# Patient Record
Sex: FEMALE | Race: OTHER | Employment: OTHER | URBAN - METROPOLITAN AREA
[De-identification: names, ages, dates, MRNs, and addresses within clinical notes are randomized per-mention and may not be internally consistent; named-entity substitution may affect disease eponyms.]

---

## 2017-01-05 PROCEDURE — 82728 ASSAY OF FERRITIN: CPT | Performed by: INTERNAL MEDICINE

## 2017-01-05 PROCEDURE — 82746 ASSAY OF FOLIC ACID SERUM: CPT | Performed by: INTERNAL MEDICINE

## 2017-01-05 PROCEDURE — 83540 ASSAY OF IRON: CPT | Performed by: INTERNAL MEDICINE

## 2017-01-05 PROCEDURE — 83550 IRON BINDING TEST: CPT | Performed by: INTERNAL MEDICINE

## 2017-01-05 PROCEDURE — 82607 VITAMIN B-12: CPT | Performed by: INTERNAL MEDICINE

## 2017-01-05 PROCEDURE — 85045 AUTOMATED RETICULOCYTE COUNT: CPT | Performed by: INTERNAL MEDICINE

## 2017-01-13 ENCOUNTER — APPOINTMENT (OUTPATIENT)
Dept: CT IMAGING | Age: 57
DRG: 389 | End: 2017-01-13
Attending: STUDENT IN AN ORGANIZED HEALTH CARE EDUCATION/TRAINING PROGRAM
Payer: COMMERCIAL

## 2017-01-13 ENCOUNTER — HOSPITAL ENCOUNTER (INPATIENT)
Facility: HOSPITAL | Age: 57
LOS: 3 days | Discharge: HOME | DRG: 389 | End: 2017-01-17
Attending: STUDENT IN AN ORGANIZED HEALTH CARE EDUCATION/TRAINING PROGRAM | Admitting: HOSPITALIST
Payer: COMMERCIAL

## 2017-01-13 DIAGNOSIS — K56.609 SBO (SMALL BOWEL OBSTRUCTION) (HCC): Primary | ICD-10-CM

## 2017-01-13 DIAGNOSIS — R10.9 ABDOMINAL PAIN, ACUTE: ICD-10-CM

## 2017-01-13 LAB
ALBUMIN SERPL-MCNC: 3.8 G/DL (ref 3.5–4.8)
ALP LIVER SERPL-CCNC: 119 U/L (ref 46–118)
ALT SERPL-CCNC: 27 U/L (ref 14–54)
AST SERPL-CCNC: 31 U/L (ref 15–41)
BASOPHILS # BLD AUTO: 0.06 X10(3) UL (ref 0–0.1)
BASOPHILS NFR BLD AUTO: 0.6 %
BILIRUB SERPL-MCNC: 0.5 MG/DL (ref 0.1–2)
BILIRUB UR QL STRIP.AUTO: NEGATIVE
BUN BLD-MCNC: 14 MG/DL (ref 8–20)
CALCIUM BLD-MCNC: 9.1 MG/DL (ref 8.3–10.3)
CHLORIDE: 106 MMOL/L (ref 101–111)
CO2: 29 MMOL/L (ref 22–32)
COLOR UR AUTO: YELLOW
CREAT BLD-MCNC: 0.86 MG/DL (ref 0.55–1.02)
EOSINOPHIL # BLD AUTO: 0.51 X10(3) UL (ref 0–0.3)
EOSINOPHIL NFR BLD AUTO: 5.4 %
ERYTHROCYTE [DISTWIDTH] IN BLOOD BY AUTOMATED COUNT: 13.4 % (ref 11.5–16)
GLUCOSE BLD-MCNC: 105 MG/DL (ref 70–99)
GLUCOSE UR STRIP.AUTO-MCNC: NEGATIVE MG/DL
HCT VFR BLD AUTO: 45.5 % (ref 34–50)
HGB BLD-MCNC: 14.8 G/DL (ref 12–16)
IMMATURE GRANULOCYTE COUNT: 0.02 X10(3) UL (ref 0–1)
IMMATURE GRANULOCYTE RATIO %: 0.2 %
KETONES UR STRIP.AUTO-MCNC: NEGATIVE MG/DL
LEUKOCYTE ESTERASE UR QL STRIP.AUTO: NEGATIVE
LYMPHOCYTES # BLD AUTO: 3.07 X10(3) UL (ref 0.9–4)
LYMPHOCYTES NFR BLD AUTO: 32.6 %
M PROTEIN MFR SERPL ELPH: 7.4 G/DL (ref 6.1–8.3)
MCH RBC QN AUTO: 30.3 PG (ref 27–33.2)
MCHC RBC AUTO-ENTMCNC: 32.5 G/DL (ref 31–37)
MCV RBC AUTO: 93 FL (ref 81–100)
MONOCYTES # BLD AUTO: 0.57 X10(3) UL (ref 0.1–0.6)
MONOCYTES NFR BLD AUTO: 6.1 %
NEUTROPHIL ABS PRELIM: 5.18 X10 (3) UL (ref 1.3–6.7)
NEUTROPHILS # BLD AUTO: 5.18 X10(3) UL (ref 1.3–6.7)
NEUTROPHILS NFR BLD AUTO: 55.1 %
NITRITE UR QL STRIP.AUTO: NEGATIVE
PH UR STRIP.AUTO: 5 [PH] (ref 4.5–8)
PLATELET # BLD AUTO: 212 10(3)UL (ref 150–450)
POTASSIUM SERPL-SCNC: 4.9 MMOL/L (ref 3.6–5.1)
PROT UR STRIP.AUTO-MCNC: NEGATIVE MG/DL
RBC # BLD AUTO: 4.89 X10(6)UL (ref 3.8–5.1)
RBC UR QL AUTO: NEGATIVE
RED CELL DISTRIBUTION WIDTH-SD: 46.1 FL (ref 35.1–46.3)
SODIUM SERPL-SCNC: 142 MMOL/L (ref 136–144)
SP GR UR STRIP.AUTO: 1.02 (ref 1–1.03)
UROBILINOGEN UR STRIP.AUTO-MCNC: 0.2 MG/DL
WBC # BLD AUTO: 9.4 X10(3) UL (ref 4–13)

## 2017-01-13 PROCEDURE — 83690 ASSAY OF LIPASE: CPT | Performed by: STUDENT IN AN ORGANIZED HEALTH CARE EDUCATION/TRAINING PROGRAM

## 2017-01-13 PROCEDURE — 96376 TX/PRO/DX INJ SAME DRUG ADON: CPT

## 2017-01-13 PROCEDURE — 99285 EMERGENCY DEPT VISIT HI MDM: CPT

## 2017-01-13 PROCEDURE — 96361 HYDRATE IV INFUSION ADD-ON: CPT

## 2017-01-13 PROCEDURE — 96374 THER/PROPH/DIAG INJ IV PUSH: CPT

## 2017-01-13 PROCEDURE — 96375 TX/PRO/DX INJ NEW DRUG ADDON: CPT

## 2017-01-13 PROCEDURE — 80053 COMPREHEN METABOLIC PANEL: CPT

## 2017-01-13 PROCEDURE — 85025 COMPLETE CBC W/AUTO DIFF WBC: CPT

## 2017-01-13 PROCEDURE — 81003 URINALYSIS AUTO W/O SCOPE: CPT | Performed by: STUDENT IN AN ORGANIZED HEALTH CARE EDUCATION/TRAINING PROGRAM

## 2017-01-13 RX ORDER — HYDROMORPHONE HYDROCHLORIDE 1 MG/ML
0.5 INJECTION, SOLUTION INTRAMUSCULAR; INTRAVENOUS; SUBCUTANEOUS EVERY 30 MIN PRN
Status: DISCONTINUED | OUTPATIENT
Start: 2017-01-13 | End: 2017-01-14

## 2017-01-13 RX ORDER — ONDANSETRON 2 MG/ML
4 INJECTION INTRAMUSCULAR; INTRAVENOUS ONCE
Status: COMPLETED | OUTPATIENT
Start: 2017-01-13 | End: 2017-01-13

## 2017-01-13 RX ORDER — ONDANSETRON 2 MG/ML
4 INJECTION INTRAMUSCULAR; INTRAVENOUS ONCE
Status: DISCONTINUED | OUTPATIENT
Start: 2017-01-13 | End: 2017-01-17

## 2017-01-13 RX ORDER — SODIUM CHLORIDE 9 MG/ML
INJECTION, SOLUTION INTRAVENOUS ONCE
Status: COMPLETED | OUTPATIENT
Start: 2017-01-13 | End: 2017-01-14

## 2017-01-14 ENCOUNTER — APPOINTMENT (OUTPATIENT)
Dept: GENERAL RADIOLOGY | Facility: HOSPITAL | Age: 57
DRG: 389 | End: 2017-01-14
Attending: SURGERY
Payer: COMMERCIAL

## 2017-01-14 ENCOUNTER — APPOINTMENT (OUTPATIENT)
Dept: CT IMAGING | Age: 57
DRG: 389 | End: 2017-01-14
Attending: STUDENT IN AN ORGANIZED HEALTH CARE EDUCATION/TRAINING PROGRAM
Payer: COMMERCIAL

## 2017-01-14 PROBLEM — K56.609 SBO (SMALL BOWEL OBSTRUCTION) (HCC): Status: ACTIVE | Noted: 2017-01-14

## 2017-01-14 PROBLEM — R10.9 ABDOMINAL PAIN, ACUTE: Status: ACTIVE | Noted: 2017-01-14

## 2017-01-14 LAB
ALBUMIN SERPL-MCNC: 3.4 G/DL (ref 3.5–4.8)
ALP LIVER SERPL-CCNC: 144 U/L (ref 46–118)
ALT SERPL-CCNC: 64 U/L (ref 14–54)
AST SERPL-CCNC: 115 U/L (ref 15–41)
BASOPHILS # BLD AUTO: 0.05 X10(3) UL (ref 0–0.1)
BASOPHILS NFR BLD AUTO: 0.6 %
BILIRUB SERPL-MCNC: 0.4 MG/DL (ref 0.1–2)
BUN BLD-MCNC: 11 MG/DL (ref 8–20)
CALCIUM BLD-MCNC: 8.2 MG/DL (ref 8.3–10.3)
CHLORIDE: 109 MMOL/L (ref 101–111)
CO2: 29 MMOL/L (ref 22–32)
CREAT BLD-MCNC: 0.66 MG/DL (ref 0.55–1.02)
EOSINOPHIL # BLD AUTO: 0.26 X10(3) UL (ref 0–0.3)
EOSINOPHIL NFR BLD AUTO: 3.2 %
ERYTHROCYTE [DISTWIDTH] IN BLOOD BY AUTOMATED COUNT: 13.5 % (ref 11.5–16)
GLUCOSE BLD-MCNC: 103 MG/DL (ref 70–99)
HCT VFR BLD AUTO: 40 % (ref 34–50)
HGB BLD-MCNC: 13.2 G/DL (ref 12–16)
IMMATURE GRANULOCYTE COUNT: 0.03 X10(3) UL (ref 0–1)
IMMATURE GRANULOCYTE RATIO %: 0.4 %
LIPASE: 139 U/L (ref 73–393)
LYMPHOCYTES # BLD AUTO: 1.88 X10(3) UL (ref 0.9–4)
LYMPHOCYTES NFR BLD AUTO: 23.5 %
M PROTEIN MFR SERPL ELPH: 6.2 G/DL (ref 6.1–8.3)
MCH RBC QN AUTO: 30.8 PG (ref 27–33.2)
MCHC RBC AUTO-ENTMCNC: 33 G/DL (ref 31–37)
MCV RBC AUTO: 93.5 FL (ref 81–100)
MONOCYTES # BLD AUTO: 0.53 X10(3) UL (ref 0.1–0.6)
MONOCYTES NFR BLD AUTO: 6.6 %
NEUTROPHIL ABS PRELIM: 5.26 X10 (3) UL (ref 1.3–6.7)
NEUTROPHILS # BLD AUTO: 5.26 X10(3) UL (ref 1.3–6.7)
NEUTROPHILS NFR BLD AUTO: 65.7 %
PLATELET # BLD AUTO: 178 10(3)UL (ref 150–450)
POTASSIUM SERPL-SCNC: 4.1 MMOL/L (ref 3.6–5.1)
RBC # BLD AUTO: 4.28 X10(6)UL (ref 3.8–5.1)
RED CELL DISTRIBUTION WIDTH-SD: 45.9 FL (ref 35.1–46.3)
SODIUM SERPL-SCNC: 143 MMOL/L (ref 136–144)
WBC # BLD AUTO: 8 X10(3) UL (ref 4–13)

## 2017-01-14 PROCEDURE — 71010 XR CHEST AP PORTABLE  (CPT=71010): CPT

## 2017-01-14 PROCEDURE — 94640 AIRWAY INHALATION TREATMENT: CPT

## 2017-01-14 PROCEDURE — 80053 COMPREHEN METABOLIC PANEL: CPT | Performed by: INTERNAL MEDICINE

## 2017-01-14 PROCEDURE — 85025 COMPLETE CBC W/AUTO DIFF WBC: CPT | Performed by: INTERNAL MEDICINE

## 2017-01-14 PROCEDURE — 74177 CT ABD & PELVIS W/CONTRAST: CPT

## 2017-01-14 RX ORDER — OXYBUTYNIN CHLORIDE 10 MG/1
10 TABLET, EXTENDED RELEASE ORAL DAILY
Status: DISCONTINUED | OUTPATIENT
Start: 2017-01-14 | End: 2017-01-17

## 2017-01-14 RX ORDER — HYDROCODONE BITARTRATE AND ACETAMINOPHEN 5; 325 MG/1; MG/1
2 TABLET ORAL EVERY 4 HOURS PRN
Status: DISCONTINUED | OUTPATIENT
Start: 2017-01-14 | End: 2017-01-17

## 2017-01-14 RX ORDER — HYDROCODONE BITARTRATE AND ACETAMINOPHEN 5; 325 MG/1; MG/1
1 TABLET ORAL EVERY 4 HOURS PRN
Status: DISCONTINUED | OUTPATIENT
Start: 2017-01-14 | End: 2017-01-17

## 2017-01-14 RX ORDER — ONDANSETRON 2 MG/ML
4 INJECTION INTRAMUSCULAR; INTRAVENOUS EVERY 6 HOURS PRN
Status: DISCONTINUED | OUTPATIENT
Start: 2017-01-14 | End: 2017-01-17

## 2017-01-14 RX ORDER — MONTELUKAST SODIUM 10 MG/1
10 TABLET ORAL
Status: DISCONTINUED | OUTPATIENT
Start: 2017-01-14 | End: 2017-01-17

## 2017-01-14 RX ORDER — LOSARTAN POTASSIUM 100 MG/1
100 TABLET ORAL DAILY
Status: DISCONTINUED | OUTPATIENT
Start: 2017-01-14 | End: 2017-01-17

## 2017-01-14 RX ORDER — MORPHINE SULFATE 2 MG/ML
2 INJECTION, SOLUTION INTRAMUSCULAR; INTRAVENOUS EVERY 2 HOUR PRN
Status: DISCONTINUED | OUTPATIENT
Start: 2017-01-14 | End: 2017-01-17

## 2017-01-14 RX ORDER — BUPROPION HYDROCHLORIDE 100 MG/1
100 TABLET ORAL 2 TIMES DAILY
Status: DISCONTINUED | OUTPATIENT
Start: 2017-01-14 | End: 2017-01-17

## 2017-01-14 RX ORDER — FLUOXETINE HYDROCHLORIDE 20 MG/1
40 CAPSULE ORAL DAILY
Status: DISCONTINUED | OUTPATIENT
Start: 2017-01-14 | End: 2017-01-17

## 2017-01-14 RX ORDER — HEPARIN SODIUM 5000 [USP'U]/ML
7500 INJECTION, SOLUTION INTRAVENOUS; SUBCUTANEOUS EVERY 8 HOURS
Status: DISCONTINUED | OUTPATIENT
Start: 2017-01-14 | End: 2017-01-17

## 2017-01-14 RX ORDER — SODIUM CHLORIDE 9 MG/ML
INJECTION, SOLUTION INTRAVENOUS CONTINUOUS
Status: DISCONTINUED | OUTPATIENT
Start: 2017-01-14 | End: 2017-01-17

## 2017-01-14 RX ORDER — MORPHINE SULFATE 4 MG/ML
4 INJECTION, SOLUTION INTRAMUSCULAR; INTRAVENOUS EVERY 2 HOUR PRN
Status: DISCONTINUED | OUTPATIENT
Start: 2017-01-14 | End: 2017-01-17

## 2017-01-14 RX ORDER — ONDANSETRON 2 MG/ML
4 INJECTION INTRAMUSCULAR; INTRAVENOUS EVERY 4 HOURS PRN
Status: DISCONTINUED | OUTPATIENT
Start: 2017-01-14 | End: 2017-01-14

## 2017-01-14 RX ORDER — HYDROMORPHONE HYDROCHLORIDE 1 MG/ML
0.5 INJECTION, SOLUTION INTRAMUSCULAR; INTRAVENOUS; SUBCUTANEOUS EVERY 30 MIN PRN
Status: ACTIVE | OUTPATIENT
Start: 2017-01-14 | End: 2017-01-14

## 2017-01-14 RX ORDER — HEPARIN SODIUM 5000 [USP'U]/ML
5000 INJECTION, SOLUTION INTRAVENOUS; SUBCUTANEOUS EVERY 8 HOURS
Status: DISCONTINUED | OUTPATIENT
Start: 2017-01-14 | End: 2017-01-14

## 2017-01-14 RX ORDER — ALBUTEROL SULFATE 90 UG/1
2 AEROSOL, METERED RESPIRATORY (INHALATION) EVERY 4 HOURS PRN
Status: DISCONTINUED | OUTPATIENT
Start: 2017-01-14 | End: 2017-01-17

## 2017-01-14 RX ORDER — SODIUM CHLORIDE 9 MG/ML
INJECTION, SOLUTION INTRAVENOUS CONTINUOUS
Status: ACTIVE | OUTPATIENT
Start: 2017-01-14 | End: 2017-01-14

## 2017-01-14 RX ORDER — AMLODIPINE BESYLATE 5 MG/1
5 TABLET ORAL 2 TIMES DAILY
Status: DISCONTINUED | OUTPATIENT
Start: 2017-01-14 | End: 2017-01-16

## 2017-01-14 RX ORDER — MORPHINE SULFATE 2 MG/ML
1 INJECTION, SOLUTION INTRAMUSCULAR; INTRAVENOUS EVERY 2 HOUR PRN
Status: DISCONTINUED | OUTPATIENT
Start: 2017-01-14 | End: 2017-01-17

## 2017-01-14 RX ORDER — ACETAMINOPHEN 325 MG/1
650 TABLET ORAL EVERY 4 HOURS PRN
Status: DISCONTINUED | OUTPATIENT
Start: 2017-01-14 | End: 2017-01-17

## 2017-01-14 NOTE — PROGRESS NOTES
Brief Internal Medicine Note    Full Note to Follow    Pt is a 59 yo with HTN/HL, morbid obesity s/p gastric bypass, pseudoseizures, asthma, GERD, depression, who is admitted for abdominal pain.   Yesterday, started having n/bilious vomiting with intermit

## 2017-01-14 NOTE — PROGRESS NOTES
1/14/17 14 FR NG TUBE INSERTED IN LEFT NARE AND PLACED TO LIS SUCTION PER ORDER. SMALLER (14 FR) NG TUBE USED PER REQUEST OF DR TALAVERA RELATED TO PT HISTORY. PT TOLERATED WELL. NO SHORTNESS OF BREATH NOTED. CPOX REMAINS ON AND 02 SAT 97% ON RA.  SMALL AMOUNT

## 2017-01-14 NOTE — PROGRESS NOTES
NURSING ADMISSION NOTE      Patient admitted via cart via ambulance from [facility] er    Oriented to room. Safety precautions initiated. Bed in low position. Call light in reach.  at bedside. stephan protocol started.  placed on o2 at 2 l. decline

## 2017-01-14 NOTE — CONSULTS
[facility]  Report of Consultation    Samra Diallo Patient Status:  Inpatient    1960 MRN [id number]   [facility] 3NW-A Attending Louise Katz, *   Hosp Day # 1 PCP Michael Farr MD     Reason for [facility] UNDER STRESS   • Hearing impairment    • Muscle weakness    • Blood disorder      LOW IRON       Past Surgical History:        Past Surgical History    UPPER GI ENDOSCOPY,DIAGNOSIS  5/18/2005    Comment hiatal hernia, mild gastritis (H pylori (--))    SHADIA acetaminophen (TYLENOL) tab 650 mg, 650 mg, Oral, Q4H PRN **OR** HYDROcodone-acetaminophen (NORCO) 5-325 MG per tab 1 tablet, 1 tablet, Oral, Q4H PRN **OR** HYDROcodone-acetaminophen (NORCO) 5-325 MG per tab 2 tablet, 2 tablet, Oral, Q4H PRN  •  ondansetro Montelukast Sodium 10 MG Oral Tab Take 1 tablet (10 mg total) by mouth once daily.  Disp: 90 tablet Rfl: 2   FLUoxetine HCl 40 MG Oral Cap TAKE 2 CAPSULES DAILY Disp: 180 capsule Rfl: 2   Pantoprazole Sodium 40 MG Oral Tab EC Take 1 tablet (40 mg total) b performed from the dome of the diaphragm to the pubic symphysis with non-ionic intravenous contrast material. Post contrast coronal MIP imaging was performed. Dose reduction techniques were used.  Dose information is transmitted to the [employer] ([employer] Problem List:    Patient Active Problem List:     Type II or unspecified type diabetes mellitus without mention of complication, not stated as uncontrolled     Morbid obesity ([facility] [address]     Essential hypertension     Anxiety state, unspecified     Allergic

## 2017-01-14 NOTE — H&P
[facility] Hospitalist H&P       CC: abdominal pain    PCP: Gregg Arellano MD    History of Present Illness:     Pt is a 59 yo with HTN/HL, morbid obesity s/p gastric bypass, pseudoseizures, asthma, GERD, depression, who is admitted for abdominal pain. BYPASS,OBESE<100CM LIZBETH-EN-Y      UPPER GI ENDOSCOPY,DIAGNOSIS  3/24/2009    CHOLECYSTECTOMY      COLONOSCOPY  5/18/2005    Comment diverticulosis - result in scan - Clabe Sor    TUBAL LIGATION      OTHER SURGICAL HISTORY  2007    Comment Lizbeth-en-y nose within normal limits, hearing intact       Neck: Supple, symmetrical, trachea midline, no thyromegaly   Lungs:   Clear diminished. Normal effort   Chest wall:  No tenderness or deformity.    Heart:  Regular rate and rhythm, S1, S2 normal, no murmur, ru FINDINGS:  LIVER:  Normal.  No enlargement, atrophy, abnormal density, or significant focal lesion. BILIARY:  Cholecystectomy. Common duct measures 1.5 cm. PANCREAS:  Normal.  No lesion, fluid collection, ductal dilatation, or atrophy.   SPLEEN:  Normal. continue home meds    **PPx-heparin subq    Outpatient records or previous hospital records reviewed. Further recommendations pending patient's clinical course.   [facility] hospitalist to continue to follow patient while in house    Patient and/or patient's f

## 2017-01-14 NOTE — CONSULTS
[facility] Progress Note:  Anticoagulation Weight Dose Adjustment for heparin    Suellen Henderson is a 60year old female who has been prescribed heparin 5000 units subq , q8hr for VTE prophylaxis. CrCl cannot be calculated (Unknown ideal weight. ).

## 2017-01-14 NOTE — ED PROVIDER NOTES
Patient Seen in: THE [facility] Department In [place]    History   Patient presents with:  Abdomen/Flank Pain (GI/)    Stated Complaint: Abd pain    HPI    Patient is a 57-year-old female who presents emergency department complaining of generalize scan - Meir Sida    TUBAL LIGATION      OTHER SURGICAL HISTORY  2007    Comment Lizbeth-en-y    OTHER SURGICAL HISTORY  1990    Comment Lap laser removal of endometrisis    TONSILLECTOMY      GASTRIC BYPASS,OBESITY,SB RECONSTRUC      SLING OPER STRES I Former Smoker                   Packs/Day: 0.30  Years: 15        Types: Cigarettes      Quit date: 03/04/2007    Smokeless Status: Never Used                        Comment: 1/2 ppd x 10 yrs     Alcohol Use: Yes           2.4 oz/week       4 Cans of beer, Abnormal; Notable for the following:     Glucose 105 (*)     Alkaline Phosphatase 119 (*)     All other components within normal limits   URINALYSIS WITH CULTURE REFLEX - Abnormal; Notable for the following:     Clarity Urine Slightly Cloudy (*)     All ot obtained, who recommended bowel rest and no NGT given lack of active vomiting and risk of trauma to prior gastric bypass.     Disposition and Plan     Clinical Impression:  SBO (small bowel obstruction) ([address]  (primary encounter diagnosis)  Abdominal pain,

## 2017-01-14 NOTE — PLAN OF CARE
Minimal or absence of nausea and vomiting Not Progressing      Maintains or returns to baseline bowel function Not Progressing      Maintains adequate nutritional intake (undernourished) Not Progressing      Discharge to home or other facility with appropr

## 2017-01-15 ENCOUNTER — APPOINTMENT (OUTPATIENT)
Dept: GENERAL RADIOLOGY | Facility: HOSPITAL | Age: 57
DRG: 389 | End: 2017-01-15
Attending: SURGERY
Payer: COMMERCIAL

## 2017-01-15 LAB
BUN BLD-MCNC: 6 MG/DL (ref 8–20)
CALCIUM BLD-MCNC: 8.6 MG/DL (ref 8.3–10.3)
CHLORIDE: 109 MMOL/L (ref 101–111)
CO2: 28 MMOL/L (ref 22–32)
CREAT BLD-MCNC: 0.54 MG/DL (ref 0.55–1.02)
ERYTHROCYTE [DISTWIDTH] IN BLOOD BY AUTOMATED COUNT: 13.6 % (ref 11.5–16)
GLUCOSE BLD-MCNC: 81 MG/DL (ref 70–99)
HCT VFR BLD AUTO: 40.8 % (ref 34–50)
HGB BLD-MCNC: 12.7 G/DL (ref 12–16)
MCH RBC QN AUTO: 30.2 PG (ref 27–33.2)
MCHC RBC AUTO-ENTMCNC: 31.1 G/DL (ref 31–37)
MCV RBC AUTO: 97.1 FL (ref 81–100)
PLATELET # BLD AUTO: 165 10(3)UL (ref 150–450)
POTASSIUM SERPL-SCNC: 3.9 MMOL/L (ref 3.6–5.1)
RBC # BLD AUTO: 4.2 X10(6)UL (ref 3.8–5.1)
RED CELL DISTRIBUTION WIDTH-SD: 48.9 FL (ref 35.1–46.3)
SODIUM SERPL-SCNC: 143 MMOL/L (ref 136–144)
WBC # BLD AUTO: 6.1 X10(3) UL (ref 4–13)

## 2017-01-15 PROCEDURE — 80048 BASIC METABOLIC PNL TOTAL CA: CPT | Performed by: SURGERY

## 2017-01-15 PROCEDURE — 85027 COMPLETE CBC AUTOMATED: CPT | Performed by: SURGERY

## 2017-01-15 PROCEDURE — 74020 XR ABDOMEN, OBSTRUCTIVE SERIES (CPT=74020): CPT

## 2017-01-15 RX ORDER — HYDRALAZINE HYDROCHLORIDE 20 MG/ML
10 INJECTION INTRAMUSCULAR; INTRAVENOUS EVERY 6 HOURS PRN
Status: DISCONTINUED | OUTPATIENT
Start: 2017-01-15 | End: 2017-01-17

## 2017-01-15 RX ORDER — KETOROLAC TROMETHAMINE 30 MG/ML
30 INJECTION, SOLUTION INTRAMUSCULAR; INTRAVENOUS EVERY 6 HOURS PRN
Status: DISPENSED | OUTPATIENT
Start: 2017-01-15 | End: 2017-01-17

## 2017-01-15 NOTE — PLAN OF CARE
Maintains or returns to baseline bowel function Not Progressing      Maintains adequate nutritional intake (undernourished) Not Progressing      Discharge to home or other facility with appropriate resources Progressing      Glucose maintained within presc

## 2017-01-15 NOTE — PLAN OF CARE
DISCHARGE PLANNING    • Discharge to home or other facility with appropriate resources Progressing        Diabetes/Glucose Control    • Glucose maintained within prescribed range Progressing        GASTROINTESTINAL - ADULT    • Minimal or absence of nausea

## 2017-01-15 NOTE — PROGRESS NOTES
[facility]  Progress Note    Reid Loss Patient Status:  Inpatient    1960 MRN [id number]   [facility] 3NW-A Attending Ron Bailey, *   Hosp Day # 2 PCP Татьяна Singer MD     Subjective:    Feels better.   A malabsorption     Other iron deficiency anemias     Ganglion cyst of wrist, right     Epilepsy seizure, generalized, convulsive ([facility] [address] pt. says was told it was \"nerves\"     Alteration consciousness     SBO (small bowel obstruction) (HCC)     Abdominal pa

## 2017-01-16 ENCOUNTER — APPOINTMENT (OUTPATIENT)
Dept: CT IMAGING | Facility: HOSPITAL | Age: 57
DRG: 389 | End: 2017-01-16
Attending: HOSPITALIST
Payer: COMMERCIAL

## 2017-01-16 LAB
GLUCOSE BLD-MCNC: 117 MG/DL (ref 65–99)
GLUCOSE BLD-MCNC: 146 MG/DL (ref 65–99)
GLUCOSE BLD-MCNC: 149 MG/DL (ref 65–99)
GLUCOSE BLD-MCNC: 63 MG/DL (ref 65–99)
GLUCOSE BLD-MCNC: 72 MG/DL (ref 65–99)
GLUCOSE BLD-MCNC: 89 MG/DL (ref 65–99)

## 2017-01-16 PROCEDURE — 70450 CT HEAD/BRAIN W/O DYE: CPT

## 2017-01-16 PROCEDURE — 82962 GLUCOSE BLOOD TEST: CPT

## 2017-01-16 RX ORDER — DEXTROSE MONOHYDRATE 25 G/50ML
50 INJECTION, SOLUTION INTRAVENOUS AS NEEDED
Status: DISCONTINUED | OUTPATIENT
Start: 2017-01-16 | End: 2017-01-17

## 2017-01-16 RX ORDER — HYDRALAZINE HYDROCHLORIDE 20 MG/ML
10 INJECTION INTRAMUSCULAR; INTRAVENOUS ONCE
Status: COMPLETED | OUTPATIENT
Start: 2017-01-16 | End: 2017-01-16

## 2017-01-16 RX ORDER — AMLODIPINE BESYLATE 5 MG/1
10 TABLET ORAL 2 TIMES DAILY
Status: DISCONTINUED | OUTPATIENT
Start: 2017-01-16 | End: 2017-01-16

## 2017-01-16 RX ORDER — AMLODIPINE BESYLATE 5 MG/1
5 TABLET ORAL 2 TIMES DAILY
Status: DISCONTINUED | OUTPATIENT
Start: 2017-01-16 | End: 2017-01-17

## 2017-01-16 RX ORDER — DEXTROSE MONOHYDRATE 25 G/50ML
INJECTION, SOLUTION INTRAVENOUS
Status: COMPLETED
Start: 2017-01-16 | End: 2017-01-16

## 2017-01-16 NOTE — PROGRESS NOTES
[facility] Hospitalist Progress Note                                                                   [address]  8/7/1960    CC: FU Abd pain    Interval History:  - +  Nausea, passing gas 165.0     Recent Labs   Lab  01/13/17   2233  01/14/17   0602  01/15/17   0631   GLU  105*  103*  81   BUN  14  11  6*   CREATSERUM  0.86  0.66  0.54*   CA  9.1  8.2*  8.6   NA  142  143  143   K  4.9  4.1  3.9   CL  106  109  109   CO2  29.0  29.0  28.0

## 2017-01-16 NOTE — PLAN OF CARE
NG tube clamped per MD's orders, patient instructed to notify writing RN if she develops any nausea or vomiting and plan of care to check residuals after 6 hours, patient verbalizes understanding. NG d/c. Pt tolerated clamping.

## 2017-01-16 NOTE — PAYOR COMM NOTE
Attending Physician: Deandre Pineda,*    Review Type: ADMISSION   Reviewer: Zee Tate       Date: January 16, 2017 - 8:24 AM  Payor: [facility] [address] Number:  [id number]  Admit date: 1/13/2017 10:11 PM   Admit yesterday.  No melena, hematochezia, or hemetemesis.  No dysuria, urgency.  Has had multiple abdominal surgeries in the past  PMH  Past Medical History    Diagnosis  Date    •  Anemia, unspecified          EGD/colon in 2005 negative    •  Obesity, unspecifi BYPASS,OBESITY,SB RECONSTRUC          SLING OPER STRES INCONTINENCE          COLONOSCOPY  N/A  5/25/2016      Comment  adenoma/hp, divertic. repeat 5 yrs, mac/2 day prep         ALL:    No Known Allergies           Home Medications:    Active Medications Neurologic:  Psychiatric:  No focal deficits, cranial nerves grossly intact, sensation intact  AxO, appropriate affect, recent/remote memory intact        Diagnostic Data:     CBC/Chem  Recent Labs    Lab  01/13/17   2233   01/14/17   0602    WBC   9.4  RETROPERITONEUM:  Normal.  No mass or adenopathy.  BOWEL/MESENTERY:  Postsurgical changes of a gastric bypass. Distended proximal small bowel loop measures up to 4.7 cm with transition point at the site of the small bowel anastomosis.  Normal caliber colon Service: (none) Author Type: Physician     Filed: 1/15/2017 10:59 AM Note Time: 1/15/2017 10:58 AM Status: Signed     : Nicole Campos MD (Physician)     [facility]  Progress Note  [address] [place] [address] [address] Patient Status:  Inpatient    [facility] 8/7/ w/minimal debridement-lt knee-global thru 8/27/14     S/P left knee arthroscopy     Hypertonicity of bladder     Transient cerebral ischemia     At risk for falling     Chest pain     Iron malabsorption     Other iron deficiency anemias     Ganglion cyst o Intravenous Bairon Chaudhary, RN      0.9%  NaCl infusion 125 ml/hr  Freq:  Once Route: IV  Last Dose: 1,000 mL (01/14/17 0006)  Start: 01/13/17 2257 End: 01/14/17 0006    RESULTS LAST 24HRS:  Labs Reviewed   COMP METABOLIC PANEL (14) - Abnorm -----------         ------                     CBC W/ DIFFERENTIAL[[id number]]                              Final result                 Please view results for these tests on the individual orders.    CBC W/ DIFFERENTIAL

## 2017-01-16 NOTE — PROGRESS NOTES
[facility] Hospitalist Progress Note                                                                   [address]  8/7/1960    CC: FU Abd pain    Interval History:  - Nausea improved, wants MCH  30.3  30.8  30.2   MCHC  32.5  33.0  31.1   RDW  13.4  13.5  13.6   NEPRELIM  5.18  5.26   --    WBC  9.4  8.0  6.1   PLT  212.0  178.0  165.0     Recent Labs   Lab  01/13/17   2233  01/14/17   0602  01/15/17   0631   GLU  105*  103*  81   BUN  14

## 2017-01-16 NOTE — PROGRESS NOTES
[facility]  Progress Note    Ocasio Manifold Patient Status:  Inpatient    1960 MRN [id number]   [facility] 3NW-A Attending Cora Archer Day # 3 PCP Jv Arias MD     Subjective:    Patient reports SBO (small bowel obstruction) (HCC)     Abdominal pain, acute          Impression:     61 y/o with small bowel obstruction, improved    Plan:    -will clamp NG today x 6 hrs, check residual if less than 150cc can remove and start clears.   If increase in ab

## 2017-01-17 VITALS
OXYGEN SATURATION: 94 % | RESPIRATION RATE: 18 BRPM | WEIGHT: 277.38 LBS | TEMPERATURE: 98 F | HEART RATE: 73 BPM | SYSTOLIC BLOOD PRESSURE: 135 MMHG | DIASTOLIC BLOOD PRESSURE: 64 MMHG | BODY MASS INDEX: 49 KG/M2

## 2017-01-17 LAB
GLUCOSE BLD-MCNC: 132 MG/DL (ref 65–99)
GLUCOSE BLD-MCNC: 85 MG/DL (ref 65–99)

## 2017-01-17 PROCEDURE — 82962 GLUCOSE BLOOD TEST: CPT

## 2017-01-17 RX ORDER — FLUTICASONE PROPIONATE 50 MCG
2 SPRAY, SUSPENSION (ML) NASAL 2 TIMES DAILY
Qty: 1 BOTTLE | Refills: 0 | Status: SHIPPED | OUTPATIENT
Start: 2017-01-17 | End: 2017-01-29

## 2017-01-17 RX ORDER — ACETAMINOPHEN 325 MG/1
650 TABLET ORAL EVERY 4 HOURS PRN
Qty: 20 TABLET | Refills: 0 | Status: SHIPPED | OUTPATIENT
Start: 2017-01-17 | End: 2017-05-04 | Stop reason: ALTCHOICE

## 2017-01-17 RX ORDER — FLUTICASONE PROPIONATE 50 MCG
2 SPRAY, SUSPENSION (ML) NASAL 2 TIMES DAILY
Status: DISCONTINUED | OUTPATIENT
Start: 2017-01-17 | End: 2017-01-17

## 2017-01-17 NOTE — DISCHARGE SUMMARY
General Medicine Discharge Summary     Patient ID:  Minda Stone  60year old  8/7/1960    Admit date: 1/13/2017    Discharge date and time: 1/17/17    Attending Physician: Mayo Cabrera diabetic but had no PO, on IVF only, on admit given SBO. She is now eating and BS up as expected. Discussed in detail with patient. Last HA1C 5.9.      Consults: IP CONSULT TO HOSPITALIST  IP CONSULT TO GENERAL SURGERY  IP CONSULT TO RESPIRATORY CARE  IP CO residue  Wound Care: as directed  Code Status: Full Code      Exam on day of discharge:      01/17/17  1141   BP: 135/64   Pulse: 73   Temp: 98 °F (36.7 °C)   Resp: 18       General: no acute distress, alert and oriented x 3  Heart: RRR  Lungs: clear bilat

## 2017-01-17 NOTE — PROGRESS NOTES
[facility]  Progress Note    Christine Ege Patient Status:  Inpatient    1960 MRN [id number]   [facility] 3NW-A Attending Matthew Jeong   [facility] Day # 4 PCP iVvienne Broderick MD     Subjective:  NG was removed yes diet  -encourage ambulation    Patient seen and examined with Neville Maharaj [facility]  1/17/2017

## 2017-01-17 NOTE — PROGRESS NOTES
NURSING DISCHARGE NOTE    Discharged pt via wheelchair to [facility]. Accompanied by spouse. Belongings at hand. IV catheter d/c'd; catheter intact. Discharge instruction and education given to pt and verbalizes understanding.  Scripts filled via [employer]

## 2017-01-17 NOTE — PLAN OF CARE
DISCHARGE PLANNING    • Discharge to home or other facility with appropriate resources Completed        Diabetes/Glucose Control    • Glucose maintained within prescribed range Completed        GASTROINTESTINAL - ADULT    • Minimal or absence of nausea and

## 2017-01-19 NOTE — PAYOR COMM NOTE
Review Type: CONTINUED STAY  Reviewer: Belem Tomas     Date: January 19, 2017 - 1:12 PM  Payor: [address] Number:  [id number]  Admit date: 1/13/2017 10:11 PM        Discharge Summaries by Ryne Goodrich meds- not getting orals as NPO- prn hydralazine.  Restart home meds on D/C    **morbid obesity s/p gastric bypass-encourage weight loss, f/u with PCP    **pseudoseizures, asthma, GERD, depression-stable, no acute issues- continue home meds    **HA- toradol Oral Tab  Take 1 tablet (10 mg total) by mouth once daily. FLUoxetine HCl 40 MG Oral Cap  TAKE 2 CAPSULES DAILY    Pantoprazole Sodium 40 MG Oral Tab EC  Take 1 tablet (40 mg total) by mouth daily.     Albuterol Sulfate HFA (PROAIR HFA) 108 (90 BASE) MCG

## 2017-01-29 ENCOUNTER — APPOINTMENT (OUTPATIENT)
Dept: GENERAL RADIOLOGY | Facility: HOSPITAL | Age: 57
End: 2017-01-29
Attending: EMERGENCY MEDICINE
Payer: COMMERCIAL

## 2017-01-29 ENCOUNTER — HOSPITAL ENCOUNTER (OUTPATIENT)
Facility: HOSPITAL | Age: 57
Setting detail: OBSERVATION
Discharge: HOME | End: 2017-01-31
Attending: EMERGENCY MEDICINE | Admitting: FAMILY MEDICINE
Payer: COMMERCIAL

## 2017-01-29 ENCOUNTER — APPOINTMENT (OUTPATIENT)
Dept: CT IMAGING | Facility: HOSPITAL | Age: 57
End: 2017-01-29
Attending: EMERGENCY MEDICINE
Payer: COMMERCIAL

## 2017-01-29 DIAGNOSIS — G45.9 TIA DUE TO EMBOLISM (HCC): Primary | ICD-10-CM

## 2017-01-29 DIAGNOSIS — I74.9 TIA DUE TO EMBOLISM (HCC): Primary | ICD-10-CM

## 2017-01-29 DIAGNOSIS — G40.309 EPILEPSY SEIZURE, GENERALIZED, CONVULSIVE (HCC): ICD-10-CM

## 2017-01-29 LAB
ALBUMIN SERPL-MCNC: 3.7 G/DL (ref 3.5–4.8)
ALP LIVER SERPL-CCNC: 127 U/L (ref 46–118)
ALT SERPL-CCNC: 29 U/L (ref 14–54)
AST SERPL-CCNC: 18 U/L (ref 15–41)
BASOPHILS # BLD AUTO: 0.07 X10(3) UL (ref 0–0.1)
BASOPHILS NFR BLD AUTO: 0.8 %
BILIRUB SERPL-MCNC: 0.4 MG/DL (ref 0.1–2)
BUN BLD-MCNC: 14 MG/DL (ref 8–20)
CALCIUM BLD-MCNC: 8.6 MG/DL (ref 8.3–10.3)
CHLORIDE: 109 MMOL/L (ref 101–111)
CO2: 25 MMOL/L (ref 22–32)
CREAT BLD-MCNC: 0.76 MG/DL (ref 0.55–1.02)
EOSINOPHIL # BLD AUTO: 0.42 X10(3) UL (ref 0–0.3)
EOSINOPHIL NFR BLD AUTO: 5 %
ERYTHROCYTE [DISTWIDTH] IN BLOOD BY AUTOMATED COUNT: 13.6 % (ref 11.5–16)
GLUCOSE BLD-MCNC: 98 MG/DL (ref 70–99)
HCT VFR BLD AUTO: 44.1 % (ref 34–50)
HGB BLD-MCNC: 14.5 G/DL (ref 12–16)
IMMATURE GRANULOCYTE COUNT: 0.02 X10(3) UL (ref 0–1)
IMMATURE GRANULOCYTE RATIO %: 0.2 %
LYMPHOCYTES # BLD AUTO: 3.06 X10(3) UL (ref 0.9–4)
LYMPHOCYTES NFR BLD AUTO: 36.4 %
M PROTEIN MFR SERPL ELPH: 7.2 G/DL (ref 6.1–8.3)
MCH RBC QN AUTO: 30.7 PG (ref 27–33.2)
MCHC RBC AUTO-ENTMCNC: 32.9 G/DL (ref 31–37)
MCV RBC AUTO: 93.2 FL (ref 81–100)
MONOCYTES # BLD AUTO: 0.49 X10(3) UL (ref 0.1–0.6)
MONOCYTES NFR BLD AUTO: 5.8 %
NEUTROPHIL ABS PRELIM: 4.34 X10 (3) UL (ref 1.3–6.7)
NEUTROPHILS # BLD AUTO: 4.34 X10(3) UL (ref 1.3–6.7)
NEUTROPHILS NFR BLD AUTO: 51.8 %
PLATELET # BLD AUTO: 248 10(3)UL (ref 150–450)
POTASSIUM SERPL-SCNC: 4.1 MMOL/L (ref 3.6–5.1)
RBC # BLD AUTO: 4.73 X10(6)UL (ref 3.8–5.1)
RED CELL DISTRIBUTION WIDTH-SD: 46.5 FL (ref 35.1–46.3)
SODIUM SERPL-SCNC: 142 MMOL/L (ref 136–144)
TROPONIN: <0.046 NG/ML (ref ?–0.05)
WBC # BLD AUTO: 8.4 X10(3) UL (ref 4–13)

## 2017-01-29 PROCEDURE — 85025 COMPLETE CBC W/AUTO DIFF WBC: CPT | Performed by: EMERGENCY MEDICINE

## 2017-01-29 PROCEDURE — 93005 ELECTROCARDIOGRAM TRACING: CPT

## 2017-01-29 PROCEDURE — 36415 COLL VENOUS BLD VENIPUNCTURE: CPT

## 2017-01-29 PROCEDURE — 99285 EMERGENCY DEPT VISIT HI MDM: CPT

## 2017-01-29 PROCEDURE — 70450 CT HEAD/BRAIN W/O DYE: CPT

## 2017-01-29 PROCEDURE — 71010 XR CHEST AP PORTABLE  (CPT=71010): CPT

## 2017-01-29 PROCEDURE — 80053 COMPREHEN METABOLIC PANEL: CPT | Performed by: EMERGENCY MEDICINE

## 2017-01-29 PROCEDURE — 84484 ASSAY OF TROPONIN QUANT: CPT | Performed by: EMERGENCY MEDICINE

## 2017-01-29 PROCEDURE — 93010 ELECTROCARDIOGRAM REPORT: CPT

## 2017-01-29 RX ORDER — ASPIRIN 325 MG
325 TABLET ORAL DAILY
Status: DISCONTINUED | OUTPATIENT
Start: 2017-01-30 | End: 2017-01-31

## 2017-01-29 RX ORDER — ASPIRIN 325 MG
325 TABLET, DELAYED RELEASE (ENTERIC COATED) ORAL ONCE
Status: COMPLETED | OUTPATIENT
Start: 2017-01-29 | End: 2017-01-29

## 2017-01-29 RX ORDER — LOSARTAN POTASSIUM 100 MG/1
100 TABLET ORAL DAILY
Status: DISCONTINUED | OUTPATIENT
Start: 2017-01-30 | End: 2017-01-31

## 2017-01-29 RX ORDER — UBIDECARENONE 75 MG
100 CAPSULE ORAL DAILY
COMMUNITY
End: 2017-12-18

## 2017-01-29 RX ORDER — BUPROPION HYDROCHLORIDE 100 MG/1
200 TABLET, EXTENDED RELEASE ORAL DAILY
Status: DISCONTINUED | OUTPATIENT
Start: 2017-01-30 | End: 2017-01-31

## 2017-01-29 RX ORDER — AMLODIPINE BESYLATE 5 MG/1
5 TABLET ORAL 2 TIMES DAILY
Status: DISCONTINUED | OUTPATIENT
Start: 2017-01-29 | End: 2017-01-31

## 2017-01-29 RX ORDER — ALBUTEROL SULFATE 90 UG/1
2 AEROSOL, METERED RESPIRATORY (INHALATION) EVERY 4 HOURS PRN
Status: DISCONTINUED | OUTPATIENT
Start: 2017-01-29 | End: 2017-01-30

## 2017-01-29 RX ORDER — OXYBUTYNIN CHLORIDE 5 MG/1
10 TABLET, EXTENDED RELEASE ORAL DAILY
Status: DISCONTINUED | OUTPATIENT
Start: 2017-01-30 | End: 2017-01-31

## 2017-01-29 RX ORDER — ACETAMINOPHEN 325 MG/1
650 TABLET ORAL EVERY 4 HOURS PRN
Status: DISCONTINUED | OUTPATIENT
Start: 2017-01-29 | End: 2017-01-29

## 2017-01-29 RX ORDER — ACETAMINOPHEN 325 MG/1
650 TABLET ORAL EVERY 6 HOURS PRN
Status: DISCONTINUED | OUTPATIENT
Start: 2017-01-29 | End: 2017-01-31

## 2017-01-29 RX ORDER — PANTOPRAZOLE SODIUM 40 MG/1
40 TABLET, DELAYED RELEASE ORAL DAILY
Status: DISCONTINUED | OUTPATIENT
Start: 2017-01-30 | End: 2017-01-31

## 2017-01-29 RX ORDER — BUPROPION HYDROCHLORIDE 200 MG/1
200 TABLET, EXTENDED RELEASE ORAL DAILY
COMMUNITY
End: 2019-04-02

## 2017-01-29 RX ORDER — MONTELUKAST SODIUM 10 MG/1
10 TABLET ORAL DAILY
Status: DISCONTINUED | OUTPATIENT
Start: 2017-01-30 | End: 2017-01-31

## 2017-01-29 NOTE — ED PROVIDER NOTES
Patient Seen in: [facility] Emergency Department    History   Patient presents with:  Stroke (neurologic)    Stated Complaint: ha/facial droop/numbness started yesterday 5 pm    HPI    49-year-old female comes in the hospital the chief complaint of Past Surgical History    UPPER GI ENDOSCOPY,DIAGNOSIS  5/18/2005    Comment hiatal hernia, mild gastritis (H pylori (--))    GASTRIC BYPASS,OBESE<100CM NIKKI-EN-Y      UPPER GI ENDOSCOPY,DIAGNOSIS  3/24/2009    CHOLECYSTECTOMY      COLONOSCOPY  5/18/2005 • Cancer Other      ?ovarian   • Diabetes Sister    • Diabetes Sister    • Diabetes Other      MAUNT         Smoking Status: Former Smoker                   Packs/Day: 0.30  Years: 15        Types: Cigarettes      Quit date: 03/04/2007    Smokeless Status: CBC W/ DIFFERENTIAL - Abnormal; Notable for the following:     RDW-SD 46.5 (*)     Eosinophil Absolute 0.42 (*)     All other components within normal limits   TROPONIN I - Normal   CBC WITH DIFFERENTIAL WITH PLATELET    Narrative:      The following orders FINDINGS:  Borderline heart size is normal pulmonary vascularity. No pleural effusion or pneumothorax. No lobar consolidation.  Degenerative changes in the thoracic spine.                  CT BRAIN OR HEAD (21708) (Final result) Result time: 01/29/17 17:20:   RAINBOW DRAW LAVENDER (Final result) Component (Lab Inquiry)       Collection Time Result Time HL     01/29/17 16:22:00 01/29/17 18:00:00 Auto Resulted                      RAINBOW DRAW LIGHT GREEN (Final result) Component (Lab Inquiry)       Collection   CBC W/ DIFFERENTIAL (Final result)    Abnormal Component (Lab Inquiry)       Collection Time Result Time WBC RBC HGB HCT PLT     01/29/17 16:22:00 01/29/17 16:30:00 8.4 4.73 14.5 44.1 248. 0          Collection Time Result Time MCV MCH MCHC RDW RDW-SD     * (Principal)TIA due to embolism (HCC) G45.9, I74.9 1/29/2017 Unknown    TIA (transient ischemic attack) G45.9 1/29/2017 Unknown

## 2017-01-29 NOTE — ED INITIAL ASSESSMENT (HPI)
Yesterday at 5 pm pt family noticed that right side of face drooping. Pt states right side of face feels heavy, slurred speech noted.

## 2017-01-29 NOTE — ED NOTES
Patient is resting comfortably. Reports no changes in headache following aspirin and no changes in neuro status.

## 2017-01-30 ENCOUNTER — APPOINTMENT (OUTPATIENT)
Dept: MRI IMAGING | Facility: HOSPITAL | Age: 57
End: 2017-01-30
Attending: Other
Payer: COMMERCIAL

## 2017-01-30 ENCOUNTER — APPOINTMENT (OUTPATIENT)
Dept: CV DIAGNOSTICS | Facility: HOSPITAL | Age: 57
End: 2017-01-30
Attending: Other
Payer: COMMERCIAL

## 2017-01-30 LAB
ATRIAL RATE: 66 BPM
BASOPHILS # BLD AUTO: 0.07 X10(3) UL (ref 0–0.1)
BASOPHILS NFR BLD AUTO: 1 %
BUN BLD-MCNC: 14 MG/DL (ref 8–20)
CALCIUM BLD-MCNC: 8.6 MG/DL (ref 8.3–10.3)
CHLORIDE: 109 MMOL/L (ref 101–111)
CHOLEST SMN-MCNC: 172 MG/DL (ref ?–200)
CO2: 27 MMOL/L (ref 22–32)
CREAT BLD-MCNC: 0.69 MG/DL (ref 0.55–1.02)
EOSINOPHIL # BLD AUTO: 0.51 X10(3) UL (ref 0–0.3)
EOSINOPHIL NFR BLD AUTO: 6.9 %
ERYTHROCYTE [DISTWIDTH] IN BLOOD BY AUTOMATED COUNT: 13.9 % (ref 11.5–16)
EST. AVERAGE GLUCOSE BLD GHB EST-MCNC: 123 MG/DL (ref 68–126)
GLUCOSE BLD-MCNC: 102 MG/DL (ref 65–99)
GLUCOSE BLD-MCNC: 125 MG/DL (ref 65–99)
GLUCOSE BLD-MCNC: 75 MG/DL (ref 65–99)
GLUCOSE BLD-MCNC: 77 MG/DL (ref 65–99)
GLUCOSE BLD-MCNC: 90 MG/DL (ref 70–99)
HBA1C MFR BLD HPLC: 5.9 % (ref ?–5.7)
HCT VFR BLD AUTO: 43.2 % (ref 34–50)
HDLC SERPL-MCNC: 68 MG/DL (ref 45–?)
HDLC SERPL: 2.53 {RATIO} (ref ?–4.44)
HGB BLD-MCNC: 13.9 G/DL (ref 12–16)
IMMATURE GRANULOCYTE COUNT: 0.02 X10(3) UL (ref 0–1)
IMMATURE GRANULOCYTE RATIO %: 0.3 %
LDLC SERPL CALC-MCNC: 88 MG/DL (ref ?–130)
LYMPHOCYTES # BLD AUTO: 2.49 X10(3) UL (ref 0.9–4)
LYMPHOCYTES NFR BLD AUTO: 33.9 %
MCH RBC QN AUTO: 30.2 PG (ref 27–33.2)
MCHC RBC AUTO-ENTMCNC: 32.2 G/DL (ref 31–37)
MCV RBC AUTO: 93.9 FL (ref 81–100)
MONOCYTES # BLD AUTO: 0.51 X10(3) UL (ref 0.1–0.6)
MONOCYTES NFR BLD AUTO: 6.9 %
NEUTROPHIL ABS PRELIM: 3.75 X10 (3) UL (ref 1.3–6.7)
NEUTROPHILS # BLD AUTO: 3.75 X10(3) UL (ref 1.3–6.7)
NEUTROPHILS NFR BLD AUTO: 51 %
NONHDLC SERPL-MCNC: 104 MG/DL (ref ?–130)
P AXIS: 44 DEGREES
P-R INTERVAL: 140 MS
PLATELET # BLD AUTO: 225 10(3)UL (ref 150–450)
POTASSIUM SERPL-SCNC: 3.9 MMOL/L (ref 3.6–5.1)
Q-T INTERVAL: 420 MS
QRS DURATION: 96 MS
QTC CALCULATION (BEZET): 440 MS
R AXIS: -11 DEGREES
RBC # BLD AUTO: 4.6 X10(6)UL (ref 3.8–5.1)
RED CELL DISTRIBUTION WIDTH-SD: 47.3 FL (ref 35.1–46.3)
SODIUM SERPL-SCNC: 144 MMOL/L (ref 136–144)
T AXIS: 37 DEGREES
TRIGLYCERIDES: 78 MG/DL (ref ?–150)
VENTRICULAR RATE: 66 BPM
VLDL: 16 MG/DL (ref 5–40)
WBC # BLD AUTO: 7.4 X10(3) UL (ref 4–13)

## 2017-01-30 PROCEDURE — 82962 GLUCOSE BLOOD TEST: CPT

## 2017-01-30 PROCEDURE — 70549 MR ANGIOGRAPH NECK W/O&W/DYE: CPT

## 2017-01-30 PROCEDURE — 93306 TTE W/DOPPLER COMPLETE: CPT

## 2017-01-30 PROCEDURE — 94640 AIRWAY INHALATION TREATMENT: CPT

## 2017-01-30 PROCEDURE — 94660 CPAP INITIATION&MGMT: CPT

## 2017-01-30 PROCEDURE — 80048 BASIC METABOLIC PNL TOTAL CA: CPT | Performed by: INTERNAL MEDICINE

## 2017-01-30 PROCEDURE — 70546 MR ANGIOGRAPH HEAD W/O&W/DYE: CPT

## 2017-01-30 PROCEDURE — 80061 LIPID PANEL: CPT | Performed by: OTHER

## 2017-01-30 PROCEDURE — A9575 INJ GADOTERATE MEGLUMI 0.1ML: HCPCS | Performed by: FAMILY MEDICINE

## 2017-01-30 PROCEDURE — 93306 TTE W/DOPPLER COMPLETE: CPT | Performed by: INTERNAL MEDICINE

## 2017-01-30 PROCEDURE — 83036 HEMOGLOBIN GLYCOSYLATED A1C: CPT | Performed by: OTHER

## 2017-01-30 PROCEDURE — 85025 COMPLETE CBC W/AUTO DIFF WBC: CPT | Performed by: INTERNAL MEDICINE

## 2017-01-30 PROCEDURE — 92610 EVALUATE SWALLOWING FUNCTION: CPT

## 2017-01-30 PROCEDURE — 70553 MRI BRAIN STEM W/O & W/DYE: CPT

## 2017-01-30 RX ORDER — ALBUTEROL SULFATE 90 UG/1
2 AEROSOL, METERED RESPIRATORY (INHALATION) EVERY 4 HOURS PRN
Status: DISCONTINUED | OUTPATIENT
Start: 2017-01-30 | End: 2017-01-31

## 2017-01-30 RX ORDER — CYCLOBENZAPRINE HCL 5 MG
5 TABLET ORAL 3 TIMES DAILY PRN
Status: DISCONTINUED | OUTPATIENT
Start: 2017-01-30 | End: 2017-01-31

## 2017-01-30 NOTE — CM/SW NOTE
01/30/17 1200   CM/SW Referral Data   Referral Source    Reason for Referral Readmission   Informant Patient   Readmission Assessment   Factors that patient feels contributed to this readmission Other (only choose if nothing else applies)  ( steps to get into her home. Patient indicates she has never used home health care.

## 2017-01-30 NOTE — H&P
[facility] Patient Status:  Inpatient    1960 MRN [id number]   [facility] 7NE-A Attending Shady Newberry MD   Hosp Day # 1 PCP Umu Patel MD     History of Present Illness: (postoperative nausea and vomiting)    • Depression    • Esophageal reflux    • Visual impairment      wears bifocals   • Hyperlipidemia    • Essential hypertension    • Asthma    • Problems with swallowing      narrow esophagus, hiatal hernia   • Back pro mouth daily. Disp:  Rfl:  1/28/2017 at 0900   Budesonide-Formoterol Fumarate (SYMBICORT) 160-4.5 MCG/ACT Inhalation Aerosol Inhale 2 puffs into the lungs 2 (two) times daily.  Disp: 1 Inhaler Rfl: 11 1/29/2017 at 0900   AmLODIPine Besylate 5 MG Oral Tab [facility] 66  Temp(Src) 98 °F (36.7 °C) (Oral)  Resp 19  Ht 5' 3\" (1.6 m)  Wt 271 lb (122.925 kg)  BMI 48.02 kg/m2  SpO2 92%    General Appearance:    Alert.    Head:    Normocephalic, without obvious abnormality, atraumatic   Eyes:    PER, sclerae non-icteric, no e as per out-pt. Anemia= managed well          Avel Snow  1/30/2017  7:47 AM      Disposition: full code    VTE / GI prophylaxis: TEDs / PPI    D/C Plans : home when Neuro.  w/u completed and pt. stable      Patient understands the above and, except as

## 2017-01-30 NOTE — ED NOTES
Diet tray ordered with MD approval. Pt to be admitted for \"TIA vs pseudo seizure activity\".  Pt states her symptoms today are similar to her pseudo seizure symptoms, but have lasted longer than normal, otherwise pt reports being back to her normal baselin

## 2017-01-30 NOTE — CONSULTS
[facility]    PATIENT'S NAME: Citlalli Campos   ATTENDING PHYSICIAN: Ruperto Verdin M.D.   CONSULTING PHYSICIAN: Tristan Trotter M.D.    PATIENT ACCOUNT#:   [de-identified]    LOCATION:  [address] [place] [address]  MEDICAL RECORD #:   [id number] the past; prediabetes; muscle weakness, generalized. PAST SURGICAL HISTORY:  Upper endoscopy, gastric bypass, cholecystectomy, colonoscopy, tubal ligation, endometriosis surgery, sling surgery for incontinence.     MEDICATIONS:  At home:  Bupropion, B1 on the workup.     Dictated By Soraya Roberts M.D.  d: 01/30/2017 10:38:20  t: 01/30/2017 11:28:54  Job [id number]  /

## 2017-01-30 NOTE — ED NOTES
Pt reports her concern is that symptoms are > 2 hr and normally she has similar symptoms with her pseudo seizure activity that resolves in 30 min to an hour of onset. Pt able to swallow water with no difficulty and aspirin ordered for pt.  Pt's main complai

## 2017-01-30 NOTE — ED NOTES
Although spouse reports slight deviation to pt's smile on right side, but no change has been observed by this RN since initial assessment on arrival. Will continue to be monitored.

## 2017-01-30 NOTE — PAYOR COMM NOTE
Attending Physician: Cami Benitez MD    Review Type: ADMISSION   Reviewer: Maris Lee       Date: January 30, 2017 - 11:32 AM  Payor: [address] [place] [address] Road Number: [id number]  Admit date: 1/29/2017  4:14 PM Incontinence      •  Ganglion cyst          right wrist    •  Abdominal pain          left side    •  Prediabetes      •  Seizure disorder (HCC)      •  Hearing impairment      •  Muscle weakness      •  Blood disorder          LOW IRON      Past Surgical Problem  Relation  Age of Onset    •  Hypertension  Father      •  Heart Disease  Father          pacer    •  Other[other] [OTHER]  Mother          copd    •  Pulmonary Disease  Mother          COPD    •  Other[other] [OTHER]  Sister          copd    •    Eosinophil Absolute  0.42 (*)        All other components within normal limits    TROPONIN I - Normal    CBC WITH DIFFERENTIAL WITH PLATELET        EKG  Rate, intervals and axes as noted on EKG Report.   Rate: 66  Rhythm: Sinus Rhythm  Reading: Agreed injury     TECHNIQUE:  Noncontrast CT scanning is performed through the brain. Dose reduction techniques were used.   PATIENT STATED HISTORY:  Patient with right side facial droop and heavy feeling.  Patient states had slurred speech and symptoms yesterday           8.6         (Comment)  -         Total Calciums are not corrected for effects of low albumin.  If needed, use the following correction formula.       Corrected Calcium Formula:       ((4.0 - Albumin) x 0.8 + Calcium     Note: Calculation is only MOPERCENT       01/29/17 16:22:00  01/29/17 16:30:00  0.07  0.02  51.8  36.4  5.8              Collection Time  Result Time  EOPERCENT  BASOPERCEN  IG%       01/29/17 16:22:00  01/29/17 16:30:00  5.0  0.8  0.2                             Medications    ace of C) Neurologist as having \"pseudo-sz d/o. \"  These sx consist, per pt., of her mouth \"bunching up to the right\" for a brief period of time. On 1/28 she had sx like these but, in addition, had what sounds like dysarthria and some R sided facial pains. 325 mg Oral Mona Edwards RN      aspirin EC EC tab 325 mg     Date Action Dose Route User    1/29/2017 1642 Given 325 mg Oral Lima Mcdaniels RN      Fluticasone Furoate-Vilanterol (BREO ELLIPTA) 200-25 MCG/INH inhaler 1 puff     Date Action Dose Rou

## 2017-01-30 NOTE — PLAN OF CARE
NURSING ADMISSION NOTE      Patient admitted via cart from ER to [address] [place] [address]  Admitted for right facial droop, slurred speech,right face numbness and headache.   No right facial droop noted, mild slurred speech, right facial numbness and tolerable headache

## 2017-01-30 NOTE — ED NOTES
Food tray has arrived, report pending till after change of shift, spouse is at bedside, no neuro status changes observed.

## 2017-01-30 NOTE — SLP NOTE
ADULT SWALLOWING EVALUATION    ASSESSMENT & PLAN   ASSESSMENT  Patient seen for swallowing evaluation per protocol. Patient alert, up in bedside chair. Coughing and clearing her throat after recently receiving inhaler for asthma.   She reports this is her w/u   • Unspecified sleep apnea PSG 5-15-13     AHI 21 RDI 21 REM AHI 48 SaO2 marisol 78%    • Borderline diabetes mellitus    • PMB (postmenopausal bleeding)    • Chest pain 4/2/2013     cath at [facility] [place] - nl - ef = 60% report sent to scan   • High b Upright;Midline;Standard chair    Oral Phase of Swallow: Within Functional Limits                      Pharyngeal Phase of Swallow: Within Functional Limits           (Please note: Silent aspiration cannot be evaluated clinically.  Videofluoroscopic Swallow

## 2017-01-31 VITALS
BODY MASS INDEX: 48.02 KG/M2 | TEMPERATURE: 98 F | HEIGHT: 63 IN | RESPIRATION RATE: 20 BRPM | HEART RATE: 85 BPM | SYSTOLIC BLOOD PRESSURE: 120 MMHG | DIASTOLIC BLOOD PRESSURE: 59 MMHG | OXYGEN SATURATION: 98 % | WEIGHT: 271 LBS

## 2017-01-31 LAB
GLUCOSE BLD-MCNC: 73 MG/DL (ref 65–99)
GLUCOSE BLD-MCNC: 92 MG/DL (ref 65–99)

## 2017-01-31 PROCEDURE — 92526 ORAL FUNCTION THERAPY: CPT

## 2017-01-31 PROCEDURE — 82962 GLUCOSE BLOOD TEST: CPT

## 2017-01-31 RX ORDER — CYCLOBENZAPRINE HCL 5 MG
5 TABLET ORAL 3 TIMES DAILY PRN
Qty: 30 TABLET | Refills: 3 | Status: SHIPPED | OUTPATIENT
Start: 2017-01-31 | End: 2017-05-04 | Stop reason: ALTCHOICE

## 2017-01-31 RX ORDER — ASPIRIN 81 MG/1
81 TABLET ORAL DAILY
Qty: 30 TABLET | Refills: 11 | Status: SHIPPED | OUTPATIENT
Start: 2017-01-31 | End: 2020-12-03

## 2017-01-31 NOTE — RESPIRATORY THERAPY NOTE
APRIL - Equipment Use Daily Summary:                  . Set Mode: AUTO CPAP WITH C-FLEX                . Usage in hours: 0.1                . 90% Pressure (EPAP) level: 6.0                . 90% Insp. Pressure (IPAP): Slater August AHI: 0                .  Kennyth Holden

## 2017-01-31 NOTE — CM/SW NOTE
01/31/17 1200   Discharge disposition   Discharged to: Home or Self   Name of [address] Provider Home   Outpatient services (Neuro PT)   Discharge transportation Private car

## 2017-01-31 NOTE — PLAN OF CARE
DISCHARGE PLANNING    • Discharge to home or other facility with appropriate resources Progressing        Impaired Swallowing    • Minimize aspiration risk Progressing        NEUROLOGICAL - ADULT    • Achieves stable or improved neurological status Progres

## 2017-01-31 NOTE — PAYOR COMM NOTE
Mary Kohler     (MR # [id number])         Order    Place in observation Once [ADT12] (Order [id number])         Order Questions      Question Answer Comment     Diagnosis TIA (transient ischemic attack)        Reason For Exam  Priority: Routine

## 2017-01-31 NOTE — PROGRESS NOTES
Thu Tapia is a 60year old female.    Patient presents with:  Stroke (neurologic)    HPI:    Neuro f/u    Pt stable, no events overnight  W/u negative  She still has a bit of a ha and numbness right f/a/l    Notes back and neck muscle spasm  The reformats were done.      Fetal origin of the right posterior cerebral artery is noted. Infundibulum involving the left posterior communicating artery is noted.   The anterior, middle, posterior cerebral arteries and their major branches are of normal cours study was performed. Additional evaluation included M-mode, complete spectral Doppler, and color  Doppler. Inpatient. Bedside. Comparison was made to the study of 03/27/2016.     This was a routine echocardiographic study.  Transthoracic  echocardiography (D):  4mm Hg. Aortic valve:   Structurally normal valve. Trileaflet. Cusp separation was  normal.  Doppler:  Transvalvular velocity was within the normal range. There  was no stenosis.  Trivial regurgitation. Tricuspid valve:   Structurally normal valve.  6            ---------   LV e&apos;, medial                                   0.085 m/sec  ---------   LV E/e&apos;, medial                                 6            ---------   LV e&apos;, average                                  0.084 m/sec  --------  ---------      Pulmonary arteries                              Value        Reference   PA pressure, S, DP                              31    mm Hg  ---------      Tricuspid valve                                 Value        Reference   Tricuspid regurg p 95%    CONSTITUTIONAL/OTHER     Appearance: well nourished, well developed, no acute distress. Head-Spine: No tenderness.      BP: Normal     Pulse/Respiration: Normal     Carotid: No carotid bruits     Heart: RRR     Extremities: No edema     Lungs CTA

## 2017-01-31 NOTE — PLAN OF CARE
Neuro assessments consistent. No neuro deficits noted at 1900. At 2100, pt states facial droop and slight slurred speech returned like it was in the daytime. Alteration not present at 0100 assessment. Maintained on room air.      NEUROLOGICAL - ADULT

## 2017-01-31 NOTE — SLP NOTE
SPEECH DAILY NOTE - INPATIENT    Evaluation Date: 01/31/2017    ASSESSMENT & PLAN   ASSESSMENT  Patient seen for dysphagia and communication follow up. Patient seems clearer today, reports her speech is near baseline.   She is 100% intelligible though her

## 2017-01-31 NOTE — PAYOR COMM NOTE
Elif Calix     (MR # [id number])         Order    Admit to inpatient Once [ADT1] (Order [id number])         Order Questions      Question Answer Comment     Diagnosis TIA due to embolism [facility])      Level of Care Acute        Reason For Exam  Prior

## 2017-01-31 NOTE — PLAN OF CARE
DISCHARGE PLANNING    • Discharge to home or other facility with appropriate resources Adequate for Discharge        Impaired Swallowing    • Minimize aspiration risk Adequate for Discharge        NEUROLOGICAL - ADULT    • Achieves stable or improved neuro

## 2017-01-31 NOTE — PROGRESS NOTES
Bhumika [address] [place] [address] Family Medicine Progress Note    [facility] Patient Status:  Inpatient    1960 MRN [id number]   [facility] 7NE-A Attending Martín Velazquez MD   Hosp Day # 2 PCP Virl Dubin, MD ALT  29   AST  18   ALB  3.7       No results for input(s): PT, INR in the last 72 hours. Recent Labs   Lab  01/29/17   1622   TROP  <0.046         Assessment & Plan:    #) Neuro  · S/p evaluation  · Imaging noted.   · Echo wnl  · Neuro evaluation - un

## 2017-02-01 ENCOUNTER — HOSPITAL ENCOUNTER (OUTPATIENT)
Dept: PHYSICAL THERAPY | Facility: HOSPITAL | Age: 57
Setting detail: THERAPIES SERIES
Discharge: HOME | End: 2017-02-01
Attending: Other
Payer: COMMERCIAL

## 2017-02-01 DIAGNOSIS — G40.309 EPILEPSY SEIZURE, GENERALIZED, CONVULSIVE (HCC): Primary | ICD-10-CM

## 2017-02-01 PROCEDURE — 97112 NEUROMUSCULAR REEDUCATION: CPT

## 2017-02-01 PROCEDURE — 97162 PT EVAL MOD COMPLEX 30 MIN: CPT

## 2017-02-02 NOTE — PROGRESS NOTES
SPINE EVALUATION:   Referring Physician: Dr. Ryan Ventura  Diagnosis: Seizures/ Cervical/upper back pain     Date of Service: 2/1/2017     PATIENT SUMMARY   Thiago Rolon is a 60year old y/o female who presents to therapy today with complaints of n day and intermittent seziures. Her  helps assist with reaching overhead or under the bed or reach low objects due to pain. Pt goals include \"relieving neck pain and to find out if her back and neck pain is related her her seizures. \"  Past medical L 79 degs with mid back pain at end range  Accessory motion: Significant reproduction of pain down entire spine into low back, per report, with Gr II central mobs to C2-T10.  Hypomobile cervical and thoracic spine with central PAs, lumbar spine WNL  Palpati mobilizations without radiating pain to low back, indicating improved pain and posterior blood circulation with all daily activities. NEW GOAL      4.  Patient will improve R cervical rotation AROM to at least 75degs with no more than 1-2/10 pain, indicati

## 2017-02-09 ENCOUNTER — HOSPITAL ENCOUNTER (OUTPATIENT)
Dept: PHYSICAL THERAPY | Facility: HOSPITAL | Age: 57
Setting detail: THERAPIES SERIES
Discharge: HOME | End: 2017-02-09
Attending: Other
Payer: COMMERCIAL

## 2017-02-09 PROCEDURE — 97140 MANUAL THERAPY 1/> REGIONS: CPT

## 2017-02-09 PROCEDURE — 97110 THERAPEUTIC EXERCISES: CPT

## 2017-02-09 NOTE — PROGRESS NOTES
Dx: Cervical pain/seizure disorder         Authorized # of Visits:  [facility]         Next MD visit: none scheduled  Fall Risk: standard         Precautions: n/a             Subjective: Patient reports taht she feels more tense today, it is one of her bad days. progressive postural strengthening and manual therapy for decreased tension and pain    Date: 2/9/2017  Tx#: 2/8 Date: Tx#: 3/ Date: Tx#: 4/ Date: Tx#: 5/ Date: Tx#: 6/ Date: Tx#: 7/ Date:    Tx#: 8/   Jai Level 3  Fwd x 2'  Retro x 3'

## 2017-02-16 ENCOUNTER — HOSPITAL ENCOUNTER (OUTPATIENT)
Dept: PHYSICAL THERAPY | Facility: HOSPITAL | Age: 57
Setting detail: THERAPIES SERIES
Discharge: HOME | End: 2017-02-16
Attending: Other
Payer: COMMERCIAL

## 2017-02-16 PROCEDURE — 97140 MANUAL THERAPY 1/> REGIONS: CPT

## 2017-02-16 PROCEDURE — 97110 THERAPEUTIC EXERCISES: CPT

## 2017-02-17 NOTE — PROGRESS NOTES
Dx: Cervical pain/seizure disorder         Authorized # of Visits:  [facility]         Next MD visit: none scheduled  Fall Risk: standard         Precautions: n/a             Subjective: Patient reports feeling really good after last visit.  She was sore after las Progressing   3. Patient will tolerate Gr II central cervical and thoracic mobilizations without radiating pain to low back, indicating improved pain and posterior blood circulation with all daily activities.   Progressing   4.  Patient will improve R cervi Reviewed HEP today    Charges: Manual x 3 (40mins), There-ex x 1 (15mins)       Total Timed Treatment: 55 min  Total Treatment Time: 60 min

## 2017-02-23 ENCOUNTER — HOSPITAL ENCOUNTER (OUTPATIENT)
Dept: PHYSICAL THERAPY | Facility: HOSPITAL | Age: 57
Setting detail: THERAPIES SERIES
Discharge: HOME | End: 2017-02-23
Attending: Other
Payer: COMMERCIAL

## 2017-02-23 PROCEDURE — 97140 MANUAL THERAPY 1/> REGIONS: CPT

## 2017-02-23 PROCEDURE — 97110 THERAPEUTIC EXERCISES: CPT

## 2017-02-24 NOTE — PROGRESS NOTES
Dx: Cervical pain/seizure disorder         Authorized # of Visits:  [facility]         Next MD visit: none scheduled  Fall Risk: standard         Precautions: n/a             Subjective: Patient reports not feeling so good today.  Her R side is hurting her again t 1-2/10 pain, indicating improved tissue tightness for decreased pain with driving.   Progressing   5.  Patient will report resting cervical pain at no more than 5/10 with all daily activities suggesting improved tissue balances.   Progressing     Plan: Cont [id number]    Supine SCM x 4mins ea L/R    Manual foam rolling to entire thoracic spine x 5mins for improved mobility and blood flow Manual foam rolling to entire thoracic spine x 4 mins for improved mobility and blood flow       Skilled Services: Manual ther

## 2017-02-27 ENCOUNTER — HOSPITAL ENCOUNTER (OUTPATIENT)
Dept: PHYSICAL THERAPY | Facility: HOSPITAL | Age: 57
Setting detail: THERAPIES SERIES
Discharge: HOME | End: 2017-02-27
Attending: Other
Payer: COMMERCIAL

## 2017-02-27 PROCEDURE — 97110 THERAPEUTIC EXERCISES: CPT

## 2017-02-27 PROCEDURE — 97140 MANUAL THERAPY 1/> REGIONS: CPT

## 2017-02-27 NOTE — PROGRESS NOTES
Dx: Cervical pain/seizure disorder         Authorized # of Visits:  [facility]         Next MD visit: none scheduled  Fall Risk: standard         Precautions: n/a             Subjective: Patient reports she still has the pain on the R side of her body, her neck f report resting cervical pain at no more than 5/10 with all daily activities suggesting improved tissue balances.   Progressing     Plan: Continue PT 2x/wk for progressive postural strengthening and manual therapy for decreased tension and pain    Date: 2/9 II-III C2-T10 [id number]    L/R rotational mobs Gr III mid thoracic   x3mins    Prone screw PA Gr III to thoracic spine x 3mins Central PAs Gr II-IV C2-T10 [id number]    L/R rotational mobs Gr III mid thoracic   x3mins    Prone screw PA Gr III-IV to thoracic spi

## 2017-03-09 ENCOUNTER — HOSPITAL ENCOUNTER (OUTPATIENT)
Dept: PHYSICAL THERAPY | Facility: HOSPITAL | Age: 57
Setting detail: THERAPIES SERIES
Discharge: HOME | End: 2017-03-09
Attending: Other
Payer: COMMERCIAL

## 2017-03-09 PROCEDURE — 97140 MANUAL THERAPY 1/> REGIONS: CPT

## 2017-03-09 PROCEDURE — 97110 THERAPEUTIC EXERCISES: CPT

## 2017-03-10 NOTE — PROGRESS NOTES
Dx: Cervical pain/seizure disorder         Authorized # of Visits:  [facility]         Next MD visit: none scheduled  Fall Risk: standard         Precautions: n/a             Subjective: Patient reports that she usually leaves PT feeling good for a few days and t cervical and thoracic mobilizations without radiating pain to low back, indicating improved pain and posterior blood circulation with all daily activities.   MOSTLY MET - able to tolerate Gr III-IV without radiating pain, just local pain at this time   4. isometrics  [id number] hold  Sitting chin tucks x 10  Followed by sitting extension x 10  HEP Sitting chin tucks x 10  Followed by sitting extension x 10 Standing ball up wall flex BUE  x10 with stretch into end range     Supine head nods  [id number] hold  Sitti L/R    Manual foam rolling to entire thoracic spine x 5mins for improved mobility and blood flow Manual foam rolling to entire thoracic spine x 4 mins for improved mobility and blood flow       Skilled Services: Manual therapy to improve spinal mobility, i

## 2017-03-16 ENCOUNTER — HOSPITAL ENCOUNTER (OUTPATIENT)
Dept: PHYSICAL THERAPY | Facility: HOSPITAL | Age: 57
Setting detail: THERAPIES SERIES
Discharge: HOME | End: 2017-03-16
Attending: Other
Payer: COMMERCIAL

## 2017-03-16 PROCEDURE — 97110 THERAPEUTIC EXERCISES: CPT

## 2017-03-16 PROCEDURE — 97140 MANUAL THERAPY 1/> REGIONS: CPT

## 2017-03-16 NOTE — PROGRESS NOTES
Dx: Cervical pain/seizure disorder         Authorized # of Visits:  [facility]         Next MD visit: none scheduled  Fall Risk: standard         Precautions: n/a             Subjective: Patient reports having a very stiff neck today.  Reports a couple mini-seizur least 75degs with no more than 1-2/10 pain, indicating improved tissue tightness for decreased pain with driving.   MOSTLY MET   5.  Patient will report resting cervical pain at no more than 5/10 with all daily activities suggesting improved tissue balances Sitting chin tucks x 10  Followed by sitting extension x 10 Standing ball up wall flex BUE  x10 with stretch into end range Cat/Camel stretch in quadruped 2x10    Supine head nods  16a5oud hold  Sitting thoracic extension over towel roll x 10 HEP    Sittin [id number] supine    Manual cervical distraction [id number] SO release   [id number] supine    Manual cervical distraction [id number]     STM - cervical spine [id number] STM - posterior cervical spine with UT MFR [id number]    Supine SCM x 4mins ea L/R    Manual foam r

## 2017-03-24 ENCOUNTER — HOSPITAL ENCOUNTER (OUTPATIENT)
Dept: PHYSICAL THERAPY | Facility: HOSPITAL | Age: 57
Setting detail: THERAPIES SERIES
Discharge: HOME | End: 2017-03-24
Attending: Other
Payer: COMMERCIAL

## 2017-03-24 PROCEDURE — 97140 MANUAL THERAPY 1/> REGIONS: CPT

## 2017-03-24 PROCEDURE — 97110 THERAPEUTIC EXERCISES: CPT

## 2017-03-24 NOTE — PROGRESS NOTES
Dx: Cervical pain/seizure disorder         Authorized # of Visits:  [facility]         Next MD visit: none scheduled  Fall Risk: standard         Precautions: n/a            Discharge Summary    Pt has attended 8, cancelled 0, and no shown 0 visits in Physical Th posture in addition to improved spinal mobility might decrease the \"stress\" she experiences in her back to decrease overall onset of pseudoseizures.  Unfortunately, she reports to me that she feels good for a few days following PT but then the neck stiffn improved tissue balances. NOT MET - 4/10 at rest, but can increase to 8/10 still on R     Plan: Discharge from physical therapy today. Continue with proper posture and HEP independently.  Return to MD for further follow-up on causes of continued \"stress\ rotation 7lbs L/R x10    Supine Chin tuck isometrics  63y9tnr hold  Sitting chin tucks x 10  Followed by sitting extension x 10  HEP Sitting chin tucks x 10  Followed by sitting extension x 10 Standing ball up wall flex BUE  x10 with stretch into end range Central PAs Gr II-IV C2-T10 [id number]    Cervical lateral glides toward R x 3mins    Cervical lateral glides toward L x 3mins    Prone unilateral PAs with R cervical rotation Gr II-III x3min Central PAs Gr II-III C2-C5 x8mins    Prone Unilateral PAs R C2-C4

## 2017-09-18 PROBLEM — M70.52 PES ANSERINUS BURSITIS OF LEFT KNEE: Status: ACTIVE | Noted: 2017-09-18

## 2017-09-25 ENCOUNTER — OFFICE VISIT (OUTPATIENT)
Dept: PHYSICAL THERAPY | Age: 57
End: 2017-09-25
Attending: ORTHOPAEDIC SURGERY
Payer: COMMERCIAL

## 2017-09-26 ENCOUNTER — OFFICE VISIT (OUTPATIENT)
Dept: PHYSICAL THERAPY | Age: 57
End: 2017-09-26
Attending: ORTHOPAEDIC SURGERY
Payer: COMMERCIAL

## 2017-09-26 PROCEDURE — 97110 THERAPEUTIC EXERCISES: CPT | Performed by: PHYSICAL THERAPIST

## 2017-09-26 PROCEDURE — 97162 PT EVAL MOD COMPLEX 30 MIN: CPT | Performed by: PHYSICAL THERAPIST

## 2017-09-26 NOTE — PROGRESS NOTES
LOWER EXTREMITY EVALUATION:   Referring Physician: Dr. Anibal Jean-Baptiste  Diagnosis:    Pes anserinus bursitis of left knee (M70.52)  Physician Comments:  2/week for 4 weeks     Most importantly is ELIE/US Sulaiman to pes.  Physical Therapy for Patellofemoral Kelly anserinus bursitis of (L) knee, small bowel obstruction, epilepsy seizure and chest pain.      ASSESSMENT:   Pt is presenting with decreased flexibility, impaired neuromuscular control, pain and difficulty with eccentric movements, gait and balance deficits limitations, and evolving symptoms including changing pain levels.     In agreement with FOTO score and clinical rationale, this evaluation involved Moderate Complexity decision making due to 3+ personal factors/comorbidities, 3 body structures involved/act [phone]    Sincerely,  Electronically signed by therapist: Rere Jean Baptiste, PT, DPT, CANDY, Sunita 60    Physician's certification required: Yes  I certify the need for these services furnished under this plan of treatment and while under my care.     X_______

## 2017-09-27 ENCOUNTER — OFFICE VISIT (OUTPATIENT)
Dept: PHYSICAL THERAPY | Age: 57
End: 2017-09-27
Attending: ORTHOPAEDIC SURGERY
Payer: COMMERCIAL

## 2017-09-27 DIAGNOSIS — M70.52 PES ANSERINUS BURSITIS OF LEFT KNEE: ICD-10-CM

## 2017-09-27 PROCEDURE — 97140 MANUAL THERAPY 1/> REGIONS: CPT | Performed by: PHYSICAL THERAPIST

## 2017-09-27 PROCEDURE — 97110 THERAPEUTIC EXERCISES: CPT | Performed by: PHYSICAL THERAPIST

## 2017-09-27 NOTE — PROGRESS NOTES
Referring Physician: Dr. Rebekah Alan    Dx: Pes anserinus bursitis of left knee (M70.52), Physician Comments:  2/week for 4 weeks. Most importantly is ELIE/US Sulaiman to teodoro.  Physical Therapy for Patellofemoral Joint Pain.  Work to improve lower extremity grass ( 10 visits)  · Pt will be independent and compliant with comprehensive HEP to maintain progress achieved in PT (8 visits)    Rehab Potential:good  Physical Functional status measure:  25    Plan: Continue with LE stretching and strengthening exercis

## 2017-10-02 ENCOUNTER — OFFICE VISIT (OUTPATIENT)
Dept: PHYSICAL THERAPY | Age: 57
End: 2017-10-02
Attending: FAMILY MEDICINE
Payer: COMMERCIAL

## 2017-10-02 PROCEDURE — 97140 MANUAL THERAPY 1/> REGIONS: CPT | Performed by: PHYSICAL THERAPIST

## 2017-10-02 PROCEDURE — 97110 THERAPEUTIC EXERCISES: CPT | Performed by: PHYSICAL THERAPIST

## 2017-10-02 NOTE — PROGRESS NOTES
Referring Physician: Dr. Raul Gandhi    Dx: Pes anserinus bursitis of left knee (M70.52), Physician Comments:  2/week for 4 weeks. Most importantly is ELIE/US Sulaiman to teodoro.  Physical Therapy for Patellofemoral Joint Pain.  Work to improve lower extremity to maintain progress achieved in PT (8 visits)    Rehab Potential:good  Physical Functional status measure:  25    Plan: Continue with LE stretching and strengthening exercises.      Date: 9/27/2017 TX#: 2/8 Date:               TX#: 3/   Date:

## 2017-10-03 ENCOUNTER — APPOINTMENT (OUTPATIENT)
Dept: PHYSICAL THERAPY | Age: 57
End: 2017-10-03
Attending: ORTHOPAEDIC SURGERY
Payer: COMMERCIAL

## 2017-10-04 ENCOUNTER — APPOINTMENT (OUTPATIENT)
Dept: PHYSICAL THERAPY | Age: 57
End: 2017-10-04
Attending: ORTHOPAEDIC SURGERY
Payer: COMMERCIAL

## 2017-10-10 ENCOUNTER — APPOINTMENT (OUTPATIENT)
Dept: PHYSICAL THERAPY | Age: 57
End: 2017-10-10
Attending: ORTHOPAEDIC SURGERY
Payer: COMMERCIAL

## 2017-10-10 ENCOUNTER — OFFICE VISIT (OUTPATIENT)
Dept: PHYSICAL THERAPY | Age: 57
End: 2017-10-10
Attending: FAMILY MEDICINE
Payer: COMMERCIAL

## 2017-10-10 PROCEDURE — 97110 THERAPEUTIC EXERCISES: CPT | Performed by: PHYSICAL THERAPIST

## 2017-10-10 PROCEDURE — 97035 APP MDLTY 1+ULTRASOUND EA 15: CPT | Performed by: PHYSICAL THERAPIST

## 2017-10-10 PROCEDURE — 97140 MANUAL THERAPY 1/> REGIONS: CPT | Performed by: PHYSICAL THERAPIST

## 2017-10-10 NOTE — PROGRESS NOTES
Referring Physician: Dr. Tracie Collier    Dx: Pes anserinus bursitis of left knee (M70.52), Physician Comments:  2/week for 4 weeks. Most importantly is ELIE/US Sulaiman to teodoro.  Physical Therapy for Patellofemoral Joint Pain.  Work to improve lower extremity Potential:good  Physical Functional status measure:  25    Plan: Continue with LE stretching and strengthening exercises.      Date: 9/27/2017 TX#: 2/8 Date:               TX#: 3/   Date: 10/10/2017              TX#: 4/ Date:               TX#: 5/ Date:

## 2017-10-11 ENCOUNTER — APPOINTMENT (OUTPATIENT)
Dept: PHYSICAL THERAPY | Age: 57
End: 2017-10-11
Attending: ORTHOPAEDIC SURGERY
Payer: COMMERCIAL

## 2017-10-16 ENCOUNTER — OFFICE VISIT (OUTPATIENT)
Dept: PHYSICAL THERAPY | Age: 57
End: 2017-10-16
Attending: FAMILY MEDICINE
Payer: COMMERCIAL

## 2017-10-16 ENCOUNTER — APPOINTMENT (OUTPATIENT)
Dept: PHYSICAL THERAPY | Age: 57
End: 2017-10-16
Attending: ORTHOPAEDIC SURGERY
Payer: COMMERCIAL

## 2017-10-16 PROCEDURE — 97140 MANUAL THERAPY 1/> REGIONS: CPT | Performed by: PHYSICAL THERAPIST

## 2017-10-16 PROCEDURE — 97112 NEUROMUSCULAR REEDUCATION: CPT | Performed by: PHYSICAL THERAPIST

## 2017-10-16 PROCEDURE — 97110 THERAPEUTIC EXERCISES: CPT | Performed by: PHYSICAL THERAPIST

## 2017-10-16 NOTE — PROGRESS NOTES
Referring Physician: Dr. Fabienne Hinojosa    Dx: Pes anserinus bursitis of left knee (M70.52), Physician Comments:  2/week for 4 weeks. Most importantly is ELIE/US Sulaiman to teodoro.  Physical Therapy for Patellofemoral Joint Pain.  Work to improve lower extremity comprehensive HEP to maintain progress achieved in PT (8 visits)    Rehab Potential:good  Physical Functional status measure:  25    Plan: Continue with LE stretching and strengthening exercises.      Date: 9/27/2017 TX#: 2/8 Date:               TX#: 3/   D

## 2017-10-18 ENCOUNTER — APPOINTMENT (OUTPATIENT)
Dept: PHYSICAL THERAPY | Age: 57
End: 2017-10-18
Attending: ORTHOPAEDIC SURGERY
Payer: COMMERCIAL

## 2017-10-18 ENCOUNTER — OFFICE VISIT (OUTPATIENT)
Dept: PHYSICAL THERAPY | Age: 57
End: 2017-10-18
Attending: FAMILY MEDICINE
Payer: COMMERCIAL

## 2017-10-18 PROCEDURE — 97110 THERAPEUTIC EXERCISES: CPT | Performed by: PHYSICAL THERAPIST

## 2017-10-18 PROCEDURE — 97035 APP MDLTY 1+ULTRASOUND EA 15: CPT | Performed by: PHYSICAL THERAPIST

## 2017-10-18 PROCEDURE — 97140 MANUAL THERAPY 1/> REGIONS: CPT | Performed by: PHYSICAL THERAPIST

## 2017-10-18 NOTE — PROGRESS NOTES
Referring Physician: Dr. Cj Walton    Dx: Pes anserinus bursitis of left knee (M70.52), Physician Comments:  2/week for 4 weeks. Most importantly is ELIE/US Sulaiman to teodoro.  Physical Therapy for Patellofemoral Joint Pain.  Work to improve lower extremity will be independent and compliant with comprehensive HEP to maintain progress achieved in PT (8 visits)    Rehab Potential:good  Physical Functional status measure:  25    Plan: Continue with LE stretching and strengthening exercises.      Date: 9/27/2017 T

## 2017-11-01 ENCOUNTER — OFFICE VISIT (OUTPATIENT)
Dept: PHYSICAL THERAPY | Age: 57
End: 2017-11-01
Attending: FAMILY MEDICINE
Payer: COMMERCIAL

## 2017-11-01 PROCEDURE — 97140 MANUAL THERAPY 1/> REGIONS: CPT | Performed by: PHYSICAL THERAPIST

## 2017-11-01 PROCEDURE — 97110 THERAPEUTIC EXERCISES: CPT | Performed by: PHYSICAL THERAPIST

## 2017-11-01 NOTE — PROGRESS NOTES
Referring Physician: Dr. Toshia Colby    Dx: Pes anserinus bursitis of left knee (M70.52), Physician Comments:  2/week for 4 weeks. Most importantly is ELIE/US Sulaiman to teodoro.  Physical Therapy for Patellofemoral Joint Pain.  Work to improve lower extremity such as grass ( 10 visits) met  · Pt will be independent and compliant with comprehensive HEP to maintain progress achieved in PT (8 visits)    Rehab Potential:good  Physical Functional status measure:  25    Plan: Continue with LE stretching and strengthe

## 2017-11-02 ENCOUNTER — APPOINTMENT (OUTPATIENT)
Dept: PHYSICAL THERAPY | Age: 57
End: 2017-11-02
Attending: FAMILY MEDICINE
Payer: COMMERCIAL

## 2017-11-06 ENCOUNTER — APPOINTMENT (OUTPATIENT)
Dept: PHYSICAL THERAPY | Age: 57
End: 2017-11-06
Attending: FAMILY MEDICINE
Payer: COMMERCIAL

## 2017-11-08 ENCOUNTER — APPOINTMENT (OUTPATIENT)
Dept: PHYSICAL THERAPY | Age: 57
End: 2017-11-08
Attending: FAMILY MEDICINE
Payer: COMMERCIAL

## 2017-11-08 ENCOUNTER — OFFICE VISIT (OUTPATIENT)
Dept: PHYSICAL THERAPY | Age: 57
End: 2017-11-08
Attending: FAMILY MEDICINE
Payer: COMMERCIAL

## 2017-11-08 PROCEDURE — 97140 MANUAL THERAPY 1/> REGIONS: CPT | Performed by: PHYSICAL THERAPIST

## 2017-11-08 PROCEDURE — 97110 THERAPEUTIC EXERCISES: CPT | Performed by: PHYSICAL THERAPIST

## 2017-11-08 PROCEDURE — 97035 APP MDLTY 1+ULTRASOUND EA 15: CPT | Performed by: PHYSICAL THERAPIST

## 2017-11-08 NOTE — PROGRESS NOTES
Discharge Summary  Referring Physician: Dr. Ashok Nettles     Pt has attended 8, cancelled 0, and no shown 0 visits in Physical Therapy. Dx: Pes anserinus bursitis of left knee (M70.52).   Authorized # of Visits:  8         Next MD visit: none scheduled, uneven surfaces such as grass ( 10 visits) met  · Pt will be independent and compliant with comprehensive HEP to maintain progress achieved in PT (8 visits) Met    Rehab Potential:good    Plan: Patient is discharged to HEP.      Patient/Family/Caregiver was Passive HS stretch X 2, 30 sec X 2, 30 sec LSU & over x 10, 6\"  x 10' X 10 -       SLR x 10 Tandem stance ball toss x 10 each way. X 10 each way Shuttle SLP (L) press 10 x 2, 4 B  10 x 3, (B)       US to left knee . 8 johansen/cm2, 3.0 mhtz x 8'  X 8' X 8'

## 2017-12-11 PROBLEM — M17.12 PRIMARY OSTEOARTHRITIS OF LEFT KNEE: Status: ACTIVE | Noted: 2017-12-11

## 2018-04-16 PROBLEM — S92.352A CLOSED DISPLACED FRACTURE OF FIFTH METATARSAL BONE OF LEFT FOOT, INITIAL ENCOUNTER: Status: ACTIVE | Noted: 2018-04-16

## 2018-07-02 PROCEDURE — 82043 UR ALBUMIN QUANTITATIVE: CPT | Performed by: FAMILY MEDICINE

## 2018-07-02 PROCEDURE — 82570 ASSAY OF URINE CREATININE: CPT | Performed by: FAMILY MEDICINE

## 2018-07-16 PROBLEM — F44.5 PSEUDOSEIZURES: Status: ACTIVE | Noted: 2018-07-16

## 2018-07-16 PROBLEM — R56.9 PSEUDOSEIZURES (HCC): Status: ACTIVE | Noted: 2018-07-16

## 2018-07-16 PROBLEM — K56.609 SBO (SMALL BOWEL OBSTRUCTION) (HCC): Status: RESOLVED | Noted: 2017-01-14 | Resolved: 2018-07-16

## 2018-07-16 PROBLEM — R56.9 PSEUDOSEIZURES: Status: ACTIVE | Noted: 2018-07-16

## 2019-01-21 PROCEDURE — 87088 URINE BACTERIA CULTURE: CPT | Performed by: NURSE PRACTITIONER

## 2019-01-21 PROCEDURE — 87086 URINE CULTURE/COLONY COUNT: CPT | Performed by: NURSE PRACTITIONER

## 2019-01-21 PROCEDURE — 87186 SC STD MICRODIL/AGAR DIL: CPT | Performed by: NURSE PRACTITIONER

## 2019-05-08 PROBLEM — I27.20 PULMONARY HYPERTENSION (HCC): Status: ACTIVE | Noted: 2019-05-08

## 2019-05-08 PROBLEM — I77.9 BILATERAL CAROTID ARTERY DISEASE (HCC): Status: ACTIVE | Noted: 2019-05-08

## 2019-05-08 PROBLEM — I77.9 BILATERAL CAROTID ARTERY DISEASE: Status: ACTIVE | Noted: 2019-05-08

## 2019-07-03 PROBLEM — I74.9 TIA DUE TO EMBOLISM (HCC): Status: RESOLVED | Noted: 2017-01-29 | Resolved: 2019-07-03

## 2019-07-03 PROBLEM — G45.9 TIA DUE TO EMBOLISM (HCC): Status: RESOLVED | Noted: 2017-01-29 | Resolved: 2019-07-03

## 2019-07-03 PROBLEM — G45.9 TIA (TRANSIENT ISCHEMIC ATTACK): Status: RESOLVED | Noted: 2017-01-29 | Resolved: 2019-07-03

## 2019-07-05 ENCOUNTER — HOSPITAL ENCOUNTER (EMERGENCY)
Age: 59
Discharge: HOME | End: 2019-07-05
Attending: EMERGENCY MEDICINE
Payer: MEDICARE

## 2019-07-05 ENCOUNTER — APPOINTMENT (OUTPATIENT)
Dept: CT IMAGING | Age: 59
End: 2019-07-05
Attending: EMERGENCY MEDICINE
Payer: MEDICARE

## 2019-07-05 ENCOUNTER — APPOINTMENT (OUTPATIENT)
Dept: GENERAL RADIOLOGY | Age: 59
End: 2019-07-05
Attending: EMERGENCY MEDICINE
Payer: MEDICARE

## 2019-07-05 VITALS
TEMPERATURE: 98 F | BODY MASS INDEX: 51.91 KG/M2 | HEART RATE: 65 BPM | WEIGHT: 293 LBS | OXYGEN SATURATION: 97 % | HEIGHT: 63 IN | DIASTOLIC BLOOD PRESSURE: 69 MMHG | SYSTOLIC BLOOD PRESSURE: 138 MMHG | RESPIRATION RATE: 24 BRPM

## 2019-07-05 DIAGNOSIS — K43.9 VENTRAL HERNIA WITHOUT OBSTRUCTION OR GANGRENE: ICD-10-CM

## 2019-07-05 DIAGNOSIS — R07.9 CHEST PAIN OF UNCERTAIN ETIOLOGY: ICD-10-CM

## 2019-07-05 DIAGNOSIS — R10.33 ABDOMINAL PAIN, PERIUMBILICAL: Primary | ICD-10-CM

## 2019-07-05 DIAGNOSIS — M54.9 UPPER BACK PAIN: ICD-10-CM

## 2019-07-05 LAB
ALBUMIN SERPL-MCNC: 3.6 G/DL (ref 3.4–5)
ALBUMIN/GLOB SERPL: 1.1 {RATIO} (ref 1–2)
ALP LIVER SERPL-CCNC: 139 U/L (ref 46–118)
ALT SERPL-CCNC: 22 U/L (ref 13–56)
ANION GAP SERPL CALC-SCNC: 6 MMOL/L (ref 0–18)
APTT PPP: 28.1 SECONDS (ref 25.4–36.1)
AST SERPL-CCNC: 21 U/L (ref 15–37)
ATRIAL RATE: 67 BPM
BASOPHILS # BLD AUTO: 0.05 X10(3) UL (ref 0–0.2)
BASOPHILS NFR BLD AUTO: 0.7 %
BILIRUB SERPL-MCNC: 0.5 MG/DL (ref 0.1–2)
BILIRUB UR QL STRIP.AUTO: NEGATIVE
BUN BLD-MCNC: 12 MG/DL (ref 7–18)
BUN/CREAT SERPL: 15.8 (ref 10–20)
CALCIUM BLD-MCNC: 8.8 MG/DL (ref 8.5–10.1)
CHLORIDE SERPL-SCNC: 110 MMOL/L (ref 98–112)
CLARITY UR REFRACT.AUTO: CLEAR
CO2 SERPL-SCNC: 26 MMOL/L (ref 21–32)
COLOR UR AUTO: YELLOW
CREAT BLD-MCNC: 0.76 MG/DL (ref 0.55–1.02)
DEPRECATED RDW RBC AUTO: 52 FL (ref 35.1–46.3)
EOSINOPHIL # BLD AUTO: 0.34 X10(3) UL (ref 0–0.7)
EOSINOPHIL NFR BLD AUTO: 4.5 %
ERYTHROCYTE [DISTWIDTH] IN BLOOD BY AUTOMATED COUNT: 15.2 % (ref 11–15)
GLOBULIN PLAS-MCNC: 3.3 G/DL (ref 2.8–4.4)
GLUCOSE BLD-MCNC: 102 MG/DL (ref 70–99)
GLUCOSE UR STRIP.AUTO-MCNC: NEGATIVE MG/DL
HCT VFR BLD AUTO: 43.1 % (ref 35–48)
HGB BLD-MCNC: 13.5 G/DL (ref 12–16)
IMM GRANULOCYTES # BLD AUTO: 0.02 X10(3) UL (ref 0–1)
IMM GRANULOCYTES NFR BLD: 0.3 %
INR BLD: 0.95 (ref 0.9–1.1)
KETONES UR STRIP.AUTO-MCNC: NEGATIVE MG/DL
LEUKOCYTE ESTERASE UR QL STRIP.AUTO: NEGATIVE
LIPASE SERPL-CCNC: 80 U/L (ref 73–393)
LYMPHOCYTES # BLD AUTO: 2.72 X10(3) UL (ref 1–4)
LYMPHOCYTES NFR BLD AUTO: 35.6 %
M PROTEIN MFR SERPL ELPH: 6.9 G/DL (ref 6.4–8.2)
MCH RBC QN AUTO: 29.2 PG (ref 26–34)
MCHC RBC AUTO-ENTMCNC: 31.3 G/DL (ref 31–37)
MCV RBC AUTO: 93.3 FL (ref 80–100)
MONOCYTES # BLD AUTO: 0.57 X10(3) UL (ref 0.1–1)
MONOCYTES NFR BLD AUTO: 7.5 %
NEUTROPHILS # BLD AUTO: 3.93 X10 (3) UL (ref 1.5–7.7)
NEUTROPHILS # BLD AUTO: 3.93 X10(3) UL (ref 1.5–7.7)
NEUTROPHILS NFR BLD AUTO: 51.4 %
NITRITE UR QL STRIP.AUTO: NEGATIVE
OSMOLALITY SERPL CALC.SUM OF ELEC: 294 MOSM/KG (ref 275–295)
P AXIS: 51 DEGREES
P-R INTERVAL: 142 MS
PH UR STRIP.AUTO: 5 [PH] (ref 4.5–8)
PLATELET # BLD AUTO: 213 10(3)UL (ref 150–450)
POTASSIUM SERPL-SCNC: 4.2 MMOL/L (ref 3.5–5.1)
PROT UR STRIP.AUTO-MCNC: NEGATIVE MG/DL
PSA SERPL DL<=0.01 NG/ML-MCNC: 12.7 SECONDS (ref 12.2–14.4)
Q-T INTERVAL: 426 MS
QRS DURATION: 80 MS
QTC CALCULATION (BEZET): 450 MS
R AXIS: -6 DEGREES
RBC # BLD AUTO: 4.62 X10(6)UL (ref 3.8–5.3)
RBC UR QL AUTO: NEGATIVE
SODIUM SERPL-SCNC: 142 MMOL/L (ref 136–145)
SP GR UR STRIP.AUTO: 1.02 (ref 1–1.03)
T AXIS: 35 DEGREES
TROPONIN I SERPL-MCNC: <0.045 NG/ML (ref ?–0.04)
UROBILINOGEN UR STRIP.AUTO-MCNC: 0.2 MG/DL
VENTRICULAR RATE: 67 BPM
WBC # BLD AUTO: 7.6 X10(3) UL (ref 4–11)

## 2019-07-05 PROCEDURE — 93005 ELECTROCARDIOGRAM TRACING: CPT

## 2019-07-05 PROCEDURE — 71046 X-RAY EXAM CHEST 2 VIEWS: CPT | Performed by: EMERGENCY MEDICINE

## 2019-07-05 PROCEDURE — 83690 ASSAY OF LIPASE: CPT | Performed by: EMERGENCY MEDICINE

## 2019-07-05 PROCEDURE — 81003 URINALYSIS AUTO W/O SCOPE: CPT | Performed by: EMERGENCY MEDICINE

## 2019-07-05 PROCEDURE — 85025 COMPLETE CBC W/AUTO DIFF WBC: CPT | Performed by: EMERGENCY MEDICINE

## 2019-07-05 PROCEDURE — 96360 HYDRATION IV INFUSION INIT: CPT

## 2019-07-05 PROCEDURE — 99285 EMERGENCY DEPT VISIT HI MDM: CPT

## 2019-07-05 PROCEDURE — 84484 ASSAY OF TROPONIN QUANT: CPT | Performed by: EMERGENCY MEDICINE

## 2019-07-05 PROCEDURE — 93010 ELECTROCARDIOGRAM REPORT: CPT

## 2019-07-05 PROCEDURE — 74177 CT ABD & PELVIS W/CONTRAST: CPT | Performed by: EMERGENCY MEDICINE

## 2019-07-05 PROCEDURE — 85610 PROTHROMBIN TIME: CPT | Performed by: EMERGENCY MEDICINE

## 2019-07-05 PROCEDURE — 80053 COMPREHEN METABOLIC PANEL: CPT | Performed by: EMERGENCY MEDICINE

## 2019-07-05 PROCEDURE — 85730 THROMBOPLASTIN TIME PARTIAL: CPT | Performed by: EMERGENCY MEDICINE

## 2019-07-05 RX ORDER — SODIUM CHLORIDE 9 MG/ML
INJECTION, SOLUTION INTRAVENOUS CONTINUOUS
Status: DISCONTINUED | OUTPATIENT
Start: 2019-07-05 | End: 2019-07-05

## 2019-07-05 NOTE — ED PROVIDER NOTES
Patient Seen in: THE [facility] Emergency Department In [place]    History   Patient presents with:  Abdomen/Flank Pain (GI/)  Back Pain (musculoskeletal)  Neck Pain (musculoskeletal, neurologic)    Stated Complaint: c/o LLQ, back pain and neck pain x few w esophagus, hiatal hernia   • Seizure disorder (HCC)     WHEN UNDER STRESS   • TIA (transient ischemic attack) 1/29/2017   • TIA due to embolism ([facility] [address] 1/29/2017   • Unspecified sleep apnea PSG 5-15-13    AHI 21 RDI 21 REM AHI 48 SaO2 marisol 78%    • Visual i complaint: c/o LLQ, back pain and neck pain x few weeks. Other systems are as noted in HPI. Constitutional and vital signs reviewed. All other systems reviewed and negative except as noted above.     Physical Exam     ED Triage Vitals [07/05/19 1227] DIFFERENTIAL WITH PLATELET    Narrative: The following orders were created for panel order CBC WITH DIFFERENTIAL WITH PLATELET.   Procedure                               Abnormality         Status                     --------- Only)(cpt=74177)    Result Date: 7/5/2019  PROCEDURE:  CT ABDOMEN+PELVIS (CONTRAST ONLY) (CPT=74177)  COMPARISON:  [place], CT ABDOMEN+PELVIS(CONTRAST ONLY)(CPT=74177), 1/14/2017, 0:20.   INDICATIONS:  c/o LLQ abdominal pain, back pain and neck pain x fe retrospectively seen and is stable. URINARY BLADDER:  No visible focal wall thickening, lesion, or calculus. PELVIC NODES:  No adenopathy. PELVIC ORGANS:  No visible mass. Pelvic organs appropriate for patient age. BONES:  No bony lesion or fracture. Prescribed:  Current Discharge Medication List

## 2019-07-05 NOTE — ED INITIAL ASSESSMENT (HPI)
Pt c/o \"a fluttering on and off to down here with mid back pain and neck pain x 3 weeks\". Pt c/o discomfort to LLQ. Pt denies chest pain and SOB at this time, yet states, \"I get short of breath because I have asthma\".  Denies diarrhea or vomiting/nausea

## 2019-07-16 ENCOUNTER — APPOINTMENT (OUTPATIENT)
Dept: GENERAL RADIOLOGY | Age: 59
End: 2019-07-16
Attending: EMERGENCY MEDICINE
Payer: MEDICARE

## 2019-07-16 ENCOUNTER — HOSPITAL ENCOUNTER (EMERGENCY)
Age: 59
Discharge: HOME | End: 2019-07-17
Attending: EMERGENCY MEDICINE
Payer: MEDICARE

## 2019-07-16 VITALS
RESPIRATION RATE: 18 BRPM | TEMPERATURE: 98 F | SYSTOLIC BLOOD PRESSURE: 120 MMHG | HEIGHT: 63 IN | OXYGEN SATURATION: 98 % | DIASTOLIC BLOOD PRESSURE: 60 MMHG | HEART RATE: 70 BPM | WEIGHT: 293 LBS | BODY MASS INDEX: 51.91 KG/M2

## 2019-07-16 DIAGNOSIS — S92.901A CLOSED FRACTURE OF RIGHT FOOT, INITIAL ENCOUNTER: Primary | ICD-10-CM

## 2019-07-16 PROCEDURE — 99284 EMERGENCY DEPT VISIT MOD MDM: CPT

## 2019-07-16 PROCEDURE — 28470 CLTX METATARSAL FX WO MNP EA: CPT

## 2019-07-16 PROCEDURE — 73610 X-RAY EXAM OF ANKLE: CPT | Performed by: EMERGENCY MEDICINE

## 2019-07-16 PROCEDURE — 73630 X-RAY EXAM OF FOOT: CPT | Performed by: EMERGENCY MEDICINE

## 2019-07-17 NOTE — ED PROVIDER NOTES
Patient Seen in: THE [facility] Emergency Department In [place]    History   Patient presents with:  Lower Extremity Injury (musculoskeletal)    Stated Complaint: LEFT KNEE RIGHT FOOT/ANKLE PAIN S/P FALL    HPI    This is a 56-year-old female that presents wi • Visual impairment     wears bifocals       Past Surgical History:   Procedure Laterality Date   • BLADDER TRANSVAGINAL TAPING SUSPENSION URETHROLYSIS N/A 2/24/2015    Performed by Gerardo Pond MD at [facility] MAIN OR   • CHOLECYSTECTOMY     • COLONOSCOPY [07/16/19 9110]   /61   Pulse 69   Resp 20   Temp 97.7 °F (36.5 °C)   Temp src Oral   SpO2 97 %   O2 Device None (Room air)       Current:/61   Pulse 69   Temp 97.7 °F (36.5 °C) (Oral)   Resp 20   Ht 160 cm (5' 3\")   Wt 136.1 kg   LMP 12/10/20 (MIN 3 VIEWS), RIGHT (CPT=73630)  TECHNIQUE:  AP, oblique, and lateral views were obtained. COMPARISON:  None.   INDICATIONS:  LEFT KNEE RIGHT FOOT/ANKLE PAIN S/P FALL  PATIENT STATED HISTORY: (As transcribed by Technologist)  Patient fell getting out of b Impression:  Closed fracture of right foot, initial encounter  (primary encounter diagnosis)    Disposition:  Discharge  7/16/2019 11:44 pm    Follow-up:  Rhonda West, [address] [place] [address] [phone]    Call in

## 2019-07-17 NOTE — ED INITIAL ASSESSMENT (HPI)
Pt to ed for eval of pain to left knee and right foot after accidentally falling as she attempted to get out of bed

## 2019-07-18 PROBLEM — S92.354A: Status: ACTIVE | Noted: 2019-07-18

## 2019-08-01 PROBLEM — I77.810 ECTATIC THORACIC AORTA (HCC): Status: ACTIVE | Noted: 2019-08-01

## 2019-08-01 PROBLEM — I77.810 ECTATIC THORACIC AORTA: Status: ACTIVE | Noted: 2019-08-01

## 2019-08-01 PROBLEM — I77.1 TORTUOUS AORTA (HCC): Status: ACTIVE | Noted: 2019-08-01

## 2019-08-01 PROBLEM — I77.1 TORTUOUS AORTA: Status: ACTIVE | Noted: 2019-08-01

## 2019-08-18 PROBLEM — G40.909 SEIZURE DISORDER (HCC): Status: ACTIVE | Noted: 2019-08-18

## 2019-08-18 PROBLEM — F44.5 PSEUDOSEIZURES: Status: RESOLVED | Noted: 2018-07-16 | Resolved: 2019-08-18

## 2019-08-18 PROBLEM — R56.9 PSEUDOSEIZURES (HCC): Status: RESOLVED | Noted: 2018-07-16 | Resolved: 2019-08-18

## 2019-08-18 PROBLEM — S92.352A CLOSED DISPLACED FRACTURE OF FIFTH METATARSAL BONE OF LEFT FOOT, INITIAL ENCOUNTER: Status: RESOLVED | Noted: 2018-04-16 | Resolved: 2019-08-18

## 2019-08-18 PROBLEM — R56.9 PSEUDOSEIZURES: Status: RESOLVED | Noted: 2018-07-16 | Resolved: 2019-08-18

## 2019-08-18 PROBLEM — M70.52 PES ANSERINUS BURSITIS OF LEFT KNEE: Status: RESOLVED | Noted: 2017-09-18 | Resolved: 2019-08-18

## 2019-08-18 PROBLEM — R10.9 ABDOMINAL PAIN, ACUTE: Status: RESOLVED | Noted: 2017-01-14 | Resolved: 2019-08-18

## 2019-08-18 PROBLEM — M17.12 PRIMARY OSTEOARTHRITIS OF LEFT KNEE: Status: RESOLVED | Noted: 2017-12-11 | Resolved: 2019-08-18

## 2019-08-18 PROBLEM — S92.354A: Status: RESOLVED | Noted: 2019-07-18 | Resolved: 2019-08-18

## 2019-08-18 PROBLEM — F33.1 MODERATE RECURRENT MAJOR DEPRESSION (HCC): Status: ACTIVE | Noted: 2019-08-18

## 2019-09-23 PROCEDURE — 88175 CYTOPATH C/V AUTO FLUID REDO: CPT | Performed by: OBSTETRICS & GYNECOLOGY

## 2019-09-23 PROCEDURE — 87624 HPV HI-RISK TYP POOLED RSLT: CPT | Performed by: OBSTETRICS & GYNECOLOGY

## 2020-10-05 ENCOUNTER — APPOINTMENT (OUTPATIENT)
Dept: CT IMAGING | Facility: HOSPITAL | Age: 60
DRG: 880 | End: 2020-10-05
Attending: EMERGENCY MEDICINE
Payer: MEDICARE

## 2020-10-05 ENCOUNTER — HOSPITAL ENCOUNTER (INPATIENT)
Facility: HOSPITAL | Age: 60
LOS: 2 days | Discharge: HOME | DRG: 880 | End: 2020-10-07
Attending: EMERGENCY MEDICINE | Admitting: HOSPITALIST
Payer: MEDICARE

## 2020-10-05 DIAGNOSIS — I16.0 HYPERTENSIVE URGENCY: ICD-10-CM

## 2020-10-05 DIAGNOSIS — I63.9 ACUTE CVA (CEREBROVASCULAR ACCIDENT) (HCC): Primary | ICD-10-CM

## 2020-10-05 PROCEDURE — 70450 CT HEAD/BRAIN W/O DYE: CPT | Performed by: EMERGENCY MEDICINE

## 2020-10-05 PROCEDURE — 70496 CT ANGIOGRAPHY HEAD: CPT | Performed by: EMERGENCY MEDICINE

## 2020-10-05 PROCEDURE — 70498 CT ANGIOGRAPHY NECK: CPT | Performed by: EMERGENCY MEDICINE

## 2020-10-05 PROCEDURE — 3E03317 INTRODUCTION OF OTHER THROMBOLYTIC INTO PERIPHERAL VEIN, PERCUTANEOUS APPROACH: ICD-10-PCS | Performed by: EMERGENCY MEDICINE

## 2020-10-05 RX ORDER — LABETALOL HYDROCHLORIDE 5 MG/ML
INJECTION, SOLUTION INTRAVENOUS
Status: DISCONTINUED
Start: 2020-10-05 | End: 2020-10-06

## 2020-10-05 RX ORDER — ACETAMINOPHEN 650 MG/1
650 SUPPOSITORY RECTAL EVERY 4 HOURS PRN
Status: DISCONTINUED | OUTPATIENT
Start: 2020-10-05 | End: 2020-10-06

## 2020-10-05 RX ORDER — LABETALOL HYDROCHLORIDE 5 MG/ML
10 INJECTION, SOLUTION INTRAVENOUS ONCE
Status: DISCONTINUED | OUTPATIENT
Start: 2020-10-05 | End: 2020-10-06

## 2020-10-05 RX ORDER — DIPHENHYDRAMINE HYDROCHLORIDE 50 MG/ML
50 INJECTION INTRAMUSCULAR; INTRAVENOUS ONCE AS NEEDED
Status: COMPLETED | OUTPATIENT
Start: 2020-10-05 | End: 2020-10-05

## 2020-10-05 RX ORDER — ONDANSETRON 2 MG/ML
4 INJECTION INTRAMUSCULAR; INTRAVENOUS EVERY 4 HOURS PRN
Status: CANCELLED | OUTPATIENT
Start: 2020-10-05

## 2020-10-05 RX ORDER — LABETALOL HYDROCHLORIDE 5 MG/ML
10 INJECTION, SOLUTION INTRAVENOUS ONCE
Status: DISCONTINUED | OUTPATIENT
Start: 2020-10-05 | End: 2020-10-05

## 2020-10-05 RX ORDER — METHYLPREDNISOLONE SODIUM SUCCINATE 125 MG/2ML
125 INJECTION, POWDER, LYOPHILIZED, FOR SOLUTION INTRAMUSCULAR; INTRAVENOUS ONCE AS NEEDED
Status: COMPLETED | OUTPATIENT
Start: 2020-10-05 | End: 2020-10-05

## 2020-10-05 RX ORDER — SODIUM CHLORIDE 9 MG/ML
INJECTION, SOLUTION INTRAVENOUS CONTINUOUS
Status: DISCONTINUED | OUTPATIENT
Start: 2020-10-05 | End: 2020-10-06

## 2020-10-05 RX ORDER — FAMOTIDINE 10 MG/ML
20 INJECTION, SOLUTION INTRAVENOUS ONCE AS NEEDED
Status: COMPLETED | OUTPATIENT
Start: 2020-10-05 | End: 2020-10-05

## 2020-10-05 RX ORDER — LABETALOL HYDROCHLORIDE 5 MG/ML
10 INJECTION, SOLUTION INTRAVENOUS ONCE AS NEEDED
Status: ACTIVE | OUTPATIENT
Start: 2020-10-05 | End: 2020-10-05

## 2020-10-06 ENCOUNTER — APPOINTMENT (OUTPATIENT)
Dept: CT IMAGING | Facility: HOSPITAL | Age: 60
DRG: 880 | End: 2020-10-06
Attending: INTERNAL MEDICINE
Payer: MEDICARE

## 2020-10-06 ENCOUNTER — APPOINTMENT (OUTPATIENT)
Dept: MRI IMAGING | Facility: HOSPITAL | Age: 60
DRG: 880 | End: 2020-10-06
Attending: INTERNAL MEDICINE
Payer: MEDICARE

## 2020-10-06 ENCOUNTER — APPOINTMENT (OUTPATIENT)
Dept: CV DIAGNOSTICS | Facility: HOSPITAL | Age: 60
DRG: 880 | End: 2020-10-06
Attending: NURSE PRACTITIONER
Payer: MEDICARE

## 2020-10-06 PROCEDURE — 5A09357 ASSISTANCE WITH RESPIRATORY VENTILATION, LESS THAN 24 CONSECUTIVE HOURS, CONTINUOUS POSITIVE AIRWAY PRESSURE: ICD-10-PCS | Performed by: INTERNAL MEDICINE

## 2020-10-06 PROCEDURE — 70551 MRI BRAIN STEM W/O DYE: CPT | Performed by: INTERNAL MEDICINE

## 2020-10-06 PROCEDURE — 93306 TTE W/DOPPLER COMPLETE: CPT | Performed by: NURSE PRACTITIONER

## 2020-10-06 PROCEDURE — 70450 CT HEAD/BRAIN W/O DYE: CPT | Performed by: INTERNAL MEDICINE

## 2020-10-06 PROCEDURE — 99291 CRITICAL CARE FIRST HOUR: CPT | Performed by: NURSE PRACTITIONER

## 2020-10-06 PROCEDURE — 99291 CRITICAL CARE FIRST HOUR: CPT | Performed by: INTERNAL MEDICINE

## 2020-10-06 RX ORDER — SODIUM CHLORIDE 9 MG/ML
INJECTION, SOLUTION INTRAVENOUS CONTINUOUS
Status: DISCONTINUED | OUTPATIENT
Start: 2020-10-06 | End: 2020-10-06

## 2020-10-06 RX ORDER — ACETAMINOPHEN 650 MG/1
650 SUPPOSITORY RECTAL EVERY 4 HOURS PRN
Status: DISCONTINUED | OUTPATIENT
Start: 2020-10-06 | End: 2020-10-07

## 2020-10-06 RX ORDER — FAMOTIDINE 10 MG/ML
20 INJECTION, SOLUTION INTRAVENOUS ONCE AS NEEDED
Status: DISCONTINUED | OUTPATIENT
Start: 2020-10-06 | End: 2020-10-06

## 2020-10-06 RX ORDER — ONDANSETRON 2 MG/ML
4 INJECTION INTRAMUSCULAR; INTRAVENOUS EVERY 6 HOURS PRN
Status: DISCONTINUED | OUTPATIENT
Start: 2020-10-06 | End: 2020-10-07

## 2020-10-06 RX ORDER — FLUOXETINE HYDROCHLORIDE 20 MG/1
20 CAPSULE ORAL DAILY
Status: DISCONTINUED | OUTPATIENT
Start: 2020-10-06 | End: 2020-10-07

## 2020-10-06 RX ORDER — ACETAMINOPHEN 650 MG/1
650 SUPPOSITORY RECTAL EVERY 4 HOURS PRN
Status: DISCONTINUED | OUTPATIENT
Start: 2020-10-06 | End: 2020-10-06

## 2020-10-06 RX ORDER — POLYMYXIN B SULFATE AND TRIMETHOPRIM 1; 10000 MG/ML; [USP'U]/ML
1 SOLUTION OPHTHALMIC EVERY 6 HOURS
Status: DISCONTINUED | OUTPATIENT
Start: 2020-10-06 | End: 2020-10-06

## 2020-10-06 RX ORDER — ACETAMINOPHEN 325 MG/1
650 TABLET ORAL EVERY 6 HOURS PRN
Status: DISCONTINUED | OUTPATIENT
Start: 2020-10-06 | End: 2020-10-06

## 2020-10-06 RX ORDER — ASPIRIN 325 MG
325 TABLET ORAL DAILY
Status: DISCONTINUED | OUTPATIENT
Start: 2020-10-06 | End: 2020-10-07

## 2020-10-06 RX ORDER — IBUPROFEN 400 MG/1
400 TABLET ORAL EVERY 6 HOURS PRN
Status: DISCONTINUED | OUTPATIENT
Start: 2020-10-06 | End: 2020-10-07

## 2020-10-06 RX ORDER — LABETALOL HYDROCHLORIDE 5 MG/ML
10 INJECTION, SOLUTION INTRAVENOUS EVERY 10 MIN PRN
Status: DISCONTINUED | OUTPATIENT
Start: 2020-10-06 | End: 2020-10-07

## 2020-10-06 RX ORDER — ACETAMINOPHEN 325 MG/1
650 TABLET ORAL EVERY 4 HOURS PRN
Status: DISCONTINUED | OUTPATIENT
Start: 2020-10-06 | End: 2020-10-07

## 2020-10-06 RX ORDER — DIPHENHYDRAMINE HYDROCHLORIDE 50 MG/ML
50 INJECTION INTRAMUSCULAR; INTRAVENOUS ONCE AS NEEDED
Status: COMPLETED | OUTPATIENT
Start: 2020-10-06 | End: 2020-10-06

## 2020-10-06 RX ORDER — ENOXAPARIN SODIUM 100 MG/ML
40 INJECTION SUBCUTANEOUS DAILY
Status: DISCONTINUED | OUTPATIENT
Start: 2020-10-06 | End: 2020-10-07

## 2020-10-06 RX ORDER — BUPROPION HYDROCHLORIDE 100 MG/1
200 TABLET, EXTENDED RELEASE ORAL DAILY
Status: DISCONTINUED | OUTPATIENT
Start: 2020-10-06 | End: 2020-10-07

## 2020-10-06 RX ORDER — ASPIRIN 300 MG
300 SUPPOSITORY, RECTAL RECTAL DAILY
Status: DISCONTINUED | OUTPATIENT
Start: 2020-10-06 | End: 2020-10-07

## 2020-10-06 RX ORDER — ALBUTEROL SULFATE 90 UG/1
2 AEROSOL, METERED RESPIRATORY (INHALATION) EVERY 4 HOURS PRN
Status: DISCONTINUED | OUTPATIENT
Start: 2020-10-06 | End: 2020-10-07

## 2020-10-06 RX ORDER — HYDRALAZINE HYDROCHLORIDE 20 MG/ML
10 INJECTION INTRAMUSCULAR; INTRAVENOUS EVERY 2 HOUR PRN
Status: DISCONTINUED | OUTPATIENT
Start: 2020-10-06 | End: 2020-10-07

## 2020-10-06 RX ORDER — TOPIRAMATE 25 MG/1
25 TABLET ORAL 2 TIMES DAILY
Status: DISCONTINUED | OUTPATIENT
Start: 2020-10-06 | End: 2020-10-07

## 2020-10-06 RX ORDER — METHYLPREDNISOLONE SODIUM SUCCINATE 125 MG/2ML
125 INJECTION, POWDER, LYOPHILIZED, FOR SOLUTION INTRAMUSCULAR; INTRAVENOUS ONCE AS NEEDED
Status: COMPLETED | OUTPATIENT
Start: 2020-10-06 | End: 2020-10-06

## 2020-10-06 RX ORDER — PREDNISOLONE ACETATE 10 MG/ML
1 SUSPENSION/ DROPS OPHTHALMIC 4 TIMES DAILY
Status: DISCONTINUED | OUTPATIENT
Start: 2020-10-06 | End: 2020-10-06

## 2020-10-06 RX ORDER — OFLOXACIN 3 MG/ML
1 SOLUTION/ DROPS OPHTHALMIC 4 TIMES DAILY
Status: DISCONTINUED | OUTPATIENT
Start: 2020-10-06 | End: 2020-10-06

## 2020-10-06 NOTE — PROGRESS NOTES
Assume care @ 0730. Pt awake,alert and CAMPA. Neuro exam difficult to assess d/t inconsistent finding of giveaway weakness. Weakness to shoulder shrug, right hand grasp and right leg lift. Slurred speech on/off.   Pt c/o headache PRN Tylenol given and home me

## 2020-10-06 NOTE — PLAN OF CARE
Received pt from ER @0375. Post TPA- frequent VS, neuro exams, and angioedema assessments. Alert, fatigued, Ox4. Baseline NIH upon arrival to unit: 5. Made NPO for slurred speech. See complex neuro flowsheet for details. NSR. VSS on RA. CPAP when asleep.  P

## 2020-10-06 NOTE — PLAN OF CARE
Problem: Impaired Swallowing  Goal: Minimize aspiration risk  Description: Interventions:  - Patient should be alert and upright for all feedings (90 degrees preferred)  - Offer food and liquids at a slow rate  - Encourage small bites of food and small s

## 2020-10-06 NOTE — SLP NOTE
ADULT SWALLOWING EVALUATION    ASSESSMENT    ASSESSMENT/OVERALL IMPRESSION:  Patient seen for swallowing evaluation per protocol. Patient admitted due to not feeling well;  reported facial droop and elevated blood pressure at home.   She is alert an report sent to scan   • Depression    • Esophageal reflux    • Essential hypertension    • Extrinsic asthma, unspecified    • Ganglion cyst     right wrist   • Hearing impairment    • Hx of diseases NEC     hx of pseudoseizures due to anxiety, s/p psych an Pharyngeal Phase of Swallow: Within Functional Limits           (Please note: Silent aspiration cannot be evaluated clinically.  Videofluoroscopic Swallow Study is required to rule-out silent aspiration.)    Esophageal Phase of Swallow: No complaints co

## 2020-10-06 NOTE — RESPIRATORY THERAPY NOTE
APRIL : EQUIPMENT USE: DAILY SUMMARY                                            SET MODE:AUTO CPAP WITH CFLEX                                          USAGE IN HOURS:2:46                                          90%

## 2020-10-06 NOTE — PROGRESS NOTES
[facility]  Neurocritical Care APRN Progress Note    NAME: Ana Lerner - ROOM: [facility]7127-I - MRN: [id number] - Age: 56year old - :1960    History Of Present Illness:  Ana Lerner is a 56year old female with PMHx sign right wrist   • Hearing impairment    • Hx of diseases NEC     hx of pseudoseizures due to anxiety, s/p psych and neuro w/u   • Hyperlipidemia    • Incontinence    • Internal derangement of right knee 5/21/2014   • Muscle weakness    • Obesity, unspecified SpO2 92%   BMI 50.53 kg/m²      Physical Exam:    General Appearance: Alert, cooperative, c/o headache, appears stated age  Neck: Supple, symmetrical, trachea midline, no carotid bruits, adenopathy, or JVD  Lungs: Clear to auscultation bilaterally, respira significant atherosclerotic disease intracranially or within the neck.     Dictated by (CST): Kaiser Holbrook MD on 10/05/2020 at 9:39 PM     Finalized by (CST): Kaiser Holbrook MD on 10/05/2020 at 9:48 PM         Labs:  Lab Results   Component Value Date Practitioner  Spec [id number]

## 2020-10-06 NOTE — CONSULTS
[facility]  Neurocritical Care Consult Note    Monico Newell Patient Status:  Inpatient    1960 MRN [id number]   [facility] 6NE-A Attending Barbara Lara MD   Hosp Day # 1 PCP Tiana Jimenez MD TRANSVAGINAL TAPING SUSPENSION URETHROLYSIS N/A 2/24/2015    Performed by Jen Yin MD at [facility] MAIN OR   • CHOLECYSTECTOMY     • COLONOSCOPY  5/18/2005    diverticulosis - result in scan - Rigoberto Winslow   • COLONOSCOPY N/A 5/25/2016    Performed by MOUTH DAILY. MUST BE SEEN FOR ANY FURTHER REFILLS., Disp: 90 tablet, Rfl: 3, 10/5/2020 at AM    •  Budesonide-Formoterol Fumarate 160-4.5 MCG/ACT Inhalation Aerosol, Inhale 2 puffs into the lungs 2 (two) times daily. , Disp: 1 Inhaler, Rfl: 11, 10/5/2020 at Activity      Alcohol use:  Yes        Alcohol/week: 4.0 standard drinks        Types: 4 Cans of beer per week        Frequency: 2-3 times a week        Drinks per session: 1 or 2        Binge frequency: Never        Comment: social      Drug use: No      S infusion, 5-15 mg/hr, Intravenous, Continuous PRN        Review of Systems:     Constitutional:    Denies unusual weight loss or weight gain, fever/chills or night sweats. HEENT:                Denies changes in vision or difficulty swallowing.   Pulm: tortuosity of the internal carotid arteries within the neck distal to the bifurcations. There is no evidence of significant atherosclerotic disease intracranially or within the neck.     Dictated by (CST): Molly Beatty MD on 10/05/2020 at 9:39 PM     Fin

## 2020-10-06 NOTE — ED INITIAL ASSESSMENT (HPI)
Patient was working when she states she started not to feel well.  states facial droop and elevated blood pressure at home. Symptoms started at approximately 1915.

## 2020-10-06 NOTE — ED NOTES
Patient reports swelling and feeling like she has a \"fat lip\" to right side of mouth. MD notified. PRN medications administered as documented.

## 2020-10-06 NOTE — H&P
[facility] Hospitalist History and Physical      Patient presents with:  Altered Mental Status  Numbness Weakness       PCP: Anna Dorado MD      History of Present Illness: Patient is a 56year old female with PMH sig for HTN, HL, DM2, TIA, pseudoseizur Lidia Domingo   • COLONOSCOPY N/A 5/25/2016    Performed by Bernadine Lamb MD at [facility] ENDOSCOPY   • ESOPHAGOGASTRODUODENOSCOPY (EGD) N/A 3/28/2016    Performed by Bernadine Lamb MD at [facility] ENDOSCOPY   • GASTRIC BYPASS,OBESE<100CM NIKKI-EN-Y     • GAS 130/75   Pulse 97   Temp 98.3 °F (36.8 °C) (Temporal)   Resp 18   Wt 285 lb 4.4 oz (129.4 kg)   LMP 12/10/2014 (Approximate)   SpO2 95%   BMI 50.53 kg/m²   General:  Alert, no distress, appears stated age.     Head:  Normocephalic, without obvious abnormali approx 45 min pta  TECHNIQUE:  Noncontrast CT scanning is performed through the brain. Dose reduction techniques were used.  Dose information is transmitted to the ACR ([facility]) [address] ([address]) which includes t vascular malformation. There is no branch occlusion. There is fetal origin of the right PCA.   The vertebrobasilar junction, basilar artery and tip are normal.  PCA branches are normal.  Cavernous carotid segments, A1 and M1 segments are normal.  There is significant atherosclerotic disease intracranially or within the neck.     Dictated by (CST): Anika Comer MD on 10/05/2020 at 9:39 PM     Finalized by (CST): Anika Comer MD on 10/05/2020 at 9:48 PM          Assessment/Plan:       # AMS/slurred speech/

## 2020-10-06 NOTE — ED PROVIDER NOTES
Patient Seen in: [facility] Emergency Department      History   Patient presents with:  Altered Mental Status  Numbness Weakness    Stated Complaint: confusion/facial droop starting approx 45 min pta    HPI    58-year-old female with a history of hy movements are intact. Oropharynx is clear. Uvula is midline. Tympanic membranes are clear without evidence of injection or perforation. Left facial droop  Neck exam: Supple. There is no lymphadenopathy or carotid bruit.   Cardiovascular exam: Regular r tests on the individual orders. TYPE AND SCREEN    Narrative: The following orders were created for panel order TYPE AND SCREEN.   Procedure                               Abnormality         Status                     --------- COMPARISON:  None.       INDICATIONS:  confusion/facial droop starting approx 45 min pta       TECHNIQUE  Noncontrast CT scanning is performed through the brain and CT angiography of the head and neck were obtained with non-ionic contrast. MIP images wer hemodynamically significant stenosis or dissection       RIGHT   INTERNAL CAROTID:   No hemodynamically significant stenosis or dissection   EXTERNAL CAROTID:    No hemodynamically significant stenosis or dissection   COMMON CAROTID:   No hemodynamically s clinical condition. The patient required my constant attention. Time was spent ordering, reviewing, and interpreting ancillary testing and imaging. The patient was frequently reevaluated, and I made frequent checks of  vital signs and monitor.  Nursing no

## 2020-10-06 NOTE — SIGNIFICANT EVENT
Responded to code stroke paged at 21:13. Pt was a walk in who arrived to ED at 21:06. This practitioner arrived to pt en route to CT scan at 21:17.      Pt is a 56 y.o. female with hx including DM, HTN, depression, obesity, HLD, APRIL, migraines, tia Current

## 2020-10-07 VITALS
DIASTOLIC BLOOD PRESSURE: 91 MMHG | WEIGHT: 286.81 LBS | SYSTOLIC BLOOD PRESSURE: 147 MMHG | HEART RATE: 71 BPM | RESPIRATION RATE: 13 BRPM | OXYGEN SATURATION: 96 % | TEMPERATURE: 98 F | BODY MASS INDEX: 51 KG/M2

## 2020-10-07 PROBLEM — F44.4 CONVERSION DISORDER WITH WEAKNESS OR PARALYSIS: Status: ACTIVE | Noted: 2020-10-07

## 2020-10-07 PROCEDURE — 90792 PSYCH DIAG EVAL W/MED SRVCS: CPT | Performed by: OTHER

## 2020-10-07 PROCEDURE — 99291 CRITICAL CARE FIRST HOUR: CPT | Performed by: INTERNAL MEDICINE

## 2020-10-07 RX ORDER — BUPROPION HYDROCHLORIDE 150 MG/1
150 TABLET, EXTENDED RELEASE ORAL 2 TIMES DAILY
Qty: 60 TABLET | Refills: 2 | Status: SHIPPED | OUTPATIENT
Start: 2020-10-07 | End: 2020-10-29

## 2020-10-07 RX ORDER — BUPROPION HYDROCHLORIDE 150 MG/1
300 TABLET, EXTENDED RELEASE ORAL DAILY
Status: DISCONTINUED | OUTPATIENT
Start: 2020-10-08 | End: 2020-10-07

## 2020-10-07 RX ORDER — PANTOPRAZOLE SODIUM 40 MG/1
40 TABLET, DELAYED RELEASE ORAL
Status: DISCONTINUED | OUTPATIENT
Start: 2020-10-08 | End: 2020-10-07

## 2020-10-07 NOTE — PLAN OF CARE
Assumed care of pt @ 0730. Pt neurologically intact, up to bathroom with standby assist, tolerating well. Eating well. Pt family at bedside. Pt cleared for discharge, pt and pt  educated and all questions answered.  Discharge instructions and follow

## 2020-10-07 NOTE — PLAN OF CARE
Patient alert and oriented, following commands, conversing appropriately. Vitals within ordered parameters, no pressors at this time. Tolerating PO well, urine per [place] due to bedrest. Pain managed with ordered medications.  All care explained as given,

## 2020-10-07 NOTE — CM/SW NOTE
10/07/20 1200   CM/SW Screening   Referral Source    Information Source Chart review;[facility] staff   Patient's Mental Status Alert;Oriented   Patient lives with Spouse   Discharge Needs   Anticipated D/C needs To be determined     PT=home.  Kelsey

## 2020-10-07 NOTE — DISCHARGE SUMMARY
General Medicine Discharge Summary     Patient ID:  Murrel Apgar  56year old  1960    Admit date: 10/5/2020    Discharge date and time: 10/7/2020    Attending Physician: Gerson Sotelo Patient instructions:      Current Discharge Medication List    CONTINUE these medications which have NOT CHANGED    Tolterodine Tartrate ER 4 MG Oral Capsule SR 24 Hr  Take 1 capsule (4 mg total) by mouth once daily.     PANTOPRAZOLE SODIUM 40 MG Ora distress, alert and oriented x 3  Heart: RRR  Lungs: clear bilaterally, no active wheezing  Abdomen: nontender, nondistended, intact BS  Extremities: no pedal edema   Neuro: CN inact, no focal deficits      Total time coordinating care for discharge: [facility]

## 2020-10-07 NOTE — OCCUPATIONAL THERAPY NOTE
OCCUPATIONAL THERAPY QUICK EVALUATION - INPATIENT    Room Number: 6912/2098-T  Evaluation Date: 10/7/2020     Type of Evaluation: Quick Eval  Presenting Problem: slurred speech, pseudoseizure    Physician Order: IP Consult to Occupational Therapy  Reason f AHI 21 RDI 21 REM AHI 48 SaO2 marisol 78%        Past Surgical History  Past Surgical History:   Procedure Laterality Date   • BLADDER TRANSVAGINAL TAPING SUSPENSION URETHROLYSIS N/A 2/24/2015    Performed by Rachel Randall MD at [address] [address] Findings: Coordination - Finger to Nose;Coordination - Rapid Alternating Movement; Coordination - Finger Opposition  Coordination - Finger to Nose: Symmetrical  Coordination - Rapid Alternating Movement: Symmetrical  Coordination - Finger Opposition: Symmet level. Independent, but supervision for safety. Recommend discharge home.      Patient Complexity  Occupational Profile/Medical History  LOW - Brief history including review of medical or therapy records    Specific performance deficits impacting engagement

## 2020-10-07 NOTE — PHYSICAL THERAPY NOTE
PHYSICAL THERAPY QUICK EVALUATION - INPATIENT    Room Number: 3476/9679-T  Evaluation Date: 10/7/2020  Presenting Problem: Acute R sided weakness with psychological involvement   Physician Order: PT Eval and Treat    Problem List  Principal Problem:    A COLONOSCOPY N/A 5/25/2016    Performed by Anamaria Cao MD at [facility] ENDOSCOPY   • ESOPHAGOGASTRODUODENOSCOPY (EGD) N/A 3/28/2016    Performed by Anamaria Cao MD at [facility] ENDOSCOPY   • GASTRIC BYPASS,OBESE<100CM NIKKI-EN-Y     • GASTRIC BYPASS,OBESITY,S ACTIVITY TOLERANCE                         O2 WALK                  AM-PAC '6-Clicks' INPATIENT SHORT FORM - BASIC MOBILITY  How much difficulty does the patient currently have. ..  -   Turning over in bed (including adjusting bedclothes, sheets and kailee The AM-PAC '6-Clicks' Inpatient Basic Mobility Short Form was completed and this patient is demonstrating a 28% degree of impairment in mobility. Research supports that patients with this level of impairment may benefit from d/c home.   .  Based on this gloria

## 2020-10-07 NOTE — RESPIRATORY THERAPY NOTE
APRIL : EQUIPMENT USE: DAILY SUMMARY                                            SET MODE:AUTO CPAP WITH CFLEX                                          USAGE IN HOURS:5:00                                          90%

## 2020-10-07 NOTE — PROGRESS NOTES
CT head 24 hour post TPA done.   No sign of hemorrhage.      -Ischemic stroke/TIA order set entered  -ASA to start tonight  -Transfer orders entered  -Q4h neuro checks    [address] [facility]  Nurse Practitioner  [address]

## 2020-10-07 NOTE — PLAN OF CARE
Problem: Impaired Activities of Daily Living  Goal: Achieve highest/safest level of independence in self care  Description: Interventions:  - Assess ability and encourage patient to participate in ADLs to maximize function  - Promote sitting position [facility]

## 2020-10-07 NOTE — CONSULTS
[facility]  Report of Psychiatric Consultation    Alondra Byers Patient Status:  Inpatient    1960 MRN [id number]   [facility] 6NE-A Attending Shawn Zapata MD   [facility] Day # 2 PCP Hany Grover MD     Date of Adm She has been on Prozac for many years and had Wellbutrin added about 1 year ago. Over 10 yrs ago, she began having shaking episodes when she was under stress.  She was evaluated at both [facility] and [facility] and recalls having a 5 day video EEG that capture family members. Social and Developmental History:  with 3 living children. She is raising 4 of her grandchildren. She used to work in customer service.      Past Medical History:   Diagnosis Date   • A & E with part medial menisectomy w/minimal d HYSTEROSCOPY DILATION AND CURETTAGE N/A 2/24/2015    Performed by Fatoumata Villagran MD at [facility] MAIN OR   • KNEE ARTHROSCOPY Left 5/29/2014    Performed by Erik Tao MD at [address] [place] [address]   • OTHER SURGICAL HISTORY  2007    Lizbeth-en-y   • OTHER SURGICAL HIS mg, Oral, Daily  •  topiramate (Topamax) tab 25 mg, 25 mg, Oral, BID  •  ibuprofen (MOTRIN) tab 400 mg, 400 mg, Oral, Q6H PRN  •  influenza vaccine split quad (FLULAVAL) ages 6 months to 64 years inj 0.5ml, 0.5 mL, Intramuscular, Prior to discharge  •  ace 10/07/2020    BUN 18 10/07/2020     10/07/2020    K 3.9 10/07/2020     10/07/2020    CO2 27.0 10/07/2020     10/07/2020    CA 8.5 10/07/2020    PGLU 113 10/07/2020       STUDIES  10/6/20  PROCEDURE:  MRI BRAIN (CPT=70551)     COMPARISON: INDICATIONS:  follow up tPA administration     TECHNIQUE:  Noncontrast CT scanning is performed through the brain. Dose reduction techniques were used.  Dose information is transmitted to the ACR ([address] [place] [address] of Radiology) [address] [facility] Da

## 2020-10-08 NOTE — BH PROGRESS NOTE
Called and gave the pt a referral per Dr. Odalis Pan to follow up with a psychiatrist and psychotherapist.  The pts PCP, Dr. Miguel Ace office and they said, [facility] for [employer] at [facility] 28  [phone]

## 2020-10-13 ENCOUNTER — TELEPHONE (OUTPATIENT)
Dept: NEUROLOGY | Facility: CLINIC | Age: 60
End: 2020-10-13

## 2020-10-13 NOTE — TELEPHONE ENCOUNTER
Per PSR Note:    called pt to schedule for stroke f/u, pt declined to schedule appt, she states she did not have a stroke and is currently seeing another Dr pertaining to her stay at hospital

## 2020-10-28 PROBLEM — I77.1 TORTUOUS AORTA (HCC): Status: RESOLVED | Noted: 2019-08-01 | Resolved: 2020-10-28

## 2020-10-28 PROBLEM — I63.9 ACUTE CVA (CEREBROVASCULAR ACCIDENT) (HCC): Status: ACTIVE | Noted: 2020-10-28

## 2020-10-28 PROBLEM — G40.309 EPILEPSY SEIZURE, GENERALIZED, CONVULSIVE (HCC): Status: ACTIVE | Noted: 2020-10-28

## 2020-10-28 PROBLEM — I65.29 CAROTID ATHEROSCLEROSIS: Status: ACTIVE | Noted: 2020-10-28

## 2020-10-28 PROBLEM — I77.9 BILATERAL CAROTID ARTERY DISEASE (HCC): Status: RESOLVED | Noted: 2019-05-08 | Resolved: 2020-10-28

## 2020-10-28 PROBLEM — I77.9 BILATERAL CAROTID ARTERY DISEASE: Status: RESOLVED | Noted: 2019-05-08 | Resolved: 2020-10-28

## 2020-10-28 PROBLEM — I77.1 TORTUOUS AORTA: Status: RESOLVED | Noted: 2019-08-01 | Resolved: 2020-10-28

## 2020-11-18 ENCOUNTER — APPOINTMENT (OUTPATIENT)
Dept: GENERAL RADIOLOGY | Facility: HOSPITAL | Age: 60
DRG: 389 | End: 2020-11-18
Attending: EMERGENCY MEDICINE
Payer: MEDICARE

## 2020-11-18 ENCOUNTER — APPOINTMENT (OUTPATIENT)
Dept: CT IMAGING | Facility: HOSPITAL | Age: 60
DRG: 389 | End: 2020-11-18
Attending: EMERGENCY MEDICINE
Payer: MEDICARE

## 2020-11-18 ENCOUNTER — HOSPITAL ENCOUNTER (INPATIENT)
Facility: HOSPITAL | Age: 60
LOS: 2 days | Discharge: HOME | DRG: 389 | End: 2020-11-21
Attending: EMERGENCY MEDICINE | Admitting: INTERNAL MEDICINE
Payer: MEDICARE

## 2020-11-18 DIAGNOSIS — K56.609 SBO (SMALL BOWEL OBSTRUCTION) (HCC): Primary | ICD-10-CM

## 2020-11-18 PROCEDURE — 96375 TX/PRO/DX INJ NEW DRUG ADDON: CPT

## 2020-11-18 PROCEDURE — 83690 ASSAY OF LIPASE: CPT | Performed by: EMERGENCY MEDICINE

## 2020-11-18 PROCEDURE — 96374 THER/PROPH/DIAG INJ IV PUSH: CPT

## 2020-11-18 PROCEDURE — 74177 CT ABD & PELVIS W/CONTRAST: CPT | Performed by: EMERGENCY MEDICINE

## 2020-11-18 PROCEDURE — 80053 COMPREHEN METABOLIC PANEL: CPT

## 2020-11-18 PROCEDURE — 99285 EMERGENCY DEPT VISIT HI MDM: CPT

## 2020-11-18 PROCEDURE — 85025 COMPLETE CBC W/AUTO DIFF WBC: CPT

## 2020-11-18 PROCEDURE — 71045 X-RAY EXAM CHEST 1 VIEW: CPT | Performed by: EMERGENCY MEDICINE

## 2020-11-18 PROCEDURE — 96376 TX/PRO/DX INJ SAME DRUG ADON: CPT

## 2020-11-18 PROCEDURE — 96361 HYDRATE IV INFUSION ADD-ON: CPT

## 2020-11-18 PROCEDURE — 83690 ASSAY OF LIPASE: CPT

## 2020-11-18 PROCEDURE — 0D9670Z DRAINAGE OF STOMACH WITH DRAINAGE DEVICE, VIA NATURAL OR ARTIFICIAL OPENING: ICD-10-PCS | Performed by: EMERGENCY MEDICINE

## 2020-11-18 PROCEDURE — 80053 COMPREHEN METABOLIC PANEL: CPT | Performed by: EMERGENCY MEDICINE

## 2020-11-18 PROCEDURE — 85025 COMPLETE CBC W/AUTO DIFF WBC: CPT | Performed by: EMERGENCY MEDICINE

## 2020-11-18 RX ORDER — ONDANSETRON 2 MG/ML
4 INJECTION INTRAMUSCULAR; INTRAVENOUS ONCE
Status: COMPLETED | OUTPATIENT
Start: 2020-11-18 | End: 2020-11-18

## 2020-11-18 RX ORDER — LIDOCAINE HYDROCHLORIDE 20 MG/ML
10 SOLUTION OROPHARYNGEAL ONCE
Status: COMPLETED | OUTPATIENT
Start: 2020-11-18 | End: 2020-11-18

## 2020-11-18 RX ORDER — MORPHINE SULFATE 4 MG/ML
4 INJECTION, SOLUTION INTRAMUSCULAR; INTRAVENOUS EVERY 30 MIN PRN
Status: DISCONTINUED | OUTPATIENT
Start: 2020-11-18 | End: 2020-11-21

## 2020-11-19 PROCEDURE — 85025 COMPLETE CBC W/AUTO DIFF WBC: CPT | Performed by: HOSPITALIST

## 2020-11-19 PROCEDURE — C9113 INJ PANTOPRAZOLE SODIUM, VIA: HCPCS | Performed by: HOSPITALIST

## 2020-11-19 PROCEDURE — 94640 AIRWAY INHALATION TREATMENT: CPT

## 2020-11-19 PROCEDURE — 80048 BASIC METABOLIC PNL TOTAL CA: CPT | Performed by: HOSPITALIST

## 2020-11-19 RX ORDER — ONDANSETRON 2 MG/ML
4 INJECTION INTRAMUSCULAR; INTRAVENOUS EVERY 6 HOURS PRN
Status: DISCONTINUED | OUTPATIENT
Start: 2020-11-19 | End: 2020-11-21

## 2020-11-19 RX ORDER — ALBUTEROL SULFATE 90 UG/1
2 AEROSOL, METERED RESPIRATORY (INHALATION) EVERY 4 HOURS PRN
Status: DISCONTINUED | OUTPATIENT
Start: 2020-11-19 | End: 2020-11-21

## 2020-11-19 RX ORDER — SODIUM CHLORIDE 9 MG/ML
INJECTION, SOLUTION INTRAVENOUS CONTINUOUS
Status: DISCONTINUED | OUTPATIENT
Start: 2020-11-19 | End: 2020-11-21

## 2020-11-19 RX ORDER — HEPARIN SODIUM 5000 [USP'U]/ML
7500 INJECTION, SOLUTION INTRAVENOUS; SUBCUTANEOUS EVERY 8 HOURS SCHEDULED
Status: DISCONTINUED | OUTPATIENT
Start: 2020-11-19 | End: 2020-11-21

## 2020-11-19 RX ORDER — MORPHINE SULFATE 2 MG/ML
1 INJECTION, SOLUTION INTRAMUSCULAR; INTRAVENOUS EVERY 2 HOUR PRN
Status: DISCONTINUED | OUTPATIENT
Start: 2020-11-19 | End: 2020-11-21

## 2020-11-19 RX ORDER — MORPHINE SULFATE 2 MG/ML
2 INJECTION, SOLUTION INTRAMUSCULAR; INTRAVENOUS EVERY 2 HOUR PRN
Status: DISCONTINUED | OUTPATIENT
Start: 2020-11-19 | End: 2020-11-21

## 2020-11-19 RX ORDER — ACETAMINOPHEN 325 MG/1
650 TABLET ORAL EVERY 6 HOURS PRN
Status: DISCONTINUED | OUTPATIENT
Start: 2020-11-19 | End: 2020-11-21

## 2020-11-19 RX ORDER — MORPHINE SULFATE 4 MG/ML
4 INJECTION, SOLUTION INTRAMUSCULAR; INTRAVENOUS EVERY 2 HOUR PRN
Status: DISCONTINUED | OUTPATIENT
Start: 2020-11-19 | End: 2020-11-21

## 2020-11-19 NOTE — PROGRESS NOTES
Assumed care in AM, awakens easily, NG to LIS , thought to be related to trama of insertion , abdomen tender , VSS, afebrile , respirations easy , takes in small amts of ice chips , w/o difficulties, up in hallway walking , passing flatus, IV fluids

## 2020-11-19 NOTE — PROGRESS NOTES
[facility] Pharmacy Note:  Anticoagulation Weight Dose Adjustment for heparin    Ezequiel Wright is a 56year old patient who has been prescribed heparin 5000 units every 8 hours. Estimated Creatinine Clearance: 64.3 mL/min (based on SCr of 0.75 mg/dL).  Conrado Shafer

## 2020-11-19 NOTE — H&P (VIEW-ONLY)
[facility]    History and Physical     Margarito Chacon Patient Status:  Inpatient    1960 MRN [id number]   [facility] 3NE-A Attending Winnie Stafford MD   [facility] Day # 0 PCP Shari Trevino MD     Chief Complaint: Abdominal (postmenopausal bleeding)    • PONV (postoperative nausea and vomiting)    • Primary osteoarthritis of left knee 12/11/2017   • S/P left knee arthroscopy 6/6/2014   • Seizure disorder ([address]     WHEN UNDER STRESS   • TIA due to embolism ([facility] 1/29/2017   • History   Problem Relation Age of [place]   • Hypertension Father    • Heart Disease Father         pacer   • Pulmonary Disease Mother         COPD   • Psychiatric Mother    • Other (Other) Mother         copd   • Other (Other) Sister         copd   • Cancer takes ), Disp: 1 Inhaler, Rfl: 11    •  ofloxacin 0.3 % Ophthalmic Solution, Place 1 drop into the right eye 4 (four) times daily. (Patient taking differently: Place 1 drop into the right eye 4 (four) times daily.  Plan to start next week before surgery ), guarding  Neurologic: No focal neurological deficits. CNII-XII grossly intact. Musculoskeletal: Moves all extremities. Extremities: No significant pitting edema  Integument: No rashes or lesions. Psychiatric: Appropriate mood and affect.       Lacho Call

## 2020-11-19 NOTE — CM/SW NOTE
11/19/20 1100   CM/SW Referral Data   Referral Source Physician;Social Work (self-referral)   Reason for Referral Discharge planning   Informant Patient;Spouse   Patient Info   Advanced directives? No   Information provided?  Yes   Patient's Mental Statu

## 2020-11-19 NOTE — RESPIRATORY THERAPY NOTE
Patient currently with NG tube and is refusing CPAP while NG is in place. Told patient should she change her mind or once NG is removed she will be set up with a hospital CPAP during her stay.

## 2020-11-19 NOTE — H&P
[facility]    History and Physical     Tam Cyr Patient Status:  Inpatient    1960 MRN [id number]   [facility] 3NE-A Attending Keke Cassidy MD   [address] Day # 0 PCP Yuki Santo MD     Chief Complaint: Abdominal (postmenopausal bleeding)    • PONV (postoperative nausea and vomiting)    • Primary osteoarthritis of left knee 12/11/2017   • S/P left knee arthroscopy 6/6/2014   • Seizure disorder ([address]     WHEN UNDER STRESS   • TIA due to embolism ([facility] [facility] 1/29/2017   • History   Problem Relation Age of [place]   • Hypertension Father    • Heart Disease Father         pacer   • Pulmonary Disease Mother         COPD   • Psychiatric Mother    • Other (Other) Mother         copd   • Other (Other) Sister         copd   • Cancer takes ), Disp: 1 Inhaler, Rfl: 11    •  ofloxacin 0.3 % Ophthalmic Solution, Place 1 drop into the right eye 4 (four) times daily. (Patient taking differently: Place 1 drop into the right eye 4 (four) times daily.  Plan to start next week before surgery ), guarding  Neurologic: No focal neurological deficits. CNII-XII grossly intact. Musculoskeletal: Moves all extremities. Extremities: No significant pitting edema  Integument: No rashes or lesions. Psychiatric: Appropriate mood and affect.       Jazmine Serve

## 2020-11-19 NOTE — ED INITIAL ASSESSMENT (HPI)
Pt presents to ed c/o lower abdominal pain at a 10/10 and n/v that started at 1800 this evening. Pt states 3 episodes of vomiting, hx of sbo.

## 2020-11-19 NOTE — ED PROVIDER NOTES
Patient Seen in: [facility] Emergency Department      History   Patient presents with:  Abdomen/Flank Pain    Stated Complaint: abd pain vomiting     HPI    Patient is a 56-year-old woman here with roller abdominal pain sharp constant moderate sever CHOLECYSTECTOMY     • COLONOSCOPY  5/18/2005    diverticulosis - result in scan - Chris Salomon   • COLONOSCOPY N/A 5/25/2016    Performed by Judith Garza MD at [facility] ENDOSCOPY   • ESOPHAGOGASTRODUODENOSCOPY (EGD) N/A 3/28/2016    Performed by Hort[address]   Ht 160 cm (5' 3\")   Wt 129.3 kg   LMP 12/10/2014 (Approximate)   SpO2 95%   BMI 50.49 kg/m²         Physical Exam  Vitals signs and nursing note reviewed. Constitutional:       General: She is not in acute distress.      Appearance: She is well-deve Abnormality         Status                     ---------                               -----------         ------                     CBC W/ DIFFERENTIAL[[id number]]          Abnormal            Final result                 Please small bowel in the mid abdomen. These involve loops with surgical clips. Diameter is measured up to 4.3     cm. Proximal and distal small bowel     are normal caliber. Normal appendix. The colon is decompressed. There     is mild diverticulosis. obstruction) ([facility]  (primary encounter diagnosis)    Disposition:  Admit  11/18/2020 10:31 pm    Follow-up:  No follow-up provider specified.         Medications Prescribed:  Current Discharge Medication List                          Present on Admission  D

## 2020-11-19 NOTE — PROGRESS NOTES
NURSING ADMISSION NOTE      Patient admitted via Cart  Oriented to room. Safety precautions initiated. Bed in low position. Call light in reach.     Assumed care of pt around 0000  A&Ox4   on RA while awake, 2L placed while sleeping d/t sleep apne

## 2020-11-19 NOTE — CONSULTS
[facility]  Report of Consultation    [facility] Patient Status:  Inpatient    1960 MRN [id number]   [facility] 3NE-A Attending Taya Perez MD   [facility] Day # 0 PCP Memo Chua MD     20    Reason for Consul bleeding)    • PONV (postoperative nausea and vomiting)    • Primary osteoarthritis of left knee 12/11/2017   • S/P left knee arthroscopy 6/6/2014   • Seizure disorder ([address]     WHEN UNDER STRESS   • TIA due to embolism ([facility] [address] 1/29/2017   • Unspecified slee Diabetes Other         MAUNT       Social History:     reports that she quit smoking about 13 years ago. Her smoking use included cigarettes. She started smoking about 29 years ago. She has a 4.50 pack-year smoking history.  She has never used smokeless tob LOSARTAN 100 MG Oral Tab, TAKE 1 TABLET BY MOUTH ONCE DAILY, Disp: 90 tablet, Rfl: 3    •  AMLODIPINE BESYLATE 5 MG Oral Tab, TAKE 1 TABLET BY MOUTH ONCE DAILY.  MUST BE SEEN FOR ANY FURTHER REFILLS., Disp: 90 tablet, Rfl: 3    •  MONTELUKAST SODIUM 10 MG O Disp: 30 tablet, Rfl: 11        Review of Systems:    10 point review performed pertinent positives and negatives per history of present illness.     Physical Exam:    Blood pressure 120/82, pulse 75, temperature 98.3 °F (36.8 °C), temperature source Oral, pain.  History of small bowel obstruction   CONTRAST USED:  100cc of Omnipaque 350  FINDINGS:  LIVER:  Stable. Uniform parenchyma. BILIARY:  Stable. Surgically absent gallbladder. No biliary dilatation. PANCREAS:  Stable. Fatty infiltration.  SPLEEN:  S NG/OG is present. Tip is proximal to mid stomach. The side-hole marker is below the GE junction. External catheter is also seen coiled at the upper abdomen. CONCLUSION:  NG/OG, tip proximal to mid stomach.     Dictated by (CST): Dameon Elaine MD

## 2020-11-20 ENCOUNTER — APPOINTMENT (OUTPATIENT)
Dept: GENERAL RADIOLOGY | Facility: HOSPITAL | Age: 60
DRG: 389 | End: 2020-11-20
Attending: PHYSICIAN ASSISTANT
Payer: MEDICARE

## 2020-11-20 PROCEDURE — 80048 BASIC METABOLIC PNL TOTAL CA: CPT | Performed by: HOSPITALIST

## 2020-11-20 PROCEDURE — 5A09357 ASSISTANCE WITH RESPIRATORY VENTILATION, LESS THAN 24 CONSECUTIVE HOURS, CONTINUOUS POSITIVE AIRWAY PRESSURE: ICD-10-PCS | Performed by: INTERNAL MEDICINE

## 2020-11-20 PROCEDURE — 74019 RADEX ABDOMEN 2 VIEWS: CPT | Performed by: PHYSICIAN ASSISTANT

## 2020-11-20 PROCEDURE — 94660 CPAP INITIATION&MGMT: CPT

## 2020-11-20 PROCEDURE — 83735 ASSAY OF MAGNESIUM: CPT | Performed by: HOSPITALIST

## 2020-11-20 PROCEDURE — 85025 COMPLETE CBC W/AUTO DIFF WBC: CPT | Performed by: HOSPITALIST

## 2020-11-20 PROCEDURE — C9113 INJ PANTOPRAZOLE SODIUM, VIA: HCPCS | Performed by: HOSPITALIST

## 2020-11-20 PROCEDURE — 81003 URINALYSIS AUTO W/O SCOPE: CPT | Performed by: HOSPITALIST

## 2020-11-20 PROCEDURE — 94640 AIRWAY INHALATION TREATMENT: CPT

## 2020-11-20 RX ORDER — TOPIRAMATE 25 MG/1
25 TABLET ORAL 2 TIMES DAILY
Status: DISCONTINUED | OUTPATIENT
Start: 2020-11-20 | End: 2020-11-21

## 2020-11-20 RX ORDER — KETOROLAC TROMETHAMINE 15 MG/ML
30 INJECTION, SOLUTION INTRAMUSCULAR; INTRAVENOUS EVERY 6 HOURS PRN
Status: DISCONTINUED | OUTPATIENT
Start: 2020-11-20 | End: 2020-11-21

## 2020-11-20 RX ORDER — BUPROPION HYDROCHLORIDE 150 MG/1
150 TABLET, EXTENDED RELEASE ORAL 2 TIMES DAILY
Status: DISCONTINUED | OUTPATIENT
Start: 2020-11-20 | End: 2020-11-21

## 2020-11-20 RX ORDER — KETOROLAC TROMETHAMINE 30 MG/ML
INJECTION, SOLUTION INTRAMUSCULAR; INTRAVENOUS
Status: COMPLETED
Start: 2020-11-20 | End: 2020-11-20

## 2020-11-20 RX ORDER — FLUTICASONE PROPIONATE 50 MCG
1 SPRAY, SUSPENSION (ML) NASAL DAILY
Status: DISCONTINUED | OUTPATIENT
Start: 2020-11-20 | End: 2020-11-21

## 2020-11-20 RX ORDER — FLUOXETINE HYDROCHLORIDE 20 MG/1
20 CAPSULE ORAL DAILY
Status: DISCONTINUED | OUTPATIENT
Start: 2020-11-20 | End: 2020-11-21

## 2020-11-20 RX ORDER — ALBUTEROL SULFATE 2.5 MG/3ML
2.5 SOLUTION RESPIRATORY (INHALATION) EVERY 4 HOURS PRN
Status: DISCONTINUED | OUTPATIENT
Start: 2020-11-20 | End: 2020-11-21

## 2020-11-20 NOTE — PROGRESS NOTES
[facility] Hospitalist Progress Note     [facility]      SUBJECTIVE:  Having some mild abdominal pain, better  Still having some nausea, no emesis  Has had some flatus  Having headache/sinus pain this morning and whitish phlegm    OBJECTIVE:  Temp:  [97.5 with non-ionic intravenous contrast material. Post contrast coronal MPR imaging was performed. Dose reduction techniques were used.  Dose information is transmitted to the ACR ([address] [place] [address] of Radiology) [address] ([address]) which in 11/18/2020 at 9:58 PM       Xr Chest Ap Portable  (cpt=71045)    Result Date: 11/18/2020  PROCEDURE:  XR CHEST AP PORTABLE  (CPT=71045)  TECHNIQUE:  AP chest radiograph was obtained. COMPARISON:  None.   INDICATIONS:  Verify correct tube placement  PATIENT infusion, , Intravenous, Continuous    •  Heparin Sodium (Porcine) 5000 UNIT/ML injection 7,500 Units, 7,500 Units, Subcutaneous, Q8H VERÓNICA    •  morphINE sulfate (PF) 2 MG/ML injection 1 mg, 1 mg, Intravenous, Q2H PRN    Or    •  morphINE sulfate (PF) 2 MG/ subQ    Dispo: admitted for SBO    Updated patient and  at [address] [address]  Internal Medicine  [place]

## 2020-11-20 NOTE — PLAN OF CARE
A/O x 4  C/O headache overnight- irritable  1.5L on nc, .  APRIL, unable to use CPAP due to NGT  Tele NSR  NG to LIS- low output through night  UA collected  Heparin subQ  NPO w ice chips  Obstructive series Friday AM 11/20  Passing gas  Morphine 2mg IV MO antiemetic medications  - Provide nonpharmacologic comfort measures as appropriate  - Advance diet as tolerated, if ordered  - Obtain nutritional consult as needed  - Evaluate fluid balance  Outcome: Progressing  Goal: Maintains or returns to baseline haily

## 2020-11-20 NOTE — PROGRESS NOTES
Assumed care in AM , fatigued , skin pale , afebrile ,, respirations w/ slight effort , Neb treatments and Inhaler , Passing flatus , NG clamped at 1100, will recheck residual in 6 hours to determine if tube is removed , start PO meds once taking in PO, 16

## 2020-11-20 NOTE — PROGRESS NOTES
[facility]  Progress Note    Ian Jesus Patient Status:  Inpatient    1960 MRN [id number]   [facility] 3NE-A Attending Isa Dela Cruz MD   Hosp Day # 1 PCP Marlin Correa MD     Subjective:    Patient reports pain i disorder with weakness or paralysis     Major depressive disorder, recurrent severe without psychotic features ([address]     Carotid atherosclerosis     Epilepsy seizure, generalized, convulsive ([address]     Acute CVA (cerebrovascular accident) ([address]     SBO (sma

## 2020-11-21 VITALS
OXYGEN SATURATION: 97 % | TEMPERATURE: 98 F | HEIGHT: 63 IN | SYSTOLIC BLOOD PRESSURE: 138 MMHG | WEIGHT: 285 LBS | DIASTOLIC BLOOD PRESSURE: 86 MMHG | HEART RATE: 65 BPM | BODY MASS INDEX: 50.5 KG/M2 | RESPIRATION RATE: 13 BRPM

## 2020-11-21 PROCEDURE — 80048 BASIC METABOLIC PNL TOTAL CA: CPT | Performed by: INTERNAL MEDICINE

## 2020-11-21 PROCEDURE — 84100 ASSAY OF PHOSPHORUS: CPT | Performed by: INTERNAL MEDICINE

## 2020-11-21 PROCEDURE — C9113 INJ PANTOPRAZOLE SODIUM, VIA: HCPCS | Performed by: HOSPITALIST

## 2020-11-21 PROCEDURE — 83735 ASSAY OF MAGNESIUM: CPT | Performed by: INTERNAL MEDICINE

## 2020-11-21 RX ORDER — AMLODIPINE BESYLATE 5 MG/1
5 TABLET ORAL DAILY
Status: DISCONTINUED | OUTPATIENT
Start: 2020-11-21 | End: 2020-11-21

## 2020-11-21 RX ORDER — ASPIRIN 81 MG/1
81 TABLET ORAL DAILY
Status: DISCONTINUED | OUTPATIENT
Start: 2020-11-21 | End: 2020-11-21

## 2020-11-21 RX ORDER — OXYBUTYNIN CHLORIDE 10 MG/1
10 TABLET, EXTENDED RELEASE ORAL DAILY
Refills: 0 | Status: DISCONTINUED | OUTPATIENT
Start: 2020-11-21 | End: 2020-11-21

## 2020-11-21 RX ORDER — POTASSIUM CHLORIDE 20 MEQ/1
40 TABLET, EXTENDED RELEASE ORAL ONCE
Status: COMPLETED | OUTPATIENT
Start: 2020-11-21 | End: 2020-11-21

## 2020-11-21 RX ORDER — PANTOPRAZOLE SODIUM 40 MG/1
40 TABLET, DELAYED RELEASE ORAL
Status: DISCONTINUED | OUTPATIENT
Start: 2020-11-22 | End: 2020-11-21

## 2020-11-21 RX ORDER — LOSARTAN POTASSIUM 100 MG/1
100 TABLET ORAL DAILY
Status: DISCONTINUED | OUTPATIENT
Start: 2020-11-21 | End: 2020-11-21

## 2020-11-21 RX ORDER — MONTELUKAST SODIUM 10 MG/1
10 TABLET ORAL DAILY
Status: DISCONTINUED | OUTPATIENT
Start: 2020-11-21 | End: 2020-11-21

## 2020-11-21 RX ORDER — FLUTICASONE PROPIONATE 50 MCG
1 SPRAY, SUSPENSION (ML) NASAL DAILY
Qty: 1 BOTTLE | Refills: 0 | Status: SHIPPED | OUTPATIENT
Start: 2020-11-22 | End: 2021-04-14

## 2020-11-21 NOTE — DISCHARGE SUMMARY
General Medicine Discharge Summary     Patient ID:  Suellen Henderson  56year old  8/7/1960    Admit date: 11/18/2020    Discharge date and time: 11/21/20    Attending Physician: Claudia Parker MD     Primary Care Physician: Viktoria Diallo MD List    START taking these medications    Fluticasone Propionate 50 MCG/ACT Nasal Suspension  1 spray by Each Nare route daily.       CONTINUE these medications which have NOT CHANGED    buPROPion HCl ER, SR, 150 MG Oral Tablet 12 Hr  Take 1 tablet (150 mg

## 2020-11-21 NOTE — PLAN OF CARE
Assumed pt care at 0730. A&Ox4. VSS. Room air. NSR on tele. Seizure precautions maintained, no seizure-like activity noted. Tolerating clear liquid diet, advanced to full liquid diet by MD. Ciro N/V. Reports headache, improved with PRN toradol.  Heparin ordered and tolerated  - Nasogastric tube to low intermittent suction as ordered  - Evaluate effectiveness of ordered antiemetic medications  - Provide nonpharmacologic comfort measures as appropriate  - Advance diet as tolerated, if ordered  - Obtain nutr

## 2020-11-21 NOTE — PLAN OF CARE
NURSING DISCHARGE NOTE    Discharged Home via Ambulatory. Accompanied by Spouse  Belongings Taken by patient/family. Pt discharged in calm, stable status. Discharge paperwork provided & discussed, pt verbalized understanding.  Paper prescription provide

## 2020-11-21 NOTE — PLAN OF CARE
Problem: PAIN - ADULT  Goal: Verbalizes/displays adequate comfort level or patient's stated pain goal  Description: INTERVENTIONS:  - Encourage pt to monitor pain and request assistance  - Assess pain using appropriate pain scale  - Administer analgesics INTERVENTIONS:  - Assess bowel function  - Maintain adequate hydration with IV or PO as ordered and tolerated  - Evaluate effectiveness of GI medications  - Encourage mobilization and activity  - Obtain nutritional consult as needed  - Establish a toiletin

## 2020-11-21 NOTE — PLAN OF CARE
Pt A&Ox4 Room Air APRIL C-pap at Night   NSR on Tele  Resting in bed  Denies pain at this time  Electrolyte protocol maintained  Clear liq diet  Seizure precaution, Not seizure activity noted.   Sub Q heparin given   Multiple BM    Safety precaution maintaine vomiting  Description: INTERVENTIONS:  - Maintain adequate hydration with IV or PO as ordered and tolerated  - Nasogastric tube to low intermittent suction as ordered  - Evaluate effectiveness of ordered antiemetic medications  - Provide nonpharmacologic c

## 2020-11-21 NOTE — RESPIRATORY THERAPY NOTE
APRIL : EQUIPMENT USE: DAILY SUMMARY                                            SET MODE:AUTO CPAP WITH CFLEX                                          USAGE IN HOURS:2:24                                          90%

## 2020-12-07 ENCOUNTER — LAB ENCOUNTER (OUTPATIENT)
Dept: LAB | Age: 60
End: 2020-12-07
Attending: OPHTHALMOLOGY
Payer: MEDICARE

## 2020-12-07 DIAGNOSIS — H25.819 COMBINED FORMS OF AGE-RELATED CATARACT: ICD-10-CM

## 2020-12-10 ENCOUNTER — HOSPITAL ENCOUNTER (OUTPATIENT)
Facility: HOSPITAL | Age: 60
Setting detail: HOSPITAL OUTPATIENT SURGERY
Discharge: HOME | End: 2020-12-10
Attending: OPHTHALMOLOGY | Admitting: OPHTHALMOLOGY
Payer: MEDICARE

## 2020-12-10 ENCOUNTER — ANESTHESIA EVENT (OUTPATIENT)
Dept: SURGERY | Facility: HOSPITAL | Age: 60
End: 2020-12-10
Payer: MEDICARE

## 2020-12-10 ENCOUNTER — ANESTHESIA (OUTPATIENT)
Dept: SURGERY | Facility: HOSPITAL | Age: 60
End: 2020-12-10
Payer: MEDICARE

## 2020-12-10 VITALS
OXYGEN SATURATION: 98 % | BODY MASS INDEX: 50.83 KG/M2 | HEIGHT: 62.5 IN | WEIGHT: 283.31 LBS | DIASTOLIC BLOOD PRESSURE: 84 MMHG | SYSTOLIC BLOOD PRESSURE: 106 MMHG | RESPIRATION RATE: 16 BRPM | TEMPERATURE: 97 F | HEART RATE: 74 BPM

## 2020-12-10 DIAGNOSIS — H25.819 COMBINED FORMS OF AGE-RELATED CATARACT: Primary | ICD-10-CM

## 2020-12-10 DIAGNOSIS — H25.811 COMBINED FORMS OF AGE-RELATED CATARACT OF RIGHT EYE: ICD-10-CM

## 2020-12-10 PROCEDURE — 08RJ3JZ REPLACEMENT OF RIGHT LENS WITH SYNTHETIC SUBSTITUTE, PERCUTANEOUS APPROACH: ICD-10-PCS | Performed by: OPHTHALMOLOGY

## 2020-12-10 PROCEDURE — 82962 GLUCOSE BLOOD TEST: CPT

## 2020-12-10 DEVICE — IMPLANTABLE DEVICE: Type: IMPLANTABLE DEVICE | Site: EYE | Status: FUNCTIONAL

## 2020-12-10 RX ORDER — HYDROCODONE BITARTRATE AND ACETAMINOPHEN 5; 325 MG/1; MG/1
2 TABLET ORAL AS NEEDED
Status: COMPLETED | OUTPATIENT
Start: 2020-12-10 | End: 2020-12-10

## 2020-12-10 RX ORDER — PHENYLEPHRINE HCL 2.5 %
1 DROPS OPHTHALMIC (EYE) SEE ADMIN INSTRUCTIONS
Status: COMPLETED | OUTPATIENT
Start: 2020-12-10 | End: 2020-12-10

## 2020-12-10 RX ORDER — METOCLOPRAMIDE HYDROCHLORIDE 5 MG/ML
10 INJECTION INTRAMUSCULAR; INTRAVENOUS AS NEEDED
Status: DISCONTINUED | OUTPATIENT
Start: 2020-12-10 | End: 2020-12-10

## 2020-12-10 RX ORDER — SODIUM CHLORIDE, SODIUM LACTATE, POTASSIUM CHLORIDE, CALCIUM CHLORIDE 600; 310; 30; 20 MG/100ML; MG/100ML; MG/100ML; MG/100ML
INJECTION, SOLUTION INTRAVENOUS CONTINUOUS
Status: DISCONTINUED | OUTPATIENT
Start: 2020-12-10 | End: 2020-12-10

## 2020-12-10 RX ORDER — TROPICAMIDE 10 MG/ML
1 SOLUTION/ DROPS OPHTHALMIC SEE ADMIN INSTRUCTIONS
Status: COMPLETED | OUTPATIENT
Start: 2020-12-10 | End: 2020-12-10

## 2020-12-10 RX ORDER — HYDROCODONE BITARTRATE AND ACETAMINOPHEN 5; 325 MG/1; MG/1
1 TABLET ORAL AS NEEDED
Status: COMPLETED | OUTPATIENT
Start: 2020-12-10 | End: 2020-12-10

## 2020-12-10 RX ORDER — TETRACAINE HYDROCHLORIDE 5 MG/ML
1 SOLUTION OPHTHALMIC SEE ADMIN INSTRUCTIONS
Status: COMPLETED | OUTPATIENT
Start: 2020-12-10 | End: 2020-12-10

## 2020-12-10 RX ORDER — MIDAZOLAM HYDROCHLORIDE 1 MG/ML
INJECTION INTRAMUSCULAR; INTRAVENOUS AS NEEDED
Status: DISCONTINUED | OUTPATIENT
Start: 2020-12-10 | End: 2020-12-10 | Stop reason: SURG

## 2020-12-10 RX ORDER — ACETAMINOPHEN 500 MG
1000 TABLET ORAL ONCE
Status: DISCONTINUED | OUTPATIENT
Start: 2020-12-10 | End: 2020-12-10 | Stop reason: HOSPADM

## 2020-12-10 RX ORDER — ACETAMINOPHEN 500 MG
500 TABLET ORAL EVERY 6 HOURS PRN
COMMUNITY
End: 2021-01-25

## 2020-12-10 RX ORDER — ONDANSETRON 2 MG/ML
4 INJECTION INTRAMUSCULAR; INTRAVENOUS AS NEEDED
Status: DISCONTINUED | OUTPATIENT
Start: 2020-12-10 | End: 2020-12-10

## 2020-12-10 RX ORDER — DEXAMETHASONE SODIUM PHOSPHATE 4 MG/ML
4 VIAL (ML) INJECTION AS NEEDED
Status: DISCONTINUED | OUTPATIENT
Start: 2020-12-10 | End: 2020-12-10

## 2020-12-10 RX ORDER — HYDROMORPHONE HYDROCHLORIDE 1 MG/ML
0.4 INJECTION, SOLUTION INTRAMUSCULAR; INTRAVENOUS; SUBCUTANEOUS EVERY 5 MIN PRN
Status: DISCONTINUED | OUTPATIENT
Start: 2020-12-10 | End: 2020-12-10

## 2020-12-10 RX ORDER — MOXIFLOXACIN 5 MG/ML
SOLUTION/ DROPS OPHTHALMIC AS NEEDED
Status: DISCONTINUED | OUTPATIENT
Start: 2020-12-10 | End: 2020-12-10 | Stop reason: HOSPADM

## 2020-12-10 RX ORDER — LIDOCAINE HYDROCHLORIDE 10 MG/ML
INJECTION, SOLUTION EPIDURAL; INFILTRATION; INTRACAUDAL; PERINEURAL AS NEEDED
Status: DISCONTINUED | OUTPATIENT
Start: 2020-12-10 | End: 2020-12-10 | Stop reason: HOSPADM

## 2020-12-10 RX ORDER — CYCLOPENTOLATE HYDROCHLORIDE 10 MG/ML
1 SOLUTION/ DROPS OPHTHALMIC SEE ADMIN INSTRUCTIONS
Status: COMPLETED | OUTPATIENT
Start: 2020-12-10 | End: 2020-12-10

## 2020-12-10 RX ORDER — IBUPROFEN 600 MG/1
600 TABLET ORAL ONCE AS NEEDED
Status: DISCONTINUED | OUTPATIENT
Start: 2020-12-10 | End: 2020-12-10

## 2020-12-10 RX ORDER — DEXTROSE MONOHYDRATE 25 G/50ML
50 INJECTION, SOLUTION INTRAVENOUS
Status: DISCONTINUED | OUTPATIENT
Start: 2020-12-10 | End: 2020-12-10

## 2020-12-10 RX ORDER — BALANCED SALT SOLUTION 6.4; .75; .48; .3; 3.9; 1.7 MG/ML; MG/ML; MG/ML; MG/ML; MG/ML; MG/ML
SOLUTION OPHTHALMIC AS NEEDED
Status: DISCONTINUED | OUTPATIENT
Start: 2020-12-10 | End: 2020-12-10 | Stop reason: HOSPADM

## 2020-12-10 RX ORDER — NALOXONE HYDROCHLORIDE 0.4 MG/ML
80 INJECTION, SOLUTION INTRAMUSCULAR; INTRAVENOUS; SUBCUTANEOUS AS NEEDED
Status: DISCONTINUED | OUTPATIENT
Start: 2020-12-10 | End: 2020-12-10

## 2020-12-10 RX ADMIN — SODIUM CHLORIDE, SODIUM LACTATE, POTASSIUM CHLORIDE, CALCIUM CHLORIDE: 600; 310; 30; 20 INJECTION, SOLUTION INTRAVENOUS at 07:44:00

## 2020-12-10 RX ADMIN — MIDAZOLAM HYDROCHLORIDE 2 MG: 1 INJECTION INTRAMUSCULAR; INTRAVENOUS at 07:07:00

## 2020-12-10 NOTE — OPERATIVE REPORT
[facility] OPERATIVE REPORT:     PATIENT NAME:  Gordy Salinas    MRN:  [id number]    DATE OF OPERATION:   12/10/2020      PREOPERATIVE DIAGNOSIS:   1. Cortical Cataract, OD  2. Nuclear Sclerotic Cataract, OD    POSTOPERATIVE DIAGNOSIS:   1.  Ata salt solution on a cannula. The nucleus was then divided into 4 quadrants using a prechopper. The phacoemulsification tip was introduced into the eye, and the nuclear fragments were removed without difficulty.  The residual cortex was removed using automate

## 2020-12-10 NOTE — ANESTHESIA PREPROCEDURE EVALUATION
PRE-OP EVALUATION    Patient Name: Jory London    Pre-op Diagnosis: Combined forms of age-related cataract of right eye [H25.811]    Procedure(s):  PHACOEMULSIFICATION OF RIGHT CATARACT WITH PLACEMENT OF INTRAOCULAR LENS IMPLANT    Surgeon(s) and DAILY, Disp: 90 tablet, Rfl: 3, 12/9/2020 at Unknown time    •  LOSARTAN 100 MG Oral Tab, TAKE 1 TABLET BY MOUTH ONCE DAILY, Disp: 90 tablet, Rfl: 3, 12/10/2020 at 0230    •  AMLODIPINE BESYLATE 5 MG Oral Tab, TAKE 1 TABLET BY MOUTH ONCE DAILY.  MUST BE SEE Cardiovascular                (+) obesity  (+) hypertension                                     Endo/Other                                  Pulmonary      (+) asthma              (+) sleep apnea       Neuro/Psych      (+) depression    (+) CVA Available pre-op labs reviewed.   Lab Results   Component Value Date    WBC 8.7 11/20/2020    RBC 4.05 11/20/2020    HGB 12.1 11/20/2020    HCT 39.1 11/20/2020    MCV 96.5 11/20/2020    MCH 29.9 11/20/2020    MCHC 30.9 (L) 11/20/2020    RDW 15.1 (H) 11/

## 2020-12-10 NOTE — ANESTHESIA POSTPROCEDURE EVALUATION
Iwona Patient Status:  Hospital Outpatient Surgery   Age/Gender 56year old female MRN [id number]   [facility] SURGERY Attending Amarilys Cintron MD   Hosp Day # 0 PCP Gregg Arellano MD       Anesthesia

## 2021-01-05 ENCOUNTER — LAB ENCOUNTER (OUTPATIENT)
Dept: LAB | Age: 61
End: 2021-01-05
Attending: OPHTHALMOLOGY
Payer: MEDICARE

## 2021-01-05 DIAGNOSIS — H25.819 COMBINED FORMS OF AGE-RELATED CATARACT: ICD-10-CM

## 2021-01-06 ENCOUNTER — ANESTHESIA EVENT (OUTPATIENT)
Dept: SURGERY | Facility: HOSPITAL | Age: 61
End: 2021-01-06
Payer: MEDICARE

## 2021-01-06 LAB — SARS-COV-2 RNA RESP QL NAA+PROBE: NOT DETECTED

## 2021-01-07 ENCOUNTER — HOSPITAL ENCOUNTER (OUTPATIENT)
Facility: HOSPITAL | Age: 61
Setting detail: HOSPITAL OUTPATIENT SURGERY
Discharge: HOME | End: 2021-01-07
Attending: OPHTHALMOLOGY | Admitting: OPHTHALMOLOGY
Payer: MEDICARE

## 2021-01-07 ENCOUNTER — ANESTHESIA (OUTPATIENT)
Dept: SURGERY | Facility: HOSPITAL | Age: 61
End: 2021-01-07
Payer: MEDICARE

## 2021-01-07 VITALS
OXYGEN SATURATION: 99 % | HEART RATE: 74 BPM | HEIGHT: 62.5 IN | WEIGHT: 285 LBS | DIASTOLIC BLOOD PRESSURE: 81 MMHG | RESPIRATION RATE: 18 BRPM | SYSTOLIC BLOOD PRESSURE: 120 MMHG | TEMPERATURE: 98 F | BODY MASS INDEX: 51.13 KG/M2

## 2021-01-07 DIAGNOSIS — H25.812 COMBINED FORMS OF AGE-RELATED CATARACT OF LEFT EYE: ICD-10-CM

## 2021-01-07 LAB
GLUCOSE BLD-MCNC: 101 MG/DL (ref 70–99)
GLUCOSE BLD-MCNC: 121 MG/DL (ref 70–99)

## 2021-01-07 PROCEDURE — 82962 GLUCOSE BLOOD TEST: CPT

## 2021-01-07 PROCEDURE — 08RK3JZ REPLACEMENT OF LEFT LENS WITH SYNTHETIC SUBSTITUTE, PERCUTANEOUS APPROACH: ICD-10-PCS | Performed by: OPHTHALMOLOGY

## 2021-01-07 DEVICE — IMPLANTABLE DEVICE: Type: IMPLANTABLE DEVICE | Site: EYE | Status: FUNCTIONAL

## 2021-01-07 RX ORDER — SODIUM CHLORIDE, SODIUM LACTATE, POTASSIUM CHLORIDE, CALCIUM CHLORIDE 600; 310; 30; 20 MG/100ML; MG/100ML; MG/100ML; MG/100ML
INJECTION, SOLUTION INTRAVENOUS CONTINUOUS
Status: DISCONTINUED | OUTPATIENT
Start: 2021-01-07 | End: 2021-01-07

## 2021-01-07 RX ORDER — TETRACAINE HYDROCHLORIDE 5 MG/ML
SOLUTION OPHTHALMIC
Status: COMPLETED
Start: 2021-01-07 | End: 2021-01-07

## 2021-01-07 RX ORDER — CYCLOPENTOLATE HYDROCHLORIDE 10 MG/ML
SOLUTION/ DROPS OPHTHALMIC
Status: COMPLETED
Start: 2021-01-07 | End: 2021-01-07

## 2021-01-07 RX ORDER — NALOXONE HYDROCHLORIDE 0.4 MG/ML
80 INJECTION, SOLUTION INTRAMUSCULAR; INTRAVENOUS; SUBCUTANEOUS AS NEEDED
Status: DISCONTINUED | OUTPATIENT
Start: 2021-01-07 | End: 2021-01-07

## 2021-01-07 RX ORDER — ONDANSETRON 2 MG/ML
4 INJECTION INTRAMUSCULAR; INTRAVENOUS AS NEEDED
Status: DISCONTINUED | OUTPATIENT
Start: 2021-01-07 | End: 2021-01-07

## 2021-01-07 RX ORDER — TROPICAMIDE 10 MG/ML
1 SOLUTION/ DROPS OPHTHALMIC SEE ADMIN INSTRUCTIONS
Status: COMPLETED | OUTPATIENT
Start: 2021-01-07 | End: 2021-01-07

## 2021-01-07 RX ORDER — PHENYLEPHRINE HCL 2.5 %
1 DROPS OPHTHALMIC (EYE) SEE ADMIN INSTRUCTIONS
Status: COMPLETED | OUTPATIENT
Start: 2021-01-07 | End: 2021-01-07

## 2021-01-07 RX ORDER — MOXIFLOXACIN 5 MG/ML
SOLUTION/ DROPS OPHTHALMIC AS NEEDED
Status: DISCONTINUED | OUTPATIENT
Start: 2021-01-07 | End: 2021-01-07 | Stop reason: HOSPADM

## 2021-01-07 RX ORDER — LIDOCAINE HYDROCHLORIDE 10 MG/ML
INJECTION, SOLUTION EPIDURAL; INFILTRATION; INTRACAUDAL; PERINEURAL AS NEEDED
Status: DISCONTINUED | OUTPATIENT
Start: 2021-01-07 | End: 2021-01-07 | Stop reason: HOSPADM

## 2021-01-07 RX ORDER — PHENYLEPHRINE HCL 2.5 %
DROPS OPHTHALMIC (EYE)
Status: COMPLETED
Start: 2021-01-07 | End: 2021-01-07

## 2021-01-07 RX ORDER — PREDNISOLONE ACETATE 10 MG/ML
SUSPENSION/ DROPS OPHTHALMIC AS NEEDED
Status: DISCONTINUED | OUTPATIENT
Start: 2021-01-07 | End: 2021-01-07 | Stop reason: HOSPADM

## 2021-01-07 RX ORDER — BALANCED SALT SOLUTION 6.4; .75; .48; .3; 3.9; 1.7 MG/ML; MG/ML; MG/ML; MG/ML; MG/ML; MG/ML
SOLUTION OPHTHALMIC AS NEEDED
Status: DISCONTINUED | OUTPATIENT
Start: 2021-01-07 | End: 2021-01-07 | Stop reason: HOSPADM

## 2021-01-07 RX ORDER — CYCLOPENTOLATE HYDROCHLORIDE 10 MG/ML
1 SOLUTION/ DROPS OPHTHALMIC SEE ADMIN INSTRUCTIONS
Status: COMPLETED | OUTPATIENT
Start: 2021-01-07 | End: 2021-01-07

## 2021-01-07 RX ORDER — HYDROCODONE BITARTRATE AND ACETAMINOPHEN 5; 325 MG/1; MG/1
2 TABLET ORAL AS NEEDED
Status: COMPLETED | OUTPATIENT
Start: 2021-01-07 | End: 2021-01-07

## 2021-01-07 RX ORDER — TETRACAINE HYDROCHLORIDE 5 MG/ML
1 SOLUTION OPHTHALMIC SEE ADMIN INSTRUCTIONS
Status: COMPLETED | OUTPATIENT
Start: 2021-01-07 | End: 2021-01-07

## 2021-01-07 RX ORDER — ACETAMINOPHEN 500 MG
1000 TABLET ORAL ONCE
Status: DISCONTINUED | OUTPATIENT
Start: 2021-01-07 | End: 2021-01-07

## 2021-01-07 RX ORDER — MULTIVIT-MIN/IRON/FOLIC ACID/K 18-600-40
2000 CAPSULE ORAL
COMMUNITY

## 2021-01-07 RX ORDER — TETRACAINE HYDROCHLORIDE 5 MG/ML
SOLUTION OPHTHALMIC AS NEEDED
Status: DISCONTINUED | OUTPATIENT
Start: 2021-01-07 | End: 2021-01-07 | Stop reason: HOSPADM

## 2021-01-07 RX ORDER — MIDAZOLAM HYDROCHLORIDE 1 MG/ML
INJECTION INTRAMUSCULAR; INTRAVENOUS AS NEEDED
Status: DISCONTINUED | OUTPATIENT
Start: 2021-01-07 | End: 2021-01-07 | Stop reason: SURG

## 2021-01-07 RX ORDER — DEXTROSE MONOHYDRATE 25 G/50ML
50 INJECTION, SOLUTION INTRAVENOUS
Status: DISCONTINUED | OUTPATIENT
Start: 2021-01-07 | End: 2021-01-07

## 2021-01-07 RX ORDER — HYDROCODONE BITARTRATE AND ACETAMINOPHEN 5; 325 MG/1; MG/1
1 TABLET ORAL AS NEEDED
Status: COMPLETED | OUTPATIENT
Start: 2021-01-07 | End: 2021-01-07

## 2021-01-07 RX ORDER — TROPICAMIDE 10 MG/ML
SOLUTION/ DROPS OPHTHALMIC
Status: DISCONTINUED
Start: 2021-01-07 | End: 2021-01-07

## 2021-01-07 RX ORDER — HYDROMORPHONE HYDROCHLORIDE 1 MG/ML
0.4 INJECTION, SOLUTION INTRAMUSCULAR; INTRAVENOUS; SUBCUTANEOUS EVERY 5 MIN PRN
Status: DISCONTINUED | OUTPATIENT
Start: 2021-01-07 | End: 2021-01-07

## 2021-01-07 RX ADMIN — MIDAZOLAM HYDROCHLORIDE 2 MG: 1 INJECTION INTRAMUSCULAR; INTRAVENOUS at 07:58:00

## 2021-01-07 RX ADMIN — SODIUM CHLORIDE, SODIUM LACTATE, POTASSIUM CHLORIDE, CALCIUM CHLORIDE: 600; 310; 30; 20 INJECTION, SOLUTION INTRAVENOUS at 08:37:00

## 2021-01-07 RX ADMIN — SODIUM CHLORIDE, SODIUM LACTATE, POTASSIUM CHLORIDE, CALCIUM CHLORIDE: 600; 310; 30; 20 INJECTION, SOLUTION INTRAVENOUS at 08:00:00

## 2021-01-07 NOTE — ANESTHESIA PREPROCEDURE EVALUATION
PRE-OP EVALUATION    Patient Name: Rashaad Alla    Pre-op Diagnosis: Combined forms of age-related cataract of left eye [H25.812]    Procedure(s):  PHACOEMULSIFICATION OF LEFT CATARACT WITH PLACEMENT OF INTRAOCULAR LENS IMPLANT    Surgeon(s) and R (2000 UT) Oral Cap, Take 2,000 Units by mouth., Disp: , Rfl: , 1/5/2021    •  DM-Doxylamine-Acetaminophen (NYQUIL COLD & FLU OR), Take by mouth., Disp: , Rfl: , 12/31/2020    •  buPROPion HCl ER, SR, 150 MG Oral Tablet 12 Hr, Take 1 tablet (150 mg total) b 1/5/2021    •  ofloxacin 0.3 % Ophthalmic Solution, Place 1 drop into the right eye 4 (four) times daily. , Disp: 1 Bottle, Rfl: 2, 12/31/2020    •  prednisoLONE acetate 1 % Ophthalmic Suspension, Place 1 drop into the right eye 4 (four) times daily. , Disp: COLONOSCOPY     • COLONOSCOPY N/A 5/25/2016    Performed by Trini Gonsalez MD at [facility] ENDOSCOPY   • ESOPHAGOGASTRODUODENOSCOPY (EGD) N/A 3/28/2016    Performed by Trini Gonsalez MD at [address] [place] [address] 12/10/20 11/21/2020     (H) 11/21/2020    CA 8.7 11/21/2020            Airway      Mallampati: III  Mouth opening: >3 FB  TM distance: 4 - 6 cm  Neck ROM: full Cardiovascular    Cardiovascular exam normal.         Dental             Pulmonary    Pulmonary ex

## 2021-01-07 NOTE — H&P
Thiago Rolon is a 56year old female. HPI:      No chief complaint on file. Allergies:    T-Pa [Alteplase]        OTHER (SEE COMMENTS)    Comment:Mild lip swelling. Current Meds:  No current outpatient medications on file.         HISTORY MD CRISTINA at [facility] ENDOSCOPY   • ESOPHAGOGASTRODUODENOSCOPY (EGD) N/A 3/28/2016    Performed by Elvis Gonzalez MD at [address] [place] [address] 12/10/2020    Performed by Ashley Elise MD at [facility] MAIN OR   • GASTRIC BYPASS,OBESE Oriented to person, place and time. Mood: appropriate   ENT: Denies having any swollen glands or nodes, troubles with hearing, swallowing or nasal issues. RESP: Denies cough, shortness of breath. CV: Denies angina, palpitations.    GI: Denies melena,

## 2021-01-07 NOTE — OPERATIVE REPORT
[address] [facility] OPERATIVE REPORT:     PATIENT NAME:  Murl Sandhoff    MRN:  [id number]    DATE OF OPERATION:   1/7/2021      PREOPERATIVE DIAGNOSIS:   1. Cortical Cataract, OS  2. Nuclear Sclerotic Cataract, OS    POSTOPERATIVE DIAGNOSIS:   1.  Cortical C surface of the cornea. Hydrodissection and hydrodelineation were carried out using balanced salt solution on a cannula. The nucleus was then divided into 4 quadrants using a prechopper.  The phacoemulsification tip was introduced into the eye, and the nucle

## 2021-01-07 NOTE — ANESTHESIA POSTPROCEDURE EVALUATION
Iwona Patient Status:  Hospital Outpatient Surgery   Age/Gender 56year old female MRN [id number]   [facility] SURGERY Attending Raúl Champagne MD   Hosp Day # 0 PCP Jv Arias MD       Anesthesia

## 2021-01-24 PROBLEM — Z86.73 HISTORY OF CARDIOEMBOLIC CEREBROVASCULAR ACCIDENT (CVA): Status: ACTIVE | Noted: 2021-01-24

## 2021-01-24 PROBLEM — I63.9 ACUTE CVA (CEREBROVASCULAR ACCIDENT) (HCC): Status: RESOLVED | Noted: 2020-10-28 | Resolved: 2021-01-24

## 2021-01-24 PROBLEM — Z86.73 HISTORY OF CVA (CEREBROVASCULAR ACCIDENT): Status: ACTIVE | Noted: 2021-01-24

## 2021-01-24 PROBLEM — K56.609 SBO (SMALL BOWEL OBSTRUCTION) (HCC): Status: RESOLVED | Noted: 2020-11-18 | Resolved: 2021-01-24

## 2021-02-06 PROBLEM — I16.0 HYPERTENSIVE URGENCY: Status: RESOLVED | Noted: 2020-10-05 | Resolved: 2021-02-06

## 2021-02-06 PROBLEM — E11.9 TYPE 2 DIABETES MELLITUS WITHOUT COMPLICATION, WITHOUT LONG-TERM CURRENT USE OF INSULIN (HCC): Status: ACTIVE | Noted: 2021-02-06

## 2021-02-06 PROBLEM — Z86.73 HISTORY OF CVA (CEREBROVASCULAR ACCIDENT): Status: RESOLVED | Noted: 2021-01-24 | Resolved: 2021-02-06

## 2021-04-14 PROBLEM — F10.90 ALCOHOL USE: Status: ACTIVE | Noted: 2021-04-14

## 2021-04-14 PROBLEM — Z78.9 ALCOHOL USE: Status: ACTIVE | Noted: 2021-04-14

## 2021-04-14 PROBLEM — Z72.89 ALCOHOL USE: Status: ACTIVE | Noted: 2021-04-14

## 2021-08-29 ENCOUNTER — HOSPITAL ENCOUNTER (INPATIENT)
Facility: HOSPITAL | Age: 61
LOS: 2 days | Discharge: HOME | DRG: 177 | End: 2021-08-31
Attending: INTERNAL MEDICINE | Admitting: INTERNAL MEDICINE
Payer: MEDICARE

## 2021-08-29 PROBLEM — U07.1 COVID-19: Status: ACTIVE | Noted: 2021-08-29

## 2021-08-29 LAB — GLUCOSE BLD-MCNC: 159 MG/DL (ref 70–99)

## 2021-08-29 PROCEDURE — 82962 GLUCOSE BLOOD TEST: CPT

## 2021-08-29 PROCEDURE — 87040 BLOOD CULTURE FOR BACTERIA: CPT | Performed by: INTERNAL MEDICINE

## 2021-08-29 PROCEDURE — 84145 PROCALCITONIN (PCT): CPT | Performed by: INTERNAL MEDICINE

## 2021-08-29 PROCEDURE — 83880 ASSAY OF NATRIURETIC PEPTIDE: CPT | Performed by: INTERNAL MEDICINE

## 2021-08-29 RX ORDER — ALBUTEROL SULFATE 90 UG/1
4 AEROSOL, METERED RESPIRATORY (INHALATION) EVERY 4 HOURS PRN
Status: DISCONTINUED | OUTPATIENT
Start: 2021-08-29 | End: 2021-08-31

## 2021-08-29 RX ORDER — GUAIFENESIN 600 MG
600 TABLET, EXTENDED RELEASE 12 HR ORAL 2 TIMES DAILY
Status: DISCONTINUED | OUTPATIENT
Start: 2021-08-29 | End: 2021-08-31

## 2021-08-29 RX ORDER — SODIUM PHOSPHATE, DIBASIC AND SODIUM PHOSPHATE, MONOBASIC 7; 19 G/133ML; G/133ML
1 ENEMA RECTAL ONCE AS NEEDED
Status: DISCONTINUED | OUTPATIENT
Start: 2021-08-29 | End: 2021-08-31

## 2021-08-29 RX ORDER — BISACODYL 10 MG
10 SUPPOSITORY, RECTAL RECTAL
Status: DISCONTINUED | OUTPATIENT
Start: 2021-08-29 | End: 2021-08-31

## 2021-08-29 RX ORDER — ENOXAPARIN SODIUM 100 MG/ML
0.5 INJECTION SUBCUTANEOUS DAILY
Status: DISCONTINUED | OUTPATIENT
Start: 2021-08-29 | End: 2021-08-31

## 2021-08-29 RX ORDER — ACETAMINOPHEN 325 MG/1
650 TABLET ORAL EVERY 6 HOURS PRN
Status: DISCONTINUED | OUTPATIENT
Start: 2021-08-29 | End: 2021-08-31

## 2021-08-29 RX ORDER — DOCUSATE SODIUM 100 MG/1
100 CAPSULE, LIQUID FILLED ORAL 2 TIMES DAILY
Status: DISCONTINUED | OUTPATIENT
Start: 2021-08-29 | End: 2021-08-31

## 2021-08-29 RX ORDER — PROCHLORPERAZINE EDISYLATE 5 MG/ML
5 INJECTION INTRAMUSCULAR; INTRAVENOUS EVERY 8 HOURS PRN
Status: DISCONTINUED | OUTPATIENT
Start: 2021-08-29 | End: 2021-08-31

## 2021-08-29 RX ORDER — POLYETHYLENE GLYCOL 3350 17 G/17G
17 POWDER, FOR SOLUTION ORAL DAILY PRN
Status: DISCONTINUED | OUTPATIENT
Start: 2021-08-29 | End: 2021-08-31

## 2021-08-29 RX ORDER — ONDANSETRON 2 MG/ML
4 INJECTION INTRAMUSCULAR; INTRAVENOUS EVERY 6 HOURS PRN
Status: DISCONTINUED | OUTPATIENT
Start: 2021-08-29 | End: 2021-08-31

## 2021-08-30 ENCOUNTER — APPOINTMENT (OUTPATIENT)
Dept: GENERAL RADIOLOGY | Facility: HOSPITAL | Age: 61
DRG: 177 | End: 2021-08-30
Attending: INTERNAL MEDICINE
Payer: MEDICARE

## 2021-08-30 LAB
ALBUMIN SERPL-MCNC: 3 G/DL (ref 3.4–5)
ALBUMIN/GLOB SERPL: 0.9 {RATIO} (ref 1–2)
ALP LIVER SERPL-CCNC: 97 U/L
ALT SERPL-CCNC: 72 U/L
ANION GAP SERPL CALC-SCNC: 5 MMOL/L (ref 0–18)
AST SERPL-CCNC: 38 U/L (ref 15–37)
BASOPHILS # BLD AUTO: 0.03 X10(3) UL (ref 0–0.2)
BASOPHILS NFR BLD AUTO: 0.4 %
BILIRUB SERPL-MCNC: 0.4 MG/DL (ref 0.1–2)
BILIRUB UR QL STRIP.AUTO: NEGATIVE
BUN BLD-MCNC: 13 MG/DL (ref 7–18)
CALCIUM BLD-MCNC: 8.8 MG/DL (ref 8.5–10.1)
CHLORIDE SERPL-SCNC: 113 MMOL/L (ref 98–112)
CLARITY UR REFRACT.AUTO: CLEAR
CO2 SERPL-SCNC: 26 MMOL/L (ref 21–32)
COLOR UR AUTO: YELLOW
CREAT BLD-MCNC: 0.65 MG/DL
CRP SERPL-MCNC: <0.29 MG/DL (ref ?–0.3)
D-DIMER: 0.36 UG/ML FEU (ref ?–0.63)
DEPRECATED HBV CORE AB SER IA-ACNC: 26.1 NG/ML
EOSINOPHIL # BLD AUTO: 0.02 X10(3) UL (ref 0–0.7)
EOSINOPHIL NFR BLD AUTO: 0.3 %
ERYTHROCYTE [DISTWIDTH] IN BLOOD BY AUTOMATED COUNT: 14.9 %
GLOBULIN PLAS-MCNC: 3.4 G/DL (ref 2.8–4.4)
GLUCOSE BLD-MCNC: 74 MG/DL (ref 70–99)
GLUCOSE BLD-MCNC: 87 MG/DL (ref 70–99)
GLUCOSE BLD-MCNC: 87 MG/DL (ref 70–99)
GLUCOSE BLD-MCNC: 89 MG/DL (ref 70–99)
GLUCOSE BLD-MCNC: 96 MG/DL (ref 70–99)
GLUCOSE UR STRIP.AUTO-MCNC: NEGATIVE MG/DL
HCT VFR BLD AUTO: 42.1 %
HGB BLD-MCNC: 13 G/DL
IMM GRANULOCYTES # BLD AUTO: 0.02 X10(3) UL (ref 0–1)
IMM GRANULOCYTES NFR BLD: 0.3 %
KETONES UR STRIP.AUTO-MCNC: NEGATIVE MG/DL
LDH SERPL L TO P-CCNC: 162 U/L
LEUKOCYTE ESTERASE UR QL STRIP.AUTO: NEGATIVE
LYMPHOCYTES # BLD AUTO: 2.94 X10(3) UL (ref 1–4)
LYMPHOCYTES NFR BLD AUTO: 40.1 %
M PROTEIN MFR SERPL ELPH: 6.4 G/DL (ref 6.4–8.2)
MCH RBC QN AUTO: 28.6 PG (ref 26–34)
MCHC RBC AUTO-ENTMCNC: 30.9 G/DL (ref 31–37)
MCV RBC AUTO: 92.5 FL
MONOCYTES # BLD AUTO: 0.69 X10(3) UL (ref 0.1–1)
MONOCYTES NFR BLD AUTO: 9.4 %
NEUTROPHILS # BLD AUTO: 3.64 X10 (3) UL (ref 1.5–7.7)
NEUTROPHILS # BLD AUTO: 3.64 X10(3) UL (ref 1.5–7.7)
NEUTROPHILS NFR BLD AUTO: 49.5 %
NITRITE UR QL STRIP.AUTO: NEGATIVE
NT-PROBNP SERPL-MCNC: 200 PG/ML (ref ?–125)
OSMOLALITY SERPL CALC.SUM OF ELEC: 297 MOSM/KG (ref 275–295)
PH UR STRIP.AUTO: 5 [PH] (ref 5–8)
PLATELET # BLD AUTO: 214 10(3)UL (ref 150–450)
POTASSIUM SERPL-SCNC: 3.2 MMOL/L (ref 3.5–5.1)
PROCALCITONIN SERPL-MCNC: <0.05 NG/ML (ref ?–0.16)
PROT UR STRIP.AUTO-MCNC: NEGATIVE MG/DL
RBC # BLD AUTO: 4.55 X10(6)UL
RBC UR QL AUTO: NEGATIVE
SODIUM SERPL-SCNC: 144 MMOL/L (ref 136–145)
SP GR UR STRIP.AUTO: >1.03 (ref 1–1.03)
UROBILINOGEN UR STRIP.AUTO-MCNC: 2 MG/DL
WBC # BLD AUTO: 7.3 X10(3) UL (ref 4–11)

## 2021-08-30 PROCEDURE — 80053 COMPREHEN METABOLIC PANEL: CPT | Performed by: INTERNAL MEDICINE

## 2021-08-30 PROCEDURE — 85025 COMPLETE CBC W/AUTO DIFF WBC: CPT | Performed by: INTERNAL MEDICINE

## 2021-08-30 PROCEDURE — 83615 LACTATE (LD) (LDH) ENZYME: CPT | Performed by: INTERNAL MEDICINE

## 2021-08-30 PROCEDURE — 82962 GLUCOSE BLOOD TEST: CPT

## 2021-08-30 PROCEDURE — 85379 FIBRIN DEGRADATION QUANT: CPT | Performed by: INTERNAL MEDICINE

## 2021-08-30 PROCEDURE — 81003 URINALYSIS AUTO W/O SCOPE: CPT | Performed by: INTERNAL MEDICINE

## 2021-08-30 PROCEDURE — 82728 ASSAY OF FERRITIN: CPT | Performed by: INTERNAL MEDICINE

## 2021-08-30 PROCEDURE — 71045 X-RAY EXAM CHEST 1 VIEW: CPT | Performed by: INTERNAL MEDICINE

## 2021-08-30 PROCEDURE — 86140 C-REACTIVE PROTEIN: CPT | Performed by: INTERNAL MEDICINE

## 2021-08-30 RX ORDER — TOPIRAMATE 25 MG/1
25 TABLET ORAL 2 TIMES DAILY
Status: DISCONTINUED | OUTPATIENT
Start: 2021-08-30 | End: 2021-08-31

## 2021-08-30 RX ORDER — MONTELUKAST SODIUM 10 MG/1
10 TABLET ORAL NIGHTLY
Status: DISCONTINUED | OUTPATIENT
Start: 2021-08-30 | End: 2021-08-31

## 2021-08-30 RX ORDER — AMLODIPINE BESYLATE 5 MG/1
5 TABLET ORAL DAILY
Status: DISCONTINUED | OUTPATIENT
Start: 2021-08-30 | End: 2021-08-31

## 2021-08-30 RX ORDER — NALTREXONE HYDROCHLORIDE 50 MG/1
50 TABLET, FILM COATED ORAL DAILY
Status: DISCONTINUED | OUTPATIENT
Start: 2021-08-30 | End: 2021-08-31

## 2021-08-30 RX ORDER — LOSARTAN POTASSIUM 100 MG/1
100 TABLET ORAL DAILY
Status: DISCONTINUED | OUTPATIENT
Start: 2021-08-30 | End: 2021-08-31

## 2021-08-30 RX ORDER — POTASSIUM CHLORIDE 20 MEQ/1
40 TABLET, EXTENDED RELEASE ORAL ONCE
Status: COMPLETED | OUTPATIENT
Start: 2021-08-30 | End: 2021-08-30

## 2021-08-30 RX ORDER — FLUOXETINE HYDROCHLORIDE 20 MG/1
20 CAPSULE ORAL DAILY
Status: DISCONTINUED | OUTPATIENT
Start: 2021-08-30 | End: 2021-08-31

## 2021-08-30 RX ORDER — BUPROPION HYDROCHLORIDE 300 MG/1
300 TABLET ORAL DAILY
Status: DISCONTINUED | OUTPATIENT
Start: 2021-08-30 | End: 2021-08-31

## 2021-08-30 RX ORDER — PHENTERMINE HYDROCHLORIDE 15 MG/1
15 CAPSULE ORAL EVERY MORNING
Status: DISCONTINUED | OUTPATIENT
Start: 2021-08-30 | End: 2021-08-30

## 2021-08-30 RX ORDER — PANTOPRAZOLE SODIUM 40 MG/1
40 TABLET, DELAYED RELEASE ORAL
Status: DISCONTINUED | OUTPATIENT
Start: 2021-08-30 | End: 2021-08-31

## 2021-08-30 NOTE — COVID NURSING ASSESSMENT
COVID-19 Daily Discharge Readiness-Nursing    O2 Sat at Rest:  SPO2% on Room Air at Rest: 97  %     Temperature max from last 24 hrs: Temp (24hrs), Av.2 °F (36.8 °C), Min:97.6 °F (36.4 °C), Max:98.6 °F (37 °C)    Inflammatory Markers:   Recent Labs   L

## 2021-08-30 NOTE — PROGRESS NOTES
[facility] Pharmacy Note:  Anticoagulation Weight Dose Adjustment for enoxaparin    Walton Leventhal is a 59year old patient who has been prescribed enoxaparin 40 mg every 24 hours.       Estimated Creatinine Clearance: 84 mL/min (based on SCr of 0.58 mg/dL)

## 2021-08-30 NOTE — PROGRESS NOTES
COVID-19 Daily Discharge Readiness-Nursing    O2 Sat at Rest: 94% on room air  Temperature max from last 24 hrs: Temp (24hrs), Av.2 °F (36.8 °C), Min:97.6 °F (36.4 °C), Max:98.8 °F (37.1 °C)    Inflammatory Markers:   Recent Labs   Lab 21  1144

## 2021-08-30 NOTE — CONSULTS
INFECTIOUS DISEASE CONSULT NOTE    Rashaad Needle Patient Status:  Observation    1960 MRN [id number]   [facility] 5NW-A Attending Yeni Yeh MD   Hosp Day # 0 PCP Shari Trevino MD       Reason for [facility] • GASTRIC BYPASS,OBESITY,SB RECONSTRUC     • OTHER SURGICAL HISTORY  2007    Lizbeth-en-y   • OTHER SURGICAL HISTORY  1990    Lap laser removal of endometrisis   • SLING OPER STRES INCONTINENCE     • TONSILLECTOMY     • TUBAL LIGATION     • UPPER GI ENDOSC 25 mg, Oral, BID  •  docusate sodium (COLACE) cap 100 mg, 100 mg, Oral, BID  •  PEG 3350 (MIRALAX) powder packet 17 g, 17 g, Oral, Daily PRN  •  magnesium hydroxide (MILK OF MAGNESIA) 400 MG/5ML suspension 30 mL, 30 mL, Oral, Daily PRN  •  bisacodyl (DULCO conjunctival injection. Moist mucous membranes. No thrush  Neck: No lymphadenopathy. Supple. Respiratory: Clear to auscultation bilaterally. No wheezes. No rhonchi. Cardiovascular: S1, S2.  Regular rate and rhythm. No murmurs.   Abdomen: Soft, nontende Labs   Lab 08/29/21  1143   CK 23       Inflammatory Markers  Recent Labs   Lab 08/29/21  1143 08/30/21  0644   CRP <0.30 <0.29   BREEZY  --  26.1    162   DDIMER 0.57* 0.36       Microbiology    Reviewed in EMR,     Radiology: XR CHEST PA + LAT CHEST ANGIOGRAPHY, CHEST (CPT=71275)    Result Date: 8/29/2021  DATE OF SERVICE: 08.29.2021 CT ANGIOGRAPHY, CHEST CLINICAL INDICATION: Cough COMPARISON: Chest x-ray 8/26/2021.  CT chest 4/14/2020 TECHNIQUE:  Routine CT angiography of the chest was performed [facility] IMPRESSION:  1. No evidence of acute pulmonary embolism. 2. Small left-sided pneumothorax.  3. Multifocal patchy/nodular infiltrates within the left lung associated with irregular reticular densities, all likely sequela of subacute/chronic Covid-type vi

## 2021-08-30 NOTE — PROGRESS NOTES
08/29/21 2251   Provider Notification   Reason for Communication Other (comment)  (Admission orders)   Provider Name Other (comment)  Agustín Knights)   Method of Communication Page   Response Waiting for response   Notification Time 3212       Paged for ad

## 2021-08-30 NOTE — H&P
[facility] Hospitalist History and Physical      No chief complaint on file.        PCP: Darrell Harris MD      History of Present Illness: Patient is a 59year old female with PMH sig for Dm2, morbid obesity, anxiety, HL, APRIL presents for eval of covid pna UPPER GI ENDOSCOPY,DIAGNOSIS  5/18/2005    hiatal hernia, mild gastritis (H pylori (--))   • UPPER GI ENDOSCOPY,DIAGNOSIS  3/24/2009   • UPPER GI ENDOSCOPY,EXAM          ALL:    T-Pa [Alteplase]        OTHER (SEE COMMENTS)    Comment:Mild lip swelling. supraclavicular lymph adenopathy, thyroid: no enlargment/tenderness/nodules appreciated   Lungs:   Clear to auscultation bilaterally. Normal effort   Chest wall:  No tenderness or deformity.    Heart:  Regular rate and rhythm, S1, S2 normal, no murmur, rub pneumothorax, pneumomediastinum or pleural effusion. IMPRESSION:  Patchy multifocal infiltrates in the left upper and left mid lung field, new since the previous study that are suspicious for viral pneumonia, including Covid 19 pneumonia.  Close follow- scattered calcific atherosclerosis along the aortic arch. Ascending thoracic aorta is borderline aneurysmal measuring up to 4.2 cm transaxially. MEDIASTINUM/JOSELIN:  No abnormal lymphadenopathy.  LUNGS/PLEURA: Multifocal patchy groundglass infiltrates along w anticipate that, after discharge, patient will require TBD.

## 2021-08-30 NOTE — PLAN OF CARE
Problem: Patient/Family Goals  Goal: Patient/Family Long Term Goal  Description: Patient's Long Term Goal: 8/29 (noc) to go home    Interventions:  - rest  - See additional Care Plan goals for specific interventions  Outcome: Progressing  Goal: Patient/F

## 2021-08-30 NOTE — PROGRESS NOTES
08/30/21 1100   Mobility   O2 walk?  Yes   SPO2% on Room Air at Rest 97   SPO2% Ambulation on Room Air 92

## 2021-08-30 NOTE — PROGRESS NOTES
Oxygen Walk results:      SPO2% on Room Air at Rest: 97 (08/30/21 1100)        SPO2% Ambulation on Room Air: 92 (08/30/21 1100)      .

## 2021-08-30 NOTE — CONSULTS
Pulmonary / Critical Care H&P/Consult       NAME: [address] [place] [address] Street: [id number] - MRN: [id number] - Age: 59year old - :  1960    Date of Admission: 2021  5:14 PM  Admission Diagnosis: covid 23   COVID-19    Reason for consult: covid, ENDOSCOPY,DIAGNOSIS  5/18/2005    hiatal hernia, mild gastritis (H pylori (--))   • UPPER GI ENDOSCOPY,DIAGNOSIS  3/24/2009   • UPPER GI ENDOSCOPY,EXAM          Allergies:    T-Pa [Alteplase]        OTHER (SEE COMMENTS)    Comment:Mild lip swelling.     Soc Last Year:       Ran Out of Food in the Last Year:   Transportation Needs:       Lack of Transportation (Medical):       Lack of Transportation (Non-Medical):   Physical Activity:       Days of Exercise per Week:       Minutes of Exercise per Session:   [facility] TAKE ONE CAPSULE BY MOUTH DAILY, Disp: 90 capsule, Rfl: 1  topiramate 25 MG Oral Tab, Take 1 tablet (25 mg total) by mouth 2 (two) times daily. , Disp: 180 tablet, Rfl: 0  Cholecalciferol (VITAMIN D) 50 MCG (2000 UT) Oral Cap, Take 2,000 Units by mouth., [facility] Reviewed and negative except per HPI    OBJECTIVE:   08/29/21  2332 08/30/21  0455 08/30/21  0839 08/30/21  1100   BP: 151/78 136/75 (!) 156/92 130/84   BP Location: Left arm Left arm Left arm Left arm   Pulse: 56 65  63   Resp: 20 16     Temp: 97.7 °F ( 08/30/21  0644    142 144   K 3.94 3.79 3.2*   * 109* 113*   CO2 22.4 23.2 26.0   BUN 7.0 11.0 13     Creatinine, Serum (mg/dL)   Date Value   09/03/2015 0.59   02/08/2014 0.56 (L)   03/26/2013 0.56 (L)     CREATININE (mg/dL)   Date Value   05/ Pulmonary / Critical Care  8/30/2021

## 2021-08-31 ENCOUNTER — APPOINTMENT (OUTPATIENT)
Dept: GENERAL RADIOLOGY | Facility: HOSPITAL | Age: 61
DRG: 177 | End: 2021-08-31
Attending: INTERNAL MEDICINE
Payer: MEDICARE

## 2021-08-31 VITALS
OXYGEN SATURATION: 92 % | HEIGHT: 63.5 IN | TEMPERATURE: 98 F | BODY MASS INDEX: 48.76 KG/M2 | DIASTOLIC BLOOD PRESSURE: 61 MMHG | HEART RATE: 69 BPM | WEIGHT: 278.63 LBS | SYSTOLIC BLOOD PRESSURE: 107 MMHG | RESPIRATION RATE: 18 BRPM

## 2021-08-31 LAB
ALBUMIN SERPL-MCNC: 2.7 G/DL (ref 3.4–5)
ALBUMIN/GLOB SERPL: 0.9 {RATIO} (ref 1–2)
ALP LIVER SERPL-CCNC: 91 U/L
ALT SERPL-CCNC: 55 U/L
ANION GAP SERPL CALC-SCNC: 5 MMOL/L (ref 0–18)
AST SERPL-CCNC: 24 U/L (ref 15–37)
BASOPHILS # BLD AUTO: 0.02 X10(3) UL (ref 0–0.2)
BASOPHILS NFR BLD AUTO: 0.3 %
BILIRUB SERPL-MCNC: 0.5 MG/DL (ref 0.1–2)
BUN BLD-MCNC: 13 MG/DL (ref 7–18)
CALCIUM BLD-MCNC: 8 MG/DL (ref 8.5–10.1)
CHLORIDE SERPL-SCNC: 114 MMOL/L (ref 98–112)
CO2 SERPL-SCNC: 24 MMOL/L (ref 21–32)
CREAT BLD-MCNC: 0.54 MG/DL
CRP SERPL-MCNC: 0.6 MG/DL (ref ?–0.3)
D-DIMER: 0.3 UG/ML FEU (ref ?–0.63)
DEPRECATED HBV CORE AB SER IA-ACNC: 24.1 NG/ML
EOSINOPHIL # BLD AUTO: 0.17 X10(3) UL (ref 0–0.7)
EOSINOPHIL NFR BLD AUTO: 2.4 %
ERYTHROCYTE [DISTWIDTH] IN BLOOD BY AUTOMATED COUNT: 14.9 %
GLOBULIN PLAS-MCNC: 3 G/DL (ref 2.8–4.4)
GLUCOSE BLD-MCNC: 76 MG/DL (ref 70–99)
HCT VFR BLD AUTO: 41.5 %
HGB BLD-MCNC: 12.7 G/DL
IMM GRANULOCYTES # BLD AUTO: 0.04 X10(3) UL (ref 0–1)
IMM GRANULOCYTES NFR BLD: 0.6 %
LDH SERPL L TO P-CCNC: 163 U/L
LYMPHOCYTES # BLD AUTO: 2.35 X10(3) UL (ref 1–4)
LYMPHOCYTES NFR BLD AUTO: 33.8 %
M PROTEIN MFR SERPL ELPH: 5.7 G/DL (ref 6.4–8.2)
MCH RBC QN AUTO: 28.2 PG (ref 26–34)
MCHC RBC AUTO-ENTMCNC: 30.6 G/DL (ref 31–37)
MCV RBC AUTO: 92.2 FL
MONOCYTES # BLD AUTO: 0.63 X10(3) UL (ref 0.1–1)
MONOCYTES NFR BLD AUTO: 9.1 %
NEUTROPHILS # BLD AUTO: 3.74 X10 (3) UL (ref 1.5–7.7)
NEUTROPHILS # BLD AUTO: 3.74 X10(3) UL (ref 1.5–7.7)
NEUTROPHILS NFR BLD AUTO: 53.8 %
OSMOLALITY SERPL CALC.SUM OF ELEC: 295 MOSM/KG (ref 275–295)
PLATELET # BLD AUTO: 226 10(3)UL (ref 150–450)
POTASSIUM SERPL-SCNC: 3.5 MMOL/L (ref 3.5–5.1)
RBC # BLD AUTO: 4.5 X10(6)UL
SODIUM SERPL-SCNC: 143 MMOL/L (ref 136–145)
WBC # BLD AUTO: 7 X10(3) UL (ref 4–11)

## 2021-08-31 PROCEDURE — 85379 FIBRIN DEGRADATION QUANT: CPT | Performed by: INTERNAL MEDICINE

## 2021-08-31 PROCEDURE — 87205 SMEAR GRAM STAIN: CPT | Performed by: INTERNAL MEDICINE

## 2021-08-31 PROCEDURE — 83615 LACTATE (LD) (LDH) ENZYME: CPT | Performed by: INTERNAL MEDICINE

## 2021-08-31 PROCEDURE — 71045 X-RAY EXAM CHEST 1 VIEW: CPT | Performed by: INTERNAL MEDICINE

## 2021-08-31 PROCEDURE — 82728 ASSAY OF FERRITIN: CPT | Performed by: INTERNAL MEDICINE

## 2021-08-31 PROCEDURE — 85025 COMPLETE CBC W/AUTO DIFF WBC: CPT | Performed by: INTERNAL MEDICINE

## 2021-08-31 PROCEDURE — 80053 COMPREHEN METABOLIC PANEL: CPT | Performed by: INTERNAL MEDICINE

## 2021-08-31 PROCEDURE — 86140 C-REACTIVE PROTEIN: CPT | Performed by: INTERNAL MEDICINE

## 2021-08-31 RX ORDER — GUAIFENESIN 600 MG
600 TABLET, EXTENDED RELEASE 12 HR ORAL 2 TIMES DAILY
Qty: 60 TABLET | Refills: 0 | Status: SHIPPED | OUTPATIENT
Start: 2021-08-31 | End: 2021-11-29 | Stop reason: ALTCHOICE

## 2021-08-31 NOTE — DISCHARGE PLANNING
NURSING DISCHARGE NOTE    Discharged Home via Wheelchair. Accompanied by Support staff  Belongings Taken by patient/family. PIV removed. Tele box removed & placed in drawer. Discharge navigator complete.    Discharge instructions reviewed with patient

## 2021-08-31 NOTE — PROGRESS NOTES
Iwona Patient Status:  Inpatient    1960 MRN [id number]   [facility] 5NW-A Attending Aguilar Weathers MD   Hosp Day # 2 PCP Gregg Arellano MD     Pulm / Critical Care Progress Note     S: p non-tender, non-distended, positive BS.    Extremity: no edema     Recent Labs   Lab 08/29/21  1143 08/30/21  0644 08/31/21  0635   WBC 9.15 7.3 7.0   HGB 13.9 13.0 12.7   HCT 43.5 42.1 41.5    214.0 226.0     No results for input(s): INR in the last covid-19 and potential risks. Pt is agreeable to treatment plan as outlined above. - would defer need for systemic abx to ID. Will send sputum cx. 3. Ptx: small, 2/2 above  - not apparent on repeat cxr. Does not require further imaging.    4. Dispo: for

## 2021-08-31 NOTE — DISCHARGE SUMMARY
General Medicine Discharge Summary     Patient ID:  Anh Rodríguez  59year old  8/7/1960    Admit date: 8/29/2021    Discharge date and time: 8/31/2021.      Attending Physician: Cristy Little Sulfate  (90 Base) MCG/ACT Inhalation Aero Soln  Inhale 2 puffs into the lungs every 6 (six) hours as needed for Wheezing or Shortness of Breath. Phentermine HCl 15 MG Oral Cap  Take 1 capsule (15 mg total) by mouth every morning.     buPROPion 30 deficits      Total time coordinating care for discharge: Greater than 30 minutes    . Mackenzie Shea  [place] Hospitalist  [phone]

## 2021-08-31 NOTE — PLAN OF CARE
Problem: Patient/Family Goals  Goal: Patient/Family Long Term Goal  Description: Patient's Long Term Goal: discharge home    Interventions:  - follow poc  - See additional Care Plan goals for specific interventions  Outcome: Progressing  Goal: Patient/Fa injury  - Provide assistive devices as appropriate  - Consider OT/PT consult to assist with strengthening/mobility  - Encourage toileting schedule  Outcome: Progressing     Problem: DISCHARGE PLANNING  Goal: Discharge to home or other facility with appropr

## 2021-08-31 NOTE — PROGRESS NOTES
Pt A&Ox4. Seizure precautions. Room air, o2 >90%. 2L nc as needed for comfort. APRIL no cpap. Tele, NSR. Afebrile. Lovenox. Voids, up self. Carb controlled diet, tolerating well. No c/o of pain, sob, n/v/d. Pt updated on poc.  Repeat chest xray in am. No furt

## 2021-09-04 ENCOUNTER — APPOINTMENT (OUTPATIENT)
Dept: GENERAL RADIOLOGY | Facility: HOSPITAL | Age: 61
End: 2021-09-04
Attending: EMERGENCY MEDICINE
Payer: MEDICARE

## 2021-09-04 ENCOUNTER — APPOINTMENT (OUTPATIENT)
Dept: CT IMAGING | Facility: HOSPITAL | Age: 61
End: 2021-09-04
Attending: EMERGENCY MEDICINE
Payer: MEDICARE

## 2021-09-04 ENCOUNTER — HOSPITAL ENCOUNTER (EMERGENCY)
Facility: HOSPITAL | Age: 61
Discharge: HOME | End: 2021-09-04
Attending: EMERGENCY MEDICINE
Payer: MEDICARE

## 2021-09-04 VITALS
RESPIRATION RATE: 20 BRPM | TEMPERATURE: 99 F | SYSTOLIC BLOOD PRESSURE: 164 MMHG | HEIGHT: 63 IN | BODY MASS INDEX: 47.31 KG/M2 | HEART RATE: 67 BPM | DIASTOLIC BLOOD PRESSURE: 93 MMHG | WEIGHT: 267 LBS | OXYGEN SATURATION: 94 %

## 2021-09-04 DIAGNOSIS — U09.9 COVID-19 LONG HAULER: ICD-10-CM

## 2021-09-04 DIAGNOSIS — R07.89 CHEST PAIN, NON-CARDIAC: Primary | ICD-10-CM

## 2021-09-04 LAB
ALBUMIN SERPL-MCNC: 3.3 G/DL (ref 3.4–5)
ALBUMIN/GLOB SERPL: 0.9 {RATIO} (ref 1–2)
ALP LIVER SERPL-CCNC: 109 U/L
ALT SERPL-CCNC: 40 U/L
ANION GAP SERPL CALC-SCNC: 3 MMOL/L (ref 0–18)
AST SERPL-CCNC: 20 U/L (ref 15–37)
BASOPHILS # BLD AUTO: 0.05 X10(3) UL (ref 0–0.2)
BASOPHILS NFR BLD AUTO: 0.5 %
BILIRUB SERPL-MCNC: 0.5 MG/DL (ref 0.1–2)
BUN BLD-MCNC: 8 MG/DL (ref 7–18)
CALCIUM BLD-MCNC: 8.6 MG/DL (ref 8.5–10.1)
CHLORIDE SERPL-SCNC: 113 MMOL/L (ref 98–112)
CO2 SERPL-SCNC: 26 MMOL/L (ref 21–32)
CREAT BLD-MCNC: 0.71 MG/DL
EOSINOPHIL # BLD AUTO: 0.34 X10(3) UL (ref 0–0.7)
EOSINOPHIL NFR BLD AUTO: 3.5 %
ERYTHROCYTE [DISTWIDTH] IN BLOOD BY AUTOMATED COUNT: 15.3 %
GLOBULIN PLAS-MCNC: 3.8 G/DL (ref 2.8–4.4)
GLUCOSE BLD-MCNC: 95 MG/DL (ref 70–99)
HCT VFR BLD AUTO: 45.3 %
HGB BLD-MCNC: 14.3 G/DL
IMM GRANULOCYTES # BLD AUTO: 0.04 X10(3) UL (ref 0–1)
IMM GRANULOCYTES NFR BLD: 0.4 %
LIPASE SERPL-CCNC: 57 U/L (ref 73–393)
LYMPHOCYTES # BLD AUTO: 2.3 X10(3) UL (ref 1–4)
LYMPHOCYTES NFR BLD AUTO: 23.4 %
M PROTEIN MFR SERPL ELPH: 7.1 G/DL (ref 6.4–8.2)
MCH RBC QN AUTO: 29.2 PG (ref 26–34)
MCHC RBC AUTO-ENTMCNC: 31.6 G/DL (ref 31–37)
MCV RBC AUTO: 92.6 FL
MONOCYTES # BLD AUTO: 0.71 X10(3) UL (ref 0.1–1)
MONOCYTES NFR BLD AUTO: 7.2 %
NEUTROPHILS # BLD AUTO: 6.38 X10 (3) UL (ref 1.5–7.7)
NEUTROPHILS # BLD AUTO: 6.38 X10(3) UL (ref 1.5–7.7)
NEUTROPHILS NFR BLD AUTO: 65 %
NT-PROBNP SERPL-MCNC: 78 PG/ML (ref ?–125)
OSMOLALITY SERPL CALC.SUM OF ELEC: 292 MOSM/KG (ref 275–295)
PLATELET # BLD AUTO: 299 10(3)UL (ref 150–450)
POTASSIUM SERPL-SCNC: 4.2 MMOL/L (ref 3.5–5.1)
PROCALCITONIN SERPL-MCNC: <0.05 NG/ML (ref ?–0.16)
RBC # BLD AUTO: 4.89 X10(6)UL
SODIUM SERPL-SCNC: 142 MMOL/L (ref 136–145)
TROPONIN I SERPL-MCNC: <0.045 NG/ML (ref ?–0.04)
WBC # BLD AUTO: 9.8 X10(3) UL (ref 4–11)

## 2021-09-04 PROCEDURE — 99284 EMERGENCY DEPT VISIT MOD MDM: CPT

## 2021-09-04 PROCEDURE — 36415 COLL VENOUS BLD VENIPUNCTURE: CPT

## 2021-09-04 PROCEDURE — 71045 X-RAY EXAM CHEST 1 VIEW: CPT | Performed by: EMERGENCY MEDICINE

## 2021-09-04 PROCEDURE — 83690 ASSAY OF LIPASE: CPT | Performed by: EMERGENCY MEDICINE

## 2021-09-04 PROCEDURE — 80053 COMPREHEN METABOLIC PANEL: CPT | Performed by: EMERGENCY MEDICINE

## 2021-09-04 PROCEDURE — 93010 ELECTROCARDIOGRAM REPORT: CPT

## 2021-09-04 PROCEDURE — 93005 ELECTROCARDIOGRAM TRACING: CPT

## 2021-09-04 PROCEDURE — 85025 COMPLETE CBC W/AUTO DIFF WBC: CPT | Performed by: EMERGENCY MEDICINE

## 2021-09-04 PROCEDURE — 84145 PROCALCITONIN (PCT): CPT | Performed by: EMERGENCY MEDICINE

## 2021-09-04 PROCEDURE — 83880 ASSAY OF NATRIURETIC PEPTIDE: CPT | Performed by: EMERGENCY MEDICINE

## 2021-09-04 PROCEDURE — 84484 ASSAY OF TROPONIN QUANT: CPT | Performed by: EMERGENCY MEDICINE

## 2021-09-04 PROCEDURE — 71260 CT THORAX DX C+: CPT | Performed by: EMERGENCY MEDICINE

## 2021-09-04 NOTE — ED PROVIDER NOTES
Patient Seen in: [facility] Emergency Department      History   Patient presents with:  Chest Pain Angina    Stated Complaint: cp    HPI/Subjective:   HPI      This is a 52-year-old woman, history of asthma, hypertension, recent COVID-19 infection t N/A 5/25/2016    adenoma/hp, divertic. repeat 5 yrs, mac/2 day prep   • COLONOSCOPY     • GASTRIC BYPASS,OBESE<100CM LIZBETH-EN-Y     • GASTRIC BYPASS,OBESITY,SB RECONSTRUC     • OTHER SURGICAL HISTORY  2007    Lizbeth-en-y   • OTHER SURGICAL HISTORY  1990    La Pulmonary effort is normal.  Normal breath sounds. No wheezing, rhonchi or rales.    Abdominal: Soft nontender nondistended, normal bowel sounds, no guarding no rebound tenderness  Skin: Warm and dry  Neurological: Awake alert, speech is normal        ED Co in the left mid to lower lung zones are similar to prior. Pneumothorax seen on prior CT is not appreciated, however note that an anterior pneumothorax can be radiographically occult.      IMPRESSION: Stable patchy airspace opacities in the left mid to lower normal size. There is slight blunting of the left costophrenic angle, new since the prior exam. This could reflect scarring versus a tiny pleural effusion.      IMPRESSION: Mild pleural parenchymal scarring versus tiny pleural effusion at the left costophre medial aspects of the left upper lobe and left lower lobe. The right lung is normally expanded. Central airways are patent. CHEST WALL:  Unremarkable. UPPER ABDOMEN:  Postsurgical changes of gastric bypass. Postcholecystectomy changes.  BONES:  Multilevel s time.    FINDINGS:  There is no significant pneumothorax detected. Interval increased peripheral nodular opacities left mid lung are noted. Remainder of lungs is clear. Heart size is within normal limits. Aorta is atherosclerotic.   Chest wall structure Omnipaque 350  FINDINGS:  LUNGS:  Scattered peripheral subpleural nodules in the right lower lobe measuring up to 5 mm on image 166 series 5. A few scattered peripheral nodules noted in the right upper lobe measuring up to 6 mm on image 51 series 5.  Noncal detected indeterminate pulmonary nodules in persons at least 28years of age depend on nodule size (average length and width) and underlying risk factors (including smoking and other risk factors).  Please consider the following recommendations after clinic Prescribed:  Discharge Medication List as of 9/4/2021  7:54 PM

## 2021-09-05 LAB
ATRIAL RATE: 73 BPM
P AXIS: 54 DEGREES
P-R INTERVAL: 136 MS
Q-T INTERVAL: 404 MS
QRS DURATION: 86 MS
QTC CALCULATION (BEZET): 445 MS
R AXIS: -13 DEGREES
T AXIS: 34 DEGREES
VENTRICULAR RATE: 73 BPM

## 2021-11-22 ENCOUNTER — ANESTHESIA EVENT (OUTPATIENT)
Dept: ENDOSCOPY | Facility: HOSPITAL | Age: 61
End: 2021-11-22
Payer: MEDICARE

## 2021-11-22 RX ORDER — SODIUM CHLORIDE 9 MG/ML
INJECTION, SOLUTION INTRAVENOUS CONTINUOUS
Status: CANCELLED | OUTPATIENT
Start: 2021-11-22

## 2021-11-22 RX ORDER — SODIUM CHLORIDE, SODIUM LACTATE, POTASSIUM CHLORIDE, CALCIUM CHLORIDE 600; 310; 30; 20 MG/100ML; MG/100ML; MG/100ML; MG/100ML
INJECTION, SOLUTION INTRAVENOUS CONTINUOUS
Status: CANCELLED | OUTPATIENT
Start: 2021-11-22

## 2021-11-23 ENCOUNTER — ANESTHESIA (OUTPATIENT)
Dept: ENDOSCOPY | Facility: HOSPITAL | Age: 61
End: 2021-11-23
Payer: MEDICARE

## 2021-11-23 ENCOUNTER — HOSPITAL ENCOUNTER (OUTPATIENT)
Facility: HOSPITAL | Age: 61
Setting detail: HOSPITAL OUTPATIENT SURGERY
Discharge: HOME | End: 2021-11-23
Attending: INTERNAL MEDICINE | Admitting: INTERNAL MEDICINE
Payer: MEDICARE

## 2021-11-23 VITALS
TEMPERATURE: 97 F | WEIGHT: 280 LBS | RESPIRATION RATE: 18 BRPM | HEIGHT: 62 IN | HEART RATE: 73 BPM | SYSTOLIC BLOOD PRESSURE: 160 MMHG | DIASTOLIC BLOOD PRESSURE: 97 MMHG | BODY MASS INDEX: 51.53 KG/M2 | OXYGEN SATURATION: 98 %

## 2021-11-23 DIAGNOSIS — Z86.010 PERSONAL HISTORY OF COLONIC POLYPS: ICD-10-CM

## 2021-11-23 PROCEDURE — 82962 GLUCOSE BLOOD TEST: CPT

## 2021-11-23 PROCEDURE — 0DJD8ZZ INSPECTION OF LOWER INTESTINAL TRACT, VIA NATURAL OR ARTIFICIAL OPENING ENDOSCOPIC: ICD-10-PCS | Performed by: INTERNAL MEDICINE

## 2021-11-23 RX ORDER — ONDANSETRON 2 MG/ML
4 INJECTION INTRAMUSCULAR; INTRAVENOUS AS NEEDED
Status: DISCONTINUED | OUTPATIENT
Start: 2021-11-23 | End: 2021-11-23

## 2021-11-23 RX ORDER — NALOXONE HYDROCHLORIDE 0.4 MG/ML
80 INJECTION, SOLUTION INTRAMUSCULAR; INTRAVENOUS; SUBCUTANEOUS AS NEEDED
Status: DISCONTINUED | OUTPATIENT
Start: 2021-11-23 | End: 2021-11-23

## 2021-11-23 RX ORDER — LIDOCAINE HYDROCHLORIDE 10 MG/ML
INJECTION, SOLUTION EPIDURAL; INFILTRATION; INTRACAUDAL; PERINEURAL AS NEEDED
Status: DISCONTINUED | OUTPATIENT
Start: 2021-11-23 | End: 2021-11-23 | Stop reason: SURG

## 2021-11-23 RX ORDER — ACETAMINOPHEN 500 MG
1000 TABLET ORAL ONCE AS NEEDED
Status: DISCONTINUED | OUTPATIENT
Start: 2021-11-23 | End: 2021-11-23

## 2021-11-23 RX ORDER — DEXTROSE MONOHYDRATE 25 G/50ML
50 INJECTION, SOLUTION INTRAVENOUS
Status: DISCONTINUED | OUTPATIENT
Start: 2021-11-23 | End: 2021-11-23

## 2021-11-23 RX ORDER — ACETAMINOPHEN AND CODEINE PHOSPHATE 300; 30 MG/1; MG/1
1 TABLET ORAL AS NEEDED
Status: DISCONTINUED | OUTPATIENT
Start: 2021-11-23 | End: 2021-11-23

## 2021-11-23 RX ORDER — ACETAMINOPHEN AND CODEINE PHOSPHATE 300; 30 MG/1; MG/1
2 TABLET ORAL AS NEEDED
Status: DISCONTINUED | OUTPATIENT
Start: 2021-11-23 | End: 2021-11-23

## 2021-11-23 RX ORDER — HYDROMORPHONE HYDROCHLORIDE 1 MG/ML
0.4 INJECTION, SOLUTION INTRAMUSCULAR; INTRAVENOUS; SUBCUTANEOUS EVERY 5 MIN PRN
Status: DISCONTINUED | OUTPATIENT
Start: 2021-11-23 | End: 2021-11-23

## 2021-11-23 RX ORDER — METOCLOPRAMIDE HYDROCHLORIDE 5 MG/ML
10 INJECTION INTRAMUSCULAR; INTRAVENOUS AS NEEDED
Status: DISCONTINUED | OUTPATIENT
Start: 2021-11-23 | End: 2021-11-23

## 2021-11-23 RX ORDER — LABETALOL HYDROCHLORIDE 5 MG/ML
5 INJECTION, SOLUTION INTRAVENOUS EVERY 5 MIN PRN
Status: DISCONTINUED | OUTPATIENT
Start: 2021-11-23 | End: 2021-11-23

## 2021-11-23 RX ORDER — SODIUM CHLORIDE, SODIUM LACTATE, POTASSIUM CHLORIDE, CALCIUM CHLORIDE 600; 310; 30; 20 MG/100ML; MG/100ML; MG/100ML; MG/100ML
INJECTION, SOLUTION INTRAVENOUS CONTINUOUS
Status: DISCONTINUED | OUTPATIENT
Start: 2021-11-23 | End: 2021-11-23

## 2021-11-23 RX ADMIN — LIDOCAINE HYDROCHLORIDE 25 MG: 10 INJECTION, SOLUTION EPIDURAL; INFILTRATION; INTRACAUDAL; PERINEURAL at 07:31:00

## 2021-11-23 RX ADMIN — SODIUM CHLORIDE, SODIUM LACTATE, POTASSIUM CHLORIDE, CALCIUM CHLORIDE: 600; 310; 30; 20 INJECTION, SOLUTION INTRAVENOUS at 07:53:00

## 2021-11-23 NOTE — ANESTHESIA PREPROCEDURE EVALUATION
PRE-OP EVALUATION    Patient Name: Daryl Bang    Admit Diagnosis: Personal history of colonic polyps [Z86.010]    Pre-op Diagnosis: Personal history of colonic polyps [Z86.010]    COLONOSCOPY    Anesthesia Procedure: COLONOSCOPY (N/A )    Naveed Tenorio mouth 2 (two) times daily. , Disp: 180 tablet, Rfl: 0, Past Month at Unknown time  Budesonide-Formoterol Fumarate 160-4.5 MCG/ACT Inhalation Aerosol, Inhale 2 puffs into the lungs 2 (two) times daily.  (Patient taking differently: Inhale 2 puffs into the [facility] LESL[facility] Endo/Other      (+) diabetes  type 2, not using insulin                         Pulmonary      (+) asthma              (+) sleep apnea and CPAP      Neuro/Psych  Comment: Pseudoseizure disorder    (+) depression    (+) CVA     (+) se RDW 15.4 09/04/2021    .0 09/04/2021     09/04/2021     Lab Results   Component Value Date     09/04/2021     09/04/2021    K 4.2 09/04/2021    K 4.23 09/04/2021     (H) 09/04/2021     (H) 09/04/2021    CO2 26.0 09/04/

## 2021-11-23 NOTE — BRIEF OP NOTE
Pre-Operative Diagnosis: Personal history of colonic polyps [Z86.010]     Post-Operative Diagnosis: diverticulosis      Procedure Performed:   COLONOSCOPY    Surgeon(s) and Role:     * Rock Bianchi MD - Primary    Assistant(s):        Surgical Findin

## 2021-11-23 NOTE — H&P
Elif [place] presents for endoscopy.       States prep is clear    Denies other symptoms or problems    The risks and benefits of the procedure were discussed in detail with the patient, including the risks of bleeding, infection, inaccuracy, side by mouth daily. , Disp: 90 tablet, Rfl: 0  pantoprazole 40 MG Oral Tab EC, Take 1 tablet (40 mg total) by mouth daily. , Disp: 90 tablet, Rfl: 0  montelukast 10 MG Oral Tab, Take 1 tablet (10 mg total) by mouth daily. , Disp: 90 tablet, Rfl: 0  buPROPion 300 repeat 5 yrs, mac/2 day prep   • COLONOSCOPY     • GASTRIC BYPASS,OBESE<100CM LIZBETH-EN-Y     • GASTRIC BYPASS,OBESITY,SB RECONSTRUC     • OTHER SURGICAL HISTORY  2007    Lizbeth-en-y   • OTHER SURGICAL HISTORY  1990    Lap laser removal of endometrisis   • SLI

## 2021-11-23 NOTE — OPERATIVE REPORT
ENDOSCOPY OPERATIVE REPORT    Patient Name:  Vicente Saldivar  Medical Record #: [id number]  YOB: 1960  Date of Procedure: 11/23/2021    Preoperative Diagnosis:  Hx of polyp    Postoperative Diagnosis: diverticulosis    Procedure Performed: C and followup with primary physician for routine care.     Repeat 10 year colonoscopy with MAC and plan 2 day prep (magnesium citrate 2 nights prior, PEG prep 1 day prior)            Plan is to discharge home when Anesthesia post-surgical assessment determin

## 2021-11-23 NOTE — ANESTHESIA POSTPROCEDURE EVALUATION
Iwona Patient Status:  Hospital Outpatient Surgery   Age/Gender 59year old female MRN [id number]   Location [id number] [place] [address] Attending Judith Garza MD   Hosp Day # 0 PCP Viktoria Diallo MD

## 2021-11-30 ENCOUNTER — LAB ENCOUNTER (OUTPATIENT)
Dept: LAB | Age: 61
End: 2021-11-30
Attending: ORTHOPAEDIC SURGERY
Payer: MEDICARE

## 2021-11-30 ENCOUNTER — LABORATORY ENCOUNTER (OUTPATIENT)
Dept: LAB | Age: 61
End: 2021-11-30
Attending: ORTHOPAEDIC SURGERY
Payer: MEDICARE

## 2021-11-30 DIAGNOSIS — S83.241A TEAR OF MEDIAL MENISCUS OF RIGHT KNEE: ICD-10-CM

## 2021-11-30 PROCEDURE — 80048 BASIC METABOLIC PNL TOTAL CA: CPT

## 2021-11-30 PROCEDURE — 36415 COLL VENOUS BLD VENIPUNCTURE: CPT

## 2021-12-02 ENCOUNTER — ANESTHESIA (OUTPATIENT)
Dept: SURGERY | Facility: HOSPITAL | Age: 61
End: 2021-12-02
Payer: MEDICARE

## 2021-12-02 ENCOUNTER — HOSPITAL ENCOUNTER (OUTPATIENT)
Facility: HOSPITAL | Age: 61
Setting detail: HOSPITAL OUTPATIENT SURGERY
Discharge: HOME | End: 2021-12-02
Attending: ORTHOPAEDIC SURGERY | Admitting: ORTHOPAEDIC SURGERY
Payer: MEDICARE

## 2021-12-02 ENCOUNTER — ANESTHESIA EVENT (OUTPATIENT)
Dept: SURGERY | Facility: HOSPITAL | Age: 61
End: 2021-12-02
Payer: MEDICARE

## 2021-12-02 VITALS
SYSTOLIC BLOOD PRESSURE: 130 MMHG | DIASTOLIC BLOOD PRESSURE: 92 MMHG | OXYGEN SATURATION: 95 % | BODY MASS INDEX: 51.37 KG/M2 | TEMPERATURE: 98 F | WEIGHT: 279.13 LBS | HEIGHT: 62 IN | HEART RATE: 72 BPM | RESPIRATION RATE: 16 BRPM

## 2021-12-02 DIAGNOSIS — S83.241A TEAR OF MEDIAL MENISCUS OF RIGHT KNEE: Primary | ICD-10-CM

## 2021-12-02 DIAGNOSIS — S83.231D COMPLEX TEAR OF MEDIAL MENISCUS OF RIGHT KNEE AS CURRENT INJURY, SUBSEQUENT ENCOUNTER: ICD-10-CM

## 2021-12-02 PROBLEM — Z47.89 ORTHOPEDIC AFTERCARE: Status: ACTIVE | Noted: 2021-12-02

## 2021-12-02 PROCEDURE — 82962 GLUCOSE BLOOD TEST: CPT

## 2021-12-02 PROCEDURE — 0SBC4ZZ EXCISION OF RIGHT KNEE JOINT, PERCUTANEOUS ENDOSCOPIC APPROACH: ICD-10-PCS | Performed by: ORTHOPAEDIC SURGERY

## 2021-12-02 RX ORDER — ONDANSETRON 2 MG/ML
4 INJECTION INTRAMUSCULAR; INTRAVENOUS AS NEEDED
Status: DISCONTINUED | OUTPATIENT
Start: 2021-12-02 | End: 2021-12-02

## 2021-12-02 RX ORDER — BUPROPION HYDROCHLORIDE 300 MG/1
300 TABLET ORAL DAILY
Status: CANCELLED | OUTPATIENT
Start: 2021-12-02

## 2021-12-02 RX ORDER — METOCLOPRAMIDE HYDROCHLORIDE 5 MG/ML
INJECTION INTRAMUSCULAR; INTRAVENOUS
Status: COMPLETED
Start: 2021-12-02 | End: 2021-12-02

## 2021-12-02 RX ORDER — PANTOPRAZOLE SODIUM 40 MG/1
40 TABLET, DELAYED RELEASE ORAL DAILY
Status: CANCELLED | OUTPATIENT
Start: 2021-12-02

## 2021-12-02 RX ORDER — SODIUM CHLORIDE, SODIUM LACTATE, POTASSIUM CHLORIDE, CALCIUM CHLORIDE 600; 310; 30; 20 MG/100ML; MG/100ML; MG/100ML; MG/100ML
INJECTION, SOLUTION INTRAVENOUS CONTINUOUS
Status: DISCONTINUED | OUTPATIENT
Start: 2021-12-02 | End: 2021-12-02

## 2021-12-02 RX ORDER — FLUOXETINE HYDROCHLORIDE 20 MG/1
20 CAPSULE ORAL DAILY
Status: CANCELLED | OUTPATIENT
Start: 2021-12-02

## 2021-12-02 RX ORDER — DEXAMETHASONE SODIUM PHOSPHATE 4 MG/ML
4 VIAL (ML) INJECTION AS NEEDED
Status: DISCONTINUED | OUTPATIENT
Start: 2021-12-02 | End: 2021-12-02

## 2021-12-02 RX ORDER — PHENTERMINE HYDROCHLORIDE 15 MG/1
15 CAPSULE ORAL EVERY MORNING
Status: CANCELLED | OUTPATIENT
Start: 2021-12-02

## 2021-12-02 RX ORDER — BUPIVACAINE HYDROCHLORIDE AND EPINEPHRINE 5; 5 MG/ML; UG/ML
INJECTION, SOLUTION EPIDURAL; INTRACAUDAL; PERINEURAL AS NEEDED
Status: DISCONTINUED | OUTPATIENT
Start: 2021-12-02 | End: 2021-12-02

## 2021-12-02 RX ORDER — ALBUTEROL SULFATE 90 UG/1
2 AEROSOL, METERED RESPIRATORY (INHALATION) EVERY 6 HOURS PRN
Status: CANCELLED | OUTPATIENT
Start: 2021-12-02

## 2021-12-02 RX ORDER — ACETAMINOPHEN 500 MG
1000 TABLET ORAL ONCE
Status: DISCONTINUED | OUTPATIENT
Start: 2021-12-02 | End: 2021-12-02

## 2021-12-02 RX ORDER — LOSARTAN POTASSIUM 100 MG/1
100 TABLET ORAL DAILY
Status: CANCELLED | OUTPATIENT
Start: 2021-12-02

## 2021-12-02 RX ORDER — ACETAMINOPHEN 500 MG
1000 TABLET ORAL ONCE AS NEEDED
Status: DISCONTINUED | OUTPATIENT
Start: 2021-12-02 | End: 2021-12-02

## 2021-12-02 RX ORDER — KETOROLAC TROMETHAMINE 30 MG/ML
INJECTION, SOLUTION INTRAMUSCULAR; INTRAVENOUS AS NEEDED
Status: DISCONTINUED | OUTPATIENT
Start: 2021-12-02 | End: 2021-12-02 | Stop reason: SURG

## 2021-12-02 RX ORDER — TOPIRAMATE 25 MG/1
25 TABLET ORAL 2 TIMES DAILY
Status: CANCELLED | OUTPATIENT
Start: 2021-12-02

## 2021-12-02 RX ORDER — DEXTROSE MONOHYDRATE 25 G/50ML
50 INJECTION, SOLUTION INTRAVENOUS
Status: DISCONTINUED | OUTPATIENT
Start: 2021-12-02 | End: 2021-12-02

## 2021-12-02 RX ORDER — LABETALOL HYDROCHLORIDE 5 MG/ML
5 INJECTION, SOLUTION INTRAVENOUS EVERY 5 MIN PRN
Status: DISCONTINUED | OUTPATIENT
Start: 2021-12-02 | End: 2021-12-02

## 2021-12-02 RX ORDER — LIDOCAINE HYDROCHLORIDE 10 MG/ML
INJECTION, SOLUTION EPIDURAL; INFILTRATION; INTRACAUDAL; PERINEURAL AS NEEDED
Status: DISCONTINUED | OUTPATIENT
Start: 2021-12-02 | End: 2021-12-02 | Stop reason: SURG

## 2021-12-02 RX ORDER — MEPERIDINE HYDROCHLORIDE 25 MG/ML
12.5 INJECTION INTRAMUSCULAR; INTRAVENOUS; SUBCUTANEOUS AS NEEDED
Status: DISCONTINUED | OUTPATIENT
Start: 2021-12-02 | End: 2021-12-02

## 2021-12-02 RX ORDER — MAGNESIUM 200 MG
2000 TABLET ORAL DAILY
Status: CANCELLED | OUTPATIENT
Start: 2021-12-02

## 2021-12-02 RX ORDER — ACETAMINOPHEN 500 MG
1000 TABLET ORAL EVERY 6 HOURS PRN
COMMUNITY
End: 2021-12-02

## 2021-12-02 RX ORDER — METOCLOPRAMIDE HYDROCHLORIDE 5 MG/ML
10 INJECTION INTRAMUSCULAR; INTRAVENOUS ONCE
Status: COMPLETED | OUTPATIENT
Start: 2021-12-02 | End: 2021-12-02

## 2021-12-02 RX ORDER — ONDANSETRON 2 MG/ML
INJECTION INTRAMUSCULAR; INTRAVENOUS AS NEEDED
Status: DISCONTINUED | OUTPATIENT
Start: 2021-12-02 | End: 2021-12-02 | Stop reason: SURG

## 2021-12-02 RX ORDER — AMLODIPINE BESYLATE 5 MG/1
5 TABLET ORAL DAILY
Status: CANCELLED | OUTPATIENT
Start: 2021-12-02

## 2021-12-02 RX ORDER — MONTELUKAST SODIUM 10 MG/1
10 TABLET ORAL DAILY
Status: CANCELLED | OUTPATIENT
Start: 2021-12-02

## 2021-12-02 RX ORDER — HYDROMORPHONE HYDROCHLORIDE 1 MG/ML
INJECTION, SOLUTION INTRAMUSCULAR; INTRAVENOUS; SUBCUTANEOUS
Status: COMPLETED
Start: 2021-12-02 | End: 2021-12-02

## 2021-12-02 RX ORDER — HYDROCODONE BITARTRATE AND ACETAMINOPHEN 5; 325 MG/1; MG/1
2 TABLET ORAL AS NEEDED
Status: COMPLETED | OUTPATIENT
Start: 2021-12-02 | End: 2021-12-02

## 2021-12-02 RX ORDER — HYDROMORPHONE HYDROCHLORIDE 1 MG/ML
0.4 INJECTION, SOLUTION INTRAMUSCULAR; INTRAVENOUS; SUBCUTANEOUS EVERY 5 MIN PRN
Status: DISCONTINUED | OUTPATIENT
Start: 2021-12-02 | End: 2021-12-02

## 2021-12-02 RX ORDER — HYDROCODONE BITARTRATE AND ACETAMINOPHEN 5; 325 MG/1; MG/1
1 TABLET ORAL AS NEEDED
Status: COMPLETED | OUTPATIENT
Start: 2021-12-02 | End: 2021-12-02

## 2021-12-02 RX ORDER — DEXAMETHASONE SODIUM PHOSPHATE 4 MG/ML
VIAL (ML) INJECTION AS NEEDED
Status: DISCONTINUED | OUTPATIENT
Start: 2021-12-02 | End: 2021-12-02 | Stop reason: SURG

## 2021-12-02 RX ORDER — MIDAZOLAM HYDROCHLORIDE 1 MG/ML
1 INJECTION INTRAMUSCULAR; INTRAVENOUS EVERY 5 MIN PRN
Status: DISCONTINUED | OUTPATIENT
Start: 2021-12-02 | End: 2021-12-02

## 2021-12-02 RX ORDER — NALOXONE HYDROCHLORIDE 0.4 MG/ML
80 INJECTION, SOLUTION INTRAMUSCULAR; INTRAVENOUS; SUBCUTANEOUS AS NEEDED
Status: DISCONTINUED | OUTPATIENT
Start: 2021-12-02 | End: 2021-12-02

## 2021-12-02 RX ADMIN — LIDOCAINE HYDROCHLORIDE 50 MG: 10 INJECTION, SOLUTION EPIDURAL; INFILTRATION; INTRACAUDAL; PERINEURAL at 10:00:00

## 2021-12-02 RX ADMIN — DEXAMETHASONE SODIUM PHOSPHATE 4 MG: 4 MG/ML VIAL (ML) INJECTION at 09:57:00

## 2021-12-02 RX ADMIN — ONDANSETRON 4 MG: 2 INJECTION INTRAMUSCULAR; INTRAVENOUS at 10:21:00

## 2021-12-02 RX ADMIN — KETOROLAC TROMETHAMINE 30 MG: 30 INJECTION, SOLUTION INTRAMUSCULAR; INTRAVENOUS at 10:34:00

## 2021-12-02 RX ADMIN — SODIUM CHLORIDE, SODIUM LACTATE, POTASSIUM CHLORIDE, CALCIUM CHLORIDE: 600; 310; 30; 20 INJECTION, SOLUTION INTRAVENOUS at 09:53:00

## 2021-12-02 NOTE — ANESTHESIA POSTPROCEDURE EVALUATION
Iwona Patient Status:  Hospital Outpatient Surgery   Age/Gender 59year old female MRN [id number]   [facility] SURGERY Attending Robbi Sacks, MD   Hosp Day # 0 PCP MD Savannah Rodrigues

## 2021-12-02 NOTE — INTERVAL H&P NOTE
Pre-op Diagnosis: Complex tear of medial meniscus of right knee as current injury, subsequent encounter [C18.675H]    The above referenced H&P was reviewed by Sruthi Neves MD on 12/2/2021, the patient was examined and no significant changes have occurr

## 2021-12-02 NOTE — ANESTHESIA PROCEDURE NOTES
Airway  Date/Time: 12/2/2021 10:02 AM  Urgency: elective    Airway not difficult    General Information and Staff    Patient location during procedure: OR  Anesthesiologist: Donte Orozco MD  Performed: anesthesiologist     Indications and Patient Condition

## 2021-12-02 NOTE — H&P
Donnell Awan  11/29/2021 5:00 PM   Office Visit  MRN:  [id number]  Description: 59year old female Provider: Pretty Byers MD Department: [facility]     Scanning Cover Sheet    Click to print Libia Byers 424 for scan Osteoarthritis     • Pneumonia due to organism       Was hospitalized with COVID September 2021  fever chiil cough   • Pseudoseizures ([facility]       due to anxiety, s/p psych and neuro w/u   • SBO (small bowel obstruction) ([facility] 11/18/2020   • Seizure disorde tablet, Rfl: 0  pantoprazole 40 MG Oral Tab EC, Take 1 tablet (40 mg total) by mouth daily. , Disp: 90 tablet, Rfl: 0  montelukast 10 MG Oral Tab, Take 1 tablet (10 mg total) by mouth daily. , Disp: 90 tablet, Rfl: 0  buPROPion 300 MG Oral Tablet 24 Hr, Take       Any family history of difficulties with general anesthesia: no     Social History    Socioeconomic History      Marital status:     Occupational History      Occupation: childcare    Tobacco Use      Smoking status: Former Smoker        Pack kg)   LMP 12/10/2014 (Approximate)   BMI 50.40 kg/m²   NAD  GENERAL: well developed, well nourished, in no apparent distress. EARS: Bilateral pinna / tragus are WNL in appearance, External canals patent and without inflammation.  Bilateral tympanic membran DIGITAL (CPT=77067); Future     Essential hypertension  Hypertension:  Current regimen is effective. Diet and exercise explained and encouraged.   Medication: cpm     Mixed hyperlipidemia  Hyperlipidemia:  Low cholesterol diet and regular exercise encourag for 9-12 hours prior to testing:  ~ Cholesterol (Lipid Panel)  ~ Testosterone level - to be done at 8 am  ~ Ferritin level  ~ Glucose, 2 hour postprandial (you will need to return to the lab or be in the lab for 2 hours).     (Fast 9 to 12 hours without fo

## 2021-12-02 NOTE — BRIEF OP NOTE
Pre-Operative Diagnosis: Complex tear of medial meniscus of right knee as current injury, subsequent encounter [S83.231D]     Post-Operative Diagnosis: Complex tear of medial meniscus of right knee as current injury, subsequent encounter [S83.356D]      Pr

## 2021-12-02 NOTE — ANESTHESIA PREPROCEDURE EVALUATION
PRE-OP EVALUATION    Patient Name: Damaris Arias    Admit Diagnosis: Complex tear of medial meniscus of right knee as current injury, subsequent encounter [B55.077W]    Pre-op Diagnosis: Complex tear of medial meniscus of right knee as current inju Units by mouth., Disp: , Rfl: , 12/1/2021 at Unknown time  Budesonide-Formoterol Fumarate 160-4.5 MCG/ACT Inhalation Aerosol, Inhale 2 puffs into the lungs 2 (two) times daily. (Patient taking differently: Inhale 2 puffs into the lungs 2 (two) times daily. (+) sleep apnea and CPAP      Neuro/Psych  Comment: Pseudoseizure disorder    (+) depression    (+) CVA     (+) seizures                     Past Surgical History:   Procedure Laterality Date   • ARTHROSCOPY OF JOINT UNLISTED      Left knee   • 09/04/2021    .0 09/04/2021     09/04/2021     Lab Results   Component Value Date     11/30/2021     09/04/2021    K 4.9 11/30/2021    K 4.23 09/04/2021     11/30/2021     (H) 09/04/2021    CO2 30.0 11/30/2021    CO2 2

## 2021-12-03 NOTE — OPERATIVE REPORT
[facility]    PATIENT'S NAME: Melissa Munroe   ATTENDING PHYSICIAN: Wendy Graham M.D. OPERATING PHYSICIAN: Wendy Graham M.D.    PATIENT ACCOUNT#:   [de-identified]    LOCATION:  [address] [place] [address] [address]  MEDICAL RECORD #:   [id number] under direct vision the anteromedial portal for instrumentation. Initial inspection revealed no pathology at the suprapatellar pouch. The patellofemoral joint demonstrated grade 3 chondromalacia at the patella and femoral groove.   Medial and lateral gutt

## 2021-12-15 PROBLEM — Z98.890 S/P ARTHROSCOPIC SURGERY OF RIGHT KNEE: Status: ACTIVE | Noted: 2021-12-15

## 2022-01-19 PROBLEM — F33.1 MODERATE RECURRENT MAJOR DEPRESSION (HCC): Status: RESOLVED | Noted: 2019-08-18 | Resolved: 2022-01-19

## 2022-01-19 PROBLEM — U07.1 COVID-19: Status: RESOLVED | Noted: 2021-08-29 | Resolved: 2022-01-19

## 2022-01-19 PROBLEM — M17.0 ARTHRITIS OF BOTH KNEES: Status: ACTIVE | Noted: 2022-01-19

## 2022-02-09 NOTE — RESPIRATORY THERAPY NOTE
PATIENT WEARS CPAP AT HOME BUT CURRENTLY HAS A NG IN PLACE,  WILL PLACE ON PROTOCOL (0) indicator not present

## 2022-02-10 PROBLEM — G89.29 CHRONIC PAIN OF RIGHT KNEE: Status: ACTIVE | Noted: 2022-02-10

## 2022-02-10 PROBLEM — M25.561 CHRONIC PAIN OF RIGHT KNEE: Status: ACTIVE | Noted: 2022-02-10

## 2022-02-14 ENCOUNTER — APPOINTMENT (OUTPATIENT)
Dept: GENERAL RADIOLOGY | Age: 62
DRG: 356 | End: 2022-02-14
Attending: EMERGENCY MEDICINE
Payer: MEDICARE

## 2022-02-14 ENCOUNTER — HOSPITAL ENCOUNTER (INPATIENT)
Facility: HOSPITAL | Age: 62
LOS: 15 days | Discharge: NURSING, REHAB OR LONG-TERM CARE | DRG: 356 | End: 2022-03-01
Attending: EMERGENCY MEDICINE | Admitting: HOSPITALIST
Payer: MEDICARE

## 2022-02-14 ENCOUNTER — APPOINTMENT (OUTPATIENT)
Dept: CT IMAGING | Age: 62
DRG: 356 | End: 2022-02-14
Attending: EMERGENCY MEDICINE
Payer: MEDICARE

## 2022-02-14 DIAGNOSIS — K66.8 FREE INTRAPERITONEAL AIR: ICD-10-CM

## 2022-02-14 DIAGNOSIS — R10.84 ABDOMINAL PAIN, GENERALIZED: Primary | ICD-10-CM

## 2022-02-14 DIAGNOSIS — R11.2 NAUSEA AND VOMITING IN ADULT: ICD-10-CM

## 2022-02-14 DIAGNOSIS — R73.9 HYPERGLYCEMIA: ICD-10-CM

## 2022-02-14 DIAGNOSIS — K56.609 SMALL BOWEL OBSTRUCTION (HCC): ICD-10-CM

## 2022-02-14 LAB
ALBUMIN SERPL-MCNC: 3.7 G/DL (ref 3.4–5)
ALBUMIN/GLOB SERPL: 1.1 {RATIO} (ref 1–2)
ALP LIVER SERPL-CCNC: 141 U/L
ALT SERPL-CCNC: 22 U/L
ANION GAP SERPL CALC-SCNC: 6 MMOL/L (ref 0–18)
AST SERPL-CCNC: 18 U/L (ref 15–37)
BASOPHILS # BLD AUTO: 0.07 X10(3) UL (ref 0–0.2)
BASOPHILS NFR BLD AUTO: 0.8 %
BILIRUB SERPL-MCNC: 0.5 MG/DL (ref 0.1–2)
BUN BLD-MCNC: 11 MG/DL (ref 7–18)
CALCIUM BLD-MCNC: 9.2 MG/DL (ref 8.5–10.1)
CHLORIDE SERPL-SCNC: 111 MMOL/L (ref 98–112)
CO2 SERPL-SCNC: 24 MMOL/L (ref 21–32)
CREAT BLD-MCNC: 0.78 MG/DL
EOSINOPHIL # BLD AUTO: 0.58 X10(3) UL (ref 0–0.7)
EOSINOPHIL NFR BLD AUTO: 6.4 %
ERYTHROCYTE [DISTWIDTH] IN BLOOD BY AUTOMATED COUNT: 15.3 %
GLOBULIN PLAS-MCNC: 3.4 G/DL (ref 2.8–4.4)
GLUCOSE BLD-MCNC: 135 MG/DL (ref 70–99)
HCT VFR BLD AUTO: 44 %
HGB BLD-MCNC: 13.9 G/DL
IMM GRANULOCYTES NFR BLD: 0.3 %
LYMPHOCYTES # BLD AUTO: 1.58 X10(3) UL (ref 1–4)
LYMPHOCYTES NFR BLD AUTO: 17.5 %
MCH RBC QN AUTO: 29.6 PG (ref 26–34)
MCHC RBC AUTO-ENTMCNC: 31.6 G/DL (ref 31–37)
MCV RBC AUTO: 93.6 FL
MONOCYTES # BLD AUTO: 0.75 X10(3) UL (ref 0.1–1)
MONOCYTES NFR BLD AUTO: 8.3 %
NEUTROPHILS # BLD AUTO: 6.03 X10 (3) UL (ref 1.5–7.7)
NEUTROPHILS # BLD AUTO: 6.03 X10(3) UL (ref 1.5–7.7)
NEUTROPHILS NFR BLD AUTO: 66.7 %
OSMOLALITY SERPL CALC.SUM OF ELEC: 293 MOSM/KG (ref 275–295)
PLATELET # BLD AUTO: 219 10(3)UL (ref 150–450)
POTASSIUM SERPL-SCNC: 4 MMOL/L (ref 3.5–5.1)
PROT SERPL-MCNC: 7.1 G/DL (ref 6.4–8.2)
RBC # BLD AUTO: 4.7 X10(6)UL
SARS-COV-2 RNA RESP QL NAA+PROBE: NOT DETECTED
SODIUM SERPL-SCNC: 141 MMOL/L (ref 136–145)
WBC # BLD AUTO: 9 X10(3) UL (ref 4–11)

## 2022-02-14 PROCEDURE — 96361 HYDRATE IV INFUSION ADD-ON: CPT

## 2022-02-14 PROCEDURE — 99285 EMERGENCY DEPT VISIT HI MDM: CPT

## 2022-02-14 PROCEDURE — 71045 X-RAY EXAM CHEST 1 VIEW: CPT | Performed by: EMERGENCY MEDICINE

## 2022-02-14 PROCEDURE — 85025 COMPLETE CBC W/AUTO DIFF WBC: CPT | Performed by: EMERGENCY MEDICINE

## 2022-02-14 PROCEDURE — 74177 CT ABD & PELVIS W/CONTRAST: CPT | Performed by: EMERGENCY MEDICINE

## 2022-02-14 PROCEDURE — 96375 TX/PRO/DX INJ NEW DRUG ADDON: CPT

## 2022-02-14 PROCEDURE — 80053 COMPREHEN METABOLIC PANEL: CPT | Performed by: EMERGENCY MEDICINE

## 2022-02-14 PROCEDURE — 96376 TX/PRO/DX INJ SAME DRUG ADON: CPT

## 2022-02-14 PROCEDURE — 96374 THER/PROPH/DIAG INJ IV PUSH: CPT

## 2022-02-14 RX ORDER — MORPHINE SULFATE 4 MG/ML
2 INJECTION, SOLUTION INTRAMUSCULAR; INTRAVENOUS ONCE
Status: COMPLETED | OUTPATIENT
Start: 2022-02-14 | End: 2022-02-14

## 2022-02-14 RX ORDER — SODIUM CHLORIDE 9 MG/ML
INJECTION, SOLUTION INTRAVENOUS ONCE
Status: COMPLETED | OUTPATIENT
Start: 2022-02-14 | End: 2022-02-14

## 2022-02-14 RX ORDER — MORPHINE SULFATE 4 MG/ML
4 INJECTION, SOLUTION INTRAMUSCULAR; INTRAVENOUS EVERY 2 HOUR PRN
Status: DISCONTINUED | OUTPATIENT
Start: 2022-02-14 | End: 2022-02-22

## 2022-02-14 RX ORDER — HYDRALAZINE HYDROCHLORIDE 20 MG/ML
10 INJECTION INTRAMUSCULAR; INTRAVENOUS EVERY 6 HOURS PRN
Status: DISCONTINUED | OUTPATIENT
Start: 2022-02-14 | End: 2022-02-22

## 2022-02-14 RX ORDER — IOHEXOL 350 MG/ML
100 INJECTION, SOLUTION INTRAVENOUS
Status: COMPLETED | OUTPATIENT
Start: 2022-02-14 | End: 2022-02-14

## 2022-02-14 RX ORDER — SODIUM CHLORIDE 9 MG/ML
INJECTION, SOLUTION INTRAVENOUS CONTINUOUS
Status: DISCONTINUED | OUTPATIENT
Start: 2022-02-14 | End: 2022-02-22

## 2022-02-14 RX ORDER — MORPHINE SULFATE 2 MG/ML
1 INJECTION, SOLUTION INTRAMUSCULAR; INTRAVENOUS EVERY 2 HOUR PRN
Status: DISCONTINUED | OUTPATIENT
Start: 2022-02-14 | End: 2022-02-22

## 2022-02-14 RX ORDER — ONDANSETRON 2 MG/ML
4 INJECTION INTRAMUSCULAR; INTRAVENOUS EVERY 6 HOURS PRN
Status: DISCONTINUED | OUTPATIENT
Start: 2022-02-14 | End: 2022-02-22

## 2022-02-14 RX ORDER — ONDANSETRON 2 MG/ML
4 INJECTION INTRAMUSCULAR; INTRAVENOUS ONCE
Status: COMPLETED | OUTPATIENT
Start: 2022-02-14 | End: 2022-02-14

## 2022-02-14 RX ORDER — MORPHINE SULFATE 2 MG/ML
2 INJECTION, SOLUTION INTRAMUSCULAR; INTRAVENOUS EVERY 2 HOUR PRN
Status: DISCONTINUED | OUTPATIENT
Start: 2022-02-14 | End: 2022-02-22

## 2022-02-14 RX ORDER — PROCHLORPERAZINE EDISYLATE 5 MG/ML
5 INJECTION INTRAMUSCULAR; INTRAVENOUS EVERY 8 HOURS PRN
Status: DISCONTINUED | OUTPATIENT
Start: 2022-02-14 | End: 2022-02-22

## 2022-02-14 RX ORDER — MORPHINE SULFATE 4 MG/ML
4 INJECTION, SOLUTION INTRAMUSCULAR; INTRAVENOUS ONCE
Status: COMPLETED | OUTPATIENT
Start: 2022-02-14 | End: 2022-02-14

## 2022-02-14 RX ORDER — ENOXAPARIN SODIUM 100 MG/ML
40 INJECTION SUBCUTANEOUS DAILY
Status: DISCONTINUED | OUTPATIENT
Start: 2022-02-14 | End: 2022-02-22

## 2022-02-14 NOTE — PROGRESS NOTES
Patient brought via ambulance from [place] ED; patient is aox4. States pain to abdomen is 4/10; NG tube in place and connected to intermittent suction. IV fluids running per MAR. Patient to remain NPO with ice chips. RN notified services that patient ahs arrived. RN reviewed plan of care with patient. tamy verbalized understanding of plan. RN to continue to monitor.

## 2022-02-14 NOTE — ED INITIAL ASSESSMENT (HPI)
Abd pain onset 10pm that feels like bowel obstructions she's had in the past. Pt also c/o nausea with one episode of vomiting.

## 2022-02-15 ENCOUNTER — APPOINTMENT (OUTPATIENT)
Dept: GENERAL RADIOLOGY | Facility: HOSPITAL | Age: 62
DRG: 356 | End: 2022-02-15
Attending: PHYSICIAN ASSISTANT
Payer: MEDICARE

## 2022-02-15 LAB
ANION GAP SERPL CALC-SCNC: 4 MMOL/L (ref 0–18)
BUN BLD-MCNC: 14 MG/DL (ref 7–18)
CALCIUM BLD-MCNC: 8.4 MG/DL (ref 8.5–10.1)
CHLORIDE SERPL-SCNC: 114 MMOL/L (ref 98–112)
CO2 SERPL-SCNC: 25 MMOL/L (ref 21–32)
CREAT BLD-MCNC: 0.54 MG/DL
ERYTHROCYTE [DISTWIDTH] IN BLOOD BY AUTOMATED COUNT: 15.5 %
GLUCOSE BLD-MCNC: 101 MG/DL (ref 70–99)
HCT VFR BLD AUTO: 39.8 %
HGB BLD-MCNC: 12.2 G/DL
MAGNESIUM SERPL-MCNC: 2 MG/DL (ref 1.6–2.6)
MCH RBC QN AUTO: 29.3 PG (ref 26–34)
MCHC RBC AUTO-ENTMCNC: 30.7 G/DL (ref 31–37)
MCV RBC AUTO: 95.4 FL
OSMOLALITY SERPL CALC.SUM OF ELEC: 297 MOSM/KG (ref 275–295)
PLATELET # BLD AUTO: 173 10(3)UL (ref 150–450)
POTASSIUM SERPL-SCNC: 4.1 MMOL/L (ref 3.5–5.1)
RBC # BLD AUTO: 4.17 X10(6)UL
SODIUM SERPL-SCNC: 143 MMOL/L (ref 136–145)
WBC # BLD AUTO: 7.6 X10(3) UL (ref 4–11)

## 2022-02-15 PROCEDURE — 83735 ASSAY OF MAGNESIUM: CPT | Performed by: HOSPITALIST

## 2022-02-15 PROCEDURE — 85027 COMPLETE CBC AUTOMATED: CPT | Performed by: HOSPITALIST

## 2022-02-15 PROCEDURE — 80048 BASIC METABOLIC PNL TOTAL CA: CPT | Performed by: HOSPITALIST

## 2022-02-15 PROCEDURE — 74019 RADEX ABDOMEN 2 VIEWS: CPT | Performed by: PHYSICIAN ASSISTANT

## 2022-02-15 RX ORDER — KETOROLAC TROMETHAMINE 30 MG/ML
30 INJECTION, SOLUTION INTRAMUSCULAR; INTRAVENOUS EVERY 6 HOURS PRN
Status: DISPENSED | OUTPATIENT
Start: 2022-02-15 | End: 2022-02-17

## 2022-02-15 RX ORDER — KETOROLAC TROMETHAMINE 30 MG/ML
15 INJECTION, SOLUTION INTRAMUSCULAR; INTRAVENOUS EVERY 6 HOURS PRN
Status: DISPENSED | OUTPATIENT
Start: 2022-02-15 | End: 2022-02-17

## 2022-02-15 NOTE — PLAN OF CARE
Assumed care at [address]. Alert and oriented x4. Telemetry monitor reading SR. IVF infusing assessed and maintained. NG-tube to LIS. Abd pain controlled with Morphine. Fall precautions maintained per protocol. Plan for Abd XR and cont. Bowel rest. Call light in reach at bedside. Will continue to monitor.       Problem: PAIN - ADULT  Goal: Verbalizes/displays adequate comfort level or patient's stated pain goal  Description: INTERVENTIONS:  - Encourage pt to monitor pain and request assistance  - Assess pain using appropriate pain scale  - Administer analgesics based on type and severity of pain and evaluate response  - Implement non-pharmacological measures as appropriate and evaluate response  - Consider cultural and social influences on pain and pain management  - Manage/alleviate anxiety  - Utilize distraction and/or relaxation techniques  - Monitor for opioid side effects  - Notify MD/LIP if interventions unsuccessful or patient reports new pain  - Anticipate increased pain with activity and pre-medicate as appropriate  Outcome: Progressing

## 2022-02-16 NOTE — PLAN OF CARE
Patient A&Ox4, reported abdominal/left rib pain, pain meds given per MAR. Pt reported nausea, antinausea meds given per MAR, nausea resolved. NG tube in left nostril, flushed and is patent, total output=50mL for shift, serosanguinous fluid. BP high, prn hydralazine given per [facility]   1630-- Per MD order, NG tube taken out, pt tolerated well. Pt NPO w/ice chips.

## 2022-02-16 NOTE — PLAN OF CARE
Pt. A&O x4. VSS. Afebrile. Pt. C/o headache today; PRN pain medication given per MAR. Per surgery avoid morphine and use toradol. Clamped NG at 1200. Pt. W/ episode of emesis at 1500; re-connected NG to LIS per orders. When re-connected output was bloody; MD aware, no new orders at this time. PRN zofran and compazine given per MAR. Pt. Able to ambulate to the bathroom with standby assist with the walker. Call light within reach. Will continue to monitor.     Problem: PAIN - ADULT  Goal: Verbalizes/displays adequate comfort level or patient's stated pain goal  Description: INTERVENTIONS:  - Encourage pt to monitor pain and request assistance  - Assess pain using appropriate pain scale  - Administer analgesics based on type and severity of pain and evaluate response  - Implement non-pharmacological measures as appropriate and evaluate response  - Consider cultural and social influences on pain and pain management  - Manage/alleviate anxiety  - Utilize distraction and/or relaxation techniques  - Monitor for opioid side effects  - Notify MD/LIP if interventions unsuccessful or patient reports new pain  - Anticipate increased pain with activity and pre-medicate as appropriate  Outcome: Progressing     Problem: GASTROINTESTINAL - ADULT  Goal: Minimal or absence of nausea and vomiting  Description: INTERVENTIONS:  - Maintain adequate hydration with IV or PO as ordered and tolerated  - Nasogastric tube to low intermittent suction as ordered  - Evaluate effectiveness of ordered antiemetic medications  - Provide nonpharmacologic comfort measures as appropriate  - Advance diet as tolerated, if ordered  - Obtain nutritional consult as needed  - Evaluate fluid balance  Outcome: Progressing     Problem: METABOLIC/FLUID AND ELECTROLYTES - ADULT  Goal: Electrolytes maintained within normal limits  Description: INTERVENTIONS:  - Monitor labs and rhythm and assess patient for signs and symptoms of electrolyte imbalances  - Administer electrolyte replacement as ordered  - Monitor response to electrolyte replacements, including rhythm and repeat lab results as appropriate  - Fluid restriction as ordered  - Instruct patient on fluid and nutrition restrictions as appropriate  Outcome: Progressing

## 2022-02-17 ENCOUNTER — ANESTHESIA EVENT (OUTPATIENT)
Dept: ENDOSCOPY | Facility: HOSPITAL | Age: 62
DRG: 356 | End: 2022-02-17
Payer: MEDICARE

## 2022-02-17 ENCOUNTER — ANESTHESIA (OUTPATIENT)
Dept: ENDOSCOPY | Facility: HOSPITAL | Age: 62
DRG: 356 | End: 2022-02-17
Payer: MEDICARE

## 2022-02-17 LAB
ANION GAP SERPL CALC-SCNC: 8 MMOL/L (ref 0–18)
BUN BLD-MCNC: 17 MG/DL (ref 7–18)
CALCIUM BLD-MCNC: 8.2 MG/DL (ref 8.5–10.1)
CHLORIDE SERPL-SCNC: 111 MMOL/L (ref 98–112)
CO2 SERPL-SCNC: 21 MMOL/L (ref 21–32)
CREAT BLD-MCNC: 0.51 MG/DL
ERYTHROCYTE [DISTWIDTH] IN BLOOD BY AUTOMATED COUNT: 15.1 %
GLUCOSE BLD-MCNC: 113 MG/DL (ref 70–99)
HCT VFR BLD AUTO: 34 %
HGB BLD-MCNC: 10.1 G/DL
MAGNESIUM SERPL-MCNC: 1.8 MG/DL (ref 1.6–2.6)
MCH RBC QN AUTO: 29.3 PG (ref 26–34)
MCHC RBC AUTO-ENTMCNC: 29.7 G/DL (ref 31–37)
MCV RBC AUTO: 98.6 FL
OSMOLALITY SERPL CALC.SUM OF ELEC: 292 MOSM/KG (ref 275–295)
PLATELET # BLD AUTO: 129 10(3)UL (ref 150–450)
POTASSIUM SERPL-SCNC: 4.1 MMOL/L (ref 3.5–5.1)
RBC # BLD AUTO: 3.45 X10(6)UL
SODIUM SERPL-SCNC: 140 MMOL/L (ref 136–145)
WBC # BLD AUTO: 13.5 X10(3) UL (ref 4–11)

## 2022-02-17 PROCEDURE — 80048 BASIC METABOLIC PNL TOTAL CA: CPT | Performed by: INTERNAL MEDICINE

## 2022-02-17 PROCEDURE — 0W3P8ZZ CONTROL BLEEDING IN GASTROINTESTINAL TRACT, VIA NATURAL OR ARTIFICIAL OPENING ENDOSCOPIC: ICD-10-PCS | Performed by: INTERNAL MEDICINE

## 2022-02-17 PROCEDURE — 83735 ASSAY OF MAGNESIUM: CPT | Performed by: INTERNAL MEDICINE

## 2022-02-17 PROCEDURE — C9113 INJ PANTOPRAZOLE SODIUM, VIA: HCPCS | Performed by: HOSPITALIST

## 2022-02-17 PROCEDURE — 85027 COMPLETE CBC AUTOMATED: CPT | Performed by: INTERNAL MEDICINE

## 2022-02-17 PROCEDURE — 3E0G8GC INTRODUCTION OF OTHER THERAPEUTIC SUBSTANCE INTO UPPER GI, VIA NATURAL OR ARTIFICIAL OPENING ENDOSCOPIC: ICD-10-PCS | Performed by: INTERNAL MEDICINE

## 2022-02-17 RX ORDER — NALOXONE HYDROCHLORIDE 0.4 MG/ML
80 INJECTION, SOLUTION INTRAMUSCULAR; INTRAVENOUS; SUBCUTANEOUS AS NEEDED
Status: ACTIVE | OUTPATIENT
Start: 2022-02-17 | End: 2022-02-18

## 2022-02-17 RX ORDER — SODIUM CHLORIDE, SODIUM LACTATE, POTASSIUM CHLORIDE, CALCIUM CHLORIDE 600; 310; 30; 20 MG/100ML; MG/100ML; MG/100ML; MG/100ML
INJECTION, SOLUTION INTRAVENOUS CONTINUOUS PRN
Status: DISCONTINUED | OUTPATIENT
Start: 2022-02-17 | End: 2022-02-17 | Stop reason: SURG

## 2022-02-17 RX ORDER — NICOTINE POLACRILEX 4 MG
30 LOZENGE BUCCAL
Status: DISCONTINUED | OUTPATIENT
Start: 2022-02-17 | End: 2022-02-21 | Stop reason: HOSPADM

## 2022-02-17 RX ORDER — SODIUM CHLORIDE, SODIUM LACTATE, POTASSIUM CHLORIDE, CALCIUM CHLORIDE 600; 310; 30; 20 MG/100ML; MG/100ML; MG/100ML; MG/100ML
INJECTION, SOLUTION INTRAVENOUS CONTINUOUS
Status: DISCONTINUED | OUTPATIENT
Start: 2022-02-17 | End: 2022-02-22

## 2022-02-17 RX ORDER — DEXTROSE MONOHYDRATE 25 G/50ML
50 INJECTION, SOLUTION INTRAVENOUS
Status: DISCONTINUED | OUTPATIENT
Start: 2022-02-17 | End: 2022-02-21 | Stop reason: HOSPADM

## 2022-02-17 RX ORDER — NICOTINE POLACRILEX 4 MG
15 LOZENGE BUCCAL
Status: DISCONTINUED | OUTPATIENT
Start: 2022-02-17 | End: 2022-02-21 | Stop reason: HOSPADM

## 2022-02-17 RX ORDER — LIDOCAINE HYDROCHLORIDE 10 MG/ML
INJECTION, SOLUTION EPIDURAL; INFILTRATION; INTRACAUDAL; PERINEURAL AS NEEDED
Status: DISCONTINUED | OUTPATIENT
Start: 2022-02-17 | End: 2022-02-17 | Stop reason: SURG

## 2022-02-17 RX ADMIN — SODIUM CHLORIDE, SODIUM LACTATE, POTASSIUM CHLORIDE, CALCIUM CHLORIDE: 600; 310; 30; 20 INJECTION, SOLUTION INTRAVENOUS at 16:53:00

## 2022-02-17 RX ADMIN — LIDOCAINE HYDROCHLORIDE 25 MG: 10 INJECTION, SOLUTION EPIDURAL; INFILTRATION; INTRACAUDAL; PERINEURAL at 16:55:00

## 2022-02-17 NOTE — PLAN OF CARE
Patient alert and oriented x4. Afebrile, VSS. No complaints of nausea. PRN pain medication administered per MAR. Safety continued. Call light within reach. Will continue to monitor. Patient having bloody stools. MD paged. Problem: SAFETY ADULT - FALL  Goal: Free from fall injury  Description: INTERVENTIONS:  - Assess pt frequently for physical needs  - Identify cognitive and physical deficits and behaviors that affect risk of falls.   - [place] fall precautions as indicated by assessment.  - Educate pt/family on patient safety including physical limitations  - Instruct pt to call for assistance with activity based on assessment  - Modify environment to reduce risk of injury  - Provide assistive devices as appropriate  - Consider OT/PT consult to assist with strengthening/mobility  - Encourage toileting schedule  Outcome: Progressing     Problem: GASTROINTESTINAL - ADULT  Goal: Minimal or absence of nausea and vomiting  Description: INTERVENTIONS:  - Maintain adequate hydration with IV or PO as ordered and tolerated  - Nasogastric tube to low intermittent suction as ordered  - Evaluate effectiveness of ordered antiemetic medications  - Provide nonpharmacologic comfort measures as appropriate  - Advance diet as tolerated, if ordered  - Obtain nutritional consult as needed  - Evaluate fluid balance  Outcome: Progressing     Problem: METABOLIC/FLUID AND ELECTROLYTES - ADULT  Goal: Electrolytes maintained within normal limits  Description: INTERVENTIONS:  - Monitor labs and rhythm and assess patient for signs and symptoms of electrolyte imbalances  - Administer electrolyte replacement as ordered  - Monitor response to electrolyte replacements, including rhythm and repeat lab results as appropriate  - Fluid restriction as ordered  - Instruct patient on fluid and nutrition restrictions as appropriate  Outcome: Progressing     Problem: PAIN - ADULT  Goal: Verbalizes/displays adequate comfort level or patient's stated pain goal  Description: INTERVENTIONS:  - Encourage pt to monitor pain and request assistance  - Assess pain using appropriate pain scale  - Administer analgesics based on type and severity of pain and evaluate response  - Implement non-pharmacological measures as appropriate and evaluate response  - Consider cultural and social influences on pain and pain management  - Manage/alleviate anxiety  - Utilize distraction and/or relaxation techniques  - Monitor for opioid side effects  - Notify MD/LIP if interventions unsuccessful or patient reports new pain  - Anticipate increased pain with activity and pre-medicate as appropriate  Outcome: Not Progressing

## 2022-02-17 NOTE — INTERVAL H&P NOTE
Pre-op Diagnosis: HEMATEMESIS, HEMATOCHEZIA    The above referenced H&P was reviewed by Vicky Cedillo MD on 2/17/2022, the patient was examined and no significant changes have occurred in the patient's condition since the H&P was performed. I discussed with the patient and/or legal representative the potential benefits, risks and side effects of this procedure; the likelihood of the patient achieving goals; and potential problems that might occur during recuperation. I discussed reasonable alternatives to the procedure, including risks, benefits and side effects related to the alternatives and risks related to not receiving this procedure. We will proceed with procedure as planned.

## 2022-02-18 PROCEDURE — C9113 INJ PANTOPRAZOLE SODIUM, VIA: HCPCS | Performed by: INTERNAL MEDICINE

## 2022-02-18 RX ORDER — MULTIPLE VITAMINS W/ MINERALS TAB 9MG-400MCG
1 TAB ORAL DAILY
Status: DISCONTINUED | OUTPATIENT
Start: 2022-02-18 | End: 2022-02-22

## 2022-02-18 RX ORDER — HYDROCODONE BITARTRATE AND ACETAMINOPHEN 5; 325 MG/1; MG/1
2 TABLET ORAL EVERY 4 HOURS PRN
Status: DISCONTINUED | OUTPATIENT
Start: 2022-02-18 | End: 2022-02-22

## 2022-02-18 RX ORDER — HYDROCODONE BITARTRATE AND ACETAMINOPHEN 5; 325 MG/1; MG/1
1 TABLET ORAL EVERY 4 HOURS PRN
Status: DISCONTINUED | OUTPATIENT
Start: 2022-02-18 | End: 2022-02-22

## 2022-02-18 NOTE — PLAN OF CARE
Patient alert and oriented x4. PRN pain medication administered as prescribed. IV fluids infusing. Ear drops administered per MAR. No reports of nausea/emesis. VSS. Afebrile. No BM during shift. Safety precautions continued. Call light within reach. Will continue to monitor. Problem: SAFETY ADULT - FALL  Goal: Free from fall injury  Description: INTERVENTIONS:  - Assess pt frequently for physical needs  - Identify cognitive and physical deficits and behaviors that affect risk of falls.   - [place] fall precautions as indicated by assessment.  - Educate pt/family on patient safety including physical limitations  - Instruct pt to call for assistance with activity based on assessment  - Modify environment to reduce risk of injury  - Provide assistive devices as appropriate  - Consider OT/PT consult to assist with strengthening/mobility  - Encourage toileting schedule  Outcome: Progressing     Problem: GASTROINTESTINAL - ADULT  Goal: Minimal or absence of nausea and vomiting  Description: INTERVENTIONS:  - Maintain adequate hydration with IV or PO as ordered and tolerated  - Nasogastric tube to low intermittent suction as ordered  - Evaluate effectiveness of ordered antiemetic medications  - Provide nonpharmacologic comfort measures as appropriate  - Advance diet as tolerated, if ordered  - Obtain nutritional consult as needed  - Evaluate fluid balance  Outcome: Progressing     Problem: PAIN - ADULT  Goal: Verbalizes/displays adequate comfort level or patient's stated pain goal  Description: INTERVENTIONS:  - Encourage pt to monitor pain and request assistance  - Assess pain using appropriate pain scale  - Administer analgesics based on type and severity of pain and evaluate response  - Implement non-pharmacological measures as appropriate and evaluate response  - Consider cultural and social influences on pain and pain management  - Manage/alleviate anxiety  - Utilize distraction and/or relaxation techniques  - Monitor for opioid side effects  - Notify MD/LIP if interventions unsuccessful or patient reports new pain  - Anticipate increased pain with activity and pre-medicate as appropriate  Outcome: Not Progressing     Problem: METABOLIC/FLUID AND ELECTROLYTES - ADULT  Goal: Electrolytes maintained within normal limits  Description: INTERVENTIONS:  - Monitor labs and rhythm and assess patient for signs and symptoms of electrolyte imbalances  - Administer electrolyte replacement as ordered  - Monitor response to electrolyte replacements, including rhythm and repeat lab results as appropriate  - Fluid restriction as ordered  - Instruct patient on fluid and nutrition restrictions as appropriate  Outcome: Not Progressing

## 2022-02-18 NOTE — PLAN OF CARE
Patient A&Ox4, reported abdominal pain, pain meds given per MAR. GI consulted per Dr. Sandy Ding. Consent signed by pt for EGD and colonoscopy today @1600. Pt reported left ear pain, ear drops ordered and given per MAR. Pt NPO for procedure, had 4 loose bloody BM's today, MD aware. @6210-- Endo RN called, reported that pt had a stomach ulcer/GI bleed, 2 clips were placed, pt and family informed, orders for pt to be NPO until tomorrow and redraw H&H in the AM. Pt reported stomach pain, pain meds given per MAR. IVF running.

## 2022-02-18 NOTE — PLAN OF CARE
Patient ambulated 100ft w/ walker and this RN. Patient had sob on exertion. Patient currently placed sitting up in chair.

## 2022-02-19 LAB
ANION GAP SERPL CALC-SCNC: 5 MMOL/L (ref 0–18)
BASOPHILS # BLD AUTO: 0.05 X10(3) UL (ref 0–0.2)
BASOPHILS # BLD AUTO: 0.05 X10(3) UL (ref 0–0.2)
BASOPHILS NFR BLD AUTO: 0.3 %
BASOPHILS NFR BLD AUTO: 0.3 %
BUN BLD-MCNC: 11 MG/DL (ref 7–18)
CALCIUM BLD-MCNC: 7.7 MG/DL (ref 8.5–10.1)
CHLORIDE SERPL-SCNC: 110 MMOL/L (ref 98–112)
CO2 SERPL-SCNC: 26 MMOL/L (ref 21–32)
CREAT BLD-MCNC: 0.37 MG/DL
EOSINOPHIL # BLD AUTO: 0.27 X10(3) UL (ref 0–0.7)
EOSINOPHIL # BLD AUTO: 0.29 X10(3) UL (ref 0–0.7)
EOSINOPHIL NFR BLD AUTO: 1.7 %
EOSINOPHIL NFR BLD AUTO: 1.9 %
ERYTHROCYTE [DISTWIDTH] IN BLOOD BY AUTOMATED COUNT: 15 %
ERYTHROCYTE [DISTWIDTH] IN BLOOD BY AUTOMATED COUNT: 15.1 %
GLUCOSE BLD-MCNC: 107 MG/DL (ref 70–99)
HCT VFR BLD AUTO: 21.5 %
HCT VFR BLD AUTO: 22.1 %
HGB BLD-MCNC: 6.5 G/DL
HGB BLD-MCNC: 7 G/DL
IMM GRANULOCYTES # BLD AUTO: 0.15 X10(3) UL (ref 0–1)
IMM GRANULOCYTES # BLD AUTO: 0.16 X10(3) UL (ref 0–1)
IMM GRANULOCYTES NFR BLD: 1 %
IMM GRANULOCYTES NFR BLD: 1 %
LYMPHOCYTES # BLD AUTO: 1.54 X10(3) UL (ref 1–4)
LYMPHOCYTES # BLD AUTO: 1.66 X10(3) UL (ref 1–4)
LYMPHOCYTES NFR BLD AUTO: 10.9 %
LYMPHOCYTES NFR BLD AUTO: 9.6 %
MCH RBC QN AUTO: 29.3 PG (ref 26–34)
MCHC RBC AUTO-ENTMCNC: 30.2 G/DL (ref 31–37)
MCHC RBC AUTO-ENTMCNC: 31.7 G/DL (ref 31–37)
MCV RBC AUTO: 92.5 FL
MCV RBC AUTO: 93.9 FL
MONOCYTES # BLD AUTO: 1.21 X10(3) UL (ref 0.1–1)
MONOCYTES # BLD AUTO: 1.44 X10(3) UL (ref 0.1–1)
MONOCYTES NFR BLD AUTO: 7.9 %
MONOCYTES NFR BLD AUTO: 8.9 %
NEUTROPHILS # BLD AUTO: 11.87 X10 (3) UL (ref 1.5–7.7)
NEUTROPHILS # BLD AUTO: 11.87 X10(3) UL (ref 1.5–7.7)
NEUTROPHILS # BLD AUTO: 12.65 X10 (3) UL (ref 1.5–7.7)
NEUTROPHILS # BLD AUTO: 12.65 X10(3) UL (ref 1.5–7.7)
NEUTROPHILS NFR BLD AUTO: 78 %
NEUTROPHILS NFR BLD AUTO: 78.5 %
OSMOLALITY SERPL CALC.SUM OF ELEC: 292 MOSM/KG (ref 275–295)
PLATELET # BLD AUTO: 166 10(3)UL (ref 150–450)
PLATELET # BLD AUTO: 185 10(3)UL (ref 150–450)
POTASSIUM SERPL-SCNC: 3.2 MMOL/L (ref 3.5–5.1)
RBC # BLD AUTO: 2.29 X10(6)UL
RBC # BLD AUTO: 2.39 X10(6)UL
RH BLOOD TYPE: POSITIVE
SODIUM SERPL-SCNC: 141 MMOL/L (ref 136–145)
WBC # BLD AUTO: 15.2 X10(3) UL (ref 4–11)
WBC # BLD AUTO: 16.1 X10(3) UL (ref 4–11)

## 2022-02-19 PROCEDURE — 30233N1 TRANSFUSION OF NONAUTOLOGOUS RED BLOOD CELLS INTO PERIPHERAL VEIN, PERCUTANEOUS APPROACH: ICD-10-PCS | Performed by: INTERNAL MEDICINE

## 2022-02-19 PROCEDURE — 86901 BLOOD TYPING SEROLOGIC RH(D): CPT | Performed by: INTERNAL MEDICINE

## 2022-02-19 PROCEDURE — 80048 BASIC METABOLIC PNL TOTAL CA: CPT | Performed by: HOSPITALIST

## 2022-02-19 PROCEDURE — 86850 RBC ANTIBODY SCREEN: CPT | Performed by: INTERNAL MEDICINE

## 2022-02-19 PROCEDURE — 85025 COMPLETE CBC W/AUTO DIFF WBC: CPT | Performed by: INTERNAL MEDICINE

## 2022-02-19 PROCEDURE — 86920 COMPATIBILITY TEST SPIN: CPT

## 2022-02-19 PROCEDURE — 86900 BLOOD TYPING SEROLOGIC ABO: CPT | Performed by: INTERNAL MEDICINE

## 2022-02-19 PROCEDURE — C9113 INJ PANTOPRAZOLE SODIUM, VIA: HCPCS | Performed by: INTERNAL MEDICINE

## 2022-02-19 PROCEDURE — 36430 TRANSFUSION BLD/BLD COMPNT: CPT

## 2022-02-19 PROCEDURE — 85025 COMPLETE CBC W/AUTO DIFF WBC: CPT | Performed by: HOSPITALIST

## 2022-02-19 RX ORDER — SODIUM CHLORIDE 9 MG/ML
INJECTION, SOLUTION INTRAVENOUS ONCE
Status: COMPLETED | OUTPATIENT
Start: 2022-02-19 | End: 2022-02-19

## 2022-02-19 RX ORDER — POTASSIUM CHLORIDE 20 MEQ/1
40 TABLET, EXTENDED RELEASE ORAL ONCE
Status: COMPLETED | OUTPATIENT
Start: 2022-02-19 | End: 2022-02-19

## 2022-02-19 NOTE — PLAN OF CARE
Patient alert and oriented x4. Patient reports abdominal pain and given pain medication as prescribed. Educated patient on risk for worsening bowel obstruction of overuse of IV morphine, patient walked today and has been having multiple occurrence of flatus and is feeling relief. IV fluids infusing. No reports of nausea/emesis. VSS. Afebrile. No BM during shift. Safety precautions maintained. Call light within reach. Patient calls appropriately for needs.

## 2022-02-19 NOTE — PROGRESS NOTES
No change overnight. mau clear liquids . norco po for pain.  Up to bathroom with standby assist. Passing flatus , no BM

## 2022-02-19 NOTE — PLAN OF CARE
Patient alert and oriented x4. Patient reports abdominal pain and given pain medication as prescribed. Patient hgb 7.0 this morning--could not tranfuse pt refused because she is Jehovah witness. Patient afternoon hgb check dropped to 6.5, then pt requested the blood transfusion. Informed consents were signed and pt to be tranfused 1 unit PRBC per MD order. PIV infiltrated @ 1800 and this RN attempted to replace, attempt unsuccessful charge RN aware. No reports of nausea/emesis. VSS. Afebrile. Pt had bloody BM x2  during shift. Safety precautions maintained. Call light within reach. Patient calls appropriately for needs.

## 2022-02-20 LAB
ANION GAP SERPL CALC-SCNC: 6 MMOL/L (ref 0–18)
BASOPHILS # BLD AUTO: 0.07 X10(3) UL (ref 0–0.2)
BASOPHILS NFR BLD AUTO: 0.5 %
BUN BLD-MCNC: 6 MG/DL (ref 7–18)
CALCIUM BLD-MCNC: 8 MG/DL (ref 8.5–10.1)
CHLORIDE SERPL-SCNC: 111 MMOL/L (ref 98–112)
CO2 SERPL-SCNC: 26 MMOL/L (ref 21–32)
CREAT BLD-MCNC: 0.39 MG/DL
EOSINOPHIL # BLD AUTO: 0.38 X10(3) UL (ref 0–0.7)
EOSINOPHIL NFR BLD AUTO: 2.9 %
ERYTHROCYTE [DISTWIDTH] IN BLOOD BY AUTOMATED COUNT: 14.6 %
GLUCOSE BLD-MCNC: 99 MG/DL (ref 70–99)
HCT VFR BLD AUTO: 25.4 %
HGB BLD-MCNC: 7.7 G/DL
IMM GRANULOCYTES # BLD AUTO: 0.26 X10(3) UL (ref 0–1)
IMM GRANULOCYTES NFR BLD: 2 %
LYMPHOCYTES # BLD AUTO: 1.69 X10(3) UL (ref 1–4)
LYMPHOCYTES NFR BLD AUTO: 12.9 %
MCH RBC QN AUTO: 28.7 PG (ref 26–34)
MCHC RBC AUTO-ENTMCNC: 30.3 G/DL (ref 31–37)
MCV RBC AUTO: 94.8 FL
MONOCYTES # BLD AUTO: 1.28 X10(3) UL (ref 0.1–1)
MONOCYTES NFR BLD AUTO: 9.7 %
NEUTROPHILS # BLD AUTO: 9.47 X10 (3) UL (ref 1.5–7.7)
NEUTROPHILS # BLD AUTO: 9.47 X10(3) UL (ref 1.5–7.7)
NEUTROPHILS NFR BLD AUTO: 72 %
OSMOLALITY SERPL CALC.SUM OF ELEC: 294 MOSM/KG (ref 275–295)
PLATELET # BLD AUTO: 202 10(3)UL (ref 150–450)
POTASSIUM SERPL-SCNC: 3.4 MMOL/L (ref 3.5–5.1)
POTASSIUM SERPL-SCNC: 3.4 MMOL/L (ref 3.5–5.1)
RBC # BLD AUTO: 2.68 X10(6)UL
SODIUM SERPL-SCNC: 143 MMOL/L (ref 136–145)
WBC # BLD AUTO: 13.2 X10(3) UL (ref 4–11)

## 2022-02-20 PROCEDURE — 80048 BASIC METABOLIC PNL TOTAL CA: CPT | Performed by: HOSPITALIST

## 2022-02-20 PROCEDURE — 84132 ASSAY OF SERUM POTASSIUM: CPT | Performed by: INTERNAL MEDICINE

## 2022-02-20 PROCEDURE — C9113 INJ PANTOPRAZOLE SODIUM, VIA: HCPCS | Performed by: INTERNAL MEDICINE

## 2022-02-20 PROCEDURE — 85025 COMPLETE CBC W/AUTO DIFF WBC: CPT | Performed by: HOSPITALIST

## 2022-02-20 RX ORDER — POTASSIUM CHLORIDE 20 MEQ/1
40 TABLET, EXTENDED RELEASE ORAL ONCE
Status: COMPLETED | OUTPATIENT
Start: 2022-02-20 | End: 2022-02-20

## 2022-02-20 NOTE — PROGRESS NOTES
Iv restarted in right forearm. One unit of prbc infused without adverse effects. Now with ivf 0.9NS at 83cc/Hr. Up to bathroom two episodes of watery bright red stool. (small) vitals stable , norco for abdominal pain.

## 2022-02-21 ENCOUNTER — ANESTHESIA EVENT (OUTPATIENT)
Dept: ENDOSCOPY | Facility: HOSPITAL | Age: 62
DRG: 356 | End: 2022-02-21
Payer: MEDICARE

## 2022-02-21 ENCOUNTER — ANESTHESIA (OUTPATIENT)
Dept: ENDOSCOPY | Facility: HOSPITAL | Age: 62
DRG: 356 | End: 2022-02-21
Payer: MEDICARE

## 2022-02-21 LAB
ANION GAP SERPL CALC-SCNC: 6 MMOL/L (ref 0–18)
BASOPHILS # BLD AUTO: 0.06 X10(3) UL (ref 0–0.2)
BASOPHILS NFR BLD AUTO: 0.7 %
BLOOD TYPE BARCODE: 6200
BUN BLD-MCNC: 3 MG/DL (ref 7–18)
CALCIUM BLD-MCNC: 8.1 MG/DL (ref 8.5–10.1)
CHLORIDE SERPL-SCNC: 112 MMOL/L (ref 98–112)
CO2 SERPL-SCNC: 26 MMOL/L (ref 21–32)
CREAT BLD-MCNC: 0.41 MG/DL
EOSINOPHIL # BLD AUTO: 0.45 X10(3) UL (ref 0–0.7)
EOSINOPHIL NFR BLD AUTO: 4.9 %
ERYTHROCYTE [DISTWIDTH] IN BLOOD BY AUTOMATED COUNT: 15.1 %
GLUCOSE BLD-MCNC: 104 MG/DL (ref 70–99)
HCT VFR BLD AUTO: 27.9 %
HGB BLD-MCNC: 8.8 G/DL
IMM GRANULOCYTES # BLD AUTO: 0.4 X10(3) UL (ref 0–1)
IMM GRANULOCYTES NFR BLD: 4.4 %
LYMPHOCYTES # BLD AUTO: 1.86 X10(3) UL (ref 1–4)
LYMPHOCYTES NFR BLD AUTO: 20.3 %
MCH RBC QN AUTO: 29.6 PG (ref 26–34)
MCHC RBC AUTO-ENTMCNC: 31.5 G/DL (ref 31–37)
MCV RBC AUTO: 93.9 FL
MONOCYTES # BLD AUTO: 1.02 X10(3) UL (ref 0.1–1)
MONOCYTES NFR BLD AUTO: 11.1 %
NEUTROPHILS # BLD AUTO: 5.37 X10 (3) UL (ref 1.5–7.7)
NEUTROPHILS # BLD AUTO: 5.37 X10(3) UL (ref 1.5–7.7)
NEUTROPHILS NFR BLD AUTO: 58.6 %
OSMOLALITY SERPL CALC.SUM OF ELEC: 295 MOSM/KG (ref 275–295)
PLATELET # BLD AUTO: 237 10(3)UL (ref 150–450)
POTASSIUM SERPL-SCNC: 3.7 MMOL/L (ref 3.5–5.1)
POTASSIUM SERPL-SCNC: 3.7 MMOL/L (ref 3.5–5.1)
RBC # BLD AUTO: 2.97 X10(6)UL
SARS-COV-2 RNA RESP QL NAA+PROBE: NOT DETECTED
SODIUM SERPL-SCNC: 144 MMOL/L (ref 136–145)
WBC # BLD AUTO: 9.2 X10(3) UL (ref 4–11)

## 2022-02-21 PROCEDURE — C9113 INJ PANTOPRAZOLE SODIUM, VIA: HCPCS | Performed by: INTERNAL MEDICINE

## 2022-02-21 PROCEDURE — 84132 ASSAY OF SERUM POTASSIUM: CPT | Performed by: INTERNAL MEDICINE

## 2022-02-21 PROCEDURE — 85025 COMPLETE CBC W/AUTO DIFF WBC: CPT | Performed by: HOSPITALIST

## 2022-02-21 PROCEDURE — 0DJ08ZZ INSPECTION OF UPPER INTESTINAL TRACT, VIA NATURAL OR ARTIFICIAL OPENING ENDOSCOPIC: ICD-10-PCS | Performed by: INTERNAL MEDICINE

## 2022-02-21 PROCEDURE — 80048 BASIC METABOLIC PNL TOTAL CA: CPT | Performed by: HOSPITALIST

## 2022-02-21 PROCEDURE — 36410 VNPNXR 3YR/> PHY/QHP DX/THER: CPT

## 2022-02-21 PROCEDURE — 76937 US GUIDE VASCULAR ACCESS: CPT

## 2022-02-21 RX ORDER — SODIUM CHLORIDE, SODIUM LACTATE, POTASSIUM CHLORIDE, CALCIUM CHLORIDE 600; 310; 30; 20 MG/100ML; MG/100ML; MG/100ML; MG/100ML
INJECTION, SOLUTION INTRAVENOUS CONTINUOUS
Status: DISCONTINUED | OUTPATIENT
Start: 2022-02-21 | End: 2022-02-22

## 2022-02-21 RX ORDER — NALOXONE HYDROCHLORIDE 0.4 MG/ML
80 INJECTION, SOLUTION INTRAMUSCULAR; INTRAVENOUS; SUBCUTANEOUS AS NEEDED
Status: DISCONTINUED | OUTPATIENT
Start: 2022-02-21 | End: 2022-02-21 | Stop reason: HOSPADM

## 2022-02-21 RX ORDER — LIDOCAINE HYDROCHLORIDE 10 MG/ML
INJECTION, SOLUTION EPIDURAL; INFILTRATION; INTRACAUDAL; PERINEURAL AS NEEDED
Status: DISCONTINUED | OUTPATIENT
Start: 2022-02-21 | End: 2022-02-21 | Stop reason: SURG

## 2022-02-21 RX ADMIN — LIDOCAINE HYDROCHLORIDE 120 MG: 10 INJECTION, SOLUTION EPIDURAL; INFILTRATION; INTRACAUDAL; PERINEURAL at 14:23:00

## 2022-02-21 NOTE — PLAN OF CARE
Patient alert and oriented x4, VSS, no signs of distress. Patient reports feeling better s/p blood transfusion. Patient will be NPO for EGD tomorrow. Consent signed. Fall precautions maintained. Call bell within reach. Patient reported abdominal pain and medicated with Narco 5mg.

## 2022-02-21 NOTE — OPERATIVE REPORT
PATIENT NAME: Enrique Negrete  MRN: [id number]  DATE OF OPERATION:  2/21/22  REFERRING PHYSICIAN: Dr. Nicole Carr  Medications:  MAC  PREOPERATIVE DIAGNOSIS    Upper abdominal pain   Rectal bleeding  POSTOPERATIVE DIAGNOSIS:  1. Normal esophagus and gastric remnant. 2. Normal BII anastomosis. 3. Endoclip noted border mucosa between efferent and afferent limbs. No bleeding. 4. Small 1 - 2 mm opening in mucosa adjacent to endoclip. 5. Normal efferent and afferent limbs - both deeply investigated. PROCEDURE PERFORMED:                Esophagogastroduodenoscopy    Endoscopist:  Ernesto Reyes MD     PROCEDURE AND FINDINGS: The patient was placed into the left lateral decubitus after informed consent was obtained. All questions were answered. An updated history and physical were performed and an ASA score of 3 was assigned. Informed consent was obtained prior to the procedure, with review of possible complications including bleeding, infection, and missed cancer. MAC sedation was administered. The Olympus video gastroscope was introduced into the mouth and passed into the esophogus after administration of MAC sedation. The entire examined esophagus was remarkable for normal mucosa. The entire examined stomach,  including retroflexion view was remarkable for a normal 4 - 5 cm gastric remnant. There was a normal BII anastomosis. An endoclip was noted at the border mucosa between efferent and afferent limbs. No bleeding seen. A small 1 - 2 mm opening in mucosa adjacent to endoclip was noted. Normal efferent and afferent limbs - both deeply investigated. The gastroscope was removed from the patient. The procedure was completed. The patient tolerated the procedure well. RECOMMENDATIONS   1. Will plan for small bowel follow through in 1 - 2 days to assess for gastroenteric fistula, vs transition point for partial SBO. If no further GI bleeding noted over the next 1 - 2 days will do SBFT.   If bleeding recurs, will need to to bleeding scan and possible mesenteric angiography vs ct angiogram, both of which would be delayed if SBFT was done. 2. Clear liquid diet. 3. Monitor H and H.  4. No plans for colonoscopy as last exam was 11/2021 - diverticulosis. 5. Will reconsult surgery given recurrent abdominal pain. Vinay Hoyos MD, Gastroenterologist  Cc: Dr. Jolene Anderson.

## 2022-02-21 NOTE — PLAN OF CARE
Pt received A&Ox4. Pt's  at the bedside. Afebrile. VSS. 1 norco given per [facility] for abd pain. Pt NPO for EGD this afternoon. Ex. PIV placed per vascular access. IVF infusing per orders. Pt up to bathroom this AM w/ bloody stool. Stool dark w/ bright red blood noted. Dr. Tyler Baeza and GI aware. Hgb 8.8 today. Pt states she does feel dizzy/lightheaded at times. Fall precautions in place. Call light in reach. Will continue to monitor. 1550 - Pt received from Endo. Started on CLD. Gen surg re-consulted and at the bedside. Orders placed for pt to be NPO @ midnight per gen surg.

## 2022-02-21 NOTE — PLAN OF CARE
Patient alert and oriented x4. Vital signs stable. Afebrile. Room air with O2 sats 94-96%. Mild SOB on exertion noted. Tele SNR, No chest pain. Complained of pain, PRN Norco given with good relief. No nausea and  vomiting. Patient scheduled for EGD in the morning, NPO as ordered. Consent in chart. Dark stool noted from previous shift, no bowel movement observed yet at this time. Hgb monitored. Ambulates the bathroom with assist. Fall precautions in place. Call light in reach. Will continue to monitor.          Problem: PAIN - ADULT  Goal: Verbalizes/displays adequate comfort level or patient's stated pain goal  Description: INTERVENTIONS:  - Encourage pt to monitor pain and request assistance  - Assess pain using appropriate pain scale  - Administer analgesics based on type and severity of pain and evaluate response  - Implement non-pharmacological measures as appropriate and evaluate response  - Consider cultural and social influences on pain and pain management  - Manage/alleviate anxiety  - Utilize distraction and/or relaxation techniques  - Monitor for opioid side effects  - Notify MD/LIP if interventions unsuccessful or patient reports new pain  - Anticipate increased pain with activity and pre-medicate as appropriate  Outcome: Progressing     Problem: GASTROINTESTINAL - ADULT  Goal: Minimal or absence of nausea and vomiting  Description: INTERVENTIONS:  - Maintain adequate hydration with IV or PO as ordered and tolerated  - Nasogastric tube to low intermittent suction as ordered  - Evaluate effectiveness of ordered antiemetic medications  - Provide nonpharmacologic comfort measures as appropriate  - Advance diet as tolerated, if ordered  - Obtain nutritional consult as needed  - Evaluate fluid balance  Outcome: Progressing     Problem: METABOLIC/FLUID AND ELECTROLYTES - ADULT  Goal: Electrolytes maintained within normal limits  Description: INTERVENTIONS:  - Monitor labs and rhythm and assess patient for signs and symptoms of electrolyte imbalances  - Administer electrolyte replacement as ordered  - Monitor response to electrolyte replacements, including rhythm and repeat lab results as appropriate  - Fluid restriction as ordered  - Instruct patient on fluid and nutrition restrictions as appropriate  Outcome: Progressing

## 2022-02-21 NOTE — PRE-SEDATION ASSESSMENT
Physician Pre-Sedation Assessment    Pre-Sedation Assessment:    Sedation History: Airway Assessed    Cardiac: normal S1, S2  Respiratory: breath sounds clear bilaterally   Abdomen: soft, BS (+), non-tender    ASA Classification: 3.  Patient with severe systemic disease    Plan: IV Sedation

## 2022-02-21 NOTE — ANESTHESIA POSTPROCEDURE EVALUATION
[address] Patient Status:  Inpatient   Age/Gender 59year old female MRN [id number]   Location [address] [address] Attending Lizbeth Hawk MD   [facility] Day # 7 PCP Manuel Nathan MD       Anesthesia Post-op Note    ESOPHAGOGASTRODUODENOSCOPY (EGD)    Procedure Summary     Date: 02/21/22 Room / Location: [facility] 03 / [facility]    Anesthesia Start: 7545 Anesthesia Stop: 1440    Procedure: ESOPHAGOGASTRODUODENOSCOPY (EGD) (N/A ) Diagnosis:       GI bleed      (post operative changes)    Surgeons: Nhung Sahu MD Anesthesiologist: Dylon Lanier MD    Anesthesia Type: MAC ASA Status: 3          Anesthesia Type: MAC    Vitals Value Taken Time   /78 02/21/22 1441   Temp    02/21/22 1441   Pulse 82 02/21/22 1441   Resp 16 02/21/22 1441   SpO2 98 02/21/22 1441       Patient Location: Endoscopy    Anesthesia Type: MAC    Airway Patency: patent    Postop Pain Control: adequate    Mental Status: preanesthetic baseline    Nausea/Vomiting: none    Cardiopulmonary/Hydration status: stable euvolemic    Complications: no apparent anesthesia related complications    Postop vital signs: stable    Dental Exam: Unchanged from Preop

## 2022-02-21 NOTE — BRIEF OP NOTE
Pre-Operative Diagnosis: GI bleed [K92.2]     Post-Operative Diagnosis: post operative changes      Procedure Performed:   ESOPHAGOGASTRODUODENOSCOPY (EGD)    Surgeon(s) and Role:     * Javier Teague MD - Primary    Assistant(s):        Surgical Findings: see above     Specimen: none     Estimated Blood Loss: No data recorded    Dictation Number:  none    Lynda Jimenez MD  2/21/2022  2:43 PM

## 2022-02-22 ENCOUNTER — ANESTHESIA (OUTPATIENT)
Dept: SURGERY | Facility: HOSPITAL | Age: 62
DRG: 356 | End: 2022-02-22
Payer: MEDICARE

## 2022-02-22 ENCOUNTER — APPOINTMENT (OUTPATIENT)
Dept: GENERAL RADIOLOGY | Facility: HOSPITAL | Age: 62
DRG: 356 | End: 2022-02-22
Attending: HOSPITALIST
Payer: MEDICARE

## 2022-02-22 ENCOUNTER — APPOINTMENT (OUTPATIENT)
Dept: CT IMAGING | Facility: HOSPITAL | Age: 62
DRG: 356 | End: 2022-02-22
Attending: PHYSICIAN ASSISTANT
Payer: MEDICARE

## 2022-02-22 ENCOUNTER — ANESTHESIA EVENT (OUTPATIENT)
Dept: SURGERY | Facility: HOSPITAL | Age: 62
DRG: 356 | End: 2022-02-22
Payer: MEDICARE

## 2022-02-22 LAB
BASOPHILS # BLD AUTO: 0.04 X10(3) UL (ref 0–0.2)
BASOPHILS NFR BLD AUTO: 0.5 %
EOSINOPHIL # BLD AUTO: 0.4 X10(3) UL (ref 0–0.7)
EOSINOPHIL NFR BLD AUTO: 4.6 %
ERYTHROCYTE [DISTWIDTH] IN BLOOD BY AUTOMATED COUNT: 15 %
GLUCOSE BLD-MCNC: 144 MG/DL (ref 70–99)
HCT VFR BLD AUTO: 26.2 %
HGB BLD-MCNC: 8.2 G/DL
IMM GRANULOCYTES # BLD AUTO: 0.41 X10(3) UL (ref 0–1)
IMM GRANULOCYTES NFR BLD: 4.8 %
LYMPHOCYTES # BLD AUTO: 2.2 X10(3) UL (ref 1–4)
LYMPHOCYTES NFR BLD AUTO: 25.5 %
MCH RBC QN AUTO: 28.9 PG (ref 26–34)
MCHC RBC AUTO-ENTMCNC: 31.3 G/DL (ref 31–37)
MCV RBC AUTO: 92.3 FL
MONOCYTES # BLD AUTO: 1.11 X10(3) UL (ref 0.1–1)
MONOCYTES NFR BLD AUTO: 12.9 %
NEUTROPHILS # BLD AUTO: 4.47 X10 (3) UL (ref 1.5–7.7)
NEUTROPHILS # BLD AUTO: 4.47 X10(3) UL (ref 1.5–7.7)
NEUTROPHILS NFR BLD AUTO: 51.7 %
PLATELET # BLD AUTO: 319 10(3)UL (ref 150–450)
RBC # BLD AUTO: 2.84 X10(6)UL
WBC # BLD AUTO: 8.6 X10(3) UL (ref 4–11)

## 2022-02-22 PROCEDURE — 87070 CULTURE OTHR SPECIMN AEROBIC: CPT

## 2022-02-22 PROCEDURE — 82962 GLUCOSE BLOOD TEST: CPT

## 2022-02-22 PROCEDURE — 85025 COMPLETE CBC W/AUTO DIFF WBC: CPT | Performed by: INTERNAL MEDICINE

## 2022-02-22 PROCEDURE — 71045 X-RAY EXAM CHEST 1 VIEW: CPT | Performed by: HOSPITALIST

## 2022-02-22 PROCEDURE — 87205 SMEAR GRAM STAIN: CPT

## 2022-02-22 PROCEDURE — 87070 CULTURE OTHR SPECIMN AEROBIC: CPT | Performed by: SURGERY

## 2022-02-22 PROCEDURE — 87186 SC STD MICRODIL/AGAR DIL: CPT

## 2022-02-22 PROCEDURE — 87077 CULTURE AEROBIC IDENTIFY: CPT

## 2022-02-22 PROCEDURE — 0W9G0ZZ DRAINAGE OF PERITONEAL CAVITY, OPEN APPROACH: ICD-10-PCS | Performed by: SURGERY

## 2022-02-22 PROCEDURE — 3E0T3BZ INTRODUCTION OF ANESTHETIC AGENT INTO PERIPHERAL NERVES AND PLEXI, PERCUTANEOUS APPROACH: ICD-10-PCS | Performed by: SURGERY

## 2022-02-22 PROCEDURE — 87076 CULTURE ANAEROBE IDENT EACH: CPT

## 2022-02-22 PROCEDURE — C9113 INJ PANTOPRAZOLE SODIUM, VIA: HCPCS | Performed by: SURGERY

## 2022-02-22 PROCEDURE — 87075 CULTR BACTERIA EXCEPT BLOOD: CPT

## 2022-02-22 PROCEDURE — C9113 INJ PANTOPRAZOLE SODIUM, VIA: HCPCS | Performed by: INTERNAL MEDICINE

## 2022-02-22 PROCEDURE — S0028 INJECTION, FAMOTIDINE, 20 MG: HCPCS | Performed by: SURGERY

## 2022-02-22 PROCEDURE — 74177 CT ABD & PELVIS W/CONTRAST: CPT | Performed by: PHYSICIAN ASSISTANT

## 2022-02-22 PROCEDURE — 0JNC0ZZ RELEASE PELVIC REGION SUBCUTANEOUS TISSUE AND FASCIA, OPEN APPROACH: ICD-10-PCS | Performed by: SURGERY

## 2022-02-22 RX ORDER — HYDROMORPHONE HYDROCHLORIDE 1 MG/ML
0.4 INJECTION, SOLUTION INTRAMUSCULAR; INTRAVENOUS; SUBCUTANEOUS EVERY 5 MIN PRN
Status: DISCONTINUED | OUTPATIENT
Start: 2022-02-22 | End: 2022-02-22 | Stop reason: HOSPADM

## 2022-02-22 RX ORDER — ENOXAPARIN SODIUM 100 MG/ML
40 INJECTION SUBCUTANEOUS NIGHTLY
Status: DISCONTINUED | OUTPATIENT
Start: 2022-02-23 | End: 2022-02-24

## 2022-02-22 RX ORDER — NALOXONE HYDROCHLORIDE 0.4 MG/ML
0.08 INJECTION, SOLUTION INTRAMUSCULAR; INTRAVENOUS; SUBCUTANEOUS
Status: DISCONTINUED | OUTPATIENT
Start: 2022-02-22 | End: 2022-03-01

## 2022-02-22 RX ORDER — DIPHENHYDRAMINE HYDROCHLORIDE 50 MG/ML
12.5 INJECTION INTRAMUSCULAR; INTRAVENOUS AS NEEDED
Status: DISCONTINUED | OUTPATIENT
Start: 2022-02-22 | End: 2022-02-22 | Stop reason: HOSPADM

## 2022-02-22 RX ORDER — NICOTINE POLACRILEX 4 MG
15 LOZENGE BUCCAL
Status: DISCONTINUED | OUTPATIENT
Start: 2022-02-22 | End: 2022-02-22 | Stop reason: HOSPADM

## 2022-02-22 RX ORDER — LIDOCAINE HYDROCHLORIDE 10 MG/ML
INJECTION, SOLUTION INFILTRATION; PERINEURAL AS NEEDED
Status: DISCONTINUED | OUTPATIENT
Start: 2022-02-22 | End: 2022-02-22 | Stop reason: HOSPADM

## 2022-02-22 RX ORDER — DEXAMETHASONE SODIUM PHOSPHATE 4 MG/ML
VIAL (ML) INJECTION AS NEEDED
Status: DISCONTINUED | OUTPATIENT
Start: 2022-02-22 | End: 2022-02-22 | Stop reason: SURG

## 2022-02-22 RX ORDER — ONDANSETRON 2 MG/ML
4 INJECTION INTRAMUSCULAR; INTRAVENOUS EVERY 6 HOURS PRN
Status: DISCONTINUED | OUTPATIENT
Start: 2022-02-22 | End: 2022-03-01

## 2022-02-22 RX ORDER — DEXTROSE MONOHYDRATE 25 G/50ML
50 INJECTION, SOLUTION INTRAVENOUS
Status: DISCONTINUED | OUTPATIENT
Start: 2022-02-22 | End: 2022-02-22 | Stop reason: HOSPADM

## 2022-02-22 RX ORDER — LIDOCAINE HYDROCHLORIDE 10 MG/ML
INJECTION, SOLUTION EPIDURAL; INFILTRATION; INTRACAUDAL; PERINEURAL AS NEEDED
Status: DISCONTINUED | OUTPATIENT
Start: 2022-02-22 | End: 2022-02-22 | Stop reason: SURG

## 2022-02-22 RX ORDER — METOCLOPRAMIDE HYDROCHLORIDE 5 MG/ML
INJECTION INTRAMUSCULAR; INTRAVENOUS
Status: COMPLETED
Start: 2022-02-22 | End: 2022-02-22

## 2022-02-22 RX ORDER — ONDANSETRON 2 MG/ML
4 INJECTION INTRAMUSCULAR; INTRAVENOUS AS NEEDED
Status: DISCONTINUED | OUTPATIENT
Start: 2022-02-22 | End: 2022-02-22 | Stop reason: HOSPADM

## 2022-02-22 RX ORDER — HYDROCODONE BITARTRATE AND ACETAMINOPHEN 5; 325 MG/1; MG/1
1 TABLET ORAL EVERY 4 HOURS PRN
Status: DISCONTINUED | OUTPATIENT
Start: 2022-02-22 | End: 2022-03-01

## 2022-02-22 RX ORDER — CEFOXITIN 2 G/1
INJECTION, POWDER, FOR SOLUTION INTRAVENOUS AS NEEDED
Status: DISCONTINUED | OUTPATIENT
Start: 2022-02-22 | End: 2022-02-22 | Stop reason: SURG

## 2022-02-22 RX ORDER — MIDAZOLAM HYDROCHLORIDE 1 MG/ML
1 INJECTION INTRAMUSCULAR; INTRAVENOUS EVERY 5 MIN PRN
Status: DISCONTINUED | OUTPATIENT
Start: 2022-02-22 | End: 2022-02-22 | Stop reason: HOSPADM

## 2022-02-22 RX ORDER — BUPIVACAINE HYDROCHLORIDE 5 MG/ML
INJECTION, SOLUTION EPIDURAL; INTRACAUDAL AS NEEDED
Status: DISCONTINUED | OUTPATIENT
Start: 2022-02-22 | End: 2022-02-22 | Stop reason: HOSPADM

## 2022-02-22 RX ORDER — KETOROLAC TROMETHAMINE 30 MG/ML
30 INJECTION, SOLUTION INTRAMUSCULAR; INTRAVENOUS EVERY 6 HOURS PRN
Status: ACTIVE | OUTPATIENT
Start: 2022-02-22 | End: 2022-02-24

## 2022-02-22 RX ORDER — ROCURONIUM BROMIDE 10 MG/ML
INJECTION, SOLUTION INTRAVENOUS AS NEEDED
Status: DISCONTINUED | OUTPATIENT
Start: 2022-02-22 | End: 2022-02-22 | Stop reason: SURG

## 2022-02-22 RX ORDER — PHENYLEPHRINE HCL 10 MG/ML
VIAL (ML) INJECTION AS NEEDED
Status: DISCONTINUED | OUTPATIENT
Start: 2022-02-22 | End: 2022-02-22 | Stop reason: SURG

## 2022-02-22 RX ORDER — NICOTINE POLACRILEX 4 MG
30 LOZENGE BUCCAL
Status: DISCONTINUED | OUTPATIENT
Start: 2022-02-22 | End: 2022-02-22 | Stop reason: HOSPADM

## 2022-02-22 RX ORDER — HYDROCODONE BITARTRATE AND ACETAMINOPHEN 10; 325 MG/1; MG/1
1 TABLET ORAL AS NEEDED
Status: DISCONTINUED | OUTPATIENT
Start: 2022-02-22 | End: 2022-02-22 | Stop reason: HOSPADM

## 2022-02-22 RX ORDER — SODIUM CHLORIDE, SODIUM LACTATE, POTASSIUM CHLORIDE, CALCIUM CHLORIDE 600; 310; 30; 20 MG/100ML; MG/100ML; MG/100ML; MG/100ML
INJECTION, SOLUTION INTRAVENOUS CONTINUOUS
Status: DISCONTINUED | OUTPATIENT
Start: 2022-02-22 | End: 2022-02-22 | Stop reason: HOSPADM

## 2022-02-22 RX ORDER — FLUCONAZOLE 2 MG/ML
200 INJECTION, SOLUTION INTRAVENOUS EVERY 24 HOURS
Status: DISCONTINUED | OUTPATIENT
Start: 2022-02-22 | End: 2022-02-26

## 2022-02-22 RX ORDER — HYDROCODONE BITARTRATE AND ACETAMINOPHEN 10; 325 MG/1; MG/1
2 TABLET ORAL AS NEEDED
Status: DISCONTINUED | OUTPATIENT
Start: 2022-02-22 | End: 2022-02-22 | Stop reason: HOSPADM

## 2022-02-22 RX ORDER — IOHEXOL 350 MG/ML
100 INJECTION, SOLUTION INTRAVENOUS
Status: COMPLETED | OUTPATIENT
Start: 2022-02-22 | End: 2022-02-22

## 2022-02-22 RX ORDER — NALOXONE HYDROCHLORIDE 0.4 MG/ML
80 INJECTION, SOLUTION INTRAMUSCULAR; INTRAVENOUS; SUBCUTANEOUS AS NEEDED
Status: DISCONTINUED | OUTPATIENT
Start: 2022-02-22 | End: 2022-02-22 | Stop reason: HOSPADM

## 2022-02-22 RX ORDER — DIPHENHYDRAMINE HYDROCHLORIDE 50 MG/ML
12.5 INJECTION INTRAMUSCULAR; INTRAVENOUS EVERY 4 HOURS PRN
Status: DISCONTINUED | OUTPATIENT
Start: 2022-02-22 | End: 2022-03-01

## 2022-02-22 RX ORDER — FAMOTIDINE 10 MG/ML
20 INJECTION, SOLUTION INTRAVENOUS 2 TIMES DAILY
Status: DISCONTINUED | OUTPATIENT
Start: 2022-02-22 | End: 2022-02-23

## 2022-02-22 RX ORDER — MEPERIDINE HYDROCHLORIDE 25 MG/ML
12.5 INJECTION INTRAMUSCULAR; INTRAVENOUS; SUBCUTANEOUS AS NEEDED
Status: DISCONTINUED | OUTPATIENT
Start: 2022-02-22 | End: 2022-02-22 | Stop reason: HOSPADM

## 2022-02-22 RX ORDER — ONDANSETRON 2 MG/ML
4 INJECTION INTRAMUSCULAR; INTRAVENOUS EVERY 6 HOURS PRN
Status: DISCONTINUED | OUTPATIENT
Start: 2022-02-22 | End: 2022-02-22

## 2022-02-22 RX ORDER — SODIUM CHLORIDE 9 MG/ML
INJECTION, SOLUTION INTRAVENOUS CONTINUOUS
Status: DISCONTINUED | OUTPATIENT
Start: 2022-02-22 | End: 2022-03-01

## 2022-02-22 RX ORDER — LABETALOL HYDROCHLORIDE 5 MG/ML
INJECTION, SOLUTION INTRAVENOUS AS NEEDED
Status: DISCONTINUED | OUTPATIENT
Start: 2022-02-22 | End: 2022-02-22 | Stop reason: SURG

## 2022-02-22 RX ORDER — FAMOTIDINE 20 MG/1
20 TABLET, FILM COATED ORAL 2 TIMES DAILY
Status: DISCONTINUED | OUTPATIENT
Start: 2022-02-22 | End: 2022-02-23

## 2022-02-22 RX ORDER — HYDROCODONE BITARTRATE AND ACETAMINOPHEN 5; 325 MG/1; MG/1
2 TABLET ORAL EVERY 4 HOURS PRN
Status: DISCONTINUED | OUTPATIENT
Start: 2022-02-22 | End: 2022-03-01

## 2022-02-22 RX ORDER — PROCHLORPERAZINE EDISYLATE 5 MG/ML
5 INJECTION INTRAMUSCULAR; INTRAVENOUS EVERY 8 HOURS PRN
Status: DISCONTINUED | OUTPATIENT
Start: 2022-02-22 | End: 2022-03-01

## 2022-02-22 RX ORDER — METOCLOPRAMIDE HYDROCHLORIDE 5 MG/ML
10 INJECTION INTRAMUSCULAR; INTRAVENOUS AS NEEDED
Status: DISCONTINUED | OUTPATIENT
Start: 2022-02-22 | End: 2022-02-22 | Stop reason: HOSPADM

## 2022-02-22 RX ORDER — SODIUM CHLORIDE, SODIUM LACTATE, POTASSIUM CHLORIDE, CALCIUM CHLORIDE 600; 310; 30; 20 MG/100ML; MG/100ML; MG/100ML; MG/100ML
INJECTION, SOLUTION INTRAVENOUS CONTINUOUS
Status: DISCONTINUED | OUTPATIENT
Start: 2022-02-22 | End: 2022-03-01

## 2022-02-22 RX ADMIN — PHENYLEPHRINE HCL 100 MCG: 10 MG/ML VIAL (ML) INJECTION at 16:35:00

## 2022-02-22 RX ADMIN — ROCURONIUM BROMIDE 20 MG: 10 INJECTION, SOLUTION INTRAVENOUS at 16:20:00

## 2022-02-22 RX ADMIN — ROCURONIUM BROMIDE 50 MG: 10 INJECTION, SOLUTION INTRAVENOUS at 16:37:00

## 2022-02-22 RX ADMIN — ROCURONIUM BROMIDE 10 MG: 10 INJECTION, SOLUTION INTRAVENOUS at 17:14:00

## 2022-02-22 RX ADMIN — ROCURONIUM BROMIDE 10 MG: 10 INJECTION, SOLUTION INTRAVENOUS at 17:05:00

## 2022-02-22 RX ADMIN — LIDOCAINE HYDROCHLORIDE 50 MG: 10 INJECTION, SOLUTION EPIDURAL; INFILTRATION; INTRACAUDAL; PERINEURAL at 16:07:00

## 2022-02-22 RX ADMIN — LABETALOL HYDROCHLORIDE 10 MG: 5 INJECTION, SOLUTION INTRAVENOUS at 17:42:00

## 2022-02-22 RX ADMIN — CEFOXITIN 2 G: 2 INJECTION, POWDER, FOR SOLUTION INTRAVENOUS at 16:14:00

## 2022-02-22 RX ADMIN — ONDANSETRON 4 MG: 2 INJECTION INTRAMUSCULAR; INTRAVENOUS at 17:24:00

## 2022-02-22 RX ADMIN — LABETALOL HYDROCHLORIDE 10 MG: 5 INJECTION, SOLUTION INTRAVENOUS at 17:38:00

## 2022-02-22 RX ADMIN — DEXAMETHASONE SODIUM PHOSPHATE 4 MG: 4 MG/ML VIAL (ML) INJECTION at 17:21:00

## 2022-02-22 NOTE — PLAN OF CARE
Received pt alert and orientated x4. VSS. Afebrile. C/o of abdominal pain. MD aware. Pt is up and ambulating to the bathroom. BM is brown. IVF running. All IV meds given per MAR. Pt has remained NPO. CT done. Perforated bowel. MD aware. Pt going to surgery.  updated and at the bedside. Call light within reach. 1830: Per charge RN pt will not be returning to the floor.      Problem: GASTROINTESTINAL - ADULT  Goal: Minimal or absence of nausea and vomiting  Description: INTERVENTIONS:  - Maintain adequate hydration with IV or PO as ordered and tolerated  - Nasogastric tube to low intermittent suction as ordered  - Evaluate effectiveness of ordered antiemetic medications  - Provide nonpharmacologic comfort measures as appropriate  - Advance diet as tolerated, if ordered  - Obtain nutritional consult as needed  - Evaluate fluid balance  Outcome: Progressing

## 2022-02-22 NOTE — PROGRESS NOTES
Reviewed CT scan with Dr Anson Fallon as well as Dr Feldman Rounds  - findings consistent with perforation as extraluminal air with phlegmon present    Discussed results with patient  Will proceed with surgical management today  Reviewed risks of surgery with patient and family at bedside including bleeding, infection, injury to surrounding tissue.    All questions answered  MARY KAY Blair  and Care  Dept of General [facility]

## 2022-02-22 NOTE — PLAN OF CARE
Patient alert and oriented x4. VSS. Afebrile. PRN pain medication administered per MAR. Medications administered per MAR. Patient NPO after midnight for AM procedure. Safety precautions continued. Call light within reach. Will continue to monitor. Problem: SAFETY ADULT - FALL  Goal: Free from fall injury  Description: INTERVENTIONS:  - Assess pt frequently for physical needs  - Identify cognitive and physical deficits and behaviors that affect risk of falls.   - [place] fall precautions as indicated by assessment.  - Educate pt/family on patient safety including physical limitations  - Instruct pt to call for assistance with activity based on assessment  - Modify environment to reduce risk of injury  - Provide assistive devices as appropriate  - Consider OT/PT consult to assist with strengthening/mobility  - Encourage toileting schedule  Outcome: Progressing     Problem: GASTROINTESTINAL - ADULT  Goal: Minimal or absence of nausea and vomiting  Description: INTERVENTIONS:  - Maintain adequate hydration with IV or PO as ordered and tolerated  - Nasogastric tube to low intermittent suction as ordered  - Evaluate effectiveness of ordered antiemetic medications  - Provide nonpharmacologic comfort measures as appropriate  - Advance diet as tolerated, if ordered  - Obtain nutritional consult as needed  - Evaluate fluid balance  Outcome: Progressing     Problem: METABOLIC/FLUID AND ELECTROLYTES - ADULT  Goal: Electrolytes maintained within normal limits  Description: INTERVENTIONS:  - Monitor labs and rhythm and assess patient for signs and symptoms of electrolyte imbalances  - Administer electrolyte replacement as ordered  - Monitor response to electrolyte replacements, including rhythm and repeat lab results as appropriate  - Fluid restriction as ordered  - Instruct patient on fluid and nutrition restrictions as appropriate  Outcome: Progressing     Problem: PAIN - ADULT  Goal: Verbalizes/displays adequate comfort level or patient's stated pain goal  Description: INTERVENTIONS:  - Encourage pt to monitor pain and request assistance  - Assess pain using appropriate pain scale  - Administer analgesics based on type and severity of pain and evaluate response  - Implement non-pharmacological measures as appropriate and evaluate response  - Consider cultural and social influences on pain and pain management  - Manage/alleviate anxiety  - Utilize distraction and/or relaxation techniques  - Monitor for opioid side effects  - Notify MD/LIP if interventions unsuccessful or patient reports new pain  - Anticipate increased pain with activity and pre-medicate as appropriate  Outcome: Not Progressing

## 2022-02-22 NOTE — OPERATIVE REPORT
[address]                                                         Operative Note    Jessica Boss Location: [facility]   CSN [id number] MRN [id number]   Admission Date 2/14/2022 Procedure Date 2/22/2022   Attending Physician Ashely Rebollar MD Procedure Physician Jocelin Denise MD     Pre-Operative Diagnosis: Free intraperitoneal air [K66.8]     Post-Operative Diagnosis: Free intraperitoneal air [K66.8]    Procedure Performed: EXPLORATORY LAPAROTOMY, LYSIS OF ADHESIONS, DRAINAGE ON INTRA ABDOMINAL PHLEGMON and abscess    Surgeon: Jocelin Denise MD     Assistant(s):  Surgical Assistant.: Felicita Snow       Anesthesia: General       Complications: none       Estimated Blood Loss: Blood Output: 20 mL (2/22/2022  5:36 PM)              Operative Summary: Patient was taken the operating placed in a supine position. She was prepped and draped in usual fashion placed under general anesthesia. 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 Midline incision was reopened with a scalpel and dissection was carried down to the fascia. Fascia was incised and the abdomen entered. There were moderate adhesions present between the omentum and the small bowel to the anterior abdominal wall. Approximately 40 minutes was spent lysing these adhesions gaining access to the intra-abdominal cavity. Upon dissection of the left upper quadrant there was a thickened capsular region which was gently dissected away from the peritoneum. Upon entry to this organized rind I entered a phlegmonous cavity filled with pus and debris. Capsule was thickened as well as the reaction around this process which was at the gastrojejunostomy of her previous surgery. Cultures were obtained. This area was irrigated after the phlegmonous capsule was filleted open.   This allowed access to the gastric pouch as well as the jejunum taking off this pouch. There was no obvious active leaking present nor clear violation of the anastomosis. We carefully fed down the NG tube to place the tip in the gastric pouch. Approximately 150 cc of air was intermittently flushed down the NG tube into the pouch under water with clear visualization of distention of the jejunum without air bubbles or evidence of active leak. The tissues in this region were very thickened due to inflammation suggesting the area of perforation sealed. Multiple bouts of air were introduced to the NG tube without noted leak. At this time the left upper quadrant and abdominal cavity was irrigated again copiously with combination of saline solution and irrisept. [address] drains were placed in this region 1 anterior and one posterior to this anastomotic line. These were exited out of the right and left upper quadrant. A bilateral transabdominal plane block was performed by infusing 20 cc of a mixture of half percent Marcaine 1% Xylocaine in the transabdominal plane bilaterally. At this time lap counts and needle counts were correct. The fascia was reapproximated with a #1 looped PDS suture followed by closure of the remainder of the wound with absorbable suture and then staples at the skin. Drains were secured. Dressings were placed.   The patient was awoken taken recovery in satisfactory but guarded condition        Vallorie Holter, MD  2/22/2022  5:41 PM

## 2022-02-22 NOTE — ANESTHESIA PROCEDURE NOTES
Airway  Date/Time: 2/22/2022 4:03 PM  Urgency: elective    Airway not difficult    General Information and Staff    Patient location during procedure: OR  Anesthesiologist: Reanna Beck MD  Performed: anesthesiologist     Indications and Patient Condition  Indications for airway management: anesthesia  Sedation level: deep  Preoxygenated: yes  Patient position: sniffing  Mask difficulty assessment: 1 - vent by mask  Planned trial extubation    Final Airway Details  Final airway type: endotracheal airway      Successful airway: ETT  Cuffed: yes   Successful intubation technique: Video laryngoscopy  Endotracheal tube insertion site: oral  Blade: GlideScope  Blade size: #4  ETT size (mm): 7.0    Cormack-Lehane Classification: grade IIA - partial view of glottis  Placement verified by: chest auscultation and capnometry   Measured from: lips  Number of attempts at approach: 1

## 2022-02-23 LAB
ALBUMIN SERPL-MCNC: 2.1 G/DL (ref 3.4–5)
ALP LIVER SERPL-CCNC: 168 U/L
ALT SERPL-CCNC: 62 U/L
AST SERPL-CCNC: 80 U/L (ref 15–37)
BASOPHILS # BLD: 0 X10(3) UL (ref 0–0.2)
BASOPHILS NFR BLD: 0 %
BILIRUB DIRECT SERPL-MCNC: 0.2 MG/DL (ref 0–0.2)
BILIRUB SERPL-MCNC: 0.4 MG/DL (ref 0.1–2)
EOSINOPHIL # BLD: 0 X10(3) UL (ref 0–0.7)
EOSINOPHIL NFR BLD: 0 %
ERYTHROCYTE [DISTWIDTH] IN BLOOD BY AUTOMATED COUNT: 15.4 %
GLUCOSE BLD-MCNC: 127 MG/DL (ref 70–99)
GLUCOSE BLD-MCNC: 136 MG/DL (ref 70–99)
GLUCOSE BLD-MCNC: 139 MG/DL (ref 70–99)
GLUCOSE BLD-MCNC: 149 MG/DL (ref 70–99)
HCT VFR BLD AUTO: 27.3 %
HGB BLD-MCNC: 8.4 G/DL
LYMPHOCYTES NFR BLD: 1.02 X10(3) UL (ref 1–4)
LYMPHOCYTES NFR BLD: 5 %
MCH RBC QN AUTO: 28.8 PG (ref 26–34)
MCHC RBC AUTO-ENTMCNC: 30.8 G/DL (ref 31–37)
MONOCYTES # BLD: 1.02 X10(3) UL (ref 0.1–1)
MONOCYTES NFR BLD: 5 %
MORPHOLOGY: NORMAL
NEUTROPHILS # BLD AUTO: 16.35 X10 (3) UL (ref 1.5–7.7)
NEUTROPHILS NFR BLD: 89 %
NEUTS BAND NFR BLD: 1 %
NEUTS HYPERSEG # BLD: 18.27 X10(3) UL (ref 1.5–7.7)
NRBC BLD MANUAL-RTO: 2 %
PLATELET # BLD AUTO: 375 10(3)UL (ref 150–450)
PLATELET MORPHOLOGY: NORMAL
PROT SERPL-MCNC: 5.4 G/DL (ref 6.4–8.2)
RBC # BLD AUTO: 2.92 X10(6)UL
TOTAL CELLS COUNTED BLD: 100
WBC # BLD AUTO: 20.3 X10(3) UL (ref 4–11)

## 2022-02-23 PROCEDURE — 80076 HEPATIC FUNCTION PANEL: CPT | Performed by: SURGERY

## 2022-02-23 PROCEDURE — 85025 COMPLETE CBC W/AUTO DIFF WBC: CPT | Performed by: SURGERY

## 2022-02-23 PROCEDURE — 85007 BL SMEAR W/DIFF WBC COUNT: CPT | Performed by: SURGERY

## 2022-02-23 PROCEDURE — 85027 COMPLETE CBC AUTOMATED: CPT | Performed by: SURGERY

## 2022-02-23 PROCEDURE — C9113 INJ PANTOPRAZOLE SODIUM, VIA: HCPCS | Performed by: INTERNAL MEDICINE

## 2022-02-23 PROCEDURE — 82962 GLUCOSE BLOOD TEST: CPT

## 2022-02-23 NOTE — PLAN OF CARE
A&Ox4. VSS. 4L NC. . Lethargic  Telemetry: NSR  Resp: Clear bilaterally  GI: Abdomen soft, nondistended. Hypoactive bowel sounds, not passing gas. : Gonsalves in place  Pain controlled with PCA pain medications  Up with standby assist.  Drains: Gonsalves catheter clean dry and intact with securing device. No loops or kinks noted. Urine flowing freely. NGT secured at 57cm. Suction to low intermittent. Red output. Left nare clean dry and intact. SERJIO x 1 on the left, SERJIO x1 on the right. AL sites with gauze and tape, but Jps to bulb suction with no drainage noted on dressings. Incisions: Midline incision with Aquacel. Small drainage, but clean dry and intact. Diet: NPO  Denies nausea. IVF running per order. All appropriate safety measures in place. All questions and concerns addressed. Will continue to monitor. Seizure precautions in place. Problem: PAIN - ADULT  Goal: Verbalizes/displays adequate comfort level or patient's stated pain goal  Description: INTERVENTIONS:  - Encourage pt to monitor pain and request assistance  - Assess pain using appropriate pain scale  - Administer analgesics based on type and severity of pain and evaluate response  - Implement non-pharmacological measures as appropriate and evaluate response  - Consider cultural and social influences on pain and pain management  - Manage/alleviate anxiety  - Utilize distraction and/or relaxation techniques  - Monitor for opioid side effects  - Notify MD/LIP if interventions unsuccessful or patient reports new pain  - Anticipate increased pain with activity and pre-medicate as appropriate  Outcome: Progressing     Problem: SAFETY ADULT - FALL  Goal: Free from fall injury  Description: INTERVENTIONS:  - Assess pt frequently for physical needs  - Identify cognitive and physical deficits and behaviors that affect risk of falls.   - [place] fall precautions as indicated by assessment.  - Educate pt/family on patient safety including physical limitations  - Instruct pt to call for assistance with activity based on assessment  - Modify environment to reduce risk of injury  - Provide assistive devices as appropriate  - Consider OT/PT consult to assist with strengthening/mobility  - Encourage toileting schedule  Outcome: Progressing     Problem: GASTROINTESTINAL - ADULT  Goal: Minimal or absence of nausea and vomiting  Description: INTERVENTIONS:  - Maintain adequate hydration with IV or PO as ordered and tolerated  - Nasogastric tube to low intermittent suction as ordered  - Evaluate effectiveness of ordered antiemetic medications  - Provide nonpharmacologic comfort measures as appropriate  - Advance diet as tolerated, if ordered  - Obtain nutritional consult as needed  - Evaluate fluid balance  Outcome: Progressing     Problem: METABOLIC/FLUID AND ELECTROLYTES - ADULT  Goal: Electrolytes maintained within normal limits  Description: INTERVENTIONS:  - Monitor labs and rhythm and assess patient for signs and symptoms of electrolyte imbalances  - Administer electrolyte replacement as ordered  - Monitor response to electrolyte replacements, including rhythm and repeat lab results as appropriate  - Fluid restriction as ordered  - Instruct patient on fluid and nutrition restrictions as appropriate  Outcome: Progressing     Problem: Diabetes/Glucose Control  Goal: Glucose maintained within prescribed range  Description: INTERVENTIONS:  - Monitor Blood Glucose as ordered  - Assess for signs and symptoms of hyperglycemia and hypoglycemia  - Administer ordered medications to maintain glucose within target range  - Assess barriers to adequate nutritional intake and initiate nutrition consult as needed  - Instruct patient on self management of diabetes  Outcome: Progressing

## 2022-02-23 NOTE — VASCULAR ACCESS
Consulted to place a PICC line for TPN after 1400. Primary RN  Norberto Lorenz and RD Rosalia Mehta called and informed that the PICC line will not be able to be placed today.  Pt will be seen for PICC line in am.

## 2022-02-23 NOTE — PLAN OF CARE
Assumed care at 0730. A&O x 4. Seizure precautions for history of epilepsy. 2 L NC. . NSR on tele. Lovenox. Gonsalves out this AM, voided at 9:00. NG tube 57 CM to low intermittent suction. Dilaudid PCA pump. Standby assist. NPO ice chips with Q6H accuchecks. SERJIO tube on the right and on the left. Midline incision with aquacel. Staff will continue to monitor. Problem: PAIN - ADULT  Goal: Verbalizes/displays adequate comfort level or patient's stated pain goal  Description: INTERVENTIONS:  - Encourage pt to monitor pain and request assistance  - Assess pain using appropriate pain scale  - Administer analgesics based on type and severity of pain and evaluate response  - Implement non-pharmacological measures as appropriate and evaluate response  - Consider cultural and social influences on pain and pain management  - Manage/alleviate anxiety  - Utilize distraction and/or relaxation techniques  - Monitor for opioid side effects  - Notify MD/LIP if interventions unsuccessful or patient reports new pain  - Anticipate increased pain with activity and pre-medicate as appropriate  Outcome: Progressing     Problem: SAFETY ADULT - FALL  Goal: Free from fall injury  Description: INTERVENTIONS:  - Assess pt frequently for physical needs  - Identify cognitive and physical deficits and behaviors that affect risk of falls.   - [place] fall precautions as indicated by assessment.  - Educate pt/family on patient safety including physical limitations  - Instruct pt to call for assistance with activity based on assessment  - Modify environment to reduce risk of injury  - Provide assistive devices as appropriate  - Consider OT/PT consult to assist with strengthening/mobility  - Encourage toileting schedule  Outcome: Progressing     Problem: GASTROINTESTINAL - ADULT  Goal: Minimal or absence of nausea and vomiting  Description: INTERVENTIONS:  - Maintain adequate hydration with IV or PO as ordered and tolerated  - Nasogastric tube to low intermittent suction as ordered  - Evaluate effectiveness of ordered antiemetic medications  - Provide nonpharmacologic comfort measures as appropriate  - Advance diet as tolerated, if ordered  - Obtain nutritional consult as needed  - Evaluate fluid balance  Outcome: Progressing     Problem: METABOLIC/FLUID AND ELECTROLYTES - ADULT  Goal: Electrolytes maintained within normal limits  Description: INTERVENTIONS:  - Monitor labs and rhythm and assess patient for signs and symptoms of electrolyte imbalances  - Administer electrolyte replacement as ordered  - Monitor response to electrolyte replacements, including rhythm and repeat lab results as appropriate  - Fluid restriction as ordered  - Instruct patient on fluid and nutrition restrictions as appropriate  Outcome: Progressing

## 2022-02-24 ENCOUNTER — APPOINTMENT (OUTPATIENT)
Dept: ULTRASOUND IMAGING | Facility: HOSPITAL | Age: 62
DRG: 356 | End: 2022-02-24
Attending: HOSPITALIST
Payer: MEDICARE

## 2022-02-24 LAB
ANION GAP SERPL CALC-SCNC: 5 MMOL/L (ref 0–18)
BASOPHILS # BLD: 0 X10(3) UL (ref 0–0.2)
BASOPHILS NFR BLD: 0 %
BUN BLD-MCNC: 6 MG/DL (ref 7–18)
CALCIUM BLD-MCNC: 8 MG/DL (ref 8.5–10.1)
CHLORIDE SERPL-SCNC: 109 MMOL/L (ref 98–112)
CO2 SERPL-SCNC: 30 MMOL/L (ref 21–32)
CREAT BLD-MCNC: 0.32 MG/DL
EOSINOPHIL # BLD: 0.16 X10(3) UL (ref 0–0.7)
EOSINOPHIL NFR BLD: 1 %
ERYTHROCYTE [DISTWIDTH] IN BLOOD BY AUTOMATED COUNT: 16 %
GLUCOSE BLD-MCNC: 103 MG/DL (ref 70–99)
GLUCOSE BLD-MCNC: 111 MG/DL (ref 70–99)
GLUCOSE BLD-MCNC: 123 MG/DL (ref 70–99)
GLUCOSE BLD-MCNC: 137 MG/DL (ref 70–99)
GLUCOSE BLD-MCNC: 98 MG/DL (ref 70–99)
GLUCOSE BLD-MCNC: 98 MG/DL (ref 70–99)
HCT VFR BLD AUTO: 24.7 %
HGB BLD-MCNC: 7.7 G/DL
LYMPHOCYTES NFR BLD: 13 %
LYMPHOCYTES NFR BLD: 2.05 X10(3) UL (ref 1–4)
MAGNESIUM SERPL-MCNC: 2.1 MG/DL (ref 1.6–2.6)
MCH RBC QN AUTO: 29.8 PG (ref 26–34)
MCHC RBC AUTO-ENTMCNC: 31.2 G/DL (ref 31–37)
MCV RBC AUTO: 95.7 FL
MONOCYTES # BLD: 0.63 X10(3) UL (ref 0.1–1)
MONOCYTES NFR BLD: 4 %
NEUTROPHILS # BLD AUTO: 11.79 X10 (3) UL (ref 1.5–7.7)
NEUTROPHILS NFR BLD: 76 %
NEUTS BAND NFR BLD: 6 %
NEUTS HYPERSEG # BLD: 12.96 X10(3) UL (ref 1.5–7.7)
NRBC BLD MANUAL-RTO: 1 %
OSMOLALITY SERPL CALC.SUM OF ELEC: 296 MOSM/KG (ref 275–295)
PHOSPHATE SERPL-MCNC: 3.1 MG/DL (ref 2.5–4.9)
PLATELET # BLD AUTO: 348 10(3)UL (ref 150–450)
PLATELET MORPHOLOGY: NORMAL
POTASSIUM SERPL-SCNC: 3.9 MMOL/L (ref 3.5–5.1)
RBC # BLD AUTO: 2.58 X10(6)UL
SODIUM SERPL-SCNC: 144 MMOL/L (ref 136–145)
TOTAL CELLS COUNTED BLD: 100
WBC # BLD AUTO: 15.8 X10(3) UL (ref 4–11)

## 2022-02-24 PROCEDURE — 85007 BL SMEAR W/DIFF WBC COUNT: CPT | Performed by: HOSPITALIST

## 2022-02-24 PROCEDURE — 3E0436Z INTRODUCTION OF NUTRITIONAL SUBSTANCE INTO CENTRAL VEIN, PERCUTANEOUS APPROACH: ICD-10-PCS | Performed by: INTERNAL MEDICINE

## 2022-02-24 PROCEDURE — 36573 INSJ PICC RS&I 5 YR+: CPT

## 2022-02-24 PROCEDURE — 85025 COMPLETE CBC W/AUTO DIFF WBC: CPT | Performed by: HOSPITALIST

## 2022-02-24 PROCEDURE — C9113 INJ PANTOPRAZOLE SODIUM, VIA: HCPCS | Performed by: INTERNAL MEDICINE

## 2022-02-24 PROCEDURE — 80048 BASIC METABOLIC PNL TOTAL CA: CPT | Performed by: HOSPITALIST

## 2022-02-24 PROCEDURE — 93970 EXTREMITY STUDY: CPT | Performed by: HOSPITALIST

## 2022-02-24 PROCEDURE — 82962 GLUCOSE BLOOD TEST: CPT

## 2022-02-24 PROCEDURE — 83735 ASSAY OF MAGNESIUM: CPT

## 2022-02-24 PROCEDURE — 84100 ASSAY OF PHOSPHORUS: CPT

## 2022-02-24 PROCEDURE — 85027 COMPLETE CBC AUTOMATED: CPT | Performed by: HOSPITALIST

## 2022-02-24 PROCEDURE — 02HV33Z INSERTION OF INFUSION DEVICE INTO SUPERIOR VENA CAVA, PERCUTANEOUS APPROACH: ICD-10-PCS | Performed by: INTERNAL MEDICINE

## 2022-02-24 RX ORDER — ENOXAPARIN SODIUM 100 MG/ML
40 INJECTION SUBCUTANEOUS EVERY 12 HOURS SCHEDULED
Status: DISCONTINUED | OUTPATIENT
Start: 2022-02-24 | End: 2022-02-24

## 2022-02-24 RX ORDER — LIDOCAINE HYDROCHLORIDE 10 MG/ML
5 INJECTION, SOLUTION EPIDURAL; INFILTRATION; INTRACAUDAL; PERINEURAL
Status: COMPLETED | OUTPATIENT
Start: 2022-02-24 | End: 2022-02-24

## 2022-02-24 RX ORDER — ENOXAPARIN SODIUM 100 MG/ML
30 INJECTION SUBCUTANEOUS EVERY 12 HOURS SCHEDULED
Status: DISCONTINUED | OUTPATIENT
Start: 2022-02-24 | End: 2022-02-24

## 2022-02-24 RX ORDER — ENOXAPARIN SODIUM 100 MG/ML
0.5 INJECTION SUBCUTANEOUS EVERY 24 HOURS
Status: DISCONTINUED | OUTPATIENT
Start: 2022-02-24 | End: 2022-03-01

## 2022-02-24 NOTE — PLAN OF CARE
A&Ox4. VSS. 2L NC. . Telemetry: NSR  Resp: Clear bilaterally IS: 500  GI: Abdomen soft, nondistended. Active bowel sounds, passing gas. : Incontinent, using bedside commode  Pain controlled with PCA pain medications  Up with 2  Drains: NGT secured at 57cm. Suction to low intermittent. Red output. Left nare clean dry and intact. SERJIO x 1 on the left, SERJIO x1 on the right. AL sites with gauze and tape, but Jps to bulb suction with no drainage noted on dressings. Incisions: Midline incision with Aquacel. Small drainage, but clean dry and intact. Diet: NPO + ice chips  Denies nausea. IVF running per order. All appropriate safety measures in place. All questions and concerns addressed. Will continue to monitor. Seizure precautions in place. Problem: PAIN - ADULT  Goal: Verbalizes/displays adequate comfort level or patient's stated pain goal  Description: INTERVENTIONS:  - Encourage pt to monitor pain and request assistance  - Assess pain using appropriate pain scale  - Administer analgesics based on type and severity of pain and evaluate response  - Implement non-pharmacological measures as appropriate and evaluate response  - Consider cultural and social influences on pain and pain management  - Manage/alleviate anxiety  - Utilize distraction and/or relaxation techniques  - Monitor for opioid side effects  - Notify MD/LIP if interventions unsuccessful or patient reports new pain  - Anticipate increased pain with activity and pre-medicate as appropriate  Outcome: Progressing     Problem: SAFETY ADULT - FALL  Goal: Free from fall injury  Description: INTERVENTIONS:  - Assess pt frequently for physical needs  - Identify cognitive and physical deficits and behaviors that affect risk of falls.   - [place] fall precautions as indicated by assessment.  - Educate pt/family on patient safety including physical limitations  - Instruct pt to call for assistance with activity based on assessment  - Modify environment to reduce risk of injury  - Provide assistive devices as appropriate  - Consider OT/PT consult to assist with strengthening/mobility  - Encourage toileting schedule  Outcome: Progressing     Problem: GASTROINTESTINAL - ADULT  Goal: Minimal or absence of nausea and vomiting  Description: INTERVENTIONS:  - Maintain adequate hydration with IV or PO as ordered and tolerated  - Nasogastric tube to low intermittent suction as ordered  - Evaluate effectiveness of ordered antiemetic medications  - Provide nonpharmacologic comfort measures as appropriate  - Advance diet as tolerated, if ordered  - Obtain nutritional consult as needed  - Evaluate fluid balance  Outcome: Progressing     Problem: METABOLIC/FLUID AND ELECTROLYTES - ADULT  Goal: Electrolytes maintained within normal limits  Description: INTERVENTIONS:  - Monitor labs and rhythm and assess patient for signs and symptoms of electrolyte imbalances  - Administer electrolyte replacement as ordered  - Monitor response to electrolyte replacements, including rhythm and repeat lab results as appropriate  - Fluid restriction as ordered  - Instruct patient on fluid and nutrition restrictions as appropriate  Outcome: Progressing     Problem: Diabetes/Glucose Control  Goal: Glucose maintained within prescribed range  Description: INTERVENTIONS:  - Monitor Blood Glucose as ordered  - Assess for signs and symptoms of hyperglycemia and hypoglycemia  - Administer ordered medications to maintain glucose within target range  - Assess barriers to adequate nutritional intake and initiate nutrition consult as needed  - Instruct patient on self management of diabetes  Outcome: Progressing

## 2022-02-24 NOTE — VASCULAR ACCESS
Vascular Access consult for DL PICC for TPN. Upon initial assessment, right forearm is edematous with IV fluid infusing through extended PIV at same site. Upon further examination, the IV catheter is in good placement but the vein is cloudy and does not compress along and distal to IV catheter though IV fluid is appropriately flowing into vessel. Dr Renuka Marino and Eladia Franco PA and primary RN are all aware of my impression that the patient is at increased risk for DVT with PICC placement. DL PICC is placed without incident in the left cephalic vein. After speaking to primary RN, right forearm extended PIV is removed. Patient and family updated.

## 2022-02-24 NOTE — DIETARY NOTE
[facility]  Clinical Nutrition - TPN Follow-up    AYANNA Curryannie Marina's TPN to provide: 450 dextrose calories, 270 lipid calories, 30% lipids, 90 grams protein in 2400 ml fluid. Providing 1080 total calories per day, 60% of total needs. Infused via PICC placed today. 1. Parenteral Nutrition - Goal TPN: 900 dextrose calories, 540 lipid calories, 30% lipids, 90 grams protein in 2400 ml fluid. Providing 1800 total calories per day, 100% of total needs. Infused via PICC. Recent Labs     02/21/22  0538 02/24/22  0709   * 98   BUN 3* 6*   CREATSERUM 0.41* 0.32*   CA 8.1* 8.0*   MG  --  2.1    144   K 3.7  3.7 3.9    109   CO2 26.0 30.0   PHOS  --  3.1   OSMOCALC 295 296*       RD will write TPN daily and follow per protocol. See last assessment 2/24. Pt is at high nutrition risk.     Mari Steiner RDN  Clinical Dietitian  Pager- [phone] [phone]

## 2022-02-24 NOTE — CM/SW NOTE
CM and RN discussed patient's DC needs in rounds. Plan of Care/Barriers to discharge:  TPN ordered. PCA. PICC line to be placed. NPO    DC Plan:  Home with spouse unless PT recommendation is different.  &  to remain available and supportive for discharge planning needs.     Maribel Brewer, RN Case Manager [phone]

## 2022-02-24 NOTE — PLAN OF CARE
A&Ox4. VSS. RA. . Telemetry: NSR  GI: Abdomen soft, nondistended. Denies passing gas. Denies nausea. : Voids. Incontinent at times. Pain controlled with PCA pump. Up with standby assist--PT to see. Drains: SERJIO x2 draining serosanguinous drainage. Incisions: Midline incision with aquacel intact. Diet: NPO with ice  IVF running per order. All appropriate safety measures in place. All questions and concerns addressed. Will continue to monitor. 1704: This RN called down to [facility]--department is aware of Doppler US for legs ordered. Problem: PAIN - ADULT  Goal: Verbalizes/displays adequate comfort level or patient's stated pain goal  Description: INTERVENTIONS:  - Encourage pt to monitor pain and request assistance  - Assess pain using appropriate pain scale  - Administer analgesics based on type and severity of pain and evaluate response  - Implement non-pharmacological measures as appropriate and evaluate response  - Consider cultural and social influences on pain and pain management  - Manage/alleviate anxiety  - Utilize distraction and/or relaxation techniques  - Monitor for opioid side effects  - Notify MD/LIP if interventions unsuccessful or patient reports new pain  - Anticipate increased pain with activity and pre-medicate as appropriate  Outcome: Progressing     Problem: SAFETY ADULT - FALL  Goal: Free from fall injury  Description: INTERVENTIONS:  - Assess pt frequently for physical needs  - Identify cognitive and physical deficits and behaviors that affect risk of falls.   - [place] fall precautions as indicated by assessment.  - Educate pt/family on patient safety including physical limitations  - Instruct pt to call for assistance with activity based on assessment  - Modify environment to reduce risk of injury  - Provide assistive devices as appropriate  - Consider OT/PT consult to assist with strengthening/mobility  - Encourage toileting schedule  Outcome: Progressing     Problem: GASTROINTESTINAL - ADULT  Goal: Minimal or absence of nausea and vomiting  Description: INTERVENTIONS:  - Maintain adequate hydration with IV or PO as ordered and tolerated  - Nasogastric tube to low intermittent suction as ordered  - Evaluate effectiveness of ordered antiemetic medications  - Provide nonpharmacologic comfort measures as appropriate  - Advance diet as tolerated, if ordered  - Obtain nutritional consult as needed  - Evaluate fluid balance  Outcome: Progressing     Problem: METABOLIC/FLUID AND ELECTROLYTES - ADULT  Goal: Electrolytes maintained within normal limits  Description: INTERVENTIONS:  - Monitor labs and rhythm and assess patient for signs and symptoms of electrolyte imbalances  - Administer electrolyte replacement as ordered  - Monitor response to electrolyte replacements, including rhythm and repeat lab results as appropriate  - Fluid restriction as ordered  - Instruct patient on fluid and nutrition restrictions as appropriate  Outcome: Progressing     Problem: Patient/Family Goals  Goal: Patient/Family Long Term Goal    - See additional Care Plan goals for specific interventions  Outcome: Progressing  Goal: Patient/Family Short Term Goal  - See additional Care Plan goals for specific interventions  Outcome: Progressing     Problem: Diabetes/Glucose Control  Goal: Glucose maintained within prescribed range  Description: INTERVENTIONS:  - Monitor Blood Glucose as ordered  - Assess for signs and symptoms of hyperglycemia and hypoglycemia  - Administer ordered medications to maintain glucose within target range  - Assess barriers to adequate nutritional intake and initiate nutrition consult as needed  - Instruct patient on self management of diabetes  Outcome: Progressing

## 2022-02-25 ENCOUNTER — APPOINTMENT (OUTPATIENT)
Dept: GENERAL RADIOLOGY | Facility: HOSPITAL | Age: 62
DRG: 356 | End: 2022-02-25
Attending: PHYSICIAN ASSISTANT
Payer: MEDICARE

## 2022-02-25 LAB
ALBUMIN SERPL-MCNC: 1.7 G/DL (ref 3.4–5)
ALBUMIN/GLOB SERPL: 0.5 {RATIO} (ref 1–2)
ALP LIVER SERPL-CCNC: 103 U/L
ALT SERPL-CCNC: 27 U/L
ANION GAP SERPL CALC-SCNC: 3 MMOL/L (ref 0–18)
AST SERPL-CCNC: 19 U/L (ref 15–37)
BASOPHILS # BLD: 0 X10(3) UL (ref 0–0.2)
BASOPHILS NFR BLD: 0 %
BILIRUB SERPL-MCNC: 0.3 MG/DL (ref 0.1–2)
BUN BLD-MCNC: 6 MG/DL (ref 7–18)
CALCIUM BLD-MCNC: 7.6 MG/DL (ref 8.5–10.1)
CHLORIDE SERPL-SCNC: 106 MMOL/L (ref 98–112)
CO2 SERPL-SCNC: 34 MMOL/L (ref 21–32)
CREAT BLD-MCNC: 0.35 MG/DL
EOSINOPHIL # BLD: 0.26 X10(3) UL (ref 0–0.7)
EOSINOPHIL NFR BLD: 2 %
GLOBULIN PLAS-MCNC: 3.6 G/DL (ref 2.8–4.4)
GLUCOSE BLD-MCNC: 127 MG/DL (ref 70–99)
GLUCOSE BLD-MCNC: 133 MG/DL (ref 70–99)
GLUCOSE BLD-MCNC: 139 MG/DL (ref 70–99)
GLUCOSE BLD-MCNC: 144 MG/DL (ref 70–99)
HCT VFR BLD AUTO: 23.9 %
HGB BLD-MCNC: 7 G/DL
HGB BLD-MCNC: 7.2 G/DL
LYMPHOCYTES NFR BLD: 1.19 X10(3) UL (ref 1–4)
LYMPHOCYTES NFR BLD: 9 %
MAGNESIUM SERPL-MCNC: 2 MG/DL (ref 1.6–2.6)
MCH RBC QN AUTO: 28.1 PG (ref 26–34)
MCHC RBC AUTO-ENTMCNC: 29.3 G/DL (ref 31–37)
MCV RBC AUTO: 96 FL
MONOCYTES # BLD: 0.66 X10(3) UL (ref 0.1–1)
MONOCYTES NFR BLD: 5 %
MORPHOLOGY: NORMAL
NEUTROPHILS # BLD AUTO: 9.33 X10 (3) UL (ref 1.5–7.7)
NEUTROPHILS NFR BLD: 84 %
NEUTS HYPERSEG # BLD: 11.09 X10(3) UL (ref 1.5–7.7)
OSMOLALITY SERPL CALC.SUM OF ELEC: 296 MOSM/KG (ref 275–295)
PLATELET # BLD AUTO: 325 10(3)UL (ref 150–450)
PLATELET MORPHOLOGY: NORMAL
POTASSIUM SERPL-SCNC: 3 MMOL/L (ref 3.5–5.1)
POTASSIUM SERPL-SCNC: 3.3 MMOL/L (ref 3.5–5.1)
PROT SERPL-MCNC: 5.3 G/DL (ref 6.4–8.2)
RBC # BLD AUTO: 2.49 X10(6)UL
SODIUM SERPL-SCNC: 143 MMOL/L (ref 136–145)
TOTAL CELLS COUNTED BLD: 100
TRIGL SERPL-MCNC: 115 MG/DL (ref 30–149)
WBC # BLD AUTO: 13.2 X10(3) UL (ref 4–11)

## 2022-02-25 PROCEDURE — 84478 ASSAY OF TRIGLYCERIDES: CPT

## 2022-02-25 PROCEDURE — 97530 THERAPEUTIC ACTIVITIES: CPT

## 2022-02-25 PROCEDURE — 85007 BL SMEAR W/DIFF WBC COUNT: CPT | Performed by: HOSPITALIST

## 2022-02-25 PROCEDURE — 80053 COMPREHEN METABOLIC PANEL: CPT

## 2022-02-25 PROCEDURE — C9113 INJ PANTOPRAZOLE SODIUM, VIA: HCPCS | Performed by: INTERNAL MEDICINE

## 2022-02-25 PROCEDURE — 74240 X-RAY XM UPR GI TRC 1CNTRST: CPT | Performed by: PHYSICIAN ASSISTANT

## 2022-02-25 PROCEDURE — 83735 ASSAY OF MAGNESIUM: CPT

## 2022-02-25 PROCEDURE — 84100 ASSAY OF PHOSPHORUS: CPT

## 2022-02-25 PROCEDURE — 85027 COMPLETE CBC AUTOMATED: CPT | Performed by: HOSPITALIST

## 2022-02-25 PROCEDURE — 97162 PT EVAL MOD COMPLEX 30 MIN: CPT

## 2022-02-25 PROCEDURE — 82962 GLUCOSE BLOOD TEST: CPT

## 2022-02-25 PROCEDURE — 84132 ASSAY OF SERUM POTASSIUM: CPT | Performed by: HOSPITALIST

## 2022-02-25 PROCEDURE — 85018 HEMOGLOBIN: CPT | Performed by: INTERNAL MEDICINE

## 2022-02-25 PROCEDURE — 85025 COMPLETE CBC W/AUTO DIFF WBC: CPT | Performed by: HOSPITALIST

## 2022-02-25 RX ORDER — POTASSIUM CHLORIDE 14.9 MG/ML
20 INJECTION INTRAVENOUS ONCE
Status: COMPLETED | OUTPATIENT
Start: 2022-02-25 | End: 2022-02-25

## 2022-02-25 NOTE — DIETARY NOTE
[facility]  Clinical Nutrition - TPN Follow-up    AYANNA Weston [place]'s TPN to provide: 675 dextrose calories, 400 lipid calories, 28% lipids, 90 grams protein in 2400 ml fluid. Providing 1435 total calories per day, 79% of total needs. Infused via PICC placed today. 1. Parenteral Nutrition - Goal TPN: 900 dextrose calories, 540 lipid calories, 30% lipids, 90 grams protein in 2400 ml fluid. Providing 1800 total calories per day, 100% of total needs. Infused via PICC. Recent Labs     02/24/22  0709 02/25/22  0525   GLU 98 133*   BUN 6* 6*   CREATSERUM 0.32* 0.35*   CA 8.0* 7.6*   MG 2.1 2.0    143   K 3.9 3.0*    106   CO2 30.0 34.0*   PHOS 3.1 3.1   OSMOCALC 296* 296*       RD will write TPN daily and follow per protocol. See last assessment 2/24. Pt is at high nutrition risk.     Ashley Nagel RD, LDN  Clinical Nutrition  Pager [phone]

## 2022-02-25 NOTE — PLAN OF CARE
A&Ox4. VSS. 2L O2. Unable to wean at this time. . Telemetry: NSR  GI: Abdomen soft, nondistended. Passing gas. Denies nausea. : Voids. Incontinent at times. Pain controlled with PCA pump. Up with standby assist--PT to see. Drains: SERJIO x2 draining serosanguinous drainage. NGT to LIS  Incisions: Midline incision closed with staples--no drainage Wiped with CHG cloth. Diet: NPO with ice  IVF running per order. All appropriate safety measures in place. All questions and concerns addressed. Will continue to monitor. 1340: Per PA, patient to remain on TPN--will discontinue tomorrow. Problem: PAIN - ADULT  Goal: Verbalizes/displays adequate comfort level or patient's stated pain goal  Description: INTERVENTIONS:  - Encourage pt to monitor pain and request assistance  - Assess pain using appropriate pain scale  - Administer analgesics based on type and severity of pain and evaluate response  - Implement non-pharmacological measures as appropriate and evaluate response  - Consider cultural and social influences on pain and pain management  - Manage/alleviate anxiety  - Utilize distraction and/or relaxation techniques  - Monitor for opioid side effects  - Notify MD/LIP if interventions unsuccessful or patient reports new pain  - Anticipate increased pain with activity and pre-medicate as appropriate  Outcome: Progressing     Problem: SAFETY ADULT - FALL  Goal: Free from fall injury  Description: INTERVENTIONS:  - Assess pt frequently for physical needs  - Identify cognitive and physical deficits and behaviors that affect risk of falls.   - [place] fall precautions as indicated by assessment.  - Educate pt/family on patient safety including physical limitations  - Instruct pt to call for assistance with activity based on assessment  - Modify environment to reduce risk of injury  - Provide assistive devices as appropriate  - Consider OT/PT consult to assist with strengthening/mobility  - Encourage toileting schedule  Outcome: Progressing     Problem: GASTROINTESTINAL - ADULT  Goal: Minimal or absence of nausea and vomiting  Description: INTERVENTIONS:  - Maintain adequate hydration with IV or PO as ordered and tolerated  - Nasogastric tube to low intermittent suction as ordered  - Evaluate effectiveness of ordered antiemetic medications  - Provide nonpharmacologic comfort measures as appropriate  - Advance diet as tolerated, if ordered  - Obtain nutritional consult as needed  - Evaluate fluid balance  Outcome: Progressing     Problem: METABOLIC/FLUID AND ELECTROLYTES - ADULT  Goal: Electrolytes maintained within normal limits  Description: INTERVENTIONS:  - Monitor labs and rhythm and assess patient for signs and symptoms of electrolyte imbalances  - Administer electrolyte replacement as ordered  - Monitor response to electrolyte replacements, including rhythm and repeat lab results as appropriate  - Fluid restriction as ordered  - Instruct patient on fluid and nutrition restrictions as appropriate  Outcome: Progressing     Problem: Patient/Family Goals  Goal: Patient/Family Long Term Goal  - See additional Care Plan goals for specific interventions  Outcome: Progressing  Goal: Patient/Family Short Term Goal  - See additional Care Plan goals for specific interventions  Outcome: Progressing     Problem: Diabetes/Glucose Control  Goal: Glucose maintained within prescribed range  Description: INTERVENTIONS:  - Monitor Blood Glucose as ordered  - Assess for signs and symptoms of hyperglycemia and hypoglycemia  - Administer ordered medications to maintain glucose within target range  - Assess barriers to adequate nutritional intake and initiate nutrition consult as needed  - Instruct patient on self management of diabetes  Outcome: Progressing

## 2022-02-25 NOTE — HOME CARE LIAISON
Received referral via [facility] for Home Health services. Spoke w/ patient and her  and provided with list [facility] [address], patient choice is [address]. Agency reserved in [address] and contact information placed on AVS.Financial interest disclosure provided to patient. Patient stated she now wants to go to [facility] for a week or so and then wants to have home health services after that. Notified Basilio Atkinson.

## 2022-02-25 NOTE — PLAN OF CARE
Pt a&ox4, VSS, afebrile. NSR on telemetry. Needs 2L NC to maintain O2 sats. IS use encouraged, pt verbalizes understanding. Abdomen soft, nondistended. Pt reports passing flatus, denies nausea. NG to LIS, secured at 57cm. Output maroon in color. Pain managed with PCA pump. Midline incision with aquacel c/d/i. SERJIO x2 with serosanguinous drainage. Voiding, incontinent at times. Started TPN tonight. Seizure precautions maintained. Plan of care discussed with pt, pt verbalizes understanding.    0609 - hgb dropped to 7.0 this morning. hospitalist notified, orders for redraw hgb later this morning. Problem: PAIN - ADULT  Goal: Verbalizes/displays adequate comfort level or patient's stated pain goal  Description: INTERVENTIONS:  - Encourage pt to monitor pain and request assistance  - Assess pain using appropriate pain scale  - Administer analgesics based on type and severity of pain and evaluate response  - Implement non-pharmacological measures as appropriate and evaluate response  - Consider cultural and social influences on pain and pain management  - Manage/alleviate anxiety  - Utilize distraction and/or relaxation techniques  - Monitor for opioid side effects  - Notify MD/LIP if interventions unsuccessful or patient reports new pain  - Anticipate increased pain with activity and pre-medicate as appropriate  Outcome: Progressing     Problem: SAFETY ADULT - FALL  Goal: Free from fall injury  Description: INTERVENTIONS:  - Assess pt frequently for physical needs  - Identify cognitive and physical deficits and behaviors that affect risk of falls.   - [place] fall precautions as indicated by assessment.  - Educate pt/family on patient safety including physical limitations  - Instruct pt to call for assistance with activity based on assessment  - Modify environment to reduce risk of injury  - Provide assistive devices as appropriate  - Consider OT/PT consult to assist with strengthening/mobility  - Encourage toileting schedule  Outcome: Progressing     Problem: GASTROINTESTINAL - ADULT  Goal: Minimal or absence of nausea and vomiting  Description: INTERVENTIONS:  - Maintain adequate hydration with IV or PO as ordered and tolerated  - Nasogastric tube to low intermittent suction as ordered  - Evaluate effectiveness of ordered antiemetic medications  - Provide nonpharmacologic comfort measures as appropriate  - Advance diet as tolerated, if ordered  - Obtain nutritional consult as needed  - Evaluate fluid balance  Outcome: Progressing     Problem: METABOLIC/FLUID AND ELECTROLYTES - ADULT  Goal: Electrolytes maintained within normal limits  Description: INTERVENTIONS:  - Monitor labs and rhythm and assess patient for signs and symptoms of electrolyte imbalances  - Administer electrolyte replacement as ordered  - Monitor response to electrolyte replacements, including rhythm and repeat lab results as appropriate  - Fluid restriction as ordered  - Instruct patient on fluid and nutrition restrictions as appropriate  Outcome: Progressing     Problem: Patient/Family Goals  Goal: Patient/Family Long Term Goal  Description: Patient's Long Term Goal: discharge home    Interventions:  - ambulate, remove ng, tolerate diet  - See additional Care Plan goals for specific interventions  Outcome: Progressing  Goal: Patient/Family Short Term Goal  Description: Patient's Short Term Goal: manage pain    Interventions:   - monitor pain, continue pca  - See additional Care Plan goals for specific interventions  Outcome: Progressing     Problem: Diabetes/Glucose Control  Goal: Glucose maintained within prescribed range  Description: INTERVENTIONS:  - Monitor Blood Glucose as ordered  - Assess for signs and symptoms of hyperglycemia and hypoglycemia  - Administer ordered medications to maintain glucose within target range  - Assess barriers to adequate nutritional intake and initiate nutrition consult as needed  - Instruct patient on self management of diabetes  Outcome: Progressing

## 2022-02-25 NOTE — CM/SW NOTE
MSW met with the patient at bedside to discuss [place] [id number] services per Rn recommendation. The patient is agreeable to [place] [id number]. MSW made referrals via [facility]. Will present the patient with a list of potential agencies once list available. Erick Cheema LCSW    ADDENDUM:  PT recommendations are now for [facility]. Patient is agreeable to go for a short time. MSW made [facility] referrals and called for DON screen. Will present patient with list when available.     Erick Cheema LCSW

## 2022-02-26 LAB
ALBUMIN SERPL-MCNC: 1.7 G/DL (ref 3.4–5)
ALBUMIN/GLOB SERPL: 0.4 {RATIO} (ref 1–2)
ALP LIVER SERPL-CCNC: 87 U/L
ALT SERPL-CCNC: 25 U/L
ANION GAP SERPL CALC-SCNC: 0 MMOL/L (ref 0–18)
AST SERPL-CCNC: 20 U/L (ref 15–37)
BASOPHILS # BLD AUTO: 0.04 X10(3) UL (ref 0–0.2)
BASOPHILS NFR BLD AUTO: 0.4 %
BILIRUB SERPL-MCNC: 0.3 MG/DL (ref 0.1–2)
BUN BLD-MCNC: 6 MG/DL (ref 7–18)
CALCIUM BLD-MCNC: 7.7 MG/DL (ref 8.5–10.1)
CHLORIDE SERPL-SCNC: 107 MMOL/L (ref 98–112)
CO2 SERPL-SCNC: 34 MMOL/L (ref 21–32)
CREAT BLD-MCNC: 0.39 MG/DL
EOSINOPHIL # BLD AUTO: 0.43 X10(3) UL (ref 0–0.7)
EOSINOPHIL NFR BLD AUTO: 4.1 %
ERYTHROCYTE [DISTWIDTH] IN BLOOD BY AUTOMATED COUNT: 15.6 %
GLOBULIN PLAS-MCNC: 3.8 G/DL (ref 2.8–4.4)
GLUCOSE BLD-MCNC: 122 MG/DL (ref 70–99)
GLUCOSE BLD-MCNC: 125 MG/DL (ref 70–99)
GLUCOSE BLD-MCNC: 130 MG/DL (ref 70–99)
GLUCOSE BLD-MCNC: 131 MG/DL (ref 70–99)
GLUCOSE BLD-MCNC: 164 MG/DL (ref 70–99)
HCT VFR BLD AUTO: 22.9 %
HGB BLD-MCNC: 7 G/DL
HGB BLD-MCNC: 7.1 G/DL
IMM GRANULOCYTES # BLD AUTO: 0.39 X10(3) UL (ref 0–1)
IMM GRANULOCYTES NFR BLD: 3.7 %
LYMPHOCYTES # BLD AUTO: 1.83 X10(3) UL (ref 1–4)
LYMPHOCYTES NFR BLD AUTO: 17.4 %
MAGNESIUM SERPL-MCNC: 2.1 MG/DL (ref 1.6–2.6)
MCH RBC QN AUTO: 28.8 PG (ref 26–34)
MCHC RBC AUTO-ENTMCNC: 30.6 G/DL (ref 31–37)
MCV RBC AUTO: 94.2 FL
MONOCYTES # BLD AUTO: 0.8 X10(3) UL (ref 0.1–1)
MONOCYTES NFR BLD AUTO: 7.6 %
NEUTROPHILS # BLD AUTO: 7.05 X10 (3) UL (ref 1.5–7.7)
NEUTROPHILS # BLD AUTO: 7.05 X10(3) UL (ref 1.5–7.7)
NEUTROPHILS NFR BLD AUTO: 66.8 %
OSMOLALITY SERPL CALC.SUM OF ELEC: 291 MOSM/KG (ref 275–295)
PHOSPHATE SERPL-MCNC: 2.7 MG/DL (ref 2.5–4.9)
PLATELET # BLD AUTO: 317 10(3)UL (ref 150–450)
POTASSIUM SERPL-SCNC: 3.7 MMOL/L (ref 3.5–5.1)
POTASSIUM SERPL-SCNC: 3.7 MMOL/L (ref 3.5–5.1)
PROT SERPL-MCNC: 5.5 G/DL (ref 6.4–8.2)
RBC # BLD AUTO: 2.43 X10(6)UL
SODIUM SERPL-SCNC: 141 MMOL/L (ref 136–145)
WBC # BLD AUTO: 10.5 X10(3) UL (ref 4–11)

## 2022-02-26 PROCEDURE — 82962 GLUCOSE BLOOD TEST: CPT

## 2022-02-26 PROCEDURE — 80053 COMPREHEN METABOLIC PANEL: CPT

## 2022-02-26 PROCEDURE — 84132 ASSAY OF SERUM POTASSIUM: CPT | Performed by: HOSPITALIST

## 2022-02-26 PROCEDURE — 85018 HEMOGLOBIN: CPT | Performed by: INTERNAL MEDICINE

## 2022-02-26 PROCEDURE — C9113 INJ PANTOPRAZOLE SODIUM, VIA: HCPCS | Performed by: INTERNAL MEDICINE

## 2022-02-26 PROCEDURE — 84100 ASSAY OF PHOSPHORUS: CPT

## 2022-02-26 PROCEDURE — 83735 ASSAY OF MAGNESIUM: CPT

## 2022-02-26 PROCEDURE — 85025 COMPLETE CBC W/AUTO DIFF WBC: CPT | Performed by: HOSPITALIST

## 2022-02-26 NOTE — PLAN OF CARE
Pt a&ox4, VSS, afebrile. SR on telemetry. Requires 2L NC to maintain O2 sats. Passing flatus, had BM earlier tonight. Denies nausea. Voiding well, incontinent at times. Purewick in place. Pain controlled with PCA. Midline incision with staples well approximated. SERJIO x2 with serosanguinous output. TPN infusing via picc line. Potassium replaced tonight. Safety precautions in place. Plan of care discussed with pt, pt verbalizes understanding. [address] notified of hgb this morning. Orders to recheck at noon. Problem: PAIN - ADULT  Goal: Verbalizes/displays adequate comfort level or patient's stated pain goal  Description: INTERVENTIONS:  - Encourage pt to monitor pain and request assistance  - Assess pain using appropriate pain scale  - Administer analgesics based on type and severity of pain and evaluate response  - Implement non-pharmacological measures as appropriate and evaluate response  - Consider cultural and social influences on pain and pain management  - Manage/alleviate anxiety  - Utilize distraction and/or relaxation techniques  - Monitor for opioid side effects  - Notify MD/LIP if interventions unsuccessful or patient reports new pain  - Anticipate increased pain with activity and pre-medicate as appropriate  Outcome: Progressing     Problem: SAFETY ADULT - FALL  Goal: Free from fall injury  Description: INTERVENTIONS:  - Assess pt frequently for physical needs  - Identify cognitive and physical deficits and behaviors that affect risk of falls.   - [place] fall precautions as indicated by assessment.  - Educate pt/family on patient safety including physical limitations  - Instruct pt to call for assistance with activity based on assessment  - Modify environment to reduce risk of injury  - Provide assistive devices as appropriate  - Consider OT/PT consult to assist with strengthening/mobility  - Encourage toileting schedule  Outcome: Progressing     Problem: GASTROINTESTINAL - ADULT  Goal: Minimal or absence of nausea and vomiting  Description: INTERVENTIONS:  - Maintain adequate hydration with IV or PO as ordered and tolerated  - Nasogastric tube to low intermittent suction as ordered  - Evaluate effectiveness of ordered antiemetic medications  - Provide nonpharmacologic comfort measures as appropriate  - Advance diet as tolerated, if ordered  - Obtain nutritional consult as needed  - Evaluate fluid balance  Outcome: Progressing     Problem: METABOLIC/FLUID AND ELECTROLYTES - ADULT  Goal: Electrolytes maintained within normal limits  Description: INTERVENTIONS:  - Monitor labs and rhythm and assess patient for signs and symptoms of electrolyte imbalances  - Administer electrolyte replacement as ordered  - Monitor response to electrolyte replacements, including rhythm and repeat lab results as appropriate  - Fluid restriction as ordered  - Instruct patient on fluid and nutrition restrictions as appropriate  Outcome: Progressing     Problem: Patient/Family Goals  Goal: Patient/Family Long Term Goal  Description: Patient's Long Term Goal: discharge    Interventions:  - manage pain with PO meds, tolerate diet, ambulate safely  - See additional Care Plan goals for specific interventions  Outcome: Progressing  Goal: Patient/Family Short Term Goal  Description: Patient's Short Term Goal: manage pain    Interventions:   - use PCA as needed  - See additional Care Plan goals for specific interventions  Outcome: Progressing     Problem: Diabetes/Glucose Control  Goal: Glucose maintained within prescribed range  Description: INTERVENTIONS:  - Monitor Blood Glucose as ordered  - Assess for signs and symptoms of hyperglycemia and hypoglycemia  - Administer ordered medications to maintain glucose within target range  - Assess barriers to adequate nutritional intake and initiate nutrition consult as needed  - Instruct patient on self management of diabetes  Outcome: Progressing

## 2022-02-26 NOTE — PROGRESS NOTES
Improved    Ng out    abd obese soft  Wound fine    traci with serous output    Cultures noted    Plan ID consult    Clear liquid diet

## 2022-02-26 NOTE — PLAN OF CARE
Pt A&O x's 4, VSS, afebrile. Tele NSR. Comfortable on rm air, will monitor O2 sats while sleeping. O2 satys at 92-93% with activity. Inc at times with urine and stool, bowel movement today; pt declining purwick catheter now. Pain well controlled with PCA pump. Midline incision with staples open to air, clean and dry, well aproximated. SERJIO X's 2 with serousanguinous outpt. TPN infusing via PICC line. Good blood return from both PICC ports. Hgb rechecked at noon; back at 7.1. ID consulted for positive cxs; Continue IV antibiotics as ordered. Pt aware and agreeable to plan of care. Call light and belongings within reach, will continue to monitor hourly and prn safety rounds.     Problem: PAIN - ADULT  Goal: Verbalizes/displays adequate comfort level or patient's stated pain goal  Description: INTERVENTIONS:  - Encourage pt to monitor pain and request assistance  - Assess pain using appropriate pain scale  - Administer analgesics based on type and severity of pain and evaluate response  - Implement non-pharmacological measures as appropriate and evaluate response  - Consider cultural and social influences on pain and pain management  - Manage/alleviate anxiety  - Utilize distraction and/or relaxation techniques  - Monitor for opioid side effects  - Notify MD/LIP if interventions unsuccessful or patient reports new pain  - Anticipate increased pain with activity and pre-medicate as appropriate  Outcome: Progressing

## 2022-02-27 LAB
ALBUMIN SERPL-MCNC: 1.7 G/DL (ref 3.4–5)
ALBUMIN/GLOB SERPL: 0.4 {RATIO} (ref 1–2)
ALP LIVER SERPL-CCNC: 82 U/L
ALT SERPL-CCNC: 25 U/L
ANION GAP SERPL CALC-SCNC: 3 MMOL/L (ref 0–18)
AST SERPL-CCNC: 25 U/L (ref 15–37)
BASOPHILS # BLD AUTO: 0.04 X10(3) UL (ref 0–0.2)
BASOPHILS NFR BLD AUTO: 0.4 %
BILIRUB SERPL-MCNC: 0.2 MG/DL (ref 0.1–2)
BUN BLD-MCNC: 6 MG/DL (ref 7–18)
CALCIUM BLD-MCNC: 8.1 MG/DL (ref 8.5–10.1)
CHLORIDE SERPL-SCNC: 107 MMOL/L (ref 98–112)
CO2 SERPL-SCNC: 31 MMOL/L (ref 21–32)
CREAT BLD-MCNC: 0.43 MG/DL
EOSINOPHIL # BLD AUTO: 0.45 X10(3) UL (ref 0–0.7)
EOSINOPHIL NFR BLD AUTO: 4.2 %
ERYTHROCYTE [DISTWIDTH] IN BLOOD BY AUTOMATED COUNT: 15.5 %
GLOBULIN PLAS-MCNC: 4.1 G/DL (ref 2.8–4.4)
GLUCOSE BLD-MCNC: 125 MG/DL (ref 70–99)
GLUCOSE BLD-MCNC: 130 MG/DL (ref 70–99)
GLUCOSE BLD-MCNC: 131 MG/DL (ref 70–99)
GLUCOSE BLD-MCNC: 138 MG/DL (ref 70–99)
GLUCOSE BLD-MCNC: 139 MG/DL (ref 70–99)
HCT VFR BLD AUTO: 24.1 %
HGB BLD-MCNC: 7.3 G/DL
IMM GRANULOCYTES # BLD AUTO: 0.35 X10(3) UL (ref 0–1)
IMM GRANULOCYTES NFR BLD: 3.3 %
LYMPHOCYTES # BLD AUTO: 2.09 X10(3) UL (ref 1–4)
LYMPHOCYTES NFR BLD AUTO: 19.6 %
MAGNESIUM SERPL-MCNC: 2.3 MG/DL (ref 1.6–2.6)
MCH RBC QN AUTO: 29.2 PG (ref 26–34)
MCHC RBC AUTO-ENTMCNC: 30.3 G/DL (ref 31–37)
MCV RBC AUTO: 96.4 FL
MONOCYTES # BLD AUTO: 0.67 X10(3) UL (ref 0.1–1)
MONOCYTES NFR BLD AUTO: 6.3 %
NEUTROPHILS # BLD AUTO: 7.07 X10 (3) UL (ref 1.5–7.7)
NEUTROPHILS # BLD AUTO: 7.07 X10(3) UL (ref 1.5–7.7)
NEUTROPHILS NFR BLD AUTO: 66.2 %
OSMOLALITY SERPL CALC.SUM OF ELEC: 291 MOSM/KG (ref 275–295)
PHOSPHATE SERPL-MCNC: 3.3 MG/DL (ref 2.5–4.9)
PLATELET # BLD AUTO: 338 10(3)UL (ref 150–450)
POTASSIUM SERPL-SCNC: 4.4 MMOL/L (ref 3.5–5.1)
PROT SERPL-MCNC: 5.8 G/DL (ref 6.4–8.2)
RBC # BLD AUTO: 2.5 X10(6)UL
SODIUM SERPL-SCNC: 141 MMOL/L (ref 136–145)
WBC # BLD AUTO: 10.7 X10(3) UL (ref 4–11)

## 2022-02-27 PROCEDURE — 97166 OT EVAL MOD COMPLEX 45 MIN: CPT

## 2022-02-27 PROCEDURE — 82962 GLUCOSE BLOOD TEST: CPT

## 2022-02-27 PROCEDURE — 83735 ASSAY OF MAGNESIUM: CPT

## 2022-02-27 PROCEDURE — C9113 INJ PANTOPRAZOLE SODIUM, VIA: HCPCS | Performed by: INTERNAL MEDICINE

## 2022-02-27 PROCEDURE — 85025 COMPLETE CBC W/AUTO DIFF WBC: CPT | Performed by: HOSPITALIST

## 2022-02-27 PROCEDURE — 97530 THERAPEUTIC ACTIVITIES: CPT

## 2022-02-27 PROCEDURE — 80053 COMPREHEN METABOLIC PANEL: CPT

## 2022-02-27 PROCEDURE — 97535 SELF CARE MNGMENT TRAINING: CPT

## 2022-02-27 PROCEDURE — 84100 ASSAY OF PHOSPHORUS: CPT

## 2022-02-27 NOTE — PLAN OF CARE
Pt is a&ox4, RA, Tele-NSR, TPN infusing via Picc, pain being managed with a PCA pump, Picc line to the left hand, flushed and blood return noted, 2 abd SERJIO drains with ss output, Midline incision with staples dry and intact. Pt is occasionally incontinent, wearing a brief and pads, pt is updated on the poc,needs are being addressed, call light is in reach    Problem: PAIN - ADULT  Goal: Verbalizes/displays adequate comfort level or patient's stated pain goal  Description: INTERVENTIONS:  - Encourage pt to monitor pain and request assistance  - Assess pain using appropriate pain scale  - Administer analgesics based on type and severity of pain and evaluate response  - Implement non-pharmacological measures as appropriate and evaluate response  - Consider cultural and social influences on pain and pain management  - Manage/alleviate anxiety  - Utilize distraction and/or relaxation techniques  - Monitor for opioid side effects  - Notify MD/LIP if interventions unsuccessful or patient reports new pain  - Anticipate increased pain with activity and pre-medicate as appropriate  Outcome: Progressing     Problem: SAFETY ADULT - FALL  Goal: Free from fall injury  Description: INTERVENTIONS:  - Assess pt frequently for physical needs  - Identify cognitive and physical deficits and behaviors that affect risk of falls.   - [place] fall precautions as indicated by assessment.  - Educate pt/family on patient safety including physical limitations  - Instruct pt to call for assistance with activity based on assessment  - Modify environment to reduce risk of injury  - Provide assistive devices as appropriate  - Consider OT/PT consult to assist with strengthening/mobility  - Encourage toileting schedule  Outcome: Progressing     Problem: GASTROINTESTINAL - ADULT  Goal: Minimal or absence of nausea and vomiting  Description: INTERVENTIONS:  - Maintain adequate hydration with IV or PO as ordered and tolerated  - Nasogastric tube to low intermittent suction as ordered  - Evaluate effectiveness of ordered antiemetic medications  - Provide nonpharmacologic comfort measures as appropriate  - Advance diet as tolerated, if ordered  - Obtain nutritional consult as needed  - Evaluate fluid balance  Outcome: Progressing     Problem: METABOLIC/FLUID AND ELECTROLYTES - ADULT  Goal: Electrolytes maintained within normal limits  Description: INTERVENTIONS:  - Monitor labs and rhythm and assess patient for signs and symptoms of electrolyte imbalances  - Administer electrolyte replacement as ordered  - Monitor response to electrolyte replacements, including rhythm and repeat lab results as appropriate  - Fluid restriction as ordered  - Instruct patient on fluid and nutrition restrictions as appropriate  Outcome: Progressing

## 2022-02-27 NOTE — PROGRESS NOTES
Continues to improve    abd obese  Soft  Wound fine    jps with serous output    Appreciate ID eval    Plan full liquid diet

## 2022-02-27 NOTE — PLAN OF CARE
Problem: PAIN - ADULT  Goal: Verbalizes/displays adequate comfort level or patient's stated pain goal  Description: INTERVENTIONS:  - Encourage pt to monitor pain and request assistance  - Assess pain using appropriate pain scale  - Administer analgesics based on type and severity of pain and evaluate response  - Implement non-pharmacological measures as appropriate and evaluate response  - Consider cultural and social influences on pain and pain management  - Manage/alleviate anxiety  - Utilize distraction and/or relaxation techniques  - Monitor for opioid side effects  - Notify MD/LIP if interventions unsuccessful or patient reports new pain  - Anticipate increased pain with activity and pre-medicate as appropriate  Outcome: Progressing     Problem: SAFETY ADULT - FALL  Goal: Free from fall injury  Description: INTERVENTIONS:  - Assess pt frequently for physical needs  - Identify cognitive and physical deficits and behaviors that affect risk of falls.   - [place] fall precautions as indicated by assessment.  - Educate pt/family on patient safety including physical limitations  - Instruct pt to call for assistance with activity based on assessment  - Modify environment to reduce risk of injury  - Provide assistive devices as appropriate  - Consider OT/PT consult to assist with strengthening/mobility  - Encourage toileting schedule  Outcome: Progressing     Problem: GASTROINTESTINAL - ADULT  Goal: Minimal or absence of nausea and vomiting  Description: INTERVENTIONS:  - Maintain adequate hydration with IV or PO as ordered and tolerated  - Nasogastric tube to low intermittent suction as ordered  - Evaluate effectiveness of ordered antiemetic medications  - Provide nonpharmacologic comfort measures as appropriate  - Advance diet as tolerated, if ordered  - Obtain nutritional consult as needed  - Evaluate fluid balance  Outcome: Progressing     Problem: METABOLIC/FLUID AND ELECTROLYTES - ADULT  Goal: Electrolytes maintained within normal limits  Description: INTERVENTIONS:  - Monitor labs and rhythm and assess patient for signs and symptoms of electrolyte imbalances  - Administer electrolyte replacement as ordered  - Monitor response to electrolyte replacements, including rhythm and repeat lab results as appropriate  - Fluid restriction as ordered  - Instruct patient on fluid and nutrition restrictions as appropriate  Outcome: Progressing     Problem: Patient/Family Goals  Goal: Patient/Family Long Term Goal  Description: Patient's Long Term Goal: discharge    Interventions:  - advance diet  - See additional Care Plan goals for specific interventions  Outcome: Progressing  Goal: Patient/Family Short Term Goal  Description: Patient's Short Term Goal: pain control    Interventions:   -  medications  - See additional Care Plan goals for specific interventions  Outcome: Progressing     Problem: Diabetes/Glucose Control  Goal: Glucose maintained within prescribed range  Description: INTERVENTIONS:  - Monitor Blood Glucose as ordered  - Assess for signs and symptoms of hyperglycemia and hypoglycemia  - Administer ordered medications to maintain glucose within target range  - Assess barriers to adequate nutritional intake and initiate nutrition consult as needed  - Instruct patient on self management of diabetes  Outcome: Progressing

## 2022-02-28 LAB
ALBUMIN SERPL-MCNC: 1.9 G/DL (ref 3.4–5)
ALBUMIN/GLOB SERPL: 0.4 {RATIO} (ref 1–2)
ALP LIVER SERPL-CCNC: 84 U/L
ALT SERPL-CCNC: 27 U/L
ANION GAP SERPL CALC-SCNC: 6 MMOL/L (ref 0–18)
AST SERPL-CCNC: 24 U/L (ref 15–37)
BASOPHILS # BLD AUTO: 0.05 X10(3) UL (ref 0–0.2)
BASOPHILS NFR BLD AUTO: 0.5 %
BILIRUB SERPL-MCNC: 0.3 MG/DL (ref 0.1–2)
BILIRUB UR QL STRIP.AUTO: NEGATIVE
BUN BLD-MCNC: 7 MG/DL (ref 7–18)
CALCIUM BLD-MCNC: 8.7 MG/DL (ref 8.5–10.1)
CHLORIDE SERPL-SCNC: 106 MMOL/L (ref 98–112)
CO2 SERPL-SCNC: 29 MMOL/L (ref 21–32)
COLOR UR AUTO: YELLOW
CREAT BLD-MCNC: 0.46 MG/DL
EOSINOPHIL # BLD AUTO: 0.52 X10(3) UL (ref 0–0.7)
EOSINOPHIL NFR BLD AUTO: 4.8 %
ERYTHROCYTE [DISTWIDTH] IN BLOOD BY AUTOMATED COUNT: 15.8 %
GLOBULIN PLAS-MCNC: 4.5 G/DL (ref 2.8–4.4)
GLUCOSE BLD-MCNC: 101 MG/DL (ref 70–99)
GLUCOSE BLD-MCNC: 105 MG/DL (ref 70–99)
GLUCOSE BLD-MCNC: 121 MG/DL (ref 70–99)
GLUCOSE BLD-MCNC: 132 MG/DL (ref 70–99)
GLUCOSE BLD-MCNC: 98 MG/DL (ref 70–99)
GLUCOSE UR STRIP.AUTO-MCNC: NEGATIVE MG/DL
HCT VFR BLD AUTO: 25.5 %
HGB BLD-MCNC: 7.9 G/DL
IMM GRANULOCYTES # BLD AUTO: 0.18 X10(3) UL (ref 0–1)
IMM GRANULOCYTES NFR BLD: 1.7 %
KETONES UR STRIP.AUTO-MCNC: NEGATIVE MG/DL
LYMPHOCYTES # BLD AUTO: 2.12 X10(3) UL (ref 1–4)
LYMPHOCYTES NFR BLD AUTO: 19.5 %
MAGNESIUM SERPL-MCNC: 2.4 MG/DL (ref 1.6–2.6)
MCH RBC QN AUTO: 28.9 PG (ref 26–34)
MCHC RBC AUTO-ENTMCNC: 31 G/DL (ref 31–37)
MCV RBC AUTO: 93.4 FL
MONOCYTES # BLD AUTO: 0.66 X10(3) UL (ref 0.1–1)
MONOCYTES NFR BLD AUTO: 6.1 %
NEUTROPHILS # BLD AUTO: 7.32 X10 (3) UL (ref 1.5–7.7)
NEUTROPHILS # BLD AUTO: 7.32 X10(3) UL (ref 1.5–7.7)
NEUTROPHILS NFR BLD AUTO: 67.4 %
NITRITE UR QL STRIP.AUTO: NEGATIVE
OSMOLALITY SERPL CALC.SUM OF ELEC: 291 MOSM/KG (ref 275–295)
PHOSPHATE SERPL-MCNC: 4.3 MG/DL (ref 2.5–4.9)
PLATELET # BLD AUTO: 266 10(3)UL (ref 150–450)
POTASSIUM SERPL-SCNC: 4.7 MMOL/L (ref 3.5–5.1)
PROT SERPL-MCNC: 6.4 G/DL (ref 6.4–8.2)
PROT UR STRIP.AUTO-MCNC: NEGATIVE MG/DL
RBC # BLD AUTO: 2.73 X10(6)UL
SODIUM SERPL-SCNC: 141 MMOL/L (ref 136–145)
SP GR UR STRIP.AUTO: 1 (ref 1–1.03)
UROBILINOGEN UR STRIP.AUTO-MCNC: <2 MG/DL
WBC # BLD AUTO: 10.9 X10(3) UL (ref 4–11)

## 2022-02-28 PROCEDURE — 82962 GLUCOSE BLOOD TEST: CPT

## 2022-02-28 PROCEDURE — 87086 URINE CULTURE/COLONY COUNT: CPT | Performed by: INTERNAL MEDICINE

## 2022-02-28 PROCEDURE — 84100 ASSAY OF PHOSPHORUS: CPT

## 2022-02-28 PROCEDURE — 83735 ASSAY OF MAGNESIUM: CPT

## 2022-02-28 PROCEDURE — 85025 COMPLETE CBC W/AUTO DIFF WBC: CPT | Performed by: HOSPITALIST

## 2022-02-28 PROCEDURE — 81001 URINALYSIS AUTO W/SCOPE: CPT | Performed by: INTERNAL MEDICINE

## 2022-02-28 PROCEDURE — 80053 COMPREHEN METABOLIC PANEL: CPT

## 2022-02-28 PROCEDURE — C9113 INJ PANTOPRAZOLE SODIUM, VIA: HCPCS | Performed by: INTERNAL MEDICINE

## 2022-02-28 RX ORDER — PANTOPRAZOLE SODIUM 40 MG/1
40 TABLET, DELAYED RELEASE ORAL
Status: DISCONTINUED | OUTPATIENT
Start: 2022-03-01 | End: 2022-03-01

## 2022-02-28 RX ORDER — LOSARTAN POTASSIUM 100 MG/1
100 TABLET ORAL DAILY
Status: DISCONTINUED | OUTPATIENT
Start: 2022-03-01 | End: 2022-03-01

## 2022-02-28 RX ORDER — BUPROPION HYDROCHLORIDE 300 MG/1
300 TABLET ORAL DAILY
Status: DISCONTINUED | OUTPATIENT
Start: 2022-02-28 | End: 2022-03-01

## 2022-02-28 RX ORDER — FLUOXETINE HYDROCHLORIDE 20 MG/1
20 CAPSULE ORAL DAILY
Status: DISCONTINUED | OUTPATIENT
Start: 2022-02-28 | End: 2022-03-01

## 2022-02-28 RX ORDER — AMLODIPINE BESYLATE 5 MG/1
5 TABLET ORAL DAILY
Status: DISCONTINUED | OUTPATIENT
Start: 2022-02-28 | End: 2022-03-01

## 2022-02-28 RX ORDER — TOPIRAMATE 25 MG/1
25 TABLET ORAL 2 TIMES DAILY
Status: DISCONTINUED | OUTPATIENT
Start: 2022-02-28 | End: 2022-03-01

## 2022-02-28 NOTE — CM/SW NOTE
CM spoke to patient to follow up for discharge planning, EMMA choice list given. [facility] chosen for discharge, facility reserved and notified in [facility]. Insurance Authorization for EMMA is pending. NNEKA/MARIEL to follow up to finalize discharge.       Anthony Solorzano, RN Case Manager [phone]

## 2022-02-28 NOTE — PLAN OF CARE
Patient is alert and oriented. On room air. Abdomen is soft/ rounded. Patient denies nausea. Patient states passing gas. Diet advanced to low fiber. Voids via purewick. TPN discontinued. PCA Dilaudid discontinued. Midline with staples- c/d/I. SERJIO drain x2 with serosanguinous output. PICC line on left arm. Plan of care updated with patient. Patient verbalized understanding. Problem: PAIN - ADULT  Goal: Verbalizes/displays adequate comfort level or patient's stated pain goal  Description: INTERVENTIONS:  - Encourage pt to monitor pain and request assistance  - Assess pain using appropriate pain scale  - Administer analgesics based on type and severity of pain and evaluate response  - Implement non-pharmacological measures as appropriate and evaluate response  - Consider cultural and social influences on pain and pain management  - Manage/alleviate anxiety  - Utilize distraction and/or relaxation techniques  - Monitor for opioid side effects  - Notify MD/LIP if interventions unsuccessful or patient reports new pain  - Anticipate increased pain with activity and pre-medicate as appropriate  Outcome: Progressing     Problem: SAFETY ADULT - FALL  Goal: Free from fall injury  Description: INTERVENTIONS:  - Assess pt frequently for physical needs  - Identify cognitive and physical deficits and behaviors that affect risk of falls.   - [place] fall precautions as indicated by assessment.  - Educate pt/family on patient safety including physical limitations  - Instruct pt to call for assistance with activity based on assessment  - Modify environment to reduce risk of injury  - Provide assistive devices as appropriate  - Consider OT/PT consult to assist with strengthening/mobility  - Encourage toileting schedule  Outcome: Progressing     Problem: GASTROINTESTINAL - ADULT  Goal: Minimal or absence of nausea and vomiting  Description: INTERVENTIONS:  - Maintain adequate hydration with IV or PO as ordered and tolerated  - Nasogastric tube to low intermittent suction as ordered  - Evaluate effectiveness of ordered antiemetic medications  - Provide nonpharmacologic comfort measures as appropriate  - Advance diet as tolerated, if ordered  - Obtain nutritional consult as needed  - Evaluate fluid balance  Outcome: Progressing     Problem: METABOLIC/FLUID AND ELECTROLYTES - ADULT  Goal: Electrolytes maintained within normal limits  Description: INTERVENTIONS:  - Monitor labs and rhythm and assess patient for signs and symptoms of electrolyte imbalances  - Administer electrolyte replacement as ordered  - Monitor response to electrolyte replacements, including rhythm and repeat lab results as appropriate  - Fluid restriction as ordered  - Instruct patient on fluid and nutrition restrictions as appropriate  Outcome: Progressing     Problem: Patient/Family Goals  Goal: Patient/Family Long Term Goal  Description: Patient's Long Term Goal: Discharge Home    Interventions:  - Pain Tolerance  -Diet Tolerance  - See additional Care Plan goals for specific interventions  Outcome: Progressing  Goal: Patient/Family Short Term Goal  Description: Patient's Short Term Goal: Prepare to Discharge Home    Interventions:   - Transition from IV to oral pain medicine   -Advance diet as tolerated   - See additional Care Plan goals for specific interventions  Outcome: Progressing     Problem: Diabetes/Glucose Control  Goal: Glucose maintained within prescribed range  Description: INTERVENTIONS:  - Monitor Blood Glucose as ordered  - Assess for signs and symptoms of hyperglycemia and hypoglycemia  - Administer ordered medications to maintain glucose within target range  - Assess barriers to adequate nutritional intake and initiate nutrition consult as needed  - Instruct patient on self management of diabetes  Outcome: Progressing

## 2022-02-28 NOTE — PLAN OF CARE
Pt alert and orientedx4. Room air. Pulse Ox. Encouraged to use IS. SOB w/ exertion. Tele- NSR. On lovenox. Full liquid diet and tolerating. TPN running @ 100cc. Last bowel movement was today. Passing gas. Abdomen soft, rounded. Midline incision with praneeth. R and L SERJIO drains with SS output. Incontinent of bowel and bladder. Up w/ assist and walker. Uses bedside commode. IV abx. Dilaudid PCA used for pain management. POC discussed with patient. Call light within reach. Will continue to monitor. Problem: PAIN - ADULT  Goal: Verbalizes/displays adequate comfort level or patient's stated pain goal  Description: INTERVENTIONS:  - Encourage pt to monitor pain and request assistance  - Assess pain using appropriate pain scale  - Administer analgesics based on type and severity of pain and evaluate response  - Implement non-pharmacological measures as appropriate and evaluate response  - Consider cultural and social influences on pain and pain management  - Manage/alleviate anxiety  - Utilize distraction and/or relaxation techniques  - Monitor for opioid side effects  - Notify MD/LIP if interventions unsuccessful or patient reports new pain  - Anticipate increased pain with activity and pre-medicate as appropriate  Outcome: Progressing     Problem: SAFETY ADULT - FALL  Goal: Free from fall injury  Description: INTERVENTIONS:  - Assess pt frequently for physical needs  - Identify cognitive and physical deficits and behaviors that affect risk of falls.   - [place] fall precautions as indicated by assessment.  - Educate pt/family on patient safety including physical limitations  - Instruct pt to call for assistance with activity based on assessment  - Modify environment to reduce risk of injury  - Provide assistive devices as appropriate  - Consider OT/PT consult to assist with strengthening/mobility  - Encourage toileting schedule  Outcome: Progressing     Problem: GASTROINTESTINAL - ADULT  Goal: Minimal or absence of nausea and vomiting  Description: INTERVENTIONS:  - Maintain adequate hydration with IV or PO as ordered and tolerated  - Nasogastric tube to low intermittent suction as ordered  - Evaluate effectiveness of ordered antiemetic medications  - Provide nonpharmacologic comfort measures as appropriate  - Advance diet as tolerated, if ordered  - Obtain nutritional consult as needed  - Evaluate fluid balance  Outcome: Progressing     Problem: METABOLIC/FLUID AND ELECTROLYTES - ADULT  Goal: Electrolytes maintained within normal limits  Description: INTERVENTIONS:  - Monitor labs and rhythm and assess patient for signs and symptoms of electrolyte imbalances  - Administer electrolyte replacement as ordered  - Monitor response to electrolyte replacements, including rhythm and repeat lab results as appropriate  - Fluid restriction as ordered  - Instruct patient on fluid and nutrition restrictions as appropriate  Outcome: Progressing     Problem: Patient/Family Goals  Goal: Patient/Family Long Term Goal  Description: Patient's Long Term Goal: to go home    Interventions:  - tolerate diet  - IV abx  - pain management  -ambulate  - See additional Care Plan goals for specific interventions  Outcome: Progressing  Goal: Patient/Family Short Term Goal  Description: Patient's Short Term Goal: pain management    Interventions:   - rate pain 0 out of 10  - use PCA as needed for pain  - See additional Care Plan goals for specific interventions  Outcome: Progressing     Problem: Diabetes/Glucose Control  Goal: Glucose maintained within prescribed range  Description: INTERVENTIONS:  - Monitor Blood Glucose as ordered  - Assess for signs and symptoms of hyperglycemia and hypoglycemia  - Administer ordered medications to maintain glucose within target range  - Assess barriers to adequate nutritional intake and initiate nutrition consult as needed  - Instruct patient on self management of diabetes  Outcome: Progressing

## 2022-03-01 VITALS
HEART RATE: 72 BPM | RESPIRATION RATE: 18 BRPM | SYSTOLIC BLOOD PRESSURE: 125 MMHG | WEIGHT: 290.69 LBS | HEIGHT: 63 IN | BODY MASS INDEX: 51.5 KG/M2 | OXYGEN SATURATION: 96 % | TEMPERATURE: 98 F | DIASTOLIC BLOOD PRESSURE: 75 MMHG

## 2022-03-01 LAB
ALBUMIN SERPL-MCNC: 2.1 G/DL (ref 3.4–5)
ALBUMIN/GLOB SERPL: 0.5 {RATIO} (ref 1–2)
ALP LIVER SERPL-CCNC: 82 U/L
ALT SERPL-CCNC: 32 U/L
ANION GAP SERPL CALC-SCNC: 5 MMOL/L (ref 0–18)
AST SERPL-CCNC: 33 U/L (ref 15–37)
BASOPHILS # BLD AUTO: 0.07 X10(3) UL (ref 0–0.2)
BASOPHILS NFR BLD AUTO: 0.6 %
BILIRUB SERPL-MCNC: 0.4 MG/DL (ref 0.1–2)
BUN BLD-MCNC: 8 MG/DL (ref 7–18)
CALCIUM BLD-MCNC: 8.8 MG/DL (ref 8.5–10.1)
CHLORIDE SERPL-SCNC: 106 MMOL/L (ref 98–112)
CO2 SERPL-SCNC: 28 MMOL/L (ref 21–32)
CREAT BLD-MCNC: 0.62 MG/DL
EOSINOPHIL # BLD AUTO: 0.51 X10(3) UL (ref 0–0.7)
EOSINOPHIL NFR BLD AUTO: 4.7 %
GLOBULIN PLAS-MCNC: 4.5 G/DL (ref 2.8–4.4)
GLUCOSE BLD-MCNC: 108 MG/DL (ref 70–99)
GLUCOSE BLD-MCNC: 91 MG/DL (ref 70–99)
GLUCOSE BLD-MCNC: 94 MG/DL (ref 70–99)
GLUCOSE BLD-MCNC: 97 MG/DL (ref 70–99)
HCT VFR BLD AUTO: 26.2 %
HGB BLD-MCNC: 8 G/DL
IMM GRANULOCYTES # BLD AUTO: 0.24 X10(3) UL (ref 0–1)
IMM GRANULOCYTES NFR BLD: 2.2 %
LYMPHOCYTES # BLD AUTO: 2.61 X10(3) UL (ref 1–4)
LYMPHOCYTES NFR BLD AUTO: 23.9 %
MCH RBC QN AUTO: 28.5 PG (ref 26–34)
MCHC RBC AUTO-ENTMCNC: 30.5 G/DL (ref 31–37)
MCV RBC AUTO: 93.2 FL
MONOCYTES # BLD AUTO: 0.61 X10(3) UL (ref 0.1–1)
MONOCYTES NFR BLD AUTO: 5.6 %
NEUTROPHILS # BLD AUTO: 6.86 X10 (3) UL (ref 1.5–7.7)
NEUTROPHILS # BLD AUTO: 6.86 X10(3) UL (ref 1.5–7.7)
NEUTROPHILS NFR BLD AUTO: 63 %
OSMOLALITY SERPL CALC.SUM OF ELEC: 286 MOSM/KG (ref 275–295)
PHOSPHATE SERPL-MCNC: 4.9 MG/DL (ref 2.5–4.9)
PLATELET # BLD AUTO: 352 10(3)UL (ref 150–450)
POTASSIUM SERPL-SCNC: 4.3 MMOL/L (ref 3.5–5.1)
PROT SERPL-MCNC: 6.6 G/DL (ref 6.4–8.2)
RBC # BLD AUTO: 2.81 X10(6)UL
SODIUM SERPL-SCNC: 139 MMOL/L (ref 136–145)
WBC # BLD AUTO: 10.9 X10(3) UL (ref 4–11)

## 2022-03-01 PROCEDURE — 80053 COMPREHEN METABOLIC PANEL: CPT

## 2022-03-01 PROCEDURE — 84100 ASSAY OF PHOSPHORUS: CPT

## 2022-03-01 PROCEDURE — 85025 COMPLETE CBC W/AUTO DIFF WBC: CPT | Performed by: HOSPITALIST

## 2022-03-01 PROCEDURE — 97530 THERAPEUTIC ACTIVITIES: CPT

## 2022-03-01 PROCEDURE — 82962 GLUCOSE BLOOD TEST: CPT

## 2022-03-01 RX ORDER — ENOXAPARIN SODIUM 100 MG/ML
0.5 INJECTION SUBCUTANEOUS EVERY 24 HOURS
Qty: 19.8 ML | Refills: 0 | Status: SHIPPED | OUTPATIENT
Start: 2022-03-01 | End: 2022-03-21

## 2022-03-01 RX ORDER — PIPERACILLIN SODIUM, TAZOBACTAM SODIUM 4; .5 G/20ML; G/20ML
4.5 INJECTION, POWDER, LYOPHILIZED, FOR SOLUTION INTRAVENOUS EVERY 8 HOURS
Qty: 94.5 G | Refills: 0 | Status: SHIPPED | OUTPATIENT
Start: 2022-03-01 | End: 2022-03-08

## 2022-03-01 RX ORDER — HYDROCODONE BITARTRATE AND ACETAMINOPHEN 5; 325 MG/1; MG/1
1 TABLET ORAL EVERY 4 HOURS PRN
Qty: 20 TABLET | Refills: 0 | Status: SHIPPED | OUTPATIENT
Start: 2022-03-01 | End: 2022-03-11

## 2022-03-01 NOTE — CM/SW NOTE
03/01/22 1000   Discharge disposition   Expected discharge disposition [facility] Provider Other  ([facility])   Discharge transportation [employer] from [facility] at the [address], they have insurance auth and can admit patient today. RN to call report to  [phone]. [facility] [phone] has been requested to arrange ambulance for  time of 5pm. PCS form completed. Call to patient to inform, spoke with patient who will inform spouse at bedside.      Earline Sheth, RN,   [phone]

## 2022-03-01 NOTE — CM/SW NOTE
Dr Tiffani Romero informed CM patient will need to continue IV abx at [address]. CM sent MD notes/DC summary,  IV abx order, and PICC info to [facility] [address] referral in [facility]. AVS was not available yet.  &  to remain available and supportive for discharge planning needs.     Jonelle Branham, RN Case Manager [phone]

## 2022-03-01 NOTE — PHYSICAL THERAPY NOTE
PHYSICAL THERAPY TREATMENT NOTE - INPATIENT    Room Number: 310/310-A     Session: 1     Number of Visits to Meet Established Goals: 5    Presenting Problem:  (abdominal pain, generalized)   History related to current admission: Patient is a 59year old female admitted on 2/14/2022 from home for abdominal pain. PMH including anxiety, asthma, cva, depression, DM, GERD, HTN, Hyperlipedemia, APRIL , with complaints of abdominal pain and SOB.     2/22/22  Procedure Performed: EXPLORATORY LAPAROTOMY, LYSIS OF ADHESIONS, DRAINAGE ON INTRA ABDOMINAL PHLEGMON and abscess     Surgeon: Jordan Boone MD       ASSESSMENT     Pt improving toward therapy goals. Pt currently improved with balance and functional mobility skills. Pt currently min assist for sit to stand to RW. Pt requires min assist for amb with RW short distances up to 5 feet this session, Pt continues to present with increase pain, decrease balance, decrease strength, and decrease functional mobility skills. Pt may benefit from continued PT while in inpt setting to address above deficits and facilitate return to highest level of functional independence. DISCHARGE RECOMMENDATIONS  PT Discharge Recommendations: Sub-acute rehabilitation     PLAN  PT Treatment Plan: Bed mobility; Body mechanics; Endurance; Energy conservation;Patient education; Family education;Gait training;Range of motion;Strengthening;Stair training;Transfer training;Balance training  Rehab Potential : Fair  Frequency (Obs):  (2-3x/wk)    CURRENT GOALS     Goal #1 Patient is able to demonstrate supine - sit EOB @ level: minimum assistance      Goal #2 Patient is able to demonstrate transfers Sit to/from Stand at assistance level: minimum assistance-MET 3/1/22   UPDATED GOAL:  CGA   Goal #3 Patient is able to ambulate 10 feet with assist device: walker - rolling at assistance level: minimal assist.    Goal #4     Goal #5     Goal #6     Goal Comments: Goals established on 2/25/2022, updated goal #2 3/1/22      3/1/2022 all goals ongoing      SUBJECTIVE  \"I'm trying to do more things myself. \"    OBJECTIVE  Precautions: Drain(s)    WEIGHT BEARING RESTRICTION  Weight Bearing Restriction: None                PAIN ASSESSMENT   Rating: Unable to rate  Location: IV site  Management Techniques: Repositioning    BALANCE                                                                                                                       Static Sitting: Fair  Dynamic Sitting: Fair -           Static Standing: Fair -  Dynamic Standing: Poor +    ACTIVITY TOLERANCE                         O2 WALK         AM-PAC '6-Clicks' INPATIENT SHORT FORM - BASIC MOBILITY  How much difficulty does the patient currently have. .. Patient Difficulty: Turning over in bed (including adjusting bedclothes, sheets and blankets)?: A Lot   Patient Difficulty: Sitting down on and standing up from a chair with arms (e.g., wheelchair, bedside commode, etc.): A Little   Patient Difficulty: Moving from lying on back to sitting on the side of the bed?: Unable   How much help from another person does the patient currently need. ..    Help from Another: Moving to and from a bed to a chair (including a wheelchair)?: A Little   Help from Another: Need to walk in hospital room?: A Lot   Help from Another: Climbing 3-5 steps with a railing?: Total       AM-PAC Score:  Raw Score: 12   Approx Degree of Impairment: 68.66%   Standardized Score (AM-PAC Scale): 35.33   CMS Modifier (G-Code): CL    FUNCTIONAL ABILITY STATUS  Gait Assessment   Functional Mobility/Gait Assessment  Gait Assistance: Minimum assistance  Distance (ft): 3,5  Assistive Device: Rolling walker  Pattern: Shuffle    Skilled Therapy Provided    Bed Mobility:  Rolling right: not tested   Rolling left: supervision    Supine<>Sit: min assist with HOB elevated   Sit<>Supine: not tested     Transfer Mobility:  Sit<>Stand: min assist   Stand<>Sit: min assist   Gait: pt amb 3 and 5 feet with RW with min assist.    Therapist's Comments: per RN pt ok to be seen. Pt received in bed and agreeable to therapy to get onto commode then chair for lunch. Pt educated on goals and course of PT session. Pt denies pain at rest.  Pt denies any significant dizziness during session. Pt supine to sit with HOB elevated to 75 degrees with min assist.  Pt sit to stand from EOB with bed height slightly elevated to RW with min assist.  Pt amb x 3 feet with RW and pivots to commode. Pt requires total assist with pericare. Pt able to pull up brief with assist.  Pt sit to stand from commode CGA to RW. Pt amb x 5 feet with RW and pivots to BSC min assist.  Pt seated in BSC with Min assist.  Pt had increase pain at IV site in sitting. Pt repositioned L UE and resolved pain. Pt educated on HEP for exercise in sitting and activity recommendations. Pt left sitting in [facility] with PCT in room attending to pt needs,  in room and RN aware of session. THERAPEUTIC EXERCISES  Lower Extremity Alternating marching  Ankle pumps  LAQ     Upper Extremity n/a     Position Sitting     Repetitions   10   Sets   1-2     Patient End of Session: Up in chair; With [address] staff;Needs met;Call light within reach;RN aware of session/findings; All patient questions and concerns addressed; Family present    Therapist PPE: Mask, gloves and goggles were worn. Patient/Family PPE: Mask not worn by pt, worn by  throughout session when he was in the room.

## 2022-03-01 NOTE — PLAN OF CARE
Patient is alert and oriented. On room air. Patient states SOB when resting. Encouraged patient to use IS. Abdomen is soft/ rounded. Patient states passing gas. Patient denies nausea. Midline incision closed with staples- c/d/I. SERJIO drain x2. Left drain with serous output. Right drain with serosanguinous output. Patient voids freely. Urine output has decreased. Saline locked. Patient c/o back pain. Norco and heat packs given. Patient is up with one person assist. Pending insurance authorization for discharge. Plan of care updated with patient. Patient verbalized understanding. Problem: PAIN - ADULT  Goal: Verbalizes/displays adequate comfort level or patient's stated pain goal  Description: INTERVENTIONS:  - Encourage pt to monitor pain and request assistance  - Assess pain using appropriate pain scale  - Administer analgesics based on type and severity of pain and evaluate response  - Implement non-pharmacological measures as appropriate and evaluate response  - Consider cultural and social influences on pain and pain management  - Manage/alleviate anxiety  - Utilize distraction and/or relaxation techniques  - Monitor for opioid side effects  - Notify MD/LIP if interventions unsuccessful or patient reports new pain  - Anticipate increased pain with activity and pre-medicate as appropriate  Outcome: Progressing     Problem: SAFETY ADULT - FALL  Goal: Free from fall injury  Description: INTERVENTIONS:  - Assess pt frequently for physical needs  - Identify cognitive and physical deficits and behaviors that affect risk of falls.   - [place] fall precautions as indicated by assessment.  - Educate pt/family on patient safety including physical limitations  - Instruct pt to call for assistance with activity based on assessment  - Modify environment to reduce risk of injury  - Provide assistive devices as appropriate  - Consider OT/PT consult to assist with strengthening/mobility  - Encourage toileting schedule  Outcome: Progressing     Problem: GASTROINTESTINAL - ADULT  Goal: Minimal or absence of nausea and vomiting  Description: INTERVENTIONS:  - Maintain adequate hydration with IV or PO as ordered and tolerated  - Nasogastric tube to low intermittent suction as ordered  - Evaluate effectiveness of ordered antiemetic medications  - Provide nonpharmacologic comfort measures as appropriate  - Advance diet as tolerated, if ordered  - Obtain nutritional consult as needed  - Evaluate fluid balance  Outcome: Progressing     Problem: METABOLIC/FLUID AND ELECTROLYTES - ADULT  Goal: Electrolytes maintained within normal limits  Description: INTERVENTIONS:  - Monitor labs and rhythm and assess patient for signs and symptoms of electrolyte imbalances  - Administer electrolyte replacement as ordered  - Monitor response to electrolyte replacements, including rhythm and repeat lab results as appropriate  - Fluid restriction as ordered  - Instruct patient on fluid and nutrition restrictions as appropriate  Outcome: Progressing     Problem: Patient/Family Goals  Goal: Patient/Family Long Term Goal  Description: Patient's Long Term Goal: Discharge Home    Interventions:  - Pain Tolerance  -Diet Tolerance  - See additional Care Plan goals for specific interventions  Outcome: Progressing  Goal: Patient/Family Short Term Goal  Description: Patient's Short Term Goal: Prepare to Discharge Home    Interventions:   - Transition from IV to oral pain medicine   -Advance diet as tolerated   - See additional Care Plan goals for specific interventions  Outcome: Progressing     Problem: Diabetes/Glucose Control  Goal: Glucose maintained within prescribed range  Description: INTERVENTIONS:  - Monitor Blood Glucose as ordered  - Assess for signs and symptoms of hyperglycemia and hypoglycemia  - Administer ordered medications to maintain glucose within target range  - Assess barriers to adequate nutritional intake and initiate nutrition consult as needed  - Instruct patient on self management of diabetes  Outcome: Progressing

## 2022-03-01 NOTE — PLAN OF CARE
Pt alert and orientedx4. Seizure precautions. Generalized edema. Room air. Pulse Ox. Encouraged to use IS. SOB w/ exertion. Tele- NSR. IV abx. On lovenox. Low fiber/soft diet. Passing gas. Having bowel movements. Abdomen soft, rounded. Bowel sounds active. Midline incision with staples, open to air. No drainage noted. Right and Left SERJIO drains with SS output. Nocro given for pain management. Hot packs given for back pain. Up w/ assist and walker. Denies N/V. Purewick in place. POC discussed with patient. Call light within reach. Will continue to monitor. Problem: PAIN - ADULT  Goal: Verbalizes/displays adequate comfort level or patient's stated pain goal  Description: INTERVENTIONS:  - Encourage pt to monitor pain and request assistance  - Assess pain using appropriate pain scale  - Administer analgesics based on type and severity of pain and evaluate response  - Implement non-pharmacological measures as appropriate and evaluate response  - Consider cultural and social influences on pain and pain management  - Manage/alleviate anxiety  - Utilize distraction and/or relaxation techniques  - Monitor for opioid side effects  - Notify MD/LIP if interventions unsuccessful or patient reports new pain  - Anticipate increased pain with activity and pre-medicate as appropriate  Outcome: Progressing     Problem: SAFETY ADULT - FALL  Goal: Free from fall injury  Description: INTERVENTIONS:  - Assess pt frequently for physical needs  - Identify cognitive and physical deficits and behaviors that affect risk of falls.   - [place] fall precautions as indicated by assessment.  - Educate pt/family on patient safety including physical limitations  - Instruct pt to call for assistance with activity based on assessment  - Modify environment to reduce risk of injury  - Provide assistive devices as appropriate  - Consider OT/PT consult to assist with strengthening/mobility  - Encourage toileting schedule  Outcome: Progressing Problem: GASTROINTESTINAL - ADULT  Goal: Minimal or absence of nausea and vomiting  Description: INTERVENTIONS:  - Maintain adequate hydration with IV or PO as ordered and tolerated  - Nasogastric tube to low intermittent suction as ordered  - Evaluate effectiveness of ordered antiemetic medications  - Provide nonpharmacologic comfort measures as appropriate  - Advance diet as tolerated, if ordered  - Obtain nutritional consult as needed  - Evaluate fluid balance  Outcome: Progressing     Problem: METABOLIC/FLUID AND ELECTROLYTES - ADULT  Goal: Electrolytes maintained within normal limits  Description: INTERVENTIONS:  - Monitor labs and rhythm and assess patient for signs and symptoms of electrolyte imbalances  - Administer electrolyte replacement as ordered  - Monitor response to electrolyte replacements, including rhythm and repeat lab results as appropriate  - Fluid restriction as ordered  - Instruct patient on fluid and nutrition restrictions as appropriate  Outcome: Progressing     Problem: Patient/Family Goals  Goal: Patient/Family Long Term Goal  Description: Patient's Long Term Goal: Discharge Home    Interventions:  - Pain Tolerance  -Diet Tolerance  - See additional Care Plan goals for specific interventions  Outcome: Progressing  Goal: Patient/Family Short Term Goal  Description: Patient's Short Term Goal: Prepare to Discharge Home    Interventions:   - Transition from IV to oral pain medicine   -Advance diet as tolerated   - See additional Care Plan goals for specific interventions  Outcome: Progressing     Problem: Diabetes/Glucose Control  Goal: Glucose maintained within prescribed range  Description: INTERVENTIONS:  - Monitor Blood Glucose as ordered  - Assess for signs and symptoms of hyperglycemia and hypoglycemia  - Administer ordered medications to maintain glucose within target range  - Assess barriers to adequate nutritional intake and initiate nutrition consult as needed  - Instruct patient on self management of diabetes  Outcome: Progressing

## 2022-03-01 NOTE — CM/SW NOTE
Message to Nadira Mayfield to check status of insurance auth, they will confirm and call back. Patient is clear for discharge today. Spoke with patient re: dc plan, to [facility] pending insurance auth, ?today, patient agreeable.      Earline Sheth, RN,   [id number]

## 2022-03-02 NOTE — PROGRESS NOTES
NURSING DISCHARGE NOTE    Discharged Rehab facility via Ambulance. Accompanied by Spouse  Belongings Taken by patient/family. IV taken out. Left picc line remained in place. Report given to Sav Fields RN at [facility]. RN aware of patient recent's vitals, ROS, prescriptions, patient's incision, SERJIO drains x2, f/u appointments, diet order. Reviewed drain care with the patient. Patient taken via ambulance.

## 2022-03-07 PROBLEM — R53.1 WEAKNESS: Status: ACTIVE | Noted: 2022-03-07

## 2022-03-07 PROBLEM — K25.1: Status: ACTIVE | Noted: 2022-03-07

## 2022-03-07 PROBLEM — K56.609 INTESTINAL OBSTRUCTION, UNSPECIFIED CAUSE, UNSPECIFIED WHETHER PARTIAL OR COMPLETE (HCC): Status: ACTIVE | Noted: 2022-02-14

## 2022-03-14 ENCOUNTER — APPOINTMENT (OUTPATIENT)
Dept: CT IMAGING | Facility: HOSPITAL | Age: 62
End: 2022-03-14
Attending: EMERGENCY MEDICINE
Payer: MEDICARE

## 2022-03-14 ENCOUNTER — HOSPITAL ENCOUNTER (EMERGENCY)
Facility: HOSPITAL | Age: 62
Discharge: NURSING, REHAB OR LONG-TERM CARE | End: 2022-03-14
Attending: EMERGENCY MEDICINE
Payer: MEDICARE

## 2022-03-14 VITALS
WEIGHT: 260 LBS | SYSTOLIC BLOOD PRESSURE: 106 MMHG | DIASTOLIC BLOOD PRESSURE: 53 MMHG | HEIGHT: 63 IN | RESPIRATION RATE: 16 BRPM | TEMPERATURE: 99 F | HEART RATE: 76 BPM | OXYGEN SATURATION: 97 % | BODY MASS INDEX: 46.07 KG/M2

## 2022-03-14 DIAGNOSIS — I82.622 ACUTE DEEP VEIN THROMBOSIS (DVT) OF LEFT UPPER EXTREMITY, UNSPECIFIED VEIN (HCC): Primary | ICD-10-CM

## 2022-03-14 LAB
ALBUMIN SERPL-MCNC: 2.7 G/DL (ref 3.4–5)
ALBUMIN/GLOB SERPL: 0.7 {RATIO} (ref 1–2)
ALP LIVER SERPL-CCNC: 80 U/L
ALT SERPL-CCNC: 15 U/L
ANION GAP SERPL CALC-SCNC: 2 MMOL/L (ref 0–18)
APTT PPP: 33.6 SECONDS (ref 23.3–35.6)
AST SERPL-CCNC: 22 U/L (ref 15–37)
ATRIAL RATE: 67 BPM
BASOPHILS # BLD AUTO: 0.04 X10(3) UL (ref 0–0.2)
BASOPHILS NFR BLD AUTO: 0.6 %
BILIRUB SERPL-MCNC: 0.4 MG/DL (ref 0.1–2)
BUN BLD-MCNC: 8 MG/DL (ref 7–18)
CALCIUM BLD-MCNC: 8.8 MG/DL (ref 8.5–10.1)
CHLORIDE SERPL-SCNC: 112 MMOL/L (ref 98–112)
CO2 SERPL-SCNC: 26 MMOL/L (ref 21–32)
CREAT BLD-MCNC: 0.56 MG/DL
EOSINOPHIL # BLD AUTO: 0.39 X10(3) UL (ref 0–0.7)
ERYTHROCYTE [DISTWIDTH] IN BLOOD BY AUTOMATED COUNT: 15.6 %
GLOBULIN PLAS-MCNC: 3.9 G/DL (ref 2.8–4.4)
GLUCOSE BLD-MCNC: 103 MG/DL (ref 70–99)
HCT VFR BLD AUTO: 27.1 %
HGB BLD-MCNC: 7.9 G/DL
IMM GRANULOCYTES # BLD AUTO: 0.02 X10(3) UL (ref 0–1)
IMM GRANULOCYTES NFR BLD: 0.3 %
INR BLD: 1.34 (ref 0.8–1.2)
LYMPHOCYTES # BLD AUTO: 1.79 X10(3) UL (ref 1–4)
LYMPHOCYTES NFR BLD AUTO: 27.8 %
MCH RBC QN AUTO: 26.2 PG (ref 26–34)
MCHC RBC AUTO-ENTMCNC: 29.2 G/DL (ref 31–37)
MCV RBC AUTO: 90 FL
MONOCYTES # BLD AUTO: 0.45 X10(3) UL (ref 0.1–1)
MONOCYTES NFR BLD AUTO: 7 %
NEUTROPHILS # BLD AUTO: 3.76 X10 (3) UL (ref 1.5–7.7)
NEUTROPHILS # BLD AUTO: 3.76 X10(3) UL (ref 1.5–7.7)
NEUTROPHILS NFR BLD AUTO: 58.3 %
OSMOLALITY SERPL CALC.SUM OF ELEC: 289 MOSM/KG (ref 275–295)
P AXIS: 46 DEGREES
P-R INTERVAL: 148 MS
PLATELET # BLD AUTO: 324 10(3)UL (ref 150–450)
POTASSIUM SERPL-SCNC: 3.7 MMOL/L (ref 3.5–5.1)
PROT SERPL-MCNC: 6.6 G/DL (ref 6.4–8.2)
PROTHROMBIN TIME: 16.6 SECONDS (ref 11.6–14.8)
Q-T INTERVAL: 400 MS
QRS DURATION: 92 MS
QTC CALCULATION (BEZET): 422 MS
R AXIS: -15 DEGREES
RBC # BLD AUTO: 3.01 X10(6)UL
SODIUM SERPL-SCNC: 140 MMOL/L (ref 136–145)
T AXIS: 34 DEGREES
VENTRICULAR RATE: 67 BPM
WBC # BLD AUTO: 6.5 X10(3) UL (ref 4–11)

## 2022-03-14 PROCEDURE — 93010 ELECTROCARDIOGRAM REPORT: CPT

## 2022-03-14 PROCEDURE — 85025 COMPLETE CBC W/AUTO DIFF WBC: CPT | Performed by: EMERGENCY MEDICINE

## 2022-03-14 PROCEDURE — 96375 TX/PRO/DX INJ NEW DRUG ADDON: CPT

## 2022-03-14 PROCEDURE — 74178 CT ABD&PLV WO CNTR FLWD CNTR: CPT | Performed by: EMERGENCY MEDICINE

## 2022-03-14 PROCEDURE — 85610 PROTHROMBIN TIME: CPT | Performed by: EMERGENCY MEDICINE

## 2022-03-14 PROCEDURE — 85730 THROMBOPLASTIN TIME PARTIAL: CPT | Performed by: EMERGENCY MEDICINE

## 2022-03-14 PROCEDURE — 71275 CT ANGIOGRAPHY CHEST: CPT | Performed by: EMERGENCY MEDICINE

## 2022-03-14 PROCEDURE — 99285 EMERGENCY DEPT VISIT HI MDM: CPT

## 2022-03-14 PROCEDURE — 80053 COMPREHEN METABOLIC PANEL: CPT | Performed by: EMERGENCY MEDICINE

## 2022-03-14 PROCEDURE — 96374 THER/PROPH/DIAG INJ IV PUSH: CPT

## 2022-03-14 PROCEDURE — 93005 ELECTROCARDIOGRAM TRACING: CPT

## 2022-03-14 RX ORDER — HYDROMORPHONE HYDROCHLORIDE 1 MG/ML
0.5 INJECTION, SOLUTION INTRAMUSCULAR; INTRAVENOUS; SUBCUTANEOUS EVERY 30 MIN PRN
Status: DISCONTINUED | OUTPATIENT
Start: 2022-03-14 | End: 2022-03-14

## 2022-03-14 RX ORDER — ONDANSETRON 2 MG/ML
4 INJECTION INTRAMUSCULAR; INTRAVENOUS ONCE
Status: COMPLETED | OUTPATIENT
Start: 2022-03-14 | End: 2022-03-14

## 2022-03-14 RX ORDER — ONDANSETRON 4 MG/1
4 TABLET, ORALLY DISINTEGRATING ORAL EVERY 4 HOURS PRN
Qty: 10 TABLET | Refills: 0 | Status: SHIPPED | OUTPATIENT
Start: 2022-03-14 | End: 2022-03-21

## 2022-03-14 RX ORDER — IOHEXOL 350 MG/ML
100 INJECTION, SOLUTION INTRAVENOUS
Status: COMPLETED | OUTPATIENT
Start: 2022-03-14 | End: 2022-03-14

## 2022-03-14 RX ORDER — OXYCODONE AND ACETAMINOPHEN 7.5; 325 MG/1; MG/1
1 TABLET ORAL EVERY 6 HOURS PRN
Qty: 10 TABLET | Refills: 0 | Status: SHIPPED | OUTPATIENT
Start: 2022-03-14 | End: 2022-03-19

## 2022-03-14 RX ORDER — FLUTICASONE FUROATE AND VILANTEROL TRIFENATATE 100; 25 UG/1; UG/1
1 POWDER RESPIRATORY (INHALATION) DAILY
COMMUNITY

## 2022-04-05 ENCOUNTER — APPOINTMENT (OUTPATIENT)
Dept: CT IMAGING | Facility: HOSPITAL | Age: 62
End: 2022-04-05
Attending: EMERGENCY MEDICINE
Payer: MEDICARE

## 2022-04-05 ENCOUNTER — HOSPITAL ENCOUNTER (INPATIENT)
Facility: HOSPITAL | Age: 62
LOS: 6 days | Discharge: HOME | End: 2022-04-12
Attending: EMERGENCY MEDICINE | Admitting: HOSPITALIST
Payer: MEDICARE

## 2022-04-05 ENCOUNTER — APPOINTMENT (OUTPATIENT)
Dept: GENERAL RADIOLOGY | Facility: HOSPITAL | Age: 62
End: 2022-04-05
Attending: EMERGENCY MEDICINE
Payer: MEDICARE

## 2022-04-05 DIAGNOSIS — T81.43XA POSTOPERATIVE INTRA-ABDOMINAL ABSCESS: Primary | ICD-10-CM

## 2022-04-05 PROBLEM — D72.829 LEUKOCYTOSIS: Status: ACTIVE | Noted: 2022-04-05

## 2022-04-05 PROBLEM — D64.9 ANEMIA: Status: ACTIVE | Noted: 2022-04-05

## 2022-04-05 PROBLEM — K65.1 POSTOPERATIVE INTRA-ABDOMINAL ABSCESS (HCC): Status: ACTIVE | Noted: 2022-04-05

## 2022-04-05 LAB
ALBUMIN SERPL-MCNC: 3 G/DL (ref 3.4–5)
ALBUMIN/GLOB SERPL: 0.7 {RATIO} (ref 1–2)
ALP LIVER SERPL-CCNC: 107 U/L
ALT SERPL-CCNC: 15 U/L
ANION GAP SERPL CALC-SCNC: 6 MMOL/L (ref 0–18)
AST SERPL-CCNC: 14 U/L (ref 15–37)
BASOPHILS # BLD AUTO: 0.05 X10(3) UL (ref 0–0.2)
BASOPHILS NFR BLD AUTO: 0.4 %
BILIRUB SERPL-MCNC: 0.6 MG/DL (ref 0.1–2)
BUN BLD-MCNC: 10 MG/DL (ref 7–18)
CALCIUM BLD-MCNC: 9.1 MG/DL (ref 8.5–10.1)
CHLORIDE SERPL-SCNC: 105 MMOL/L (ref 98–112)
CO2 SERPL-SCNC: 25 MMOL/L (ref 21–32)
CREAT BLD-MCNC: 0.76 MG/DL
EOSINOPHIL # BLD AUTO: 0.07 X10(3) UL (ref 0–0.7)
EOSINOPHIL NFR BLD AUTO: 0.5 %
ERYTHROCYTE [DISTWIDTH] IN BLOOD BY AUTOMATED COUNT: 17.7 %
GLOBULIN PLAS-MCNC: 4.1 G/DL (ref 2.8–4.4)
GLUCOSE BLD-MCNC: 108 MG/DL (ref 70–99)
HCT VFR BLD AUTO: 29.8 %
HGB BLD-MCNC: 8.8 G/DL
IMM GRANULOCYTES # BLD AUTO: 0.07 X10(3) UL (ref 0–1)
IMM GRANULOCYTES NFR BLD: 0.5 %
LACTATE SERPL-SCNC: 0.9 MMOL/L (ref 0.4–2)
LYMPHOCYTES # BLD AUTO: 2.1 X10(3) UL (ref 1–4)
LYMPHOCYTES NFR BLD AUTO: 14.8 %
MCH RBC QN AUTO: 24.1 PG (ref 26–34)
MCHC RBC AUTO-ENTMCNC: 29.5 G/DL (ref 31–37)
MCV RBC AUTO: 81.6 FL
MONOCYTES # BLD AUTO: 1.34 X10(3) UL (ref 0.1–1)
MONOCYTES NFR BLD AUTO: 9.4 %
NEUTROPHILS # BLD AUTO: 10.57 X10 (3) UL (ref 1.5–7.7)
NEUTROPHILS # BLD AUTO: 10.57 X10(3) UL (ref 1.5–7.7)
NEUTROPHILS NFR BLD AUTO: 74.4 %
OSMOLALITY SERPL CALC.SUM OF ELEC: 282 MOSM/KG (ref 275–295)
PLATELET # BLD AUTO: 379 10(3)UL (ref 150–450)
POTASSIUM SERPL-SCNC: 3.7 MMOL/L (ref 3.5–5.1)
PROT SERPL-MCNC: 7.1 G/DL (ref 6.4–8.2)
RBC # BLD AUTO: 3.65 X10(6)UL
SARS-COV-2 RNA RESP QL NAA+PROBE: NOT DETECTED
SODIUM SERPL-SCNC: 136 MMOL/L (ref 136–145)
WBC # BLD AUTO: 14.2 X10(3) UL (ref 4–11)

## 2022-04-05 PROCEDURE — 93005 ELECTROCARDIOGRAM TRACING: CPT

## 2022-04-05 PROCEDURE — 87040 BLOOD CULTURE FOR BACTERIA: CPT | Performed by: EMERGENCY MEDICINE

## 2022-04-05 PROCEDURE — 71275 CT ANGIOGRAPHY CHEST: CPT | Performed by: EMERGENCY MEDICINE

## 2022-04-05 PROCEDURE — 96365 THER/PROPH/DIAG IV INF INIT: CPT | Performed by: EMERGENCY MEDICINE

## 2022-04-05 PROCEDURE — 93010 ELECTROCARDIOGRAM REPORT: CPT | Performed by: EMERGENCY MEDICINE

## 2022-04-05 PROCEDURE — 96376 TX/PRO/DX INJ SAME DRUG ADON: CPT | Performed by: EMERGENCY MEDICINE

## 2022-04-05 PROCEDURE — 99285 EMERGENCY DEPT VISIT HI MDM: CPT | Performed by: EMERGENCY MEDICINE

## 2022-04-05 PROCEDURE — 71045 X-RAY EXAM CHEST 1 VIEW: CPT | Performed by: EMERGENCY MEDICINE

## 2022-04-05 PROCEDURE — 83605 ASSAY OF LACTIC ACID: CPT | Performed by: EMERGENCY MEDICINE

## 2022-04-05 PROCEDURE — 96361 HYDRATE IV INFUSION ADD-ON: CPT | Performed by: EMERGENCY MEDICINE

## 2022-04-05 PROCEDURE — 80053 COMPREHEN METABOLIC PANEL: CPT | Performed by: EMERGENCY MEDICINE

## 2022-04-05 PROCEDURE — 96375 TX/PRO/DX INJ NEW DRUG ADDON: CPT | Performed by: EMERGENCY MEDICINE

## 2022-04-05 PROCEDURE — 74177 CT ABD & PELVIS W/CONTRAST: CPT | Performed by: EMERGENCY MEDICINE

## 2022-04-05 PROCEDURE — 85025 COMPLETE CBC W/AUTO DIFF WBC: CPT | Performed by: EMERGENCY MEDICINE

## 2022-04-05 RX ORDER — ONDANSETRON 2 MG/ML
4 INJECTION INTRAMUSCULAR; INTRAVENOUS ONCE
Status: COMPLETED | OUTPATIENT
Start: 2022-04-05 | End: 2022-04-05

## 2022-04-05 RX ORDER — IOHEXOL 350 MG/ML
150 INJECTION, SOLUTION INTRAVENOUS
Status: COMPLETED | OUTPATIENT
Start: 2022-04-05 | End: 2022-04-05

## 2022-04-05 RX ORDER — MORPHINE SULFATE 4 MG/ML
4 INJECTION, SOLUTION INTRAMUSCULAR; INTRAVENOUS EVERY 30 MIN PRN
Status: DISCONTINUED | OUTPATIENT
Start: 2022-04-05 | End: 2022-04-06

## 2022-04-05 NOTE — ED INITIAL ASSESSMENT (HPI)
Pt states she had 2 abdominal surgeries within 3 days d/t complications, bleeding ulcer and a tear in intestines. 2 weeks ago. Pt states she developed a fever today with hypoxia at home. Pt was also recently dx with blood clot to left arm.  On eliquis

## 2022-04-06 LAB
ALBUMIN SERPL-MCNC: 2.8 G/DL (ref 3.4–5)
ALBUMIN/GLOB SERPL: 0.7 {RATIO} (ref 1–2)
ALP LIVER SERPL-CCNC: 150 U/L
ALT SERPL-CCNC: 78 U/L
ANION GAP SERPL CALC-SCNC: 6 MMOL/L (ref 0–18)
APTT PPP: 102.8 SECONDS (ref 23.3–35.6)
APTT PPP: 33.4 SECONDS (ref 23.3–35.6)
AST SERPL-CCNC: 203 U/L (ref 15–37)
ATRIAL RATE: 89 BPM
BASOPHILS # BLD AUTO: 0.04 X10(3) UL (ref 0–0.2)
BASOPHILS NFR BLD AUTO: 0.4 %
BILIRUB SERPL-MCNC: 1.3 MG/DL (ref 0.1–2)
BUN BLD-MCNC: 9 MG/DL (ref 7–18)
CALCIUM BLD-MCNC: 8.9 MG/DL (ref 8.5–10.1)
CHLORIDE SERPL-SCNC: 108 MMOL/L (ref 98–112)
CO2 SERPL-SCNC: 23 MMOL/L (ref 21–32)
CREAT BLD-MCNC: 0.82 MG/DL
EOSINOPHIL # BLD AUTO: 0.07 X10(3) UL (ref 0–0.7)
EOSINOPHIL NFR BLD AUTO: 0.6 %
ERYTHROCYTE [DISTWIDTH] IN BLOOD BY AUTOMATED COUNT: 17.7 %
GLOBULIN PLAS-MCNC: 4 G/DL (ref 2.8–4.4)
GLUCOSE BLD-MCNC: 108 MG/DL (ref 70–99)
GLUCOSE BLD-MCNC: 112 MG/DL (ref 70–99)
GLUCOSE BLD-MCNC: 116 MG/DL (ref 70–99)
GLUCOSE BLD-MCNC: 125 MG/DL (ref 70–99)
GLUCOSE BLD-MCNC: 158 MG/DL (ref 70–99)
HCT VFR BLD AUTO: 29.2 %
HGB BLD-MCNC: 8.4 G/DL
IMM GRANULOCYTES # BLD AUTO: 0.07 X10(3) UL (ref 0–1)
IMM GRANULOCYTES NFR BLD: 0.6 %
LYMPHOCYTES # BLD AUTO: 1.36 X10(3) UL (ref 1–4)
LYMPHOCYTES NFR BLD AUTO: 12.1 %
MCH RBC QN AUTO: 24.1 PG (ref 26–34)
MCHC RBC AUTO-ENTMCNC: 28.8 G/DL (ref 31–37)
MCV RBC AUTO: 83.7 FL
MONOCYTES # BLD AUTO: 0.89 X10(3) UL (ref 0.1–1)
MONOCYTES NFR BLD AUTO: 7.9 %
NEUTROPHILS # BLD AUTO: 8.79 X10 (3) UL (ref 1.5–7.7)
NEUTROPHILS # BLD AUTO: 8.79 X10(3) UL (ref 1.5–7.7)
NEUTROPHILS NFR BLD AUTO: 78.4 %
OSMOLALITY SERPL CALC.SUM OF ELEC: 284 MOSM/KG (ref 275–295)
P AXIS: 44 DEGREES
P-R INTERVAL: 138 MS
PLATELET # BLD AUTO: 345 10(3)UL (ref 150–450)
POTASSIUM SERPL-SCNC: 3.7 MMOL/L (ref 3.5–5.1)
PROT SERPL-MCNC: 6.8 G/DL (ref 6.4–8.2)
Q-T INTERVAL: 378 MS
QRS DURATION: 98 MS
QTC CALCULATION (BEZET): 459 MS
R AXIS: -13 DEGREES
RBC # BLD AUTO: 3.49 X10(6)UL
SODIUM SERPL-SCNC: 137 MMOL/L (ref 136–145)
T AXIS: 45 DEGREES
VENTRICULAR RATE: 89 BPM
WBC # BLD AUTO: 11.2 X10(3) UL (ref 4–11)

## 2022-04-06 PROCEDURE — 94660 CPAP INITIATION&MGMT: CPT

## 2022-04-06 PROCEDURE — 5A09357 ASSISTANCE WITH RESPIRATORY VENTILATION, LESS THAN 24 CONSECUTIVE HOURS, CONTINUOUS POSITIVE AIRWAY PRESSURE: ICD-10-PCS | Performed by: HOSPITALIST

## 2022-04-06 PROCEDURE — 85730 THROMBOPLASTIN TIME PARTIAL: CPT | Performed by: INTERNAL MEDICINE

## 2022-04-06 PROCEDURE — 80053 COMPREHEN METABOLIC PANEL: CPT | Performed by: HOSPITALIST

## 2022-04-06 PROCEDURE — 76937 US GUIDE VASCULAR ACCESS: CPT

## 2022-04-06 PROCEDURE — 82962 GLUCOSE BLOOD TEST: CPT

## 2022-04-06 PROCEDURE — 94640 AIRWAY INHALATION TREATMENT: CPT

## 2022-04-06 PROCEDURE — 85025 COMPLETE CBC W/AUTO DIFF WBC: CPT | Performed by: HOSPITALIST

## 2022-04-06 PROCEDURE — 36410 VNPNXR 3YR/> PHY/QHP DX/THER: CPT

## 2022-04-06 RX ORDER — TOPIRAMATE 25 MG/1
25 TABLET ORAL 2 TIMES DAILY
Status: DISCONTINUED | OUTPATIENT
Start: 2022-04-06 | End: 2022-04-12

## 2022-04-06 RX ORDER — FLUCONAZOLE 2 MG/ML
200 INJECTION, SOLUTION INTRAVENOUS EVERY 24 HOURS
Status: DISCONTINUED | OUTPATIENT
Start: 2022-04-06 | End: 2022-04-09

## 2022-04-06 RX ORDER — PANTOPRAZOLE SODIUM 40 MG/1
40 TABLET, DELAYED RELEASE ORAL
Status: DISCONTINUED | OUTPATIENT
Start: 2022-04-06 | End: 2022-04-12

## 2022-04-06 RX ORDER — BUPROPION HYDROCHLORIDE 300 MG/1
300 TABLET ORAL DAILY
Status: DISCONTINUED | OUTPATIENT
Start: 2022-04-06 | End: 2022-04-12

## 2022-04-06 RX ORDER — PROCHLORPERAZINE EDISYLATE 5 MG/ML
5 INJECTION INTRAMUSCULAR; INTRAVENOUS EVERY 8 HOURS PRN
Status: DISCONTINUED | OUTPATIENT
Start: 2022-04-06 | End: 2022-04-12

## 2022-04-06 RX ORDER — MORPHINE SULFATE 4 MG/ML
4 INJECTION, SOLUTION INTRAMUSCULAR; INTRAVENOUS EVERY 2 HOUR PRN
Status: DISCONTINUED | OUTPATIENT
Start: 2022-04-05 | End: 2022-04-12

## 2022-04-06 RX ORDER — FLUOXETINE HYDROCHLORIDE 20 MG/1
20 CAPSULE ORAL DAILY
Status: DISCONTINUED | OUTPATIENT
Start: 2022-04-06 | End: 2022-04-12

## 2022-04-06 RX ORDER — POLYETHYLENE GLYCOL 3350 17 G/17G
17 POWDER, FOR SOLUTION ORAL ONCE
Status: COMPLETED | OUTPATIENT
Start: 2022-04-06 | End: 2022-04-06

## 2022-04-06 RX ORDER — HEPARIN SODIUM AND DEXTROSE 10000; 5 [USP'U]/100ML; G/100ML
INJECTION INTRAVENOUS CONTINUOUS
Status: DISCONTINUED | OUTPATIENT
Start: 2022-04-06 | End: 2022-04-06

## 2022-04-06 RX ORDER — SODIUM CHLORIDE 9 MG/ML
INJECTION, SOLUTION INTRAVENOUS CONTINUOUS
Status: DISCONTINUED | OUTPATIENT
Start: 2022-04-06 | End: 2022-04-12

## 2022-04-06 RX ORDER — ONDANSETRON 2 MG/ML
4 INJECTION INTRAMUSCULAR; INTRAVENOUS EVERY 6 HOURS PRN
Status: DISCONTINUED | OUTPATIENT
Start: 2022-04-06 | End: 2022-04-12

## 2022-04-06 RX ORDER — MORPHINE SULFATE 2 MG/ML
2 INJECTION, SOLUTION INTRAMUSCULAR; INTRAVENOUS EVERY 2 HOUR PRN
Status: DISCONTINUED | OUTPATIENT
Start: 2022-04-05 | End: 2022-04-12

## 2022-04-06 RX ORDER — HEPARIN SODIUM AND DEXTROSE 10000; 5 [USP'U]/100ML; G/100ML
INJECTION INTRAVENOUS CONTINUOUS
Status: ACTIVE | OUTPATIENT
Start: 2022-04-06 | End: 2022-04-07

## 2022-04-06 RX ORDER — MORPHINE SULFATE 2 MG/ML
1 INJECTION, SOLUTION INTRAMUSCULAR; INTRAVENOUS EVERY 2 HOUR PRN
Status: DISCONTINUED | OUTPATIENT
Start: 2022-04-05 | End: 2022-04-12

## 2022-04-06 RX ORDER — HEPARIN SODIUM AND DEXTROSE 10000; 5 [USP'U]/100ML; G/100ML
18 INJECTION INTRAVENOUS ONCE
Status: COMPLETED | OUTPATIENT
Start: 2022-04-06 | End: 2022-04-06

## 2022-04-06 RX ORDER — ACETAMINOPHEN 325 MG/1
650 TABLET ORAL EVERY 6 HOURS PRN
Status: DISCONTINUED | OUTPATIENT
Start: 2022-04-06 | End: 2022-04-07

## 2022-04-06 RX ORDER — ALBUTEROL SULFATE 90 UG/1
2 AEROSOL, METERED RESPIRATORY (INHALATION) EVERY 6 HOURS PRN
Status: DISCONTINUED | OUTPATIENT
Start: 2022-04-06 | End: 2022-04-12

## 2022-04-06 RX ORDER — OXYBUTYNIN CHLORIDE 10 MG/1
10 TABLET, EXTENDED RELEASE ORAL DAILY
Status: DISCONTINUED | OUTPATIENT
Start: 2022-04-06 | End: 2022-04-12

## 2022-04-06 RX ORDER — HEPARIN SODIUM 5000 [USP'U]/ML
80 INJECTION INTRAVENOUS; SUBCUTANEOUS ONCE
Status: COMPLETED | OUTPATIENT
Start: 2022-04-06 | End: 2022-04-06

## 2022-04-06 NOTE — PLAN OF CARE
Pt vitals stable, A&O x4. Continuous pulse ox and telemetry in place. Left arm precautions and seizure precautions in place. Pt denied chest pain, shortness of breath, and calf pain at this time. IVF infusing. Pt NPO due to possible procedure. Bed locked in low position, call light in reach, rounding provided.        Problem: Diabetes/Glucose Control  Goal: Glucose maintained within prescribed range  Description: INTERVENTIONS:  - Monitor Blood Glucose as ordered  - Assess for signs and symptoms of hyperglycemia and hypoglycemia  - Administer ordered medications to maintain glucose within target range  - Assess barriers to adequate nutritional intake and initiate nutrition consult as needed  - Instruct patient on self management of diabetes  Outcome: Progressing     Problem: Patient/Family Goals  Goal: Patient/Family Long Term Goal  Description: Patient's Long Term Goal: go home    Interventions:  - ivf  - iv abx  - surgery  - See additional Care Plan goals for specific interventions  Outcome: Progressing  Goal: Patient/Family Short Term Goal  Description: Patient's Short Term Goal: pain management    Interventions:   - medication  - See additional Care Plan goals for specific interventions  Outcome: Progressing     Problem: PAIN - ADULT  Goal: Verbalizes/displays adequate comfort level or patient's stated pain goal  Description: INTERVENTIONS:  - Encourage pt to monitor pain and request assistance  - Assess pain using appropriate pain scale  - Administer analgesics based on type and severity of pain and evaluate response  - Implement non-pharmacological measures as appropriate and evaluate response  - Consider cultural and social influences on pain and pain management  - Manage/alleviate anxiety  - Utilize distraction and/or relaxation techniques  - Monitor for opioid side effects  - Notify MD/LIP if interventions unsuccessful or patient reports new pain  - Anticipate increased pain with activity and pre-medicate as appropriate  Outcome: Progressing     Problem: RISK FOR INFECTION - ADULT  Goal: Absence of fever/infection during anticipated neutropenic period  Description: INTERVENTIONS  - Monitor WBC  - Administer growth factors as ordered  - Implement neutropenic guidelines  Outcome: Progressing     Problem: SAFETY ADULT - FALL  Goal: Free from fall injury  Description: INTERVENTIONS:  - Assess pt frequently for physical needs  - Identify cognitive and physical deficits and behaviors that affect risk of falls.   - [place] fall precautions as indicated by assessment.  - Educate pt/family on patient safety including physical limitations  - Instruct pt to call for assistance with activity based on assessment  - Modify environment to reduce risk of injury  - Provide assistive devices as appropriate  - Consider OT/PT consult to assist with strengthening/mobility  - Encourage toileting schedule  Outcome: Progressing     Problem: DISCHARGE PLANNING  Goal: Discharge to home or other facility with appropriate resources  Description: INTERVENTIONS:  - Identify barriers to discharge w/pt and caregiver  - Include patient/family/discharge partner in discharge planning  - Arrange for needed discharge resources and transportation as appropriate  - Identify discharge learning needs (meds, wound care, etc)  - Arrange for interpreters to assist at discharge as needed  - Consider post-discharge preferences of patient/family/discharge partner  - Complete POLST form as appropriate  - Assess patient's ability to be responsible for managing their own health  - Refer to Case Management Department for coordinating discharge planning if the patient needs post-hospital services based on physician/LIP order or complex needs related to functional status, cognitive ability or social support system  Outcome: Progressing

## 2022-04-06 NOTE — CM/SW NOTE
Patient is current with [facility] prior to admission. CM placed resume care orders per protocol. Residential Liaison notified of referral, clinical updates/orders sent to agency via [email or web address] system.       Anthony Solorzano RN Case Manager [id number]

## 2022-04-06 NOTE — PLAN OF CARE
Problem: Diabetes/Glucose Control  Goal: Glucose maintained within prescribed range  Description: INTERVENTIONS:  - Monitor Blood Glucose as ordered  - Assess for signs and symptoms of hyperglycemia and hypoglycemia  - Administer ordered medications to maintain glucose within target range  - Assess barriers to adequate nutritional intake and initiate nutrition consult as needed  - Instruct patient on self management of diabetes  Outcome: Progressing     Problem: Patient/Family Goals  Goal: Patient/Family Long Term Goal  Description: Patient's Long Term Goal: go home    Interventions:  - ivf  - iv abx  - surgery  - See additional Care Plan goals for specific interventions  Outcome: Progressing  Goal: Patient/Family Short Term Goal  Description: Patient's Short Term Goal: pain management    Interventions:   - medication  - See additional Care Plan goals for specific interventions  Outcome: Progressing     Problem: PAIN - ADULT  Goal: Verbalizes/displays adequate comfort level or patient's stated pain goal  Description: INTERVENTIONS:  - Encourage pt to monitor pain and request assistance  - Assess pain using appropriate pain scale  - Administer analgesics based on type and severity of pain and evaluate response  - Implement non-pharmacological measures as appropriate and evaluate response  - Consider cultural and social influences on pain and pain management  - Manage/alleviate anxiety  - Utilize distraction and/or relaxation techniques  - Monitor for opioid side effects  - Notify MD/LIP if interventions unsuccessful or patient reports new pain  - Anticipate increased pain with activity and pre-medicate as appropriate  Outcome: Progressing     Problem: RISK FOR INFECTION - ADULT  Goal: Absence of fever/infection during anticipated neutropenic period  Description: INTERVENTIONS  - Monitor WBC  - Administer growth factors as ordered  - Implement neutropenic guidelines  Outcome: Progressing     Problem: SAFETY ADULT - FALL  Goal: Free from fall injury  Description: INTERVENTIONS:  - Assess pt frequently for physical needs  - Identify cognitive and physical deficits and behaviors that affect risk of falls. - [place] fall precautions as indicated by assessment.  - Educate pt/family on patient safety including physical limitations  - Instruct pt to call for assistance with activity based on assessment  - Modify environment to reduce risk of injury  - Provide assistive devices as appropriate  - Consider OT/PT consult to assist with strengthening/mobility  - Encourage toileting schedule  Outcome: Progressing     Problem: DISCHARGE PLANNING  Goal: Discharge to home or other facility with appropriate resources  Description: INTERVENTIONS:  - Identify barriers to discharge w/pt and caregiver  - Include patient/family/discharge partner in discharge planning  - Arrange for needed discharge resources and transportation as appropriate  - Identify discharge learning needs (meds, wound care, etc)  - Arrange for interpreters to assist at discharge as needed  - Consider post-discharge preferences of patient/family/discharge partner  - Complete POLST form as appropriate  - Assess patient's ability to be responsible for managing their own health  - Refer to Case Management Department for coordinating discharge planning if the patient needs post-hospital services based on physician/LIP order or complex needs related to functional status, cognitive ability or social support system  Outcome: Progressing     A&Ox4. VSS. RA. . Telemetry: NSR  GI: Abdomen soft, nondistended. Denies nausea. : Voids. Pain controlled with PRN pain medications  Up with standby assist with walker  Diet:clears  IVF running per order. All appropriate safety measures in place. All questions and concerns addressed.  Will continue to monitor

## 2022-04-06 NOTE — IMAGING NOTE
Plan for CT drain abscess placement TTBD  Last dose of eliquis 4/5/2022 am  Instructions reviewed with MARITZA SANCHEZ  NPO; hold blood thinners  Patient is consent able   Interventional radiologist to review  Will update with time.

## 2022-04-06 NOTE — ED QUICK NOTES
Orders for admission, patient is aware of plan and ready to go upstairs. Any questions, please call ED RN Herminia Valle  at extension [phone] . Vaccinated? Yes  Type of COVID test sent: Rapid SARS  COVID Suspicion level: Low        Titratable drug(s) infusing:  Rate:     LOC at time of transport: Alert      Other pertinent information: Pt hard stick.  Left limb precaution     CIWA score= N/A  NIH score=N/A

## 2022-04-06 NOTE — IMAGING NOTE
Pre procedure instruction given to Nirmal Epstein. NPO from [place]. Hold  Heparin drip by 0530 AM. PT/INR in am. Tentatively procedure to be  Done at 1015.

## 2022-04-07 ENCOUNTER — APPOINTMENT (OUTPATIENT)
Dept: CT IMAGING | Facility: HOSPITAL | Age: 62
End: 2022-04-07
Attending: PHYSICIAN ASSISTANT
Payer: MEDICARE

## 2022-04-07 LAB
ANION GAP SERPL CALC-SCNC: 3 MMOL/L (ref 0–18)
APTT PPP: 95.1 SECONDS (ref 23.3–35.6)
BASOPHILS # BLD AUTO: 0.04 X10(3) UL (ref 0–0.2)
BASOPHILS NFR BLD AUTO: 0.4 %
BUN BLD-MCNC: 7 MG/DL (ref 7–18)
CALCIUM BLD-MCNC: 8.2 MG/DL (ref 8.5–10.1)
CHLORIDE SERPL-SCNC: 111 MMOL/L (ref 98–112)
CO2 SERPL-SCNC: 26 MMOL/L (ref 21–32)
CREAT BLD-MCNC: 0.64 MG/DL
EOSINOPHIL # BLD AUTO: 0.28 X10(3) UL (ref 0–0.7)
EOSINOPHIL NFR BLD AUTO: 2.7 %
ERYTHROCYTE [DISTWIDTH] IN BLOOD BY AUTOMATED COUNT: 17.7 %
GLUCOSE BLD-MCNC: 101 MG/DL (ref 70–99)
GLUCOSE BLD-MCNC: 105 MG/DL (ref 70–99)
GLUCOSE BLD-MCNC: 127 MG/DL (ref 70–99)
GLUCOSE BLD-MCNC: 86 MG/DL (ref 70–99)
GLUCOSE BLD-MCNC: 96 MG/DL (ref 70–99)
GLUCOSE BLD-MCNC: 99 MG/DL (ref 70–99)
HCT VFR BLD AUTO: 25 %
HGB BLD-MCNC: 7.2 G/DL
IMM GRANULOCYTES # BLD AUTO: 0.08 X10(3) UL (ref 0–1)
IMM GRANULOCYTES NFR BLD: 0.8 %
INR BLD: 1.31 (ref 0.8–1.2)
LYMPHOCYTES # BLD AUTO: 1.75 X10(3) UL (ref 1–4)
LYMPHOCYTES NFR BLD AUTO: 16.9 %
MCH RBC QN AUTO: 24.2 PG (ref 26–34)
MCHC RBC AUTO-ENTMCNC: 28.8 G/DL (ref 31–37)
MCV RBC AUTO: 84.2 FL
MONOCYTES # BLD AUTO: 1.01 X10(3) UL (ref 0.1–1)
MONOCYTES NFR BLD AUTO: 9.8 %
NEUTROPHILS # BLD AUTO: 7.18 X10 (3) UL (ref 1.5–7.7)
NEUTROPHILS # BLD AUTO: 7.18 X10(3) UL (ref 1.5–7.7)
NEUTROPHILS NFR BLD AUTO: 69.4 %
OSMOLALITY SERPL CALC.SUM OF ELEC: 288 MOSM/KG (ref 275–295)
PLATELET # BLD AUTO: 285 10(3)UL (ref 150–450)
POTASSIUM SERPL-SCNC: 3.3 MMOL/L (ref 3.5–5.1)
PROTHROMBIN TIME: 16.3 SECONDS (ref 11.6–14.8)
RBC # BLD AUTO: 2.97 X10(6)UL
SODIUM SERPL-SCNC: 140 MMOL/L (ref 136–145)
WBC # BLD AUTO: 10.3 X10(3) UL (ref 4–11)

## 2022-04-07 PROCEDURE — 87075 CULTR BACTERIA EXCEPT BLOOD: CPT | Performed by: PHYSICIAN ASSISTANT

## 2022-04-07 PROCEDURE — 87070 CULTURE OTHR SPECIMN AEROBIC: CPT | Performed by: PHYSICIAN ASSISTANT

## 2022-04-07 PROCEDURE — 87186 SC STD MICRODIL/AGAR DIL: CPT | Performed by: PHYSICIAN ASSISTANT

## 2022-04-07 PROCEDURE — 87205 SMEAR GRAM STAIN: CPT | Performed by: PHYSICIAN ASSISTANT

## 2022-04-07 PROCEDURE — 49406 IMAGE CATH FLUID PERI/RETRO: CPT | Performed by: PHYSICIAN ASSISTANT

## 2022-04-07 PROCEDURE — 0W9G30Z DRAINAGE OF PERITONEAL CAVITY WITH DRAINAGE DEVICE, PERCUTANEOUS APPROACH: ICD-10-PCS | Performed by: RADIOLOGY

## 2022-04-07 PROCEDURE — 87077 CULTURE AEROBIC IDENTIFY: CPT | Performed by: PHYSICIAN ASSISTANT

## 2022-04-07 PROCEDURE — 80048 BASIC METABOLIC PNL TOTAL CA: CPT | Performed by: INTERNAL MEDICINE

## 2022-04-07 PROCEDURE — 85610 PROTHROMBIN TIME: CPT | Performed by: PHYSICIAN ASSISTANT

## 2022-04-07 PROCEDURE — 99152 MOD SED SAME PHYS/QHP 5/>YRS: CPT | Performed by: PHYSICIAN ASSISTANT

## 2022-04-07 PROCEDURE — 85025 COMPLETE CBC W/AUTO DIFF WBC: CPT | Performed by: INTERNAL MEDICINE

## 2022-04-07 PROCEDURE — 85730 THROMBOPLASTIN TIME PARTIAL: CPT | Performed by: HOSPITALIST

## 2022-04-07 PROCEDURE — 82962 GLUCOSE BLOOD TEST: CPT

## 2022-04-07 PROCEDURE — 99153 MOD SED SAME PHYS/QHP EA: CPT | Performed by: PHYSICIAN ASSISTANT

## 2022-04-07 PROCEDURE — 87076 CULTURE ANAEROBE IDENT EACH: CPT | Performed by: PHYSICIAN ASSISTANT

## 2022-04-07 RX ORDER — MIDAZOLAM HYDROCHLORIDE 1 MG/ML
INJECTION INTRAMUSCULAR; INTRAVENOUS
Status: COMPLETED
Start: 2022-04-07 | End: 2022-04-07

## 2022-04-07 RX ORDER — FLUMAZENIL 0.1 MG/ML
0.2 INJECTION, SOLUTION INTRAVENOUS AS NEEDED
Status: DISCONTINUED | OUTPATIENT
Start: 2022-04-07 | End: 2022-04-12

## 2022-04-07 RX ORDER — HYDROCODONE BITARTRATE AND ACETAMINOPHEN 5; 325 MG/1; MG/1
1 TABLET ORAL EVERY 4 HOURS PRN
Status: DISCONTINUED | OUTPATIENT
Start: 2022-04-07 | End: 2022-04-12

## 2022-04-07 RX ORDER — SODIUM CHLORIDE 9 MG/ML
INJECTION, SOLUTION INTRAVENOUS CONTINUOUS
Status: DISCONTINUED | OUTPATIENT
Start: 2022-04-07 | End: 2022-04-12

## 2022-04-07 RX ORDER — HYDROCODONE BITARTRATE AND ACETAMINOPHEN 5; 325 MG/1; MG/1
2 TABLET ORAL EVERY 4 HOURS PRN
Status: DISCONTINUED | OUTPATIENT
Start: 2022-04-07 | End: 2022-04-12

## 2022-04-07 RX ORDER — MIDAZOLAM HYDROCHLORIDE 1 MG/ML
1 INJECTION INTRAMUSCULAR; INTRAVENOUS EVERY 5 MIN PRN
Status: DISCONTINUED | OUTPATIENT
Start: 2022-04-07 | End: 2022-04-07

## 2022-04-07 RX ORDER — POLYETHYLENE GLYCOL 3350 17 G/17G
17 POWDER, FOR SOLUTION ORAL DAILY
Status: DISCONTINUED | OUTPATIENT
Start: 2022-04-07 | End: 2022-04-12

## 2022-04-07 RX ORDER — NALOXONE HYDROCHLORIDE 0.4 MG/ML
80 INJECTION, SOLUTION INTRAMUSCULAR; INTRAVENOUS; SUBCUTANEOUS AS NEEDED
Status: DISCONTINUED | OUTPATIENT
Start: 2022-04-07 | End: 2022-04-12

## 2022-04-07 RX ORDER — HEPARIN SODIUM AND DEXTROSE 10000; 5 [USP'U]/100ML; G/100ML
INJECTION INTRAVENOUS CONTINUOUS
Status: DISCONTINUED | OUTPATIENT
Start: 2022-04-07 | End: 2022-04-08

## 2022-04-07 RX ORDER — ACETAMINOPHEN 325 MG/1
650 TABLET ORAL EVERY 4 HOURS PRN
Status: DISCONTINUED | OUTPATIENT
Start: 2022-04-07 | End: 2022-04-12

## 2022-04-07 NOTE — PROCEDURES
[facility]  Procedure Note    Wang Ware Patient Status:  Inpatient    1960 MRN [id number]   [facility] 3NW-A Attending Leticia Pierce MD   Hosp Day # 1 PCP Chun Banks MD     Procedure: LUQ abd abscess drain    Pre-Procedure Diagnosis:  abscess    Post-Procedure Diagnosis: same    Anesthesia:  Sedation    Findings:  8.5 fr drain placed left anterior abd abscess    Specimens: 20 ml of pus    Blood Loss:  none    Tourniquet Time: none  Complications:  None  Drains:  8.5 fr drain    Secondary Diagnosis:  Post op abscess    Lanre Andujar MD  2022

## 2022-04-07 NOTE — PROGRESS NOTES
8184- Paged  to notify of new consult. 1200:  arrived to see patient. 8481: Patient transported to CT for drain placement in stable condition. 1124: Paged Jose Newman to inquire about diet after IR drain placement. Awaiting response. 1126: Paged  to clarify on when patient can resume Heparin drip after drain placement.  stated 6 hours after drain placement. 1128: Paged  to inform her of radiology stating Heparin drip can be resumed 6 hours after drain placement and informed her of need for new order, as previous one . Orders placed. 1135: Patient returned to the floor via bed from radiology in stable condition. 1222: Paged Jose Newman again regarding diet and to inform him of patient requesting Miralax daily. Orders received.

## 2022-04-07 NOTE — PROGRESS NOTES
Patient has IV fluids infusing, on room air, on seizure precautions, on telemetry running NSR, had CT guided drain placement today-drain has purulent/serosanguinous drainage, voiding well, IV Merrem and Diflucan scheduled, ambulates with a walker and standby assist, tolerating a clear liquid diet-will advance as tolerated, Morphine given for pain, potassium replaced per protocol, Heparin drip to resume this evening. Patient and  updated on plan of care.

## 2022-04-07 NOTE — IMAGING NOTE
Mary Saavedra, name, date of birth and allergies verified with pt. Pt here for Peritoneal abscess drain. States NPO since midnight. Pre procedure vitals checked. IV access established to right AC. saline lock. 0.9 NS IV initiated to maintain patency. Informed consent obtained by Dr Paco Del Rio. Positioned pt on scanner. Universal protocol performed. Site prepped, draped and local anesthetic given. Specimen sent to Pathology. Dressing to left upper abd, [place], clean, dry and intact. Presently denies pain. Tolerated procedure well. Vital signs stable on room air. Report given to Fabiano Corona at [facility]. Transported pt to  310 accompanied by "[employer].

## 2022-04-07 NOTE — PLAN OF CARE
Problem: Diabetes/Glucose Control  Goal: Glucose maintained within prescribed range  Description: INTERVENTIONS:  - Monitor Blood Glucose as ordered  - Assess for signs and symptoms of hyperglycemia and hypoglycemia  - Administer ordered medications to maintain glucose within target range  - Assess barriers to adequate nutritional intake and initiate nutrition consult as needed  - Instruct patient on self management of diabetes  Outcome: Progressing     Problem: Patient/Family Goals  Goal: Patient/Family Long Term Goal  Description: Patient's Long Term Goal: go home    Interventions:  - ivf  - iv abx  - surgery  - See additional Care Plan goals for specific interventions  Outcome: Progressing  Goal: Patient/Family Short Term Goal  Description: Patient's Short Term Goal: pain management    Interventions:   - medication  - See additional Care Plan goals for specific interventions  Outcome: Progressing     Problem: PAIN - ADULT  Goal: Verbalizes/displays adequate comfort level or patient's stated pain goal  Description: INTERVENTIONS:  - Encourage pt to monitor pain and request assistance  - Assess pain using appropriate pain scale  - Administer analgesics based on type and severity of pain and evaluate response  - Implement non-pharmacological measures as appropriate and evaluate response  - Consider cultural and social influences on pain and pain management  - Manage/alleviate anxiety  - Utilize distraction and/or relaxation techniques  - Monitor for opioid side effects  - Notify MD/LIP if interventions unsuccessful or patient reports new pain  - Anticipate increased pain with activity and pre-medicate as appropriate  Outcome: Progressing     Problem: RISK FOR INFECTION - ADULT  Goal: Absence of fever/infection during anticipated neutropenic period  Description: INTERVENTIONS  - Monitor WBC  - Administer growth factors as ordered  - Implement neutropenic guidelines  Outcome: Progressing     Problem: SAFETY ADULT - FALL  Goal: Free from fall injury  Description: INTERVENTIONS:  - Assess pt frequently for physical needs  - Identify cognitive and physical deficits and behaviors that affect risk of falls.   - [place] fall precautions as indicated by assessment.  - Educate pt/family on patient safety including physical limitations  - Instruct pt to call for assistance with activity based on assessment  - Modify environment to reduce risk of injury  - Provide assistive devices as appropriate  - Consider OT/PT consult to assist with strengthening/mobility  - Encourage toileting schedule  Outcome: Progressing     Problem: DISCHARGE PLANNING  Goal: Discharge to home or other facility with appropriate resources  Description: INTERVENTIONS:  - Identify barriers to discharge w/pt and caregiver  - Include patient/family/discharge partner in discharge planning  - Arrange for needed discharge resources and transportation as appropriate  - Identify discharge learning needs (meds, wound care, etc)  - Arrange for interpreters to assist at discharge as needed  - Consider post-discharge preferences of patient/family/discharge partner  - Complete POLST form as appropriate  - Assess patient's ability to be responsible for managing their own health  - Refer to Case Management Department for coordinating discharge planning if the patient needs post-hospital services based on physician/LIP order or complex needs related to functional status, cognitive ability or social support system  Outcome: Progressing

## 2022-04-07 NOTE — PLAN OF CARE
Pt a&ox4. Up with steady gait. NSR on telemetry. Voiding well. Pt reports no BM for 2 days, requesting miralax, orders received per hospitalist,  administered per mar. Heparin drip infusing per mar. Plan to stop at 0530 for procedure. Reports severe pain to abdomen. Pain managed with prn morphine. Safety precautions in place. Plan of care discussed with pt, pt verbalizes understanding.         Problem: Diabetes/Glucose Control  Goal: Glucose maintained within prescribed range  Description: INTERVENTIONS:  - Monitor Blood Glucose as ordered  - Assess for signs and symptoms of hyperglycemia and hypoglycemia  - Administer ordered medications to maintain glucose within target range  - Assess barriers to adequate nutritional intake and initiate nutrition consult as needed  - Instruct patient on self management of diabetes  Outcome: Progressing     Problem: Patient/Family Goals  Goal: Patient/Family Long Term Goal  Description: Patient's Long Term Goal: go home    Interventions:  - ivf  - iv abx  - surgery  - See additional Care Plan goals for specific interventions  Outcome: Progressing  Goal: Patient/Family Short Term Goal  Description: Patient's Short Term Goal: pain management    Interventions:   - medication  - See additional Care Plan goals for specific interventions  Outcome: Progressing     Problem: PAIN - ADULT  Goal: Verbalizes/displays adequate comfort level or patient's stated pain goal  Description: INTERVENTIONS:  - Encourage pt to monitor pain and request assistance  - Assess pain using appropriate pain scale  - Administer analgesics based on type and severity of pain and evaluate response  - Implement non-pharmacological measures as appropriate and evaluate response  - Consider cultural and social influences on pain and pain management  - Manage/alleviate anxiety  - Utilize distraction and/or relaxation techniques  - Monitor for opioid side effects  - Notify MD/LIP if interventions unsuccessful or patient reports new pain  - Anticipate increased pain with activity and pre-medicate as appropriate  Outcome: Progressing     Problem: RISK FOR INFECTION - ADULT  Goal: Absence of fever/infection during anticipated neutropenic period  Description: INTERVENTIONS  - Monitor WBC  - Administer growth factors as ordered  - Implement neutropenic guidelines  Outcome: Progressing     Problem: SAFETY ADULT - FALL  Goal: Free from fall injury  Description: INTERVENTIONS:  - Assess pt frequently for physical needs  - Identify cognitive and physical deficits and behaviors that affect risk of falls.   - [place] fall precautions as indicated by assessment.  - Educate pt/family on patient safety including physical limitations  - Instruct pt to call for assistance with activity based on assessment  - Modify environment to reduce risk of injury  - Provide assistive devices as appropriate  - Consider OT/PT consult to assist with strengthening/mobility  - Encourage toileting schedule  Outcome: Progressing     Problem: DISCHARGE PLANNING  Goal: Discharge to home or other facility with appropriate resources  Description: INTERVENTIONS:  - Identify barriers to discharge w/pt and caregiver  - Include patient/family/discharge partner in discharge planning  - Arrange for needed discharge resources and transportation as appropriate  - Identify discharge learning needs (meds, wound care, etc)  - Arrange for interpreters to assist at discharge as needed  - Consider post-discharge preferences of patient/family/discharge partner  - Complete POLST form as appropriate  - Assess patient's ability to be responsible for managing their own health  - Refer to Case Management Department for coordinating discharge planning if the patient needs post-hospital services based on physician/LIP order or complex needs related to functional status, cognitive ability or social support system  Outcome: Progressing

## 2022-04-08 LAB
ANION GAP SERPL CALC-SCNC: 5 MMOL/L (ref 0–18)
APTT PPP: 67.1 SECONDS (ref 23.3–35.6)
APTT PPP: 86.2 SECONDS (ref 23.3–35.6)
BASOPHILS # BLD AUTO: 0.03 X10(3) UL (ref 0–0.2)
BASOPHILS NFR BLD AUTO: 0.5 %
BUN BLD-MCNC: 2 MG/DL (ref 7–18)
CALCIUM BLD-MCNC: 8 MG/DL (ref 8.5–10.1)
CHLORIDE SERPL-SCNC: 115 MMOL/L (ref 98–112)
CO2 SERPL-SCNC: 24 MMOL/L (ref 21–32)
CREAT BLD-MCNC: 0.47 MG/DL
EOSINOPHIL # BLD AUTO: 0.35 X10(3) UL (ref 0–0.7)
EOSINOPHIL NFR BLD AUTO: 5.4 %
ERYTHROCYTE [DISTWIDTH] IN BLOOD BY AUTOMATED COUNT: 17.6 %
GLUCOSE BLD-MCNC: 101 MG/DL (ref 70–99)
GLUCOSE BLD-MCNC: 105 MG/DL (ref 70–99)
GLUCOSE BLD-MCNC: 92 MG/DL (ref 70–99)
GLUCOSE BLD-MCNC: 92 MG/DL (ref 70–99)
GLUCOSE BLD-MCNC: 98 MG/DL (ref 70–99)
HCT VFR BLD AUTO: 24.6 %
HGB BLD-MCNC: 7.3 G/DL
IMM GRANULOCYTES # BLD AUTO: 0.02 X10(3) UL (ref 0–1)
IMM GRANULOCYTES NFR BLD: 0.3 %
LYMPHOCYTES # BLD AUTO: 1.84 X10(3) UL (ref 1–4)
LYMPHOCYTES NFR BLD AUTO: 28.5 %
MCH RBC QN AUTO: 24.8 PG (ref 26–34)
MCHC RBC AUTO-ENTMCNC: 29.7 G/DL (ref 31–37)
MCV RBC AUTO: 83.7 FL
MONOCYTES # BLD AUTO: 0.46 X10(3) UL (ref 0.1–1)
MONOCYTES NFR BLD AUTO: 7.1 %
NEUTROPHILS # BLD AUTO: 3.76 X10 (3) UL (ref 1.5–7.7)
NEUTROPHILS # BLD AUTO: 3.76 X10(3) UL (ref 1.5–7.7)
NEUTROPHILS NFR BLD AUTO: 58.2 %
OSMOLALITY SERPL CALC.SUM OF ELEC: 294 MOSM/KG (ref 275–295)
PLATELET # BLD AUTO: 303 10(3)UL (ref 150–450)
POTASSIUM SERPL-SCNC: 3.5 MMOL/L (ref 3.5–5.1)
POTASSIUM SERPL-SCNC: 3.5 MMOL/L (ref 3.5–5.1)
RBC # BLD AUTO: 2.94 X10(6)UL
SODIUM SERPL-SCNC: 144 MMOL/L (ref 136–145)
WBC # BLD AUTO: 6.5 X10(3) UL (ref 4–11)

## 2022-04-08 PROCEDURE — 36410 VNPNXR 3YR/> PHY/QHP DX/THER: CPT

## 2022-04-08 PROCEDURE — 82962 GLUCOSE BLOOD TEST: CPT

## 2022-04-08 PROCEDURE — 80048 BASIC METABOLIC PNL TOTAL CA: CPT | Performed by: HOSPITALIST

## 2022-04-08 PROCEDURE — 85025 COMPLETE CBC W/AUTO DIFF WBC: CPT | Performed by: HOSPITALIST

## 2022-04-08 PROCEDURE — 85730 THROMBOPLASTIN TIME PARTIAL: CPT | Performed by: HOSPITALIST

## 2022-04-08 PROCEDURE — 84132 ASSAY OF SERUM POTASSIUM: CPT | Performed by: INTERNAL MEDICINE

## 2022-04-08 PROCEDURE — 76937 US GUIDE VASCULAR ACCESS: CPT

## 2022-04-08 RX ORDER — AMLODIPINE BESYLATE 5 MG/1
5 TABLET ORAL DAILY
Status: DISCONTINUED | OUTPATIENT
Start: 2022-04-08 | End: 2022-04-12

## 2022-04-08 NOTE — PROGRESS NOTES
Patient has IV Merrem and Diflucan scheduled, Heparin discontinued and Eliquis restarted, on room air, on seizure precautions, on telemetry running NSR, voiding well, ambulates with standby assist, SERJIO drain x1 with purulent/serosanguinous drainage, tolerating a low fiber diet. Patient and her  updated on plan of care.

## 2022-04-08 NOTE — PLAN OF CARE
Problem: Diabetes/Glucose Control  Goal: Glucose maintained within prescribed range  Description: INTERVENTIONS:  - Monitor Blood Glucose as ordered  - Assess for signs and symptoms of hyperglycemia and hypoglycemia  - Administer ordered medications to maintain glucose within target range  - Assess barriers to adequate nutritional intake and initiate nutrition consult as needed  - Instruct patient on self management of diabetes  Outcome: Progressing     Problem: Patient/Family Goals  Goal: Patient/Family Long Term Goal  Description: Patient's Long Term Goal: go home    Interventions:  - ivf  - iv abx  - surgery  - See additional Care Plan goals for specific interventions  Outcome: Progressing  Goal: Patient/Family Short Term Goal  Description: Patient's Short Term Goal: pain management    Interventions:   - medication  - See additional Care Plan goals for specific interventions  Outcome: Progressing     Problem: PAIN - ADULT  Goal: Verbalizes/displays adequate comfort level or patient's stated pain goal  Description: INTERVENTIONS:  - Encourage pt to monitor pain and request assistance  - Assess pain using appropriate pain scale  - Administer analgesics based on type and severity of pain and evaluate response  - Implement non-pharmacological measures as appropriate and evaluate response  - Consider cultural and social influences on pain and pain management  - Manage/alleviate anxiety  - Utilize distraction and/or relaxation techniques  - Monitor for opioid side effects  - Notify MD/LIP if interventions unsuccessful or patient reports new pain  - Anticipate increased pain with activity and pre-medicate as appropriate  Outcome: Progressing     Problem: RISK FOR INFECTION - ADULT  Goal: Absence of fever/infection during anticipated neutropenic period  Description: INTERVENTIONS  - Monitor WBC  - Administer growth factors as ordered  - Implement neutropenic guidelines  Outcome: Progressing     Problem: SAFETY ADULT - FALL  Goal: Free from fall injury  Description: INTERVENTIONS:  - Assess pt frequently for physical needs  - Identify cognitive and physical deficits and behaviors that affect risk of falls. - [place] fall precautions as indicated by assessment.  - Educate pt/family on patient safety including physical limitations  - Instruct pt to call for assistance with activity based on assessment  - Modify environment to reduce risk of injury  - Provide assistive devices as appropriate  - Consider OT/PT consult to assist with strengthening/mobility  - Encourage toileting schedule  Outcome: Progressing     Problem: DISCHARGE PLANNING  Goal: Discharge to home or other facility with appropriate resources  Description: INTERVENTIONS:  - Identify barriers to discharge w/pt and caregiver  - Include patient/family/discharge partner in discharge planning  - Arrange for needed discharge resources and transportation as appropriate  - Identify discharge learning needs (meds, wound care, etc)  - Arrange for interpreters to assist at discharge as needed  - Consider post-discharge preferences of patient/family/discharge partner  - Complete POLST form as appropriate  - Assess patient's ability to be responsible for managing their own health  - Refer to Case Management Department for coordinating discharge planning if the patient needs post-hospital services based on physician/LIP order or complex needs related to functional status, cognitive ability or social support system  Outcome: KATHRYN       [id number]: Paged  to inform her of Ede Woodard stating he is ok with patient resuming her Eliquis anytime. Orders placed by .

## 2022-04-08 NOTE — PLAN OF CARE
Patient alert and oriented x4. On 2L NC. Refused the CPAP at night. Heparin drip at 20 gtt/min. NSR on telemetry. IR drain flushed at 0130. Pain managed with PRN pain medications, given per mar. Voids. Merrem and IVf infusing. Safety precautions in place. Will continue to monitor.    Problem: Patient/Family Goals  Goal: Patient/Family Long Term Goal  Description: Patient's Long Term Goal: go home    Interventions:  - ivf  - iv abx  - surgery  - See additional Care Plan goals for specific interventions  Outcome: Progressing  Goal: Patient/Family Short Term Goal  Description: Patient's Short Term Goal: pain management    Interventions:   - medication  - See additional Care Plan goals for specific interventions  Outcome: Progressing     Problem: PAIN - ADULT  Goal: Verbalizes/displays adequate comfort level or patient's stated pain goal  Description: INTERVENTIONS:  - Encourage pt to monitor pain and request assistance  - Assess pain using appropriate pain scale  - Administer analgesics based on type and severity of pain and evaluate response  - Implement non-pharmacological measures as appropriate and evaluate response  - Consider cultural and social influences on pain and pain management  - Manage/alleviate anxiety  - Utilize distraction and/or relaxation techniques  - Monitor for opioid side effects  - Notify MD/LIP if interventions unsuccessful or patient reports new pain  - Anticipate increased pain with activity and pre-medicate as appropriate  Outcome: Progressing     Problem: RISK FOR INFECTION - ADULT  Goal: Absence of fever/infection during anticipated neutropenic period  Description: INTERVENTIONS  - Monitor WBC  - Administer growth factors as ordered  - Implement neutropenic guidelines  Outcome: Progressing     Problem: SAFETY ADULT - FALL  Goal: Free from fall injury  Description: INTERVENTIONS:  - Assess pt frequently for physical needs  - Identify cognitive and physical deficits and behaviors that affect risk of falls.   - [place] fall precautions as indicated by assessment.  - Educate pt/family on patient safety including physical limitations  - Instruct pt to call for assistance with activity based on assessment  - Modify environment to reduce risk of injury  - Provide assistive devices as appropriate  - Consider OT/PT consult to assist with strengthening/mobility  - Encourage toileting schedule  Outcome: Progressing     Problem: Diabetes/Glucose Control  Goal: Glucose maintained within prescribed range  Description: INTERVENTIONS:  - Monitor Blood Glucose as ordered  - Assess for signs and symptoms of hyperglycemia and hypoglycemia  - Administer ordered medications to maintain glucose within target range  - Assess barriers to adequate nutritional intake and initiate nutrition consult as needed  - Instruct patient on self management of diabetes  Outcome: Progressing

## 2022-04-09 LAB
ANION GAP SERPL CALC-SCNC: 3 MMOL/L (ref 0–18)
BASOPHILS # BLD AUTO: 0.05 X10(3) UL (ref 0–0.2)
BASOPHILS NFR BLD AUTO: 0.8 %
BUN BLD-MCNC: 2 MG/DL (ref 7–18)
CALCIUM BLD-MCNC: 8.5 MG/DL (ref 8.5–10.1)
CHLORIDE SERPL-SCNC: 115 MMOL/L (ref 98–112)
CO2 SERPL-SCNC: 27 MMOL/L (ref 21–32)
CREAT BLD-MCNC: 0.45 MG/DL
EOSINOPHIL # BLD AUTO: 0.35 X10(3) UL (ref 0–0.7)
EOSINOPHIL NFR BLD AUTO: 5.9 %
ERYTHROCYTE [DISTWIDTH] IN BLOOD BY AUTOMATED COUNT: 17.8 %
GLUCOSE BLD-MCNC: 103 MG/DL (ref 70–99)
GLUCOSE BLD-MCNC: 240 MG/DL (ref 70–99)
GLUCOSE BLD-MCNC: 79 MG/DL (ref 70–99)
GLUCOSE BLD-MCNC: 79 MG/DL (ref 70–99)
GLUCOSE BLD-MCNC: 95 MG/DL (ref 70–99)
HCT VFR BLD AUTO: 25.4 %
HGB BLD-MCNC: 7.2 G/DL
IMM GRANULOCYTES # BLD AUTO: 0.05 X10(3) UL (ref 0–1)
IMM GRANULOCYTES NFR BLD: 0.8 %
LYMPHOCYTES # BLD AUTO: 2.33 X10(3) UL (ref 1–4)
LYMPHOCYTES NFR BLD AUTO: 39.5 %
MCH RBC QN AUTO: 23.9 PG (ref 26–34)
MCHC RBC AUTO-ENTMCNC: 28.3 G/DL (ref 31–37)
MCV RBC AUTO: 84.4 FL
MONOCYTES # BLD AUTO: 0.38 X10(3) UL (ref 0.1–1)
MONOCYTES NFR BLD AUTO: 6.4 %
NEUTROPHILS # BLD AUTO: 2.74 X10 (3) UL (ref 1.5–7.7)
NEUTROPHILS # BLD AUTO: 2.74 X10(3) UL (ref 1.5–7.7)
NEUTROPHILS NFR BLD AUTO: 46.6 %
OSMOLALITY SERPL CALC.SUM OF ELEC: 295 MOSM/KG (ref 275–295)
PLATELET # BLD AUTO: 339 10(3)UL (ref 150–450)
PLATELET MORPHOLOGY: NORMAL
POTASSIUM SERPL-SCNC: 3.4 MMOL/L (ref 3.5–5.1)
RBC # BLD AUTO: 3.01 X10(6)UL
SODIUM SERPL-SCNC: 145 MMOL/L (ref 136–145)
WBC # BLD AUTO: 5.9 X10(3) UL (ref 4–11)

## 2022-04-09 PROCEDURE — 82962 GLUCOSE BLOOD TEST: CPT

## 2022-04-09 PROCEDURE — S0030 INJECTION, METRONIDAZOLE: HCPCS | Performed by: INTERNAL MEDICINE

## 2022-04-09 PROCEDURE — 80048 BASIC METABOLIC PNL TOTAL CA: CPT | Performed by: HOSPITALIST

## 2022-04-09 PROCEDURE — 85025 COMPLETE CBC W/AUTO DIFF WBC: CPT | Performed by: HOSPITALIST

## 2022-04-09 RX ORDER — METRONIDAZOLE 500 MG/100ML
500 INJECTION, SOLUTION INTRAVENOUS EVERY 8 HOURS
Status: DISCONTINUED | OUTPATIENT
Start: 2022-04-09 | End: 2022-04-12

## 2022-04-09 NOTE — PLAN OF CARE
Problem: Diabetes/Glucose Control  Goal: Glucose maintained within prescribed range  Description: INTERVENTIONS:  - Monitor Blood Glucose as ordered  - Assess for signs and symptoms of hyperglycemia and hypoglycemia  - Administer ordered medications to maintain glucose within target range  - Assess barriers to adequate nutritional intake and initiate nutrition consult as needed  - Instruct patient on self management of diabetes  Outcome: Progressing     Problem: Patient/Family Goals  Goal: Patient/Family Long Term Goal  Description: Patient's Long Term Goal: go home    Interventions:  - ivf  - iv abx  - surgery  - See additional Care Plan goals for specific interventions  Outcome: Progressing  Goal: Patient/Family Short Term Goal  Description: Patient's Short Term Goal: pain management    Interventions:   - medication  - See additional Care Plan goals for specific interventions  Outcome: Progressing     Problem: PAIN - ADULT  Goal: Verbalizes/displays adequate comfort level or patient's stated pain goal  Description: INTERVENTIONS:  - Encourage pt to monitor pain and request assistance  - Assess pain using appropriate pain scale  - Administer analgesics based on type and severity of pain and evaluate response  - Implement non-pharmacological measures as appropriate and evaluate response  - Consider cultural and social influences on pain and pain management  - Manage/alleviate anxiety  - Utilize distraction and/or relaxation techniques  - Monitor for opioid side effects  - Notify MD/LIP if interventions unsuccessful or patient reports new pain  - Anticipate increased pain with activity and pre-medicate as appropriate  Outcome: Progressing     Problem: RISK FOR INFECTION - ADULT  Goal: Absence of fever/infection during anticipated neutropenic period  Description: INTERVENTIONS  - Monitor WBC  - Administer growth factors as ordered  - Implement neutropenic guidelines  Outcome: Progressing     Problem: SAFETY ADULT - FALL  Goal: Free from fall injury  Description: INTERVENTIONS:  - Assess pt frequently for physical needs  - Identify cognitive and physical deficits and behaviors that affect risk of falls.   - [place] fall precautions as indicated by assessment.  - Educate pt/family on patient safety including physical limitations  - Instruct pt to call for assistance with activity based on assessment  - Modify environment to reduce risk of injury  - Provide assistive devices as appropriate  - Consider OT/PT consult to assist with strengthening/mobility  - Encourage toileting schedule  Outcome: Progressing     Problem: DISCHARGE PLANNING  Goal: Discharge to home or other facility with appropriate resources  Description: INTERVENTIONS:  - Identify barriers to discharge w/pt and caregiver  - Include patient/family/discharge partner in discharge planning  - Arrange for needed discharge resources and transportation as appropriate  - Identify discharge learning needs (meds, wound care, etc)  - Arrange for interpreters to assist at discharge as needed  - Consider post-discharge preferences of patient/family/discharge partner  - Complete POLST form as appropriate  - Assess patient's ability to be responsible for managing their own health  - Refer to Case Management Department for coordinating discharge planning if the patient needs post-hospital services based on physician/LIP order or complex needs related to functional status, cognitive ability or social support system  Outcome: Progressing

## 2022-04-09 NOTE — PLAN OF CARE
Problem: Diabetes/Glucose Control  Goal: Glucose maintained within prescribed range  Description: INTERVENTIONS:  - Monitor Blood Glucose as ordered  - Assess for signs and symptoms of hyperglycemia and hypoglycemia  - Administer ordered medications to maintain glucose within target range  - Assess barriers to adequate nutritional intake and initiate nutrition consult as needed  - Instruct patient on self management of diabetes  Outcome: Progressing     Problem: Patient/Family Goals  Goal: Patient/Family Long Term Goal  Description: Patient's Long Term Goal: go home    Interventions:  - ivf  - iv abx  - surgery  - See additional Care Plan goals for specific interventions  Outcome: Progressing  Goal: Patient/Family Short Term Goal  Description: Patient's Short Term Goal: pain management    Interventions:   - medication  - See additional Care Plan goals for specific interventions  Outcome: Progressing     Problem: PAIN - ADULT  Goal: Verbalizes/displays adequate comfort level or patient's stated pain goal  Description: INTERVENTIONS:  - Encourage pt to monitor pain and request assistance  - Assess pain using appropriate pain scale  - Administer analgesics based on type and severity of pain and evaluate response  - Implement non-pharmacological measures as appropriate and evaluate response  - Consider cultural and social influences on pain and pain management  - Manage/alleviate anxiety  - Utilize distraction and/or relaxation techniques  - Monitor for opioid side effects  - Notify MD/LIP if interventions unsuccessful or patient reports new pain  - Anticipate increased pain with activity and pre-medicate as appropriate  Outcome: Progressing     Problem: RISK FOR INFECTION - ADULT  Goal: Absence of fever/infection during anticipated neutropenic period  Description: INTERVENTIONS  - Monitor WBC  - Administer growth factors as ordered  - Implement neutropenic guidelines  Outcome: Progressing     Problem: SAFETY ADULT - FALL  Goal: Free from fall injury  Description: INTERVENTIONS:  - Assess pt frequently for physical needs  - Identify cognitive and physical deficits and behaviors that affect risk of falls. - [place] fall precautions as indicated by assessment.  - Educate pt/family on patient safety including physical limitations  - Instruct pt to call for assistance with activity based on assessment  - Modify environment to reduce risk of injury  - Provide assistive devices as appropriate  - Consider OT/PT consult to assist with strengthening/mobility  - Encourage toileting schedule  Outcome: Progressing     Problem: DISCHARGE PLANNING  Goal: Discharge to home or other facility with appropriate resources  Description: INTERVENTIONS:  - Identify barriers to discharge w/pt and caregiver  - Include patient/family/discharge partner in discharge planning  - Arrange for needed discharge resources and transportation as appropriate  - Identify discharge learning needs (meds, wound care, etc)  - Arrange for interpreters to assist at discharge as needed  - Consider post-discharge preferences of patient/family/discharge partner  - Complete POLST form as appropriate  - Assess patient's ability to be responsible for managing their own health  - Refer to Case Management Department for coordinating discharge planning if the patient needs post-hospital services based on physician/LIP order or complex needs related to functional status, cognitive ability or social support system  Outcome: Progressing   Pt is alert and oriented x4. VSS. Maintaining o2 sats on r/a during the day, 2L at noc. NSR on tele. Pt is voiding. No BM this shift. Tolerating soft diet. C/o abdominal pain, prn medication given. IV abx as ordered. IR drain to lt abdomen, c/d/I, to bulb suction. Serosanguinous fluid, flushed as documented. Lt and rt arm, fall, and seizure precautions in place. Glucose monitored. Pt updated w/ poc.   All questions and concerns addressed at this time.

## 2022-04-10 LAB
ANION GAP SERPL CALC-SCNC: 6 MMOL/L (ref 0–18)
BASOPHILS # BLD AUTO: 0.04 X10(3) UL (ref 0–0.2)
BASOPHILS NFR BLD AUTO: 0.8 %
BUN BLD-MCNC: 2 MG/DL (ref 7–18)
CALCIUM BLD-MCNC: 8.6 MG/DL (ref 8.5–10.1)
CHLORIDE SERPL-SCNC: 112 MMOL/L (ref 98–112)
CO2 SERPL-SCNC: 26 MMOL/L (ref 21–32)
CREAT BLD-MCNC: 0.5 MG/DL
EOSINOPHIL # BLD AUTO: 0.27 X10(3) UL (ref 0–0.7)
EOSINOPHIL NFR BLD AUTO: 5.1 %
ERYTHROCYTE [DISTWIDTH] IN BLOOD BY AUTOMATED COUNT: 17.7 %
GLUCOSE BLD-MCNC: 80 MG/DL (ref 70–99)
GLUCOSE BLD-MCNC: 87 MG/DL (ref 70–99)
GLUCOSE BLD-MCNC: 91 MG/DL (ref 70–99)
GLUCOSE BLD-MCNC: 94 MG/DL (ref 70–99)
HCT VFR BLD AUTO: 26 %
HGB BLD-MCNC: 7.5 G/DL
IMM GRANULOCYTES # BLD AUTO: 0.03 X10(3) UL (ref 0–1)
IMM GRANULOCYTES NFR BLD: 0.6 %
LYMPHOCYTES # BLD AUTO: 2.02 X10(3) UL (ref 1–4)
LYMPHOCYTES NFR BLD AUTO: 38.3 %
MCH RBC QN AUTO: 24.4 PG (ref 26–34)
MCHC RBC AUTO-ENTMCNC: 28.8 G/DL (ref 31–37)
MCV RBC AUTO: 84.4 FL
MONOCYTES # BLD AUTO: 0.31 X10(3) UL (ref 0.1–1)
MONOCYTES NFR BLD AUTO: 5.9 %
NEUTROPHILS # BLD AUTO: 2.6 X10 (3) UL (ref 1.5–7.7)
NEUTROPHILS # BLD AUTO: 2.6 X10(3) UL (ref 1.5–7.7)
NEUTROPHILS NFR BLD AUTO: 49.3 %
OSMOLALITY SERPL CALC.SUM OF ELEC: 293 MOSM/KG (ref 275–295)
PLATELET # BLD AUTO: 356 10(3)UL (ref 150–450)
POTASSIUM SERPL-SCNC: 3.3 MMOL/L (ref 3.5–5.1)
RBC # BLD AUTO: 3.08 X10(6)UL
SODIUM SERPL-SCNC: 144 MMOL/L (ref 136–145)
WBC # BLD AUTO: 5.3 X10(3) UL (ref 4–11)

## 2022-04-10 PROCEDURE — 80048 BASIC METABOLIC PNL TOTAL CA: CPT | Performed by: HOSPITALIST

## 2022-04-10 PROCEDURE — S0030 INJECTION, METRONIDAZOLE: HCPCS | Performed by: INTERNAL MEDICINE

## 2022-04-10 PROCEDURE — 85025 COMPLETE CBC W/AUTO DIFF WBC: CPT | Performed by: HOSPITALIST

## 2022-04-10 PROCEDURE — 82962 GLUCOSE BLOOD TEST: CPT

## 2022-04-10 RX ORDER — POTASSIUM CHLORIDE 750 MG/1
10 TABLET, EXTENDED RELEASE ORAL DAILY
Status: DISCONTINUED | OUTPATIENT
Start: 2022-04-10 | End: 2022-04-12

## 2022-04-10 NOTE — PLAN OF CARE
Problem: Diabetes/Glucose Control  Goal: Glucose maintained within prescribed range  Description: INTERVENTIONS:  - Monitor Blood Glucose as ordered  - Assess for signs and symptoms of hyperglycemia and hypoglycemia  - Administer ordered medications to maintain glucose within target range  - Assess barriers to adequate nutritional intake and initiate nutrition consult as needed  - Instruct patient on self management of diabetes  Outcome: Progressing     Problem: Patient/Family Goals  Goal: Patient/Family Long Term Goal  Description: Patient's Long Term Goal: go home    Interventions:  - ivf  - iv abx  - surgery  - See additional Care Plan goals for specific interventions  Outcome: Progressing  Goal: Patient/Family Short Term Goal  Description: Patient's Short Term Goal: pain management    Interventions:   - medication  - See additional Care Plan goals for specific interventions  Outcome: Progressing     Problem: PAIN - ADULT  Goal: Verbalizes/displays adequate comfort level or patient's stated pain goal  Description: INTERVENTIONS:  - Encourage pt to monitor pain and request assistance  - Assess pain using appropriate pain scale  - Administer analgesics based on type and severity of pain and evaluate response  - Implement non-pharmacological measures as appropriate and evaluate response  - Consider cultural and social influences on pain and pain management  - Manage/alleviate anxiety  - Utilize distraction and/or relaxation techniques  - Monitor for opioid side effects  - Notify MD/LIP if interventions unsuccessful or patient reports new pain  - Anticipate increased pain with activity and pre-medicate as appropriate  Outcome: Progressing     Problem: RISK FOR INFECTION - ADULT  Goal: Absence of fever/infection during anticipated neutropenic period  Description: INTERVENTIONS  - Monitor WBC  - Administer growth factors as ordered  - Implement neutropenic guidelines  Outcome: Progressing     Problem: SAFETY ADULT - FALL  Goal: Free from fall injury  Description: INTERVENTIONS:  - Assess pt frequently for physical needs  - Identify cognitive and physical deficits and behaviors that affect risk of falls.   - [place] fall precautions as indicated by assessment.  - Educate pt/family on patient safety including physical limitations  - Instruct pt to call for assistance with activity based on assessment  - Modify environment to reduce risk of injury  - Provide assistive devices as appropriate  - Consider OT/PT consult to assist with strengthening/mobility  - Encourage toileting schedule  Outcome: Progressing     Problem: DISCHARGE PLANNING  Goal: Discharge to home or other facility with appropriate resources  Description: INTERVENTIONS:  - Identify barriers to discharge w/pt and caregiver  - Include patient/family/discharge partner in discharge planning  - Arrange for needed discharge resources and transportation as appropriate  - Identify discharge learning needs (meds, wound care, etc)  - Arrange for interpreters to assist at discharge as needed  - Consider post-discharge preferences of patient/family/discharge partner  - Complete POLST form as appropriate  - Assess patient's ability to be responsible for managing their own health  - Refer to Case Management Department for coordinating discharge planning if the patient needs post-hospital services based on physician/LIP order or complex needs related to functional status, cognitive ability or social support system  Outcome: Progressing

## 2022-04-10 NOTE — PLAN OF CARE
Problem: Diabetes/Glucose Control  Goal: Glucose maintained within prescribed range  Description: INTERVENTIONS:  - Monitor Blood Glucose as ordered  - Assess for signs and symptoms of hyperglycemia and hypoglycemia  - Administer ordered medications to maintain glucose within target range  - Assess barriers to adequate nutritional intake and initiate nutrition consult as needed  - Instruct patient on self management of diabetes  Outcome: Progressing     Problem: Patient/Family Goals  Goal: Patient/Family Long Term Goal  Description: Patient's Long Term Goal: go home    Interventions:  - ivf  - iv abx  - surgery  - See additional Care Plan goals for specific interventions  Outcome: Progressing  Goal: Patient/Family Short Term Goal  Description: Patient's Short Term Goal: pain management    Interventions:   - medication  - See additional Care Plan goals for specific interventions  Outcome: Progressing     Problem: PAIN - ADULT  Goal: Verbalizes/displays adequate comfort level or patient's stated pain goal  Description: INTERVENTIONS:  - Encourage pt to monitor pain and request assistance  - Assess pain using appropriate pain scale  - Administer analgesics based on type and severity of pain and evaluate response  - Implement non-pharmacological measures as appropriate and evaluate response  - Consider cultural and social influences on pain and pain management  - Manage/alleviate anxiety  - Utilize distraction and/or relaxation techniques  - Monitor for opioid side effects  - Notify MD/LIP if interventions unsuccessful or patient reports new pain  - Anticipate increased pain with activity and pre-medicate as appropriate  Outcome: Progressing     Problem: RISK FOR INFECTION - ADULT  Goal: Absence of fever/infection during anticipated neutropenic period  Description: INTERVENTIONS  - Monitor WBC  - Administer growth factors as ordered  - Implement neutropenic guidelines  Outcome: Progressing     Problem: SAFETY ADULT - FALL  Goal: Free from fall injury  Description: INTERVENTIONS:  - Assess pt frequently for physical needs  - Identify cognitive and physical deficits and behaviors that affect risk of falls. - [place] fall precautions as indicated by assessment.  - Educate pt/family on patient safety including physical limitations  - Instruct pt to call for assistance with activity based on assessment  - Modify environment to reduce risk of injury  - Provide assistive devices as appropriate  - Consider OT/PT consult to assist with strengthening/mobility  - Encourage toileting schedule  Outcome: Progressing     Problem: DISCHARGE PLANNING  Goal: Discharge to home or other facility with appropriate resources  Description: INTERVENTIONS:  - Identify barriers to discharge w/pt and caregiver  - Include patient/family/discharge partner in discharge planning  - Arrange for needed discharge resources and transportation as appropriate  - Identify discharge learning needs (meds, wound care, etc)  - Arrange for interpreters to assist at discharge as needed  - Consider post-discharge preferences of patient/family/discharge partner  - Complete POLST form as appropriate  - Assess patient's ability to be responsible for managing their own health  - Refer to Case Management Department for coordinating discharge planning if the patient needs post-hospital services based on physician/LIP order or complex needs related to functional status, cognitive ability or social support system  Outcome: Progressing   Pt is alert and oriented x4. VSS. Maintaining o2 sats on r/a.  NSR on tele. Eliquis. Declining scd's at this time. Pt voiding freely. No BM this shift. Tolerating diet. Some c/o abdominal pain, declining intervention at this time. Up w/ assistance. Iv abx as ordered. IR drain flushed and aspirated. Fall, bilateral arm, and seizure precautions kept in place. Pt updated w/ poc. All questions and concerns addressed at this time.

## 2022-04-11 PROCEDURE — S0030 INJECTION, METRONIDAZOLE: HCPCS | Performed by: INTERNAL MEDICINE

## 2022-04-11 NOTE — CM/SW NOTE
CM spoke to Saundra at [facility] [phone] for Outpatient IV Infusion authorization request, [facility] Saundra to call CM back for request.  CM spoke to GIULIANA ORDOÑEZ to request written and signed IV antibiotic orders for discharge. CM to fax orders and all required documentation to [facility] [place] [phone] when final order and authorization received. Update:  1130: CM faxed full referral to Saundra at [facility] for authorization review and to [address] [place] [address]. CM/SW to follow up with patient/family when status update received. 1330: Infusion Services Referral received and [facility]; Referral reference [id number]. CM spoke to patient and spouse for discharge plan update; patient prefers [place] or [id number] [facility] [address]. CM to call [facility] line at [phone] to set up outpatient patient infusion. Discharge is pending medical clearance. 1600: [facility] infusion appointment made for Wed. April 13th at 11:30 AM at [address] [facility] ([address] CM/SW to follow up with Clinic if discharge plan changes. RN and patient updated.       Justo Pollock RN Case Manager [id number]

## 2022-04-11 NOTE — PLAN OF CARE
Patient resting in bed comfortably. Alert and oriented x4. On 2L NC at night. Patient report pain a 8/10, pain managed with PRN medications per MAR. Nausea relieved by antiemetics. IR drain flush with 10 ml and aspirated 9ml; serous drainage. IVF and IV antibiotics infusing per MAR. All safety precautions in place: rt and Lt precautions, and seizure precautions. Instruct patient to use call light, pt verbalize understanding. Will continue to monitor.        Problem: Patient/Family Goals  Goal: Patient/Family Long Term Goal  Description: Patient's Long Term Goal: go home    Interventions:  - ivf  - iv abx  - surgery  - See additional Care Plan goals for specific interventions  Outcome: Progressing  Goal: Patient/Family Short Term Goal  Description: Patient's Short Term Goal: pain management    Interventions:   - medication  - See additional Care Plan goals for specific interventions  Outcome: Progressing     Problem: Diabetes/Glucose Control  Goal: Glucose maintained within prescribed range  Description: INTERVENTIONS:  - Monitor Blood Glucose as ordered  - Assess for signs and symptoms of hyperglycemia and hypoglycemia  - Administer ordered medications to maintain glucose within target range  - Assess barriers to adequate nutritional intake and initiate nutrition consult as needed  - Instruct patient on self management of diabetes  Outcome: Progressing     Problem: PAIN - ADULT  Goal: Verbalizes/displays adequate comfort level or patient's stated pain goal  Description: INTERVENTIONS:  - Encourage pt to monitor pain and request assistance  - Assess pain using appropriate pain scale  - Administer analgesics based on type and severity of pain and evaluate response  - Implement non-pharmacological measures as appropriate and evaluate response  - Consider cultural and social influences on pain and pain management  - Manage/alleviate anxiety  - Utilize distraction and/or relaxation techniques  - Monitor for opioid side effects  - Notify MD/LIP if interventions unsuccessful or patient reports new pain  - Anticipate increased pain with activity and pre-medicate as appropriate  Outcome: Progressing     Problem: RISK FOR INFECTION - ADULT  Goal: Absence of fever/infection during anticipated neutropenic period  Description: INTERVENTIONS  - Monitor WBC  - Administer growth factors as ordered  - Implement neutropenic guidelines  Outcome: Progressing     Problem: SAFETY ADULT - FALL  Goal: Free from fall injury  Description: INTERVENTIONS:  - Assess pt frequently for physical needs  - Identify cognitive and physical deficits and behaviors that affect risk of falls.   - [place] fall precautions as indicated by assessment.  - Educate pt/family on patient safety including physical limitations  - Instruct pt to call for assistance with activity based on assessment  - Modify environment to reduce risk of injury  - Provide assistive devices as appropriate  - Consider OT/PT consult to assist with strengthening/mobility  - Encourage toileting schedule  Outcome: Progressing     Problem: DISCHARGE PLANNING  Goal: Discharge to home or other facility with appropriate resources  Description: INTERVENTIONS:  - Identify barriers to discharge w/pt and caregiver  - Include patient/family/discharge partner in discharge planning  - Arrange for needed discharge resources and transportation as appropriate  - Identify discharge learning needs (meds, wound care, etc)  - Arrange for interpreters to assist at discharge as needed  - Consider post-discharge preferences of patient/family/discharge partner  - Complete POLST form as appropriate  - Assess patient's ability to be responsible for managing their own health  - Refer to Case Management Department for coordinating discharge planning if the patient needs post-hospital services based on physician/LIP order or complex needs related to functional status, cognitive ability or social support system  Outcome: Progressing

## 2022-04-11 NOTE — PLAN OF CARE
Pt denies any pain at the moment. Drain site intact,flushed with 10 ml ns and 8 ml of clear drainage aspirated. Pt and spouse talk how to flush drain and returned demonstration needed. Poc updated, pt verbalized understanding. Problem: Diabetes/Glucose Control  Goal: Glucose maintained within prescribed range  Descrnd symptoms of hyperglycemia and hypoglycemia  - Administer ordered medications to maintain glucose within target range  - Assess barriers to adequate nutritional intake and initiate nutrition consult as needed  - Instruct patient on self management of diabetes  Outcome: Progressing     Problem: PAIN - ADULT  Goal: Verbalizes/displays adequate comfort level or patient's stated pain goal  Description: INTERVENTIONS:  - Encourage pt to monitor pain and request assistance  - Assess pain using appropriate pain scale  - Administer analgesics based on type and severity of pain and evaluate response  - Implement non-pharmacological measures as appropriate and evaluate response  - Consider cultural and social influences on pain and pain management  - Manage/alleviate anxiety  - Utilize distraction and/or relaxation techniques  - Monitor for opioid side effects  - Notify MD/LIP if interventions unsuccessful or patient reports new pain  - Anticipate increased pain with activity and pre-medicate as appropriate  Outcome: Progressing     Problem: RISK FOR INFECTION - ADULT  Goal: Absence of fever/infection during anticipated neutropenic period  Description: INTERVENTIONS  - Monitor WBC  - Administer growth factors as ordered  - Implement neutropenic guidelines  Outcome: Progressing     Problem: SAFETY ADULT - FALL  Goal: Free from fall injury  Description: INTERVENTIONS:  - Assess pt frequently for physical needs  - Identify cognitive and physical deficits and behaviors that affect risk of falls.   - [place] fall precautions as indicated by assessment.  - Educate pt/family on patient safety including physical limitations  - Instruct pt to call for assistance with activity based on assessment  - Modify environment to reduce risk of injury  - Provide assistive devices as appropriate  - Consider OT/PT consult to assist with strengthening/mobility  - Encourage toileting schedule  Outcome: Progressing     Problem: DISCHARGE PLANNING  Goal: Discharge to home or other facility with appropriate resources  Description: INTERVENTIONS:  - Identify barriers to discharge w/pt and caregiver  - Include patient/family/discharge partner in discharge planning  - Arrange for needed discharge resources and transportation as appropriate  - Identify discharge learning needs (meds, wound care, etc)  - Arrange for interpreters to assist at discharge as needed  - Consider post-discharge preferences of patient/family/discharge partner  - Complete POLST form as appropriate  - Assess patient's ability to be responsible for managing their own health  - Refer to Case Management Department for coordinating discharge planning if the patient needs post-hospital services based on physician/LIP order or complex needs related to functional status, cognitive ability or social support system  Outcome: Progressing

## 2022-04-12 ENCOUNTER — APPOINTMENT (OUTPATIENT)
Dept: ULTRASOUND IMAGING | Facility: HOSPITAL | Age: 62
End: 2022-04-12
Attending: HOSPITALIST
Payer: MEDICARE

## 2022-04-12 VITALS
DIASTOLIC BLOOD PRESSURE: 81 MMHG | WEIGHT: 250 LBS | TEMPERATURE: 99 F | HEART RATE: 71 BPM | SYSTOLIC BLOOD PRESSURE: 150 MMHG | OXYGEN SATURATION: 99 % | RESPIRATION RATE: 18 BRPM | HEIGHT: 63 IN | BODY MASS INDEX: 44.3 KG/M2

## 2022-04-12 LAB
ALBUMIN SERPL-MCNC: 2.6 G/DL (ref 3.4–5)
ALBUMIN/GLOB SERPL: 0.7 {RATIO} (ref 1–2)
ALP LIVER SERPL-CCNC: 100 U/L
ALT SERPL-CCNC: 24 U/L
ANION GAP SERPL CALC-SCNC: 5 MMOL/L (ref 0–18)
AST SERPL-CCNC: 14 U/L (ref 15–37)
BILIRUB SERPL-MCNC: 0.2 MG/DL (ref 0.1–2)
BUN BLD-MCNC: 5 MG/DL (ref 7–18)
CALCIUM BLD-MCNC: 8.9 MG/DL (ref 8.5–10.1)
CHLORIDE SERPL-SCNC: 112 MMOL/L (ref 98–112)
CO2 SERPL-SCNC: 26 MMOL/L (ref 21–32)
CREAT BLD-MCNC: 0.46 MG/DL
GLOBULIN PLAS-MCNC: 3.7 G/DL (ref 2.8–4.4)
GLUCOSE BLD-MCNC: 90 MG/DL (ref 70–99)
OSMOLALITY SERPL CALC.SUM OF ELEC: 293 MOSM/KG (ref 275–295)
POTASSIUM SERPL-SCNC: 3.8 MMOL/L (ref 3.5–5.1)
PROT SERPL-MCNC: 6.3 G/DL (ref 6.4–8.2)
SODIUM SERPL-SCNC: 143 MMOL/L (ref 136–145)

## 2022-04-12 PROCEDURE — S0030 INJECTION, METRONIDAZOLE: HCPCS | Performed by: INTERNAL MEDICINE

## 2022-04-12 PROCEDURE — 93971 EXTREMITY STUDY: CPT | Performed by: HOSPITALIST

## 2022-04-12 PROCEDURE — 80053 COMPREHEN METABOLIC PANEL: CPT | Performed by: PHYSICIAN ASSISTANT

## 2022-04-12 RX ORDER — HYDROCODONE BITARTRATE AND ACETAMINOPHEN 5; 325 MG/1; MG/1
1 TABLET ORAL EVERY 4 HOURS PRN
Qty: 39 TABLET | Refills: 0 | Status: SHIPPED | OUTPATIENT
Start: 2022-04-12

## 2022-04-12 NOTE — CM/SW NOTE
Tentative dc home scheduled today pending results of US Venous doppler Left arm. Outpatient IV ABT scheduled at [facility] [address] [facility] 4/13/2022 @ 1130am [address] [facility]. AVS faxed to Sherry Ray from [facility] at [phone]. ADDENDUM: [facility] informed of tentative dc home today.      Nav Sheikh RN, BSN   [phone]

## 2022-04-12 NOTE — PLAN OF CARE
A&Ox4. VSS. RA. . Telemetry: NSR  Resp: Clear bilaterally IS: 1750  GI: Abdomen soft, nondistended. Passing gas. : Voids. Pain controlled with PRN pain medications  Up ad elicia  Drains: IR drain LLQ. Serosanguineous output, Biopatch and Tegaderm in place. No leaking from site. Drain flushed with 10ml and 8ml aspirated. Flushing education reviewed with pt for discharge. Diet: soft, pt tolerating well  IVF running per order. All appropriate safety measures in place. All questions and concerns addressed.  Will continue to monitor     Problem: Diabetes/Glucose Control  Goal: Glucose maintained within prescribed range  Description: INTERVENTIONS:  - Monitor Blood Glucose as ordered  - Assess for signs and symptoms of hyperglycemia and hypoglycemia  - Administer ordered medications to maintain glucose within target range  - Assess barriers to adequate nutritional intake and initiate nutrition consult as needed  - Instruct patient on self management of diabetes  Outcome: Progressing     Problem: Patient/Family Goals  Goal: Patient/Family Long Term Goal  Description: Patient's Long Term Goal: go home    Interventions:  - ivf  - iv abx  - surgery  - See additional Care Plan goals for specific interventions  Outcome: Progressing  Goal: Patient/Family Short Term Goal  Description: Patient's Short Term Goal: pain management    Interventions:   - medication  - See additional Care Plan goals for specific interventions  Outcome: Progressing     Problem: PAIN - ADULT  Goal: Verbalizes/displays adequate comfort level or patient's stated pain goal  Description: INTERVENTIONS:  - Encourage pt to monitor pain and request assistance  - Assess pain using appropriate pain scale  - Administer analgesics based on type and severity of pain and evaluate response  - Implement non-pharmacological measures as appropriate and evaluate response  - Consider cultural and social influences on pain and pain management  - Manage/alleviate anxiety  - Utilize distraction and/or relaxation techniques  - Monitor for opioid side effects  - Notify MD/LIP if interventions unsuccessful or patient reports new pain  - Anticipate increased pain with activity and pre-medicate as appropriate  Outcome: Progressing     Problem: RISK FOR INFECTION - ADULT  Goal: Absence of fever/infection during anticipated neutropenic period  Description: INTERVENTIONS  - Monitor WBC  - Administer growth factors as ordered  - Implement neutropenic guidelines  Outcome: Progressing     Problem: SAFETY ADULT - FALL  Goal: Free from fall injury  Description: INTERVENTIONS:  - Assess pt frequently for physical needs  - Identify cognitive and physical deficits and behaviors that affect risk of falls.   - [place] fall precautions as indicated by assessment.  - Educate pt/family on patient safety including physical limitations  - Instruct pt to call for assistance with activity based on assessment  - Modify environment to reduce risk of injury  - Provide assistive devices as appropriate  - Consider OT/PT consult to assist with strengthening/mobility  - Encourage toileting schedule  Outcome: Progressing     Problem: DISCHARGE PLANNING  Goal: Discharge to home or other facility with appropriate resources  Description: INTERVENTIONS:  - Identify barriers to discharge w/pt and caregiver  - Include patient/family/discharge partner in discharge planning  - Arrange for needed discharge resources and transportation as appropriate  - Identify discharge learning needs (meds, wound care, etc)  - Arrange for interpreters to assist at discharge as needed  - Consider post-discharge preferences of patient/family/discharge partner  - Complete POLST form as appropriate  - Assess patient's ability to be responsible for managing their own health  - Refer to Case Management Department for coordinating discharge planning if the patient needs post-hospital services based on physician/LIP order or complex needs related to functional status, cognitive ability or social support system  Outcome: Progressing

## 2022-04-13 NOTE — PROGRESS NOTES
NURSING DISCHARGE NOTE    Discharged Home via Wheelchair. Accompanied by Family member  Belongings Taken by patient/family. Discharge instructions provided. Reviewed prescriptions, drain care, midline care, and POC for infusions w/ pt and spouse. All questions answered and concerns addressed. Pt discharged in stable condition.

## 2022-05-04 ENCOUNTER — HOSPITAL ENCOUNTER (OUTPATIENT)
Dept: CT IMAGING | Facility: HOSPITAL | Age: 62
Discharge: HOME | End: 2022-05-04
Attending: PHYSICIAN ASSISTANT
Payer: MEDICARE

## 2022-05-04 DIAGNOSIS — T81.43XA POSTOPERATIVE INTRA-ABDOMINAL ABSCESS: ICD-10-CM

## 2022-05-04 PROCEDURE — 49424 ASSESS CYST CONTRAST INJECT: CPT | Performed by: PHYSICIAN ASSISTANT

## 2022-05-04 PROCEDURE — 76080 X-RAY EXAM OF FISTULA: CPT | Performed by: PHYSICIAN ASSISTANT

## 2022-05-04 RX ORDER — IOHEXOL 350 MG/ML
1 INJECTION, SOLUTION INTRAVENOUS
Status: COMPLETED | OUTPATIENT
Start: 2022-05-04 | End: 2022-05-04

## 2022-05-04 RX ADMIN — IOHEXOL 1 ML: 350 INJECTION, SOLUTION INTRAVENOUS at 08:50:00

## 2022-05-04 NOTE — PROCEDURES
[facility]  Procedure Note    Jolene Gilmore Patient Status:  Outpatient    1960 MRN [id number]   [facility] CT Attending Gabriela Mejia [facility] Day # 0 PCP Manuel Nathan MD     Procedure: CT abscessogram, tube removal    Pre-Procedure Diagnosis:  Abscess    Post-Procedure Diagnosis: Same    Anesthesia:  None    Findings:  No residual collection. Tube removed completely intact.     Specimens: None    Blood Loss:  Minimal    Tourniquet Time: None  Complications:  None  Drains:  None    Secondary Diagnosis:  None    Nissa Doyle MD  2022

## 2022-06-24 ENCOUNTER — HOSPITAL ENCOUNTER (INPATIENT)
Facility: HOSPITAL | Age: 62
LOS: 5 days | Discharge: HOME | DRG: 389 | End: 2022-06-30
Attending: EMERGENCY MEDICINE | Admitting: INTERNAL MEDICINE
Payer: MEDICARE

## 2022-06-24 ENCOUNTER — LAB ENCOUNTER (OUTPATIENT)
Dept: LAB | Age: 62
DRG: 389 | End: 2022-06-24
Attending: INTERNAL MEDICINE
Payer: MEDICARE

## 2022-06-24 ENCOUNTER — LAB ENCOUNTER (OUTPATIENT)
Dept: LAB | Age: 62
End: 2022-06-24
Attending: INTERNAL MEDICINE
Payer: MEDICARE

## 2022-06-24 ENCOUNTER — HOSPITAL ENCOUNTER (INPATIENT)
Facility: HOSPITAL | Age: 62
LOS: 5 days | Discharge: HOME | End: 2022-06-30
Attending: EMERGENCY MEDICINE | Admitting: INTERNAL MEDICINE
Payer: MEDICARE

## 2022-06-24 DIAGNOSIS — K56.609 SBO (SMALL BOWEL OBSTRUCTION) (HCC): Primary | ICD-10-CM

## 2022-06-24 DIAGNOSIS — Z20.822 ENCOUNTER FOR PREPROCEDURE SCREENING LABORATORY TESTING FOR COVID-19: ICD-10-CM

## 2022-06-24 DIAGNOSIS — Z01.812 ENCOUNTER FOR PREPROCEDURE SCREENING LABORATORY TESTING FOR COVID-19: ICD-10-CM

## 2022-06-24 PROCEDURE — 96376 TX/PRO/DX INJ SAME DRUG ADON: CPT

## 2022-06-24 PROCEDURE — 96361 HYDRATE IV INFUSION ADD-ON: CPT

## 2022-06-24 PROCEDURE — 99285 EMERGENCY DEPT VISIT HI MDM: CPT

## 2022-06-24 PROCEDURE — 96375 TX/PRO/DX INJ NEW DRUG ADDON: CPT

## 2022-06-24 PROCEDURE — 96374 THER/PROPH/DIAG INJ IV PUSH: CPT

## 2022-06-24 RX ORDER — SODIUM CHLORIDE 9 MG/ML
125 INJECTION, SOLUTION INTRAVENOUS CONTINUOUS
Status: DISCONTINUED | OUTPATIENT
Start: 2022-06-24 | End: 2022-06-27

## 2022-06-24 RX ORDER — HYDROMORPHONE HYDROCHLORIDE 1 MG/ML
0.5 INJECTION, SOLUTION INTRAMUSCULAR; INTRAVENOUS; SUBCUTANEOUS ONCE
Status: COMPLETED | OUTPATIENT
Start: 2022-06-24 | End: 2022-06-25

## 2022-06-24 RX ORDER — ONDANSETRON 2 MG/ML
4 INJECTION INTRAMUSCULAR; INTRAVENOUS ONCE
Status: COMPLETED | OUTPATIENT
Start: 2022-06-24 | End: 2022-06-25

## 2022-06-25 ENCOUNTER — APPOINTMENT (OUTPATIENT)
Dept: CT IMAGING | Age: 62
DRG: 389 | End: 2022-06-25
Attending: EMERGENCY MEDICINE
Payer: MEDICARE

## 2022-06-25 ENCOUNTER — APPOINTMENT (OUTPATIENT)
Dept: CT IMAGING | Age: 62
End: 2022-06-25
Attending: EMERGENCY MEDICINE
Payer: MEDICARE

## 2022-06-25 PROBLEM — K56.609 SBO (SMALL BOWEL OBSTRUCTION) (HCC): Status: ACTIVE | Noted: 2022-06-25

## 2022-06-25 LAB
ADENOVIRUS PCR:: NOT DETECTED
ALBUMIN SERPL-MCNC: 3.3 G/DL (ref 3.4–5)
ALBUMIN/GLOB SERPL: 1 {RATIO} (ref 1–2)
ALP LIVER SERPL-CCNC: 99 U/L
ALT SERPL-CCNC: 15 U/L
ANION GAP SERPL CALC-SCNC: 3 MMOL/L (ref 0–18)
ANION GAP SERPL CALC-SCNC: 5 MMOL/L (ref 0–18)
ANION GAP SERPL CALC-SCNC: 7 MMOL/L (ref 0–18)
APTT PPP: 28.5 SECONDS (ref 23.3–35.6)
APTT PPP: >240 SECONDS (ref 23.3–35.6)
APTT PPP: >240 SECONDS (ref 23.3–35.6)
AST SERPL-CCNC: 13 U/L (ref 15–37)
B PARAPERT DNA SPEC QL NAA+PROBE: NOT DETECTED
B PERT DNA SPEC QL NAA+PROBE: NOT DETECTED
BASOPHILS # BLD AUTO: 0.04 X10(3) UL (ref 0–0.2)
BASOPHILS NFR BLD AUTO: 0.5 %
BILIRUB SERPL-MCNC: 0.4 MG/DL (ref 0.1–2)
BILIRUB UR QL STRIP.AUTO: NEGATIVE
BUN BLD-MCNC: 10 MG/DL (ref 7–18)
BUN BLD-MCNC: 7 MG/DL (ref 7–18)
BUN BLD-MCNC: 9 MG/DL (ref 7–18)
C PNEUM DNA SPEC QL NAA+PROBE: NOT DETECTED
CALCIUM BLD-MCNC: 8.8 MG/DL (ref 8.5–10.1)
CALCIUM BLD-MCNC: 9.2 MG/DL (ref 8.5–10.1)
CALCIUM BLD-MCNC: 9.3 MG/DL (ref 8.5–10.1)
CHLORIDE SERPL-SCNC: 112 MMOL/L (ref 98–112)
CHLORIDE SERPL-SCNC: 113 MMOL/L (ref 98–112)
CHLORIDE SERPL-SCNC: 114 MMOL/L (ref 98–112)
CO2 SERPL-SCNC: 23 MMOL/L (ref 21–32)
CO2 SERPL-SCNC: 25 MMOL/L (ref 21–32)
CO2 SERPL-SCNC: 26 MMOL/L (ref 21–32)
COLOR UR AUTO: YELLOW
CORONAVIRUS 229E PCR:: NOT DETECTED
CORONAVIRUS HKU1 PCR:: NOT DETECTED
CORONAVIRUS NL63 PCR:: NOT DETECTED
CORONAVIRUS OC43 PCR:: NOT DETECTED
CREAT BLD-MCNC: 0.65 MG/DL
CREAT BLD-MCNC: 0.73 MG/DL
CREAT BLD-MCNC: 0.81 MG/DL
EOSINOPHIL # BLD AUTO: 0.2 X10(3) UL (ref 0–0.7)
EOSINOPHIL NFR BLD AUTO: 2.5 %
FLUAV RNA SPEC QL NAA+PROBE: NOT DETECTED
FLUBV RNA SPEC QL NAA+PROBE: NOT DETECTED
GLOBULIN PLAS-MCNC: 3.3 G/DL (ref 2.8–4.4)
GLUCOSE BLD-MCNC: 112 MG/DL (ref 70–99)
GLUCOSE BLD-MCNC: 121 MG/DL (ref 70–99)
GLUCOSE BLD-MCNC: 126 MG/DL (ref 70–99)
GLUCOSE BLD-MCNC: 137 MG/DL (ref 70–99)
GLUCOSE BLD-MCNC: 94 MG/DL (ref 70–99)
GLUCOSE UR STRIP.AUTO-MCNC: NEGATIVE MG/DL
HCT VFR BLD AUTO: 41.6 %
HCT VFR BLD AUTO: 42.2 %
HGB BLD-MCNC: 12.5 G/DL
HGB BLD-MCNC: 12.7 G/DL
HYALINE CASTS #/AREA URNS AUTO: PRESENT /LPF
IMM GRANULOCYTES # BLD AUTO: 0.04 X10(3) UL (ref 0–1)
IMM GRANULOCYTES NFR BLD: 0.5 %
KETONES UR STRIP.AUTO-MCNC: NEGATIVE MG/DL
LIPASE SERPL-CCNC: 63 U/L (ref 73–393)
LYMPHOCYTES # BLD AUTO: 1.77 X10(3) UL (ref 1–4)
LYMPHOCYTES NFR BLD AUTO: 22.2 %
MCH RBC QN AUTO: 25.9 PG (ref 26–34)
MCH RBC QN AUTO: 26.7 PG (ref 26–34)
MCHC RBC AUTO-ENTMCNC: 30 G/DL (ref 31–37)
MCHC RBC AUTO-ENTMCNC: 30.1 G/DL (ref 31–37)
MCV RBC AUTO: 85.9 FL
MCV RBC AUTO: 88.7 FL
METAPNEUMOVIRUS PCR:: NOT DETECTED
MONOCYTES # BLD AUTO: 0.51 X10(3) UL (ref 0.1–1)
MONOCYTES NFR BLD AUTO: 6.4 %
MYCOPLASMA PNEUMONIA PCR:: NOT DETECTED
NEUTROPHILS # BLD AUTO: 5.43 X10 (3) UL (ref 1.5–7.7)
NEUTROPHILS # BLD AUTO: 5.43 X10(3) UL (ref 1.5–7.7)
NEUTROPHILS NFR BLD AUTO: 67.9 %
NITRITE UR QL STRIP.AUTO: POSITIVE
OSMOLALITY SERPL CALC.SUM OF ELEC: 294 MOSM/KG (ref 275–295)
OSMOLALITY SERPL CALC.SUM OF ELEC: 294 MOSM/KG (ref 275–295)
OSMOLALITY SERPL CALC.SUM OF ELEC: 299 MOSM/KG (ref 275–295)
PARAINFLUENZA 1 PCR:: NOT DETECTED
PARAINFLUENZA 2 PCR:: NOT DETECTED
PARAINFLUENZA 3 PCR:: NOT DETECTED
PARAINFLUENZA 4 PCR:: NOT DETECTED
PH UR STRIP.AUTO: 5 [PH] (ref 5–8)
PLATELET # BLD AUTO: 181 10(3)UL (ref 150–450)
PLATELET # BLD AUTO: 200 10(3)UL (ref 150–450)
PLATELET MORPHOLOGY: NORMAL
POTASSIUM SERPL-SCNC: 3.8 MMOL/L (ref 3.5–5.1)
POTASSIUM SERPL-SCNC: 4 MMOL/L (ref 3.5–5.1)
POTASSIUM SERPL-SCNC: 4.1 MMOL/L (ref 3.5–5.1)
PROT SERPL-MCNC: 6.6 G/DL (ref 6.4–8.2)
PROT UR STRIP.AUTO-MCNC: NEGATIVE MG/DL
RBC # BLD AUTO: 4.69 X10(6)UL
RBC # BLD AUTO: 4.91 X10(6)UL
RBC UR QL AUTO: NEGATIVE
RHINOVIRUS/ENTERO PCR:: NOT DETECTED
ROULEAUX BLD QL SMEAR: PRESENT
RSV RNA SPEC QL NAA+PROBE: NOT DETECTED
SARS-COV-2 RNA NPH QL NAA+NON-PROBE: NOT DETECTED
SARS-COV-2 RNA RESP QL NAA+PROBE: NOT DETECTED
SARS-COV-2 RNA RESP QL NAA+PROBE: NOT DETECTED
SODIUM SERPL-SCNC: 142 MMOL/L (ref 136–145)
SODIUM SERPL-SCNC: 142 MMOL/L (ref 136–145)
SODIUM SERPL-SCNC: 144 MMOL/L (ref 136–145)
SP GR UR STRIP.AUTO: 1.02 (ref 1–1.03)
UROBILINOGEN UR STRIP.AUTO-MCNC: <2 MG/DL
WBC # BLD AUTO: 7.7 X10(3) UL (ref 4–11)
WBC # BLD AUTO: 8 X10(3) UL (ref 4–11)

## 2022-06-25 PROCEDURE — 87077 CULTURE AEROBIC IDENTIFY: CPT | Performed by: INTERNAL MEDICINE

## 2022-06-25 PROCEDURE — 87186 SC STD MICRODIL/AGAR DIL: CPT | Performed by: INTERNAL MEDICINE

## 2022-06-25 PROCEDURE — 83690 ASSAY OF LIPASE: CPT | Performed by: EMERGENCY MEDICINE

## 2022-06-25 PROCEDURE — 85730 THROMBOPLASTIN TIME PARTIAL: CPT | Performed by: INTERNAL MEDICINE

## 2022-06-25 PROCEDURE — 87086 URINE CULTURE/COLONY COUNT: CPT | Performed by: INTERNAL MEDICINE

## 2022-06-25 PROCEDURE — 94660 CPAP INITIATION&MGMT: CPT

## 2022-06-25 PROCEDURE — 85025 COMPLETE CBC W/AUTO DIFF WBC: CPT | Performed by: EMERGENCY MEDICINE

## 2022-06-25 PROCEDURE — 0202U NFCT DS 22 TRGT SARS-COV-2: CPT | Performed by: SURGERY

## 2022-06-25 PROCEDURE — 85027 COMPLETE CBC AUTOMATED: CPT | Performed by: INTERNAL MEDICINE

## 2022-06-25 PROCEDURE — 82962 GLUCOSE BLOOD TEST: CPT

## 2022-06-25 PROCEDURE — 74176 CT ABD & PELVIS W/O CONTRAST: CPT | Performed by: EMERGENCY MEDICINE

## 2022-06-25 PROCEDURE — 80053 COMPREHEN METABOLIC PANEL: CPT | Performed by: EMERGENCY MEDICINE

## 2022-06-25 PROCEDURE — 81001 URINALYSIS AUTO W/SCOPE: CPT | Performed by: INTERNAL MEDICINE

## 2022-06-25 PROCEDURE — 87999 UNLISTED MICROBIOLOGY PX: CPT

## 2022-06-25 RX ORDER — HYDROCODONE BITARTRATE AND ACETAMINOPHEN 5; 325 MG/1; MG/1
1 TABLET ORAL EVERY 4 HOURS PRN
Status: DISCONTINUED | OUTPATIENT
Start: 2022-06-25 | End: 2022-06-30

## 2022-06-25 RX ORDER — ONDANSETRON 2 MG/ML
4 INJECTION INTRAMUSCULAR; INTRAVENOUS EVERY 6 HOURS PRN
Status: DISCONTINUED | OUTPATIENT
Start: 2022-06-25 | End: 2022-06-30

## 2022-06-25 RX ORDER — LOSARTAN POTASSIUM 100 MG/1
100 TABLET ORAL DAILY
Status: DISCONTINUED | OUTPATIENT
Start: 2022-06-25 | End: 2022-06-30

## 2022-06-25 RX ORDER — OXYBUTYNIN CHLORIDE 10 MG/1
10 TABLET, EXTENDED RELEASE ORAL DAILY
Refills: 1 | Status: DISCONTINUED | OUTPATIENT
Start: 2022-06-25 | End: 2022-06-30

## 2022-06-25 RX ORDER — SENNOSIDES 8.6 MG
17.2 TABLET ORAL NIGHTLY PRN
Status: DISCONTINUED | OUTPATIENT
Start: 2022-06-25 | End: 2022-06-30

## 2022-06-25 RX ORDER — HEPARIN SODIUM 5000 [USP'U]/ML
5000 INJECTION, SOLUTION INTRAVENOUS; SUBCUTANEOUS EVERY 8 HOURS SCHEDULED
Status: DISCONTINUED | OUTPATIENT
Start: 2022-06-25 | End: 2022-06-25

## 2022-06-25 RX ORDER — HEPARIN SODIUM AND DEXTROSE 10000; 5 [USP'U]/100ML; G/100ML
18 INJECTION INTRAVENOUS ONCE
Status: COMPLETED | OUTPATIENT
Start: 2022-06-25 | End: 2022-06-25

## 2022-06-25 RX ORDER — POLYETHYLENE GLYCOL 3350 17 G/17G
17 POWDER, FOR SOLUTION ORAL DAILY PRN
Status: DISCONTINUED | OUTPATIENT
Start: 2022-06-25 | End: 2022-06-30

## 2022-06-25 RX ORDER — HYDROMORPHONE HYDROCHLORIDE 1 MG/ML
0.8 INJECTION, SOLUTION INTRAMUSCULAR; INTRAVENOUS; SUBCUTANEOUS EVERY 2 HOUR PRN
Status: DISCONTINUED | OUTPATIENT
Start: 2022-06-25 | End: 2022-06-30

## 2022-06-25 RX ORDER — SODIUM PHOSPHATE, DIBASIC AND SODIUM PHOSPHATE, MONOBASIC 7; 19 G/133ML; G/133ML
1 ENEMA RECTAL ONCE AS NEEDED
Status: DISCONTINUED | OUTPATIENT
Start: 2022-06-25 | End: 2022-06-30

## 2022-06-25 RX ORDER — BISACODYL 10 MG
10 SUPPOSITORY, RECTAL RECTAL
Status: DISCONTINUED | OUTPATIENT
Start: 2022-06-25 | End: 2022-06-30

## 2022-06-25 RX ORDER — ONDANSETRON 2 MG/ML
4 INJECTION INTRAMUSCULAR; INTRAVENOUS ONCE
Status: COMPLETED | OUTPATIENT
Start: 2022-06-25 | End: 2022-06-25

## 2022-06-25 RX ORDER — HYDROMORPHONE HYDROCHLORIDE 1 MG/ML
0.5 INJECTION, SOLUTION INTRAMUSCULAR; INTRAVENOUS; SUBCUTANEOUS ONCE
Status: COMPLETED | OUTPATIENT
Start: 2022-06-25 | End: 2022-06-25

## 2022-06-25 RX ORDER — SODIUM CHLORIDE, SODIUM LACTATE, POTASSIUM CHLORIDE, CALCIUM CHLORIDE 600; 310; 30; 20 MG/100ML; MG/100ML; MG/100ML; MG/100ML
INJECTION, SOLUTION INTRAVENOUS CONTINUOUS
Status: DISCONTINUED | OUTPATIENT
Start: 2022-06-25 | End: 2022-06-27

## 2022-06-25 RX ORDER — ACETAMINOPHEN 325 MG/1
650 TABLET ORAL EVERY 4 HOURS PRN
Status: DISCONTINUED | OUTPATIENT
Start: 2022-06-25 | End: 2022-06-30

## 2022-06-25 RX ORDER — PANTOPRAZOLE SODIUM 40 MG/1
40 TABLET, DELAYED RELEASE ORAL
Status: DISCONTINUED | OUTPATIENT
Start: 2022-06-25 | End: 2022-06-30

## 2022-06-25 RX ORDER — AMLODIPINE BESYLATE 5 MG/1
5 TABLET ORAL DAILY
Status: DISCONTINUED | OUTPATIENT
Start: 2022-06-25 | End: 2022-06-30

## 2022-06-25 RX ORDER — FLUTICASONE FUROATE AND VILANTEROL 200; 25 UG/1; UG/1
1 POWDER RESPIRATORY (INHALATION) DAILY
Status: DISCONTINUED | OUTPATIENT
Start: 2022-06-25 | End: 2022-06-30

## 2022-06-25 RX ORDER — HYDROMORPHONE HYDROCHLORIDE 1 MG/ML
0.4 INJECTION, SOLUTION INTRAMUSCULAR; INTRAVENOUS; SUBCUTANEOUS EVERY 2 HOUR PRN
Status: DISCONTINUED | OUTPATIENT
Start: 2022-06-25 | End: 2022-06-30

## 2022-06-25 RX ORDER — PROCHLORPERAZINE EDISYLATE 5 MG/ML
5 INJECTION INTRAMUSCULAR; INTRAVENOUS EVERY 8 HOURS PRN
Status: DISCONTINUED | OUTPATIENT
Start: 2022-06-25 | End: 2022-06-30

## 2022-06-25 RX ORDER — FLUOXETINE HYDROCHLORIDE 20 MG/1
20 CAPSULE ORAL DAILY
Status: DISCONTINUED | OUTPATIENT
Start: 2022-06-25 | End: 2022-06-30

## 2022-06-25 RX ORDER — HYDROCODONE BITARTRATE AND ACETAMINOPHEN 5; 325 MG/1; MG/1
2 TABLET ORAL EVERY 4 HOURS PRN
Status: DISCONTINUED | OUTPATIENT
Start: 2022-06-25 | End: 2022-06-30

## 2022-06-25 RX ORDER — ALBUTEROL SULFATE 90 UG/1
2 AEROSOL, METERED RESPIRATORY (INHALATION) EVERY 6 HOURS PRN
Status: DISCONTINUED | OUTPATIENT
Start: 2022-06-25 | End: 2022-06-30

## 2022-06-25 RX ORDER — HEPARIN SODIUM AND DEXTROSE 10000; 5 [USP'U]/100ML; G/100ML
INJECTION INTRAVENOUS CONTINUOUS
Status: DISCONTINUED | OUTPATIENT
Start: 2022-06-25 | End: 2022-06-29

## 2022-06-25 RX ORDER — HYDROMORPHONE HYDROCHLORIDE 1 MG/ML
0.2 INJECTION, SOLUTION INTRAMUSCULAR; INTRAVENOUS; SUBCUTANEOUS EVERY 2 HOUR PRN
Status: DISCONTINUED | OUTPATIENT
Start: 2022-06-25 | End: 2022-06-30

## 2022-06-25 RX ORDER — ACETAMINOPHEN 500 MG
500 TABLET ORAL EVERY 4 HOURS PRN
Status: DISCONTINUED | OUTPATIENT
Start: 2022-06-25 | End: 2022-06-30

## 2022-06-25 RX ORDER — BUPROPION HYDROCHLORIDE 300 MG/1
300 TABLET ORAL DAILY
Status: DISCONTINUED | OUTPATIENT
Start: 2022-06-25 | End: 2022-06-30

## 2022-06-25 RX ORDER — TOPIRAMATE 25 MG/1
25 TABLET ORAL 2 TIMES DAILY
Status: DISCONTINUED | OUTPATIENT
Start: 2022-06-25 | End: 2022-06-30

## 2022-06-25 RX ORDER — SODIUM CHLORIDE 9 MG/ML
INJECTION, SOLUTION INTRAVENOUS CONTINUOUS
Status: ACTIVE | OUTPATIENT
Start: 2022-06-25 | End: 2022-06-25

## 2022-06-25 RX ORDER — HEPARIN SODIUM 1000 [USP'U]/ML
80 INJECTION, SOLUTION INTRAVENOUS; SUBCUTANEOUS ONCE
Status: COMPLETED | OUTPATIENT
Start: 2022-06-25 | End: 2022-06-25

## 2022-06-25 RX ORDER — MONTELUKAST SODIUM 10 MG/1
10 TABLET ORAL DAILY
Status: DISCONTINUED | OUTPATIENT
Start: 2022-06-25 | End: 2022-06-30

## 2022-06-25 RX ORDER — HYDROMORPHONE HYDROCHLORIDE 1 MG/ML
0.5 INJECTION, SOLUTION INTRAMUSCULAR; INTRAVENOUS; SUBCUTANEOUS EVERY 30 MIN PRN
Status: DISPENSED | OUTPATIENT
Start: 2022-06-25 | End: 2022-06-25

## 2022-06-25 NOTE — PLAN OF CARE
Problem: PAIN - ADULT  Goal: Verbalizes/displays adequate comfort level or patient's stated pain goal  Description: INTERVENTIONS:  - Encourage pt to monitor pain and request assistance  - Assess pain using appropriate pain scale  - Administer analgesics based on type and severity of pain and evaluate response  - Implement non-pharmacological measures as appropriate and evaluate response  - Consider cultural and social influences on pain and pain management  - Manage/alleviate anxiety  - Utilize distraction and/or relaxation techniques  - Monitor for opioid side effects  - Notify MD/LIP if interventions unsuccessful or patient reports new pain  - Anticipate increased pain with activity and pre-medicate as appropriate  Outcome: Progressing     Problem: GASTROINTESTINAL - ADULT  Goal: Minimal or absence of nausea and vomiting  Description: INTERVENTIONS:  - Maintain adequate hydration with IV or PO as ordered and tolerated  - Nasogastric tube to low intermittent suction as ordered  - Evaluate effectiveness of ordered antiemetic medications  - Provide nonpharmacologic comfort measures as appropriate  - Advance diet as tolerated, if ordered  - Obtain nutritional consult as needed  - Evaluate fluid balance  Outcome: Progressing  Goal: Maintains or returns to baseline bowel function  Description: INTERVENTIONS:  - Assess bowel function  - Maintain adequate hydration with IV or PO as ordered and tolerated  - Evaluate effectiveness of GI medications  - Encourage mobilization and activity  - Obtain nutritional consult as needed  - Establish a toileting routine/schedule  - Consider collaborating with pharmacy to review patient's medication profile  Outcome: Progressing  Goal: Maintains adequate nutritional intake (undernourished)  Description: INTERVENTIONS:  - Monitor percentage of each meal consumed  - Identify factors contributing to decreased intake, treat as appropriate  - Assist with meals as needed  - Monitor I&O, WT and lab values  - Obtain nutritional consult as needed  - Optimize oral hygiene and moisture  - Encourage food from home; allow for food preferences  - Enhance eating environment  Outcome: Progressing     Problem: METABOLIC/FLUID AND ELECTROLYTES - ADULT  Goal: Electrolytes maintained within normal limits  Description: INTERVENTIONS:  - Monitor labs and rhythm and assess patient for signs and symptoms of electrolyte imbalances  - Administer electrolyte replacement as ordered  - Monitor response to electrolyte replacements, including rhythm and repeat lab results as appropriate  - Fluid restriction as ordered  - Instruct patient on fluid and nutrition restrictions as appropriate  Outcome: Progressing     Problem: Patient/Family Goals  Goal: Patient/Family Long Term Goal  Description: Description: Patient's Long Term Goal: \" I'll be able to go home\"    Interventions:  -compliance to ordered tests and meds to meet discharge criteria  - educate pt on ordered meds, tests ordered,MD follow up and importance of healthy lifestyle and exercise  - bowel rest,  pain meds, nausea control    Outcome: Progressing  Goal: Patient/Family Short Term Goal  Description: Patient's Short Term Goal: \" My pain will be under control\"    Interventions:   - monitor for complaints of pain  -offer PRN pain meds  Outcome: Progressing

## 2022-06-25 NOTE — PLAN OF CARE
Patient alert and approriate. Interactive and cooperative with care. She denies chest pain and shortness of breath. No signs of distress noted. On tele- she is sinus rhythm. Supplemental O2 maintained. Refused CPAP for tonight.  maintained. Admitting orders per hospitalist.  IVF continue to infuse. Continue with bowel rest.  PRN pain meds and anti emetics offered. Due meds given. Follow up labs  ordered. Falls and safety precautions maintained.   Problem: GASTROINTESTINAL - ADULT  Goal: Minimal or absence of nausea and vomiting  Description: INTERVENTIONS:  - Maintain adequate hydration with IV or PO as ordered and tolerated  - Nasogastric tube to low intermittent suction as ordered  - Evaluate effectiveness of ordered antiemetic medications  - Provide nonpharmacologic comfort measures as appropriate  - Advance diet as tolerated, if ordered  - Obtain nutritional consult as needed  - Evaluate fluid balance  Outcome: Progressing  Goal: Maintains or returns to baseline bowel function  Description: INTERVENTIONS:  - Assess bowel function  - Maintain adequate hydration with IV or PO as ordered and tolerated  - Evaluate effectiveness of GI medications  - Encourage mobilization and activity  - Obtain nutritional consult as needed  - Establish a toileting routine/schedule  - Consider collaborating with pharmacy to review patient's medication profile  Outcome: Progressing  Goal: Maintains adequate nutritional intake (undernourished)  Description: INTERVENTIONS:  - Monitor percentage of each meal consumed  - Identify factors contributing to decreased intake, treat as appropriate  - Assist with meals as needed  - Monitor I&O, WT and lab values  - Obtain nutritional consult as needed  - Optimize oral hygiene and moisture  - Encourage food from home; allow for food preferences  - Enhance eating environment  Outcome: Progressing     Problem: METABOLIC/FLUID AND ELECTROLYTES - ADULT  Goal: Electrolytes maintained within normal limits  Description: INTERVENTIONS:  - Monitor labs and rhythm and assess patient for signs and symptoms of electrolyte imbalances  - Administer electrolyte replacement as ordered  - Monitor response to electrolyte replacements, including rhythm and repeat lab results as appropriate  - Fluid restriction as ordered  - Instruct patient on fluid and nutrition restrictions as appropriate  Outcome: Progressing     Problem: PAIN - ADULT  Goal: Verbalizes/displays adequate comfort level or patient's stated pain goal  Description: INTERVENTIONS:  - Encourage pt to monitor pain and request assistance  - Assess pain using appropriate pain scale  - Administer analgesics based on type and severity of pain and evaluate response  - Implement non-pharmacological measures as appropriate and evaluate response  - Consider cultural and social influences on pain and pain management  - Manage/alleviate anxiety  - Utilize distraction and/or relaxation techniques  - Monitor for opioid side effects  - Notify MD/LIP if interventions unsuccessful or patient reports new pain  - Anticipate increased pain with activity and pre-medicate as appropriate  Outcome: Progressing     Problem: Patient/Family Goals  Goal: Patient/Family Long Term Goal  Description: Description: Patient's Long Term Goal: \" I'll be able to go home\"    Interventions:  -compliance to ordered tests and meds to meet discharge criteria  - educate pt on ordered meds, tests ordered,MD follow up and importance of healthy lifestyle and exercise  - bowel rest,  pain meds, nausea control    Outcome: Progressing  Goal: Patient/Family Short Term Goal  Description: Patient's Short Term Goal: \" My pain will be under control\"    Interventions:   - monitor for complaints of pain  -offer PRN pain meds  Outcome: Progressing

## 2022-06-25 NOTE — PROGRESS NOTES
Patient has IV fluids infusing, Heparin drip initiated, on room air while awake or 2L oxygen when sleeping (when not using CPAP). Patient is voiding but had low urine output-500cc bolus infusing, ambulates with standby assist, NPO except sips with medications, has intermittent nausea-Zofran and Compazine given, Dilaudid given for pain. Patient updated on plan of care.

## 2022-06-25 NOTE — PROGRESS NOTES
9341: Paged  to inform her of patient needing IV pain medication ordered as she is NPO. Orders received. 1213: Paged  to inquire about acchecks and sliding scale insulin. Orders received. 9753: Paged  to inform her of patient only having 100mL urine output in 8 hours. Orders received to increase IV fluids and give 500cc bolus. Cecelia Martines

## 2022-06-26 ENCOUNTER — APPOINTMENT (OUTPATIENT)
Dept: GENERAL RADIOLOGY | Facility: HOSPITAL | Age: 62
End: 2022-06-26
Attending: INTERNAL MEDICINE
Payer: MEDICARE

## 2022-06-26 ENCOUNTER — APPOINTMENT (OUTPATIENT)
Dept: GENERAL RADIOLOGY | Facility: HOSPITAL | Age: 62
DRG: 389 | End: 2022-06-26
Attending: INTERNAL MEDICINE
Payer: MEDICARE

## 2022-06-26 LAB
APTT PPP: 121.1 SECONDS (ref 23.3–35.6)
APTT PPP: 227.5 SECONDS (ref 23.3–35.6)
APTT PPP: 90.6 SECONDS (ref 23.3–35.6)
GLUCOSE BLD-MCNC: 111 MG/DL (ref 70–99)
GLUCOSE BLD-MCNC: 115 MG/DL (ref 70–99)
GLUCOSE BLD-MCNC: 123 MG/DL (ref 70–99)
GLUCOSE BLD-MCNC: 95 MG/DL (ref 70–99)
HCT VFR BLD AUTO: 39.6 %
HGB BLD-MCNC: 11.8 G/DL
MCH RBC QN AUTO: 26.7 PG (ref 26–34)
MCHC RBC AUTO-ENTMCNC: 29.8 G/DL (ref 31–37)
MCV RBC AUTO: 89.6 FL
PLATELET # BLD AUTO: 170 10(3)UL (ref 150–450)
RBC # BLD AUTO: 4.42 X10(6)UL
WBC # BLD AUTO: 6.9 X10(3) UL (ref 4–11)

## 2022-06-26 PROCEDURE — 85049 AUTOMATED PLATELET COUNT: CPT | Performed by: INTERNAL MEDICINE

## 2022-06-26 PROCEDURE — 85730 THROMBOPLASTIN TIME PARTIAL: CPT | Performed by: INTERNAL MEDICINE

## 2022-06-26 PROCEDURE — 85027 COMPLETE CBC AUTOMATED: CPT | Performed by: INTERNAL MEDICINE

## 2022-06-26 PROCEDURE — 74021 RADEX ABDOMEN 3+ VIEWS: CPT | Performed by: INTERNAL MEDICINE

## 2022-06-26 PROCEDURE — 82962 GLUCOSE BLOOD TEST: CPT

## 2022-06-26 NOTE — PLAN OF CARE
PT axox4, resting in bed, ambulating to the bathroom, voiding clear yellow urine, pt has pain 8/10, ptt is greater than 240 stopped heparin drip, abdomen soft non-tender, pt care plan updated with patient will continue to monitor patient.     Problem: PAIN - ADULT  Goal: Verbalizes/displays adequate comfort level or patient's stated pain goal  Description: INTERVENTIONS:  - Encourage pt to monitor pain and request assistance  - Assess pain using appropriate pain scale  - Administer analgesics based on type and severity of pain and evaluate response  - Implement non-pharmacological measures as appropriate and evaluate response  - Consider cultural and social influences on pain and pain management  - Manage/alleviate anxiety  - Utilize distraction and/or relaxation techniques  - Monitor for opioid side effects  - Notify MD/LIP if interventions unsuccessful or patient reports new pain  - Anticipate increased pain with activity and pre-medicate as appropriate  Outcome: Progressing     Problem: GASTROINTESTINAL - ADULT  Goal: Minimal or absence of nausea and vomiting  Description: INTERVENTIONS:  - Maintain adequate hydration with IV or PO as ordered and tolerated  - Nasogastric tube to low intermittent suction as ordered  - Evaluate effectiveness of ordered antiemetic medications  - Provide nonpharmacologic comfort measures as appropriate  - Advance diet as tolerated, if ordered  - Obtain nutritional consult as needed  - Evaluate fluid balance  Outcome: Progressing  Goal: Maintains or returns to baseline bowel function  Description: INTERVENTIONS:  - Assess bowel function  - Maintain adequate hydration with IV or PO as ordered and tolerated  - Evaluate effectiveness of GI medications  - Encourage mobilization and activity  - Obtain nutritional consult as needed  - Establish a toileting routine/schedule  - Consider collaborating with pharmacy to review patient's medication profile  Outcome: Progressing  Goal: Maintains adequate nutritional intake (undernourished)  Description: INTERVENTIONS:  - Monitor percentage of each meal consumed  - Identify factors contributing to decreased intake, treat as appropriate  - Assist with meals as needed  - Monitor I&O, WT and lab values  - Obtain nutritional consult as needed  - Optimize oral hygiene and moisture  - Encourage food from home; allow for food preferences  - Enhance eating environment  Outcome: Progressing     Problem: METABOLIC/FLUID AND ELECTROLYTES - ADULT  Goal: Electrolytes maintained within normal limits  Description: INTERVENTIONS:  - Monitor labs and rhythm and assess patient for signs and symptoms of electrolyte imbalances  - Administer electrolyte replacement as ordered  - Monitor response to electrolyte replacements, including rhythm and repeat lab results as appropriate  - Fluid restriction as ordered  - Instruct patient on fluid and nutrition restrictions as appropriate  Outcome: Progressing     Problem: Patient/Family Goals  Goal: Patient/Family Long Term Goal  Description: Description: Patient's Long Term Goal: \" I'll be able to go home\"    Interventions:  -compliance to ordered tests and meds to meet discharge criteria  - educate pt on ordered meds, tests ordered,MD follow up and importance of healthy lifestyle and exercise  - bowel rest,  pain meds, nausea control    Outcome: Progressing  Goal: Patient/Family Short Term Goal  Description: Patient's Short Term Goal: \" My pain will be under control\"    Interventions:   - monitor for complaints of pain  -offer PRN pain meds  Outcome: Progressing

## 2022-06-26 NOTE — PLAN OF CARE
Patient is alert and oriented x3. Up ad elicia in hallway. Voiding freely. Denies nausea or vomiting. Had some nausea this AM.   Pain controlled.     NPO status maintained       Problem: PAIN - ADULT  Goal: Verbalizes/displays adequate comfort level or patient's stated pain goal  Description: INTERVENTIONS:  - Encourage pt to monitor pain and request assistance  - Assess pain using appropriate pain scale  - Administer analgesics based on type and severity of pain and evaluate response  - Implement non-pharmacological measures as appropriate and evaluate response  - Consider cultural and social influences on pain and pain management  - Manage/alleviate anxiety  - Utilize distraction and/or relaxation techniques  - Monitor for opioid side effects  - Notify MD/LIP if interventions unsuccessful or patient reports new pain  - Anticipate increased pain with activity and pre-medicate as appropriate  Outcome: Progressing     Problem: GASTROINTESTINAL - ADULT  Goal: Minimal or absence of nausea and vomiting  Description: INTERVENTIONS:  - Maintain adequate hydration with IV or PO as ordered and tolerated  - Nasogastric tube to low intermittent suction as ordered  - Evaluate effectiveness of ordered antiemetic medications  - Provide nonpharmacologic comfort measures as appropriate  - Advance diet as tolerated, if ordered  - Obtain nutritional consult as needed  - Evaluate fluid balance  Outcome: Progressing  Goal: Maintains or returns to baseline bowel function  Description: INTERVENTIONS:  - Assess bowel function  - Maintain adequate hydration with IV or PO as ordered and tolerated  - Evaluate effectiveness of GI medications  - Encourage mobilization and activity  - Obtain nutritional consult as needed  - Establish a toileting routine/schedule  - Consider collaborating with pharmacy to review patient's medication profile  Outcome: Progressing  Goal: Maintains adequate nutritional intake (undernourished)  Description: INTERVENTIONS:  - Monitor percentage of each meal consumed  - Identify factors contributing to decreased intake, treat as appropriate  - Assist with meals as needed  - Monitor I&O, WT and lab values  - Obtain nutritional consult as needed  - Optimize oral hygiene and moisture  - Encourage food from home; allow for food preferences  - Enhance eating environment  Outcome: Progressing     Problem: METABOLIC/FLUID AND ELECTROLYTES - ADULT  Goal: Electrolytes maintained within normal limits  Description: INTERVENTIONS:  - Monitor labs and rhythm and assess patient for signs and symptoms of electrolyte imbalances  - Administer electrolyte replacement as ordered  - Monitor response to electrolyte replacements, including rhythm and repeat lab results as appropriate  - Fluid restriction as ordered  - Instruct patient on fluid and nutrition restrictions as appropriate  Outcome: Progressing     Problem: Patient/Family Goals  Goal: Patient/Family Long Term Goal  Description: Description: Patient's Long Term Goal: \" I'll be able to go home\"    Interventions:  -compliance to ordered tests and meds to meet discharge criteria  - educate pt on ordered meds, tests ordered,MD follow up and importance of healthy lifestyle and exercise  - bowel rest,  pain meds, nausea control    Outcome: Progressing  Goal: Patient/Family Short Term Goal  Description: Patient's Short Term Goal: \" My pain will be under control\"    Interventions:   - monitor for complaints of pain  -offer PRN pain meds  Outcome: Progressing

## 2022-06-27 ENCOUNTER — APPOINTMENT (OUTPATIENT)
Dept: GENERAL RADIOLOGY | Facility: HOSPITAL | Age: 62
End: 2022-06-27
Attending: PHYSICIAN ASSISTANT
Payer: MEDICARE

## 2022-06-27 ENCOUNTER — APPOINTMENT (OUTPATIENT)
Dept: GENERAL RADIOLOGY | Facility: HOSPITAL | Age: 62
DRG: 389 | End: 2022-06-27
Attending: PHYSICIAN ASSISTANT
Payer: MEDICARE

## 2022-06-27 LAB
ANION GAP SERPL CALC-SCNC: 2 MMOL/L (ref 0–18)
APTT PPP: 84.6 SECONDS (ref 23.3–35.6)
BASOPHILS # BLD AUTO: 0.02 X10(3) UL (ref 0–0.2)
BASOPHILS NFR BLD AUTO: 0.4 %
BUN BLD-MCNC: 7 MG/DL (ref 7–18)
CALCIUM BLD-MCNC: 8.5 MG/DL (ref 8.5–10.1)
CHLORIDE SERPL-SCNC: 110 MMOL/L (ref 98–112)
CO2 SERPL-SCNC: 29 MMOL/L (ref 21–32)
CREAT BLD-MCNC: 0.68 MG/DL
EOSINOPHIL # BLD AUTO: 0.14 X10(3) UL (ref 0–0.7)
EOSINOPHIL NFR BLD AUTO: 3 %
GLUCOSE BLD-MCNC: 100 MG/DL (ref 70–99)
GLUCOSE BLD-MCNC: 114 MG/DL (ref 70–99)
GLUCOSE BLD-MCNC: 77 MG/DL (ref 70–99)
GLUCOSE BLD-MCNC: 86 MG/DL (ref 70–99)
GLUCOSE BLD-MCNC: 92 MG/DL (ref 70–99)
HCT VFR BLD AUTO: 36.3 %
HGB BLD-MCNC: 10.9 G/DL
IMM GRANULOCYTES # BLD AUTO: 0.01 X10(3) UL (ref 0–1)
IMM GRANULOCYTES NFR BLD: 0.2 %
LYMPHOCYTES # BLD AUTO: 1.32 X10(3) UL (ref 1–4)
LYMPHOCYTES NFR BLD AUTO: 28.3 %
MCH RBC QN AUTO: 26.8 PG (ref 26–34)
MCHC RBC AUTO-ENTMCNC: 30 G/DL (ref 31–37)
MCV RBC AUTO: 89.2 FL
MONOCYTES # BLD AUTO: 0.55 X10(3) UL (ref 0.1–1)
MONOCYTES NFR BLD AUTO: 11.8 %
NEUTROPHILS # BLD AUTO: 2.62 X10 (3) UL (ref 1.5–7.7)
NEUTROPHILS # BLD AUTO: 2.62 X10(3) UL (ref 1.5–7.7)
NEUTROPHILS NFR BLD AUTO: 56.3 %
OSMOLALITY SERPL CALC.SUM OF ELEC: 289 MOSM/KG (ref 275–295)
PLATELET # BLD AUTO: 139 10(3)UL (ref 150–450)
POTASSIUM SERPL-SCNC: 3.7 MMOL/L (ref 3.5–5.1)
RBC # BLD AUTO: 4.07 X10(6)UL
SODIUM SERPL-SCNC: 141 MMOL/L (ref 136–145)
WBC # BLD AUTO: 4.7 X10(3) UL (ref 4–11)

## 2022-06-27 PROCEDURE — 85730 THROMBOPLASTIN TIME PARTIAL: CPT | Performed by: INTERNAL MEDICINE

## 2022-06-27 PROCEDURE — 82962 GLUCOSE BLOOD TEST: CPT

## 2022-06-27 PROCEDURE — 74019 RADEX ABDOMEN 2 VIEWS: CPT | Performed by: PHYSICIAN ASSISTANT

## 2022-06-27 PROCEDURE — 85025 COMPLETE CBC W/AUTO DIFF WBC: CPT | Performed by: INTERNAL MEDICINE

## 2022-06-27 PROCEDURE — 80048 BASIC METABOLIC PNL TOTAL CA: CPT | Performed by: INTERNAL MEDICINE

## 2022-06-27 RX ORDER — DEXTROSE, SODIUM CHLORIDE, SODIUM LACTATE, POTASSIUM CHLORIDE, AND CALCIUM CHLORIDE 5; .6; .31; .03; .02 G/100ML; G/100ML; G/100ML; G/100ML; G/100ML
INJECTION, SOLUTION INTRAVENOUS CONTINUOUS
Status: DISCONTINUED | OUTPATIENT
Start: 2022-06-27 | End: 2022-06-30

## 2022-06-27 NOTE — PROGRESS NOTES
Endoscopy department called up to [facility], spoke to night shift nurse--asked if we could reach out to Dr. Darylene Calender to see if he still wants to do EGD for patient since it was schedulef outpatient on this date. [id number]: This RN called Dr. Susanne Colvin to see if GI should be consulted, and to reach out to Dr. Darylene Calender. Dr Susanne Colvin stated she will attempt to reach Dr. Darylene Calender. 1152: Endoscopy called this RN--stated that EGD has been cancelled for today.

## 2022-06-27 NOTE — PLAN OF CARE
A&Ox4. VSS. RA. . Denies chest pain and SOB. Telemetry: NSR. GI: Abdomen soft, nondistended. Reports passing a little bit of gas. Denies nausea. : Voids. Pain well controlled. Up ad elicia. Diet: NPO with ice chips and sips of meds  IVF running per order. IV abx scheduled. All appropriate safety measures in place. All questions and concerns addressed. Will continue to monitor.      Problem: PAIN - ADULT  Goal: Verbalizes/displays adequate comfort level or patient's stated pain goal  Description: INTERVENTIONS:  - Encourage pt to monitor pain and request assistance  - Assess pain using appropriate pain scale  - Administer analgesics based on type and severity of pain and evaluate response  - Implement non-pharmacological measures as appropriate and evaluate response  - Consider cultural and social influences on pain and pain management  - Manage/alleviate anxiety  - Utilize distraction and/or relaxation techniques  - Monitor for opioid side effects  - Notify MD/LIP if interventions unsuccessful or patient reports new pain  - Anticipate increased pain with activity and pre-medicate as appropriate  Outcome: Progressing     Problem: GASTROINTESTINAL - ADULT  Goal: Minimal or absence of nausea and vomiting  Description: INTERVENTIONS:  - Maintain adequate hydration with IV or PO as ordered and tolerated  - Nasogastric tube to low intermittent suction as ordered  - Evaluate effectiveness of ordered antiemetic medications  - Provide nonpharmacologic comfort measures as appropriate  - Advance diet as tolerated, if ordered  - Obtain nutritional consult as needed  - Evaluate fluid balance  Outcome: Progressing  Goal: Maintains or returns to baseline bowel function  Description: INTERVENTIONS:  - Assess bowel function  - Maintain adequate hydration with IV or PO as ordered and tolerated  - Evaluate effectiveness of GI medications  - Encourage mobilization and activity  - Obtain nutritional consult as needed  - Establish a toileting routine/schedule  - Consider collaborating with pharmacy to review patient's medication profile  Outcome: Progressing  Goal: Maintains adequate nutritional intake (undernourished)  Description: INTERVENTIONS:  - Monitor percentage of each meal consumed  - Identify factors contributing to decreased intake, treat as appropriate  - Assist with meals as needed  - Monitor I&O, WT and lab values  - Obtain nutritional consult as needed  - Optimize oral hygiene and moisture  - Encourage food from home; allow for food preferences  - Enhance eating environment  Outcome: Progressing     Problem: METABOLIC/FLUID AND ELECTROLYTES - ADULT  Goal: Electrolytes maintained within normal limits  Description: INTERVENTIONS:  - Monitor labs and rhythm and assess patient for signs and symptoms of electrolyte imbalances  - Administer electrolyte replacement as ordered  - Monitor response to electrolyte replacements, including rhythm and repeat lab results as appropriate  - Fluid restriction as ordered  - Instruct patient on fluid and nutrition restrictions as appropriate  Outcome: Progressing     Problem: Patient/Family Goals  Goal: Patient/Family Long Term Goal  Description: Description: Patient's Long Term Goal: \" I'll be able to go home\"    Interventions:  -compliance to ordered tests and meds to meet discharge criteria  - educate pt on ordered meds, tests ordered,MD follow up and importance of healthy lifestyle and exercise  - bowel rest,  pain meds, nausea control    Outcome: Progressing  Goal: Patient/Family Short Term Goal  Description: Patient's Short Term Goal: \" My pain will be under control\"    Interventions:   - monitor for complaints of pain  -offer PRN pain meds  Outcome: Progressing

## 2022-06-27 NOTE — PLAN OF CARE
A&Ox4. RA. 1L via NC overnight. Pt uses CPAP at home, pt declining the use of one here. Diminished lung sounds.  WDL. NSR on Tele. HR in the 70s. Refusing SCDs. Hep gtt infusing per MAR. Denies SOB and chest pain. NPO. Accuchecks Q6H. Denies N/V and passing flatus. Bowel sounds are present. Abdomen is soft/rounded. Voiding appropriately. C/o pain, managed per MAR. Educated pt on non-pharmacological pain management. Up ad elicia. IVF & IV ABX infusing per MAR. POC discussed w/ pt, all questions answered and concerns addressed. Safety precautions in place. Frequent rounds performed. 46 - pt reported having a BM.     Problem: PAIN - ADULT  Goal: Verbalizes/displays adequate comfort level or patient's stated pain goal  Description: INTERVENTIONS:  - Encourage pt to monitor pain and request assistance  - Assess pain using appropriate pain scale  - Administer analgesics based on type and severity of pain and evaluate response  - Implement non-pharmacological measures as appropriate and evaluate response  - Consider cultural and social influences on pain and pain management  - Manage/alleviate anxiety  - Utilize distraction and/or relaxation techniques  - Monitor for opioid side effects  - Notify MD/LIP if interventions unsuccessful or patient reports new pain  - Anticipate increased pain with activity and pre-medicate as appropriate  Outcome: Progressing     Problem: GASTROINTESTINAL - ADULT  Goal: Minimal or absence of nausea and vomiting  Description: INTERVENTIONS:  - Maintain adequate hydration with IV or PO as ordered and tolerated  - Nasogastric tube to low intermittent suction as ordered  - Evaluate effectiveness of ordered antiemetic medications  - Provide nonpharmacologic comfort measures as appropriate  - Advance diet as tolerated, if ordered  - Obtain nutritional consult as needed  - Evaluate fluid balance  Outcome: Progressing  Goal: Maintains or returns to baseline bowel function  Description: INTERVENTIONS:  - Assess bowel function  - Maintain adequate hydration with IV or PO as ordered and tolerated  - Evaluate effectiveness of GI medications  - Encourage mobilization and activity  - Obtain nutritional consult as needed  - Establish a toileting routine/schedule  - Consider collaborating with pharmacy to review patient's medication profile  Outcome: Progressing  Goal: Maintains adequate nutritional intake (undernourished)  Description: INTERVENTIONS:  - Monitor percentage of each meal consumed  - Identify factors contributing to decreased intake, treat as appropriate  - Assist with meals as needed  - Monitor I&O, WT and lab values  - Obtain nutritional consult as needed  - Optimize oral hygiene and moisture  - Encourage food from home; allow for food preferences  - Enhance eating environment  Outcome: Progressing     Problem: METABOLIC/FLUID AND ELECTROLYTES - ADULT  Goal: Electrolytes maintained within normal limits  Description: INTERVENTIONS:  - Monitor labs and rhythm and assess patient for signs and symptoms of electrolyte imbalances  - Administer electrolyte replacement as ordered  - Monitor response to electrolyte replacements, including rhythm and repeat lab results as appropriate  - Fluid restriction as ordered  - Instruct patient on fluid and nutrition restrictions as appropriate  Outcome: Progressing     Problem: Patient/Family Goals  Goal: Patient/Family Long Term Goal  Description: Description: Patient's Long Term Goal: \" I'll be able to go home\"    Interventions:  -compliance to ordered tests and meds to meet discharge criteria  - educate pt on ordered meds, tests ordered,MD follow up and importance of healthy lifestyle and exercise  - bowel rest,  pain meds, nausea control    Outcome: Progressing  Goal: Patient/Family Short Term Goal  Description: Patient's Short Term Goal: \" My pain will be under control\"    Interventions:   - monitor for complaints of pain  -offer PRN pain meds  Outcome: Progressing

## 2022-06-28 ENCOUNTER — APPOINTMENT (OUTPATIENT)
Dept: ULTRASOUND IMAGING | Facility: HOSPITAL | Age: 62
End: 2022-06-28
Attending: HOSPITALIST
Payer: MEDICARE

## 2022-06-28 ENCOUNTER — APPOINTMENT (OUTPATIENT)
Dept: ULTRASOUND IMAGING | Facility: HOSPITAL | Age: 62
DRG: 389 | End: 2022-06-28
Attending: HOSPITALIST
Payer: MEDICARE

## 2022-06-28 LAB
ANION GAP SERPL CALC-SCNC: 4 MMOL/L (ref 0–18)
ANION GAP SERPL CALC-SCNC: 5 MMOL/L (ref 0–18)
APTT PPP: 82.7 SECONDS (ref 23.3–35.6)
BASOPHILS # BLD AUTO: 0.03 X10(3) UL (ref 0–0.2)
BASOPHILS NFR BLD AUTO: 0.7 %
BUN BLD-MCNC: 4 MG/DL (ref 7–18)
BUN BLD-MCNC: 4 MG/DL (ref 7–18)
CALCIUM BLD-MCNC: 8.8 MG/DL (ref 8.5–10.1)
CALCIUM BLD-MCNC: 8.9 MG/DL (ref 8.5–10.1)
CHLORIDE SERPL-SCNC: 113 MMOL/L (ref 98–112)
CHLORIDE SERPL-SCNC: 114 MMOL/L (ref 98–112)
CO2 SERPL-SCNC: 25 MMOL/L (ref 21–32)
CO2 SERPL-SCNC: 27 MMOL/L (ref 21–32)
CREAT BLD-MCNC: 0.58 MG/DL
CREAT BLD-MCNC: 0.63 MG/DL
EOSINOPHIL # BLD AUTO: 0.26 X10(3) UL (ref 0–0.7)
EOSINOPHIL NFR BLD AUTO: 6.1 %
GLUCOSE BLD-MCNC: 100 MG/DL (ref 70–99)
GLUCOSE BLD-MCNC: 109 MG/DL (ref 70–99)
GLUCOSE BLD-MCNC: 112 MG/DL (ref 70–99)
GLUCOSE BLD-MCNC: 114 MG/DL (ref 70–99)
GLUCOSE BLD-MCNC: 90 MG/DL (ref 70–99)
GLUCOSE BLD-MCNC: 95 MG/DL (ref 70–99)
GLUCOSE BLD-MCNC: 98 MG/DL (ref 70–99)
HCT VFR BLD AUTO: 36.2 %
HCT VFR BLD AUTO: 37.6 %
HGB BLD-MCNC: 11 G/DL
HGB BLD-MCNC: 11.5 G/DL
IMM GRANULOCYTES # BLD AUTO: 0.01 X10(3) UL (ref 0–1)
IMM GRANULOCYTES NFR BLD: 0.2 %
LYMPHOCYTES # BLD AUTO: 1.68 X10(3) UL (ref 1–4)
LYMPHOCYTES NFR BLD AUTO: 39.4 %
MCH RBC QN AUTO: 26.7 PG (ref 26–34)
MCH RBC QN AUTO: 26.8 PG (ref 26–34)
MCHC RBC AUTO-ENTMCNC: 30.4 G/DL (ref 31–37)
MCHC RBC AUTO-ENTMCNC: 30.6 G/DL (ref 31–37)
MCV RBC AUTO: 87.4 FL
MCV RBC AUTO: 88.1 FL
MONOCYTES # BLD AUTO: 0.46 X10(3) UL (ref 0.1–1)
MONOCYTES NFR BLD AUTO: 10.8 %
NEUTROPHILS # BLD AUTO: 1.82 X10 (3) UL (ref 1.5–7.7)
NEUTROPHILS # BLD AUTO: 1.82 X10(3) UL (ref 1.5–7.7)
NEUTROPHILS NFR BLD AUTO: 42.8 %
OSMOLALITY SERPL CALC.SUM OF ELEC: 294 MOSM/KG (ref 275–295)
OSMOLALITY SERPL CALC.SUM OF ELEC: 297 MOSM/KG (ref 275–295)
PLATELET # BLD AUTO: 148 10(3)UL (ref 150–450)
PLATELET # BLD AUTO: 156 10(3)UL (ref 150–450)
POTASSIUM SERPL-SCNC: 3.2 MMOL/L (ref 3.5–5.1)
POTASSIUM SERPL-SCNC: 3.4 MMOL/L (ref 3.5–5.1)
RBC # BLD AUTO: 4.11 X10(6)UL
RBC # BLD AUTO: 4.3 X10(6)UL
SODIUM SERPL-SCNC: 143 MMOL/L (ref 136–145)
SODIUM SERPL-SCNC: 145 MMOL/L (ref 136–145)
WBC # BLD AUTO: 4.3 X10(3) UL (ref 4–11)
WBC # BLD AUTO: 4.6 X10(3) UL (ref 4–11)

## 2022-06-28 PROCEDURE — 80048 BASIC METABOLIC PNL TOTAL CA: CPT | Performed by: INTERNAL MEDICINE

## 2022-06-28 PROCEDURE — 82962 GLUCOSE BLOOD TEST: CPT

## 2022-06-28 PROCEDURE — 85025 COMPLETE CBC W/AUTO DIFF WBC: CPT | Performed by: INTERNAL MEDICINE

## 2022-06-28 PROCEDURE — 85730 THROMBOPLASTIN TIME PARTIAL: CPT | Performed by: INTERNAL MEDICINE

## 2022-06-28 PROCEDURE — 85027 COMPLETE CBC AUTOMATED: CPT | Performed by: INTERNAL MEDICINE

## 2022-06-28 PROCEDURE — 93971 EXTREMITY STUDY: CPT | Performed by: HOSPITALIST

## 2022-06-28 RX ORDER — POTASSIUM CHLORIDE 20 MEQ/1
40 TABLET, EXTENDED RELEASE ORAL ONCE
Status: COMPLETED | OUTPATIENT
Start: 2022-06-28 | End: 2022-06-28

## 2022-06-28 NOTE — PLAN OF CARE
A&Ox4. VSS. RA. . Denies chest pain and SOB. Telemetry: NSR. GI: Abdomen soft, nondistended. Reports passing gas. Denies nausea. : Voids. Pain well controlled. Up ad elicia. Diet: Clear liquids. IVF running per order. IV abx scheduled. All appropriate safety measures in place. All questions and concerns addressed. Will continue to monitor.      Problem: PAIN - ADULT  Goal: Verbalizes/displays adequate comfort level or patient's stated pain goal  Description: INTERVENTIONS:  - Encourage pt to monitor pain and request assistance  - Assess pain using appropriate pain scale  - Administer analgesics based on type and severity of pain and evaluate response  - Implement non-pharmacological measures as appropriate and evaluate response  - Consider cultural and social influences on pain and pain management  - Manage/alleviate anxiety  - Utilize distraction and/or relaxation techniques  - Monitor for opioid side effects  - Notify MD/LIP if interventions unsuccessful or patient reports new pain  - Anticipate increased pain with activity and pre-medicate as appropriate  Outcome: Progressing     Problem: GASTROINTESTINAL - ADULT  Goal: Minimal or absence of nausea and vomiting  Description: INTERVENTIONS:  - Maintain adequate hydration with IV or PO as ordered and tolerated  - Nasogastric tube to low intermittent suction as ordered  - Evaluate effectiveness of ordered antiemetic medications  - Provide nonpharmacologic comfort measures as appropriate  - Advance diet as tolerated, if ordered  - Obtain nutritional consult as needed  - Evaluate fluid balance  Outcome: Progressing  Goal: Maintains or returns to baseline bowel function  Description: INTERVENTIONS:  - Assess bowel function  - Maintain adequate hydration with IV or PO as ordered and tolerated  - Evaluate effectiveness of GI medications  - Encourage mobilization and activity  - Obtain nutritional consult as needed  - Establish a toileting routine/schedule  - Consider collaborating with pharmacy to review patient's medication profile  Outcome: Progressing  Goal: Maintains adequate nutritional intake (undernourished)  Description: INTERVENTIONS:  - Monitor percentage of each meal consumed  - Identify factors contributing to decreased intake, treat as appropriate  - Assist with meals as needed  - Monitor I&O, WT and lab values  - Obtain nutritional consult as needed  - Optimize oral hygiene and moisture  - Encourage food from home; allow for food preferences  - Enhance eating environment  Outcome: Progressing     Problem: METABOLIC/FLUID AND ELECTROLYTES - ADULT  Goal: Electrolytes maintained within normal limits  Description: INTERVENTIONS:  - Monitor labs and rhythm and assess patient for signs and symptoms of electrolyte imbalances  - Administer electrolyte replacement as ordered  - Monitor response to electrolyte replacements, including rhythm and repeat lab results as appropriate  - Fluid restriction as ordered  - Instruct patient on fluid and nutrition restrictions as appropriate  Outcome: Progressing     Problem: Patient/Family Goals  Goal: Patient/Family Long Term Goal  Description: Description: Patient's Long Term Goal: \" I'll be able to go home\"    Interventions:  -compliance to ordered tests and meds to meet discharge criteria  - educate pt on ordered meds, tests ordered,MD follow up and importance of healthy lifestyle and exercise  - bowel rest,  pain meds, nausea control    Outcome: Progressing  Goal: Patient/Family Short Term Goal  Description: Patient's Short Term Goal: \" My pain will be under control\"    Interventions:   - monitor for complaints of pain  -offer PRN pain meds  Outcome: Progressing

## 2022-06-28 NOTE — PLAN OF CARE
A&Ox4. RA while awake, 1L NC while asleep. APRIL, pt refusing CPAP.  WDL. NSR on Tele. HR in the 60s. Hep gtt infusing. NPO, tolerating ice chips, gum, hard candy. Accuchecks Q6H. Active bowel sounds. Passing flatus. Voiding appropriately. Denies SOB, chest pain, and N/V. Pain being managed per MAR. Up ad elicia, encouraged pt to ambulate. IVF infusing. POC discussed w/ pt, all questions answered and concerns addressed. Safety precautions in place. Frequent rounds performed.      Problem: PAIN - ADULT  Goal: Verbalizes/displays adequate comfort level or patient's stated pain goal  Description: INTERVENTIONS:  - Encourage pt to monitor pain and request assistance  - Assess pain using appropriate pain scale  - Administer analgesics based on type and severity of pain and evaluate response  - Implement non-pharmacological measures as appropriate and evaluate response  - Consider cultural and social influences on pain and pain management  - Manage/alleviate anxiety  - Utilize distraction and/or relaxation techniques  - Monitor for opioid side effects  - Notify MD/LIP if interventions unsuccessful or patient reports new pain  - Anticipate increased pain with activity and pre-medicate as appropriate  Outcome: Progressing     Problem: GASTROINTESTINAL - ADULT  Goal: Minimal or absence of nausea and vomiting  Description: INTERVENTIONS:  - Maintain adequate hydration with IV or PO as ordered and tolerated  - Nasogastric tube to low intermittent suction as ordered  - Evaluate effectiveness of ordered antiemetic medications  - Provide nonpharmacologic comfort measures as appropriate  - Advance diet as tolerated, if ordered  - Obtain nutritional consult as needed  - Evaluate fluid balance  Outcome: Progressing  Goal: Maintains or returns to baseline bowel function  Description: INTERVENTIONS:  - Assess bowel function  - Maintain adequate hydration with IV or PO as ordered and tolerated  - Evaluate effectiveness of GI medications  - Encourage mobilization and activity  - Obtain nutritional consult as needed  - Establish a toileting routine/schedule  - Consider collaborating with pharmacy to review patient's medication profile  Outcome: Progressing  Goal: Maintains adequate nutritional intake (undernourished)  Description: INTERVENTIONS:  - Monitor percentage of each meal consumed  - Identify factors contributing to decreased intake, treat as appropriate  - Assist with meals as needed  - Monitor I&O, WT and lab values  - Obtain nutritional consult as needed  - Optimize oral hygiene and moisture  - Encourage food from home; allow for food preferences  - Enhance eating environment  Outcome: Progressing     Problem: METABOLIC/FLUID AND ELECTROLYTES - ADULT  Goal: Electrolytes maintained within normal limits  Description: INTERVENTIONS:  - Monitor labs and rhythm and assess patient for signs and symptoms of electrolyte imbalances  - Administer electrolyte replacement as ordered  - Monitor response to electrolyte replacements, including rhythm and repeat lab results as appropriate  - Fluid restriction as ordered  - Instruct patient on fluid and nutrition restrictions as appropriate  Outcome: Progressing     Problem: Patient/Family Goals  Goal: Patient/Family Long Term Goal  Description: Description: Patient's Long Term Goal: \" I'll be able to go home\"    Interventions:  -compliance to ordered tests and meds to meet discharge criteria  - educate pt on ordered meds, tests ordered,MD follow up and importance of healthy lifestyle and exercise  - bowel rest,  pain meds, nausea control    Outcome: Progressing  Goal: Patient/Family Short Term Goal  Description: Patient's Short Term Goal: \" My pain will be under control\"    Interventions:   - monitor for complaints of pain  -offer PRN pain meds  Outcome: Progressing

## 2022-06-29 LAB
ANION GAP SERPL CALC-SCNC: 3 MMOL/L (ref 0–18)
APTT PPP: 93.4 SECONDS (ref 23.3–35.6)
BASOPHILS # BLD AUTO: 0.03 X10(3) UL (ref 0–0.2)
BASOPHILS NFR BLD AUTO: 0.7 %
BUN BLD-MCNC: 2 MG/DL (ref 7–18)
CALCIUM BLD-MCNC: 8.7 MG/DL (ref 8.5–10.1)
CHLORIDE SERPL-SCNC: 115 MMOL/L (ref 98–112)
CO2 SERPL-SCNC: 27 MMOL/L (ref 21–32)
CREAT BLD-MCNC: 0.58 MG/DL
EOSINOPHIL # BLD AUTO: 0.24 X10(3) UL (ref 0–0.7)
EOSINOPHIL NFR BLD AUTO: 5.3 %
GLUCOSE BLD-MCNC: 106 MG/DL (ref 70–99)
GLUCOSE BLD-MCNC: 108 MG/DL (ref 70–99)
GLUCOSE BLD-MCNC: 84 MG/DL (ref 70–99)
GLUCOSE BLD-MCNC: 87 MG/DL (ref 70–99)
GLUCOSE BLD-MCNC: 88 MG/DL (ref 70–99)
HCT VFR BLD AUTO: 36.2 %
HGB BLD-MCNC: 11.1 G/DL
IMM GRANULOCYTES # BLD AUTO: 0 X10(3) UL (ref 0–1)
IMM GRANULOCYTES NFR BLD: 0 %
LYMPHOCYTES # BLD AUTO: 1.65 X10(3) UL (ref 1–4)
LYMPHOCYTES NFR BLD AUTO: 36.4 %
MCH RBC QN AUTO: 27.1 PG (ref 26–34)
MCHC RBC AUTO-ENTMCNC: 30.7 G/DL (ref 31–37)
MCV RBC AUTO: 88.5 FL
MONOCYTES # BLD AUTO: 0.45 X10(3) UL (ref 0.1–1)
MONOCYTES NFR BLD AUTO: 9.9 %
NEUTROPHILS # BLD AUTO: 2.16 X10 (3) UL (ref 1.5–7.7)
NEUTROPHILS # BLD AUTO: 2.16 X10(3) UL (ref 1.5–7.7)
NEUTROPHILS NFR BLD AUTO: 47.7 %
OSMOLALITY SERPL CALC.SUM OF ELEC: 297 MOSM/KG (ref 275–295)
PLATELET # BLD AUTO: 149 10(3)UL (ref 150–450)
POTASSIUM SERPL-SCNC: 3.3 MMOL/L (ref 3.5–5.1)
RBC # BLD AUTO: 4.09 X10(6)UL
SODIUM SERPL-SCNC: 145 MMOL/L (ref 136–145)
WBC # BLD AUTO: 4.5 X10(3) UL (ref 4–11)

## 2022-06-29 PROCEDURE — 85730 THROMBOPLASTIN TIME PARTIAL: CPT | Performed by: HOSPITALIST

## 2022-06-29 PROCEDURE — 85025 COMPLETE CBC W/AUTO DIFF WBC: CPT | Performed by: INTERNAL MEDICINE

## 2022-06-29 PROCEDURE — 80048 BASIC METABOLIC PNL TOTAL CA: CPT | Performed by: INTERNAL MEDICINE

## 2022-06-29 PROCEDURE — 82962 GLUCOSE BLOOD TEST: CPT

## 2022-06-29 RX ORDER — POTASSIUM CHLORIDE 1.5 G/1.77G
20 POWDER, FOR SOLUTION ORAL 2 TIMES DAILY
Status: DISCONTINUED | OUTPATIENT
Start: 2022-06-29 | End: 2022-06-30

## 2022-06-29 RX ORDER — HEPARIN SODIUM 5000 [USP'U]/ML
5000 INJECTION, SOLUTION INTRAVENOUS; SUBCUTANEOUS EVERY 8 HOURS SCHEDULED
Status: DISCONTINUED | OUTPATIENT
Start: 2022-06-29 | End: 2022-06-30

## 2022-06-29 NOTE — CM/SW NOTE
Call to Dr Elise Fontenot office  Osbaldo Galloway [phone] to assist in scheduling outpatient EGD within the next month, left voice mail - await call back. [phone] received call from Osbaldo Galloway  at Dr Elise Fontenot office, who will coordinate EGD appointment. rn updated.      Noreen Streeter RN,   [id number]

## 2022-06-29 NOTE — PLAN OF CARE
Pt A&Ox4. RA. APRIL. Pt denies any CARSON, Cp or calf pain at this time. Plan for EDG tomorrow, Npo at midnight, hold morning subq heparin. IVF per order. Pt up ad elicia.  Safety precautions in place, call light in reach         Problem: PAIN - ADULT  Goal: Verbalizes/displays adequate comfort level or patient's stated pain goal  Description: INTERVENTIONS:  - Encourage pt to monitor pain and request assistance  - Assess pain using appropriate pain scale  - Administer analgesics based on type and severity of pain and evaluate response  - Implement non-pharmacological measures as appropriate and evaluate response  - Consider cultural and social influences on pain and pain management  - Manage/alleviate anxiety  - Utilize distraction and/or relaxation techniques  - Monitor for opioid side effects  - Notify MD/LIP if interventions unsuccessful or patient reports new pain  - Anticipate increased pain with activity and pre-medicate as appropriate  Outcome: Progressing     Problem: GASTROINTESTINAL - ADULT  Goal: Minimal or absence of nausea and vomiting  Description: INTERVENTIONS:  - Maintain adequate hydration with IV or PO as ordered and tolerated  - Nasogastric tube to low intermittent suction as ordered  - Evaluate effectiveness of ordered antiemetic medications  - Provide nonpharmacologic comfort measures as appropriate  - Advance diet as tolerated, if ordered  - Obtain nutritional consult as needed  - Evaluate fluid balance  Outcome: Progressing  Goal: Maintains or returns to baseline bowel function  Description: INTERVENTIONS:  - Assess bowel function  - Maintain adequate hydration with IV or PO as ordered and tolerated  - Evaluate effectiveness of GI medications  - Encourage mobilization and activity  - Obtain nutritional consult as needed  - Establish a toileting routine/schedule  - Consider collaborating with pharmacy to review patient's medication profile  Outcome: Progressing  Goal: Maintains adequate nutritional intake (undernourished)  Description: INTERVENTIONS:  - Monitor percentage of each meal consumed  - Identify factors contributing to decreased intake, treat as appropriate  - Assist with meals as needed  - Monitor I&O, WT and lab values  - Obtain nutritional consult as needed  - Optimize oral hygiene and moisture  - Encourage food from home; allow for food preferences  - Enhance eating environment  Outcome: Progressing     Problem: METABOLIC/FLUID AND ELECTROLYTES - ADULT  Goal: Electrolytes maintained within normal limits  Description: INTERVENTIONS:  - Monitor labs and rhythm and assess patient for signs and symptoms of electrolyte imbalances  - Administer electrolyte replacement as ordered  - Monitor response to electrolyte replacements, including rhythm and repeat lab results as appropriate  - Fluid restriction as ordered  - Instruct patient on fluid and nutrition restrictions as appropriate  Outcome: Progressing     Problem: Patient/Family Goals  Goal: Patient/Family Long Term Goal  Description: Description: Patient's Long Term Goal: \" I'll be able to go home\"    Interventions:  -compliance to ordered tests and meds to meet discharge criteria  - educate pt on ordered meds, tests ordered,MD follow up and importance of healthy lifestyle and exercise  - bowel rest,  pain meds, nausea control    Outcome: Progressing  Goal: Patient/Family Short Term Goal  Description: Patient's Short Term Goal: \" My pain will be under control\"    Interventions:   - monitor for complaints of pain  -offer PRN pain meds  Outcome: Progressing

## 2022-06-29 NOTE — PLAN OF CARE
A&Ox4. RA.  WDL. NSR on Tele. HR in the 70s. SCDs in place. Hep gtt infusing. Tolerating small amounts of clears. Abdomen soft/tender. Passing flatus. Active bowel sounds. Denies SOB, chest pain, N/V. Up ad elicia. IVF infusing. POC discussed w/ pt, all questions answered and concerns addressed. Safety precautions in place. Frequent rounds performed.      Problem: PAIN - ADULT  Goal: Verbalizes/displays adequate comfort level or patient's stated pain goal  Description: INTERVENTIONS:  - Encourage pt to monitor pain and request assistance  - Assess pain using appropriate pain scale  - Administer analgesics based on type and severity of pain and evaluate response  - Implement non-pharmacological measures as appropriate and evaluate response  - Consider cultural and social influences on pain and pain management  - Manage/alleviate anxiety  - Utilize distraction and/or relaxation techniques  - Monitor for opioid side effects  - Notify MD/LIP if interventions unsuccessful or patient reports new pain  - Anticipate increased pain with activity and pre-medicate as appropriate  Outcome: Progressing     Problem: GASTROINTESTINAL - ADULT  Goal: Minimal or absence of nausea and vomiting  Description: INTERVENTIONS:  - Maintain adequate hydration with IV or PO as ordered and tolerated  - Nasogastric tube to low intermittent suction as ordered  - Evaluate effectiveness of ordered antiemetic medications  - Provide nonpharmacologic comfort measures as appropriate  - Advance diet as tolerated, if ordered  - Obtain nutritional consult as needed  - Evaluate fluid balance  Outcome: Progressing  Goal: Maintains or returns to baseline bowel function  Description: INTERVENTIONS:  - Assess bowel function  - Maintain adequate hydration with IV or PO as ordered and tolerated  - Evaluate effectiveness of GI medications  - Encourage mobilization and activity  - Obtain nutritional consult as needed  - Establish a toileting routine/schedule  - Consider collaborating with pharmacy to review patient's medication profile  Outcome: Progressing  Goal: Maintains adequate nutritional intake (undernourished)  Description: INTERVENTIONS:  - Monitor percentage of each meal consumed  - Identify factors contributing to decreased intake, treat as appropriate  - Assist with meals as needed  - Monitor I&O, WT and lab values  - Obtain nutritional consult as needed  - Optimize oral hygiene and moisture  - Encourage food from home; allow for food preferences  - Enhance eating environment  Outcome: Progressing     Problem: METABOLIC/FLUID AND ELECTROLYTES - ADULT  Goal: Electrolytes maintained within normal limits  Description: INTERVENTIONS:  - Monitor labs and rhythm and assess patient for signs and symptoms of electrolyte imbalances  - Administer electrolyte replacement as ordered  - Monitor response to electrolyte replacements, including rhythm and repeat lab results as appropriate  - Fluid restriction as ordered  - Instruct patient on fluid and nutrition restrictions as appropriate  Outcome: Progressing     Problem: Patient/Family Goals  Goal: Patient/Family Long Term Goal  Description: Description: Patient's Long Term Goal: \" I'll be able to go home\"    Interventions:  -compliance to ordered tests and meds to meet discharge criteria  - educate pt on ordered meds, tests ordered,MD follow up and importance of healthy lifestyle and exercise  - bowel rest,  pain meds, nausea control    Outcome: Progressing  Goal: Patient/Family Short Term Goal  Description: Patient's Short Term Goal: \" My pain will be under control\"    Interventions:   - monitor for complaints of pain  -offer PRN pain meds  Outcome: Progressing

## 2022-06-30 ENCOUNTER — ANESTHESIA (OUTPATIENT)
Dept: ENDOSCOPY | Facility: HOSPITAL | Age: 62
End: 2022-06-30
Payer: MEDICARE

## 2022-06-30 ENCOUNTER — ANESTHESIA EVENT (OUTPATIENT)
Dept: ENDOSCOPY | Facility: HOSPITAL | Age: 62
End: 2022-06-30
Payer: MEDICARE

## 2022-06-30 VITALS
HEIGHT: 62.99 IN | BODY MASS INDEX: 44.3 KG/M2 | SYSTOLIC BLOOD PRESSURE: 162 MMHG | HEART RATE: 67 BPM | RESPIRATION RATE: 16 BRPM | TEMPERATURE: 98 F | DIASTOLIC BLOOD PRESSURE: 100 MMHG | WEIGHT: 250 LBS | OXYGEN SATURATION: 100 %

## 2022-06-30 LAB
APTT PPP: 30.2 SECONDS (ref 23.3–35.6)
GLUCOSE BLD-MCNC: 76 MG/DL (ref 70–99)
GLUCOSE BLD-MCNC: 82 MG/DL (ref 70–99)
GLUCOSE BLD-MCNC: 95 MG/DL (ref 70–99)
SARS-COV-2 RNA RESP QL NAA+PROBE: NOT DETECTED

## 2022-06-30 PROCEDURE — 82962 GLUCOSE BLOOD TEST: CPT

## 2022-06-30 PROCEDURE — 85730 THROMBOPLASTIN TIME PARTIAL: CPT | Performed by: HOSPITALIST

## 2022-06-30 PROCEDURE — 0DJ08ZZ INSPECTION OF UPPER INTESTINAL TRACT, VIA NATURAL OR ARTIFICIAL OPENING ENDOSCOPIC: ICD-10-PCS | Performed by: INTERNAL MEDICINE

## 2022-06-30 RX ORDER — LIDOCAINE HYDROCHLORIDE 10 MG/ML
INJECTION, SOLUTION EPIDURAL; INFILTRATION; INTRACAUDAL; PERINEURAL AS NEEDED
Status: DISCONTINUED | OUTPATIENT
Start: 2022-06-30 | End: 2022-06-30 | Stop reason: SURG

## 2022-06-30 RX ORDER — SODIUM CHLORIDE, SODIUM LACTATE, POTASSIUM CHLORIDE, CALCIUM CHLORIDE 600; 310; 30; 20 MG/100ML; MG/100ML; MG/100ML; MG/100ML
INJECTION, SOLUTION INTRAVENOUS CONTINUOUS PRN
Status: DISCONTINUED | OUTPATIENT
Start: 2022-06-30 | End: 2022-06-30 | Stop reason: SURG

## 2022-06-30 RX ORDER — HYDRALAZINE HYDROCHLORIDE 20 MG/ML
10 INJECTION INTRAMUSCULAR; INTRAVENOUS EVERY 4 HOURS PRN
Status: DISCONTINUED | OUTPATIENT
Start: 2022-06-30 | End: 2022-06-30

## 2022-06-30 RX ORDER — NALOXONE HYDROCHLORIDE 0.4 MG/ML
80 INJECTION, SOLUTION INTRAMUSCULAR; INTRAVENOUS; SUBCUTANEOUS AS NEEDED
Status: DISCONTINUED | OUTPATIENT
Start: 2022-06-30 | End: 2022-06-30 | Stop reason: HOSPADM

## 2022-06-30 RX ORDER — NICOTINE POLACRILEX 4 MG
15 LOZENGE BUCCAL
Status: DISCONTINUED | OUTPATIENT
Start: 2022-06-30 | End: 2022-06-30 | Stop reason: HOSPADM

## 2022-06-30 RX ORDER — DEXTROSE MONOHYDRATE 25 G/50ML
50 INJECTION, SOLUTION INTRAVENOUS
Status: DISCONTINUED | OUTPATIENT
Start: 2022-06-30 | End: 2022-06-30 | Stop reason: HOSPADM

## 2022-06-30 RX ORDER — SODIUM CHLORIDE, SODIUM LACTATE, POTASSIUM CHLORIDE, CALCIUM CHLORIDE 600; 310; 30; 20 MG/100ML; MG/100ML; MG/100ML; MG/100ML
INJECTION, SOLUTION INTRAVENOUS CONTINUOUS
Status: DISCONTINUED | OUTPATIENT
Start: 2022-06-30 | End: 2022-06-30

## 2022-06-30 RX ORDER — NICOTINE POLACRILEX 4 MG
30 LOZENGE BUCCAL
Status: DISCONTINUED | OUTPATIENT
Start: 2022-06-30 | End: 2022-06-30 | Stop reason: HOSPADM

## 2022-06-30 RX ADMIN — SODIUM CHLORIDE, SODIUM LACTATE, POTASSIUM CHLORIDE, CALCIUM CHLORIDE: 600; 310; 30; 20 INJECTION, SOLUTION INTRAVENOUS at 14:34:00

## 2022-06-30 RX ADMIN — LIDOCAINE HYDROCHLORIDE 100 MG: 10 INJECTION, SOLUTION EPIDURAL; INFILTRATION; INTRACAUDAL; PERINEURAL at 14:38:00

## 2022-06-30 NOTE — PLAN OF CARE
NURSING DISCHARGE NOTE    Discharge instructions given. All questions answered to pt and family satisfaction. IV removed and intact. Discharged Home via Wheelchair. Accompanied by Family member and Support staff  Belongings Taken by patient/family.

## 2022-06-30 NOTE — ANESTHESIA POSTPROCEDURE EVALUATION
[facility] Patient Status:  Inpatient   Age/Gender 59year old female MRN [id number]   Location [address] [address] Attending Trish Goodman MD   [facility] Day # 5 PCP Leslie Starks MD       Anesthesia Post-op Note    ESOPHAGOGASTRODUODENOSCOPY (EGD)    Procedure Summary     Date: 06/30/22 Room / Location: [facility] ENDOSCOPY 02 / 1404 [facility] ENDOSCOPY    Anesthesia Start: 0118 Anesthesia Stop:     Procedure: ESOPHAGOGASTRODUODENOSCOPY (EGD) (N/A ) Diagnosis: (normal post-operative changes)    Surgeons: Julieth Martinez MD Anesthesiologist: Michelle Arnett MD    Anesthesia Type: MAC ASA Status: 3          Anesthesia Type: MAC    Vitals Value Taken Time   /57 06/30/22 1449   Temp  06/30/22 1451   Pulse 72 06/30/22 1450   Resp 20 06/30/22 1450   SpO2 98 % 06/30/22 1450   Vitals shown include unvalidated device data. Patient Location: PACU    Anesthesia Type: MAC    Airway Patency: patent    Postop Pain Control: adequate    Mental Status: preanesthetic baseline    Nausea/Vomiting: none    Cardiopulmonary/Hydration status: stable euvolemic    Complications: no apparent anesthesia related complications    Postop vital signs: stable    Dental Exam: Unchanged from Preop    Patient to be discharged from PACU when criteria met.

## 2022-06-30 NOTE — PLAN OF CARE
Problem: PAIN - ADULT  Goal: Verbalizes/displays adequate comfort level or patient's stated pain goal  Description: INTERVENTIONS:  - Encourage pt to monitor pain and request assistance  - Assess pain using appropriate pain scale  - Administer analgesics based on type and severity of pain and evaluate response  - Implement non-pharmacological measures as appropriate and evaluate response  - Consider cultural and social influences on pain and pain management  - Manage/alleviate anxiety  - Utilize distraction and/or relaxation techniques  - Monitor for opioid side effects  - Notify MD/LIP if interventions unsuccessful or patient reports new pain  - Anticipate increased pain with activity and pre-medicate as appropriate  Outcome: Progressing     Problem: GASTROINTESTINAL - ADULT  Goal: Minimal or absence of nausea and vomiting  Description: INTERVENTIONS:  - Maintain adequate hydration with IV or PO as ordered and tolerated  - Nasogastric tube to low intermittent suction as ordered  - Evaluate effectiveness of ordered antiemetic medications  - Provide nonpharmacologic comfort measures as appropriate  - Advance diet as tolerated, if ordered  - Obtain nutritional consult as needed  - Evaluate fluid balance  Outcome: Progressing     Problem: GASTROINTESTINAL - ADULT  Goal: Maintains or returns to baseline bowel function  Description: INTERVENTIONS:  - Assess bowel function  - Maintain adequate hydration with IV or PO as ordered and tolerated  - Evaluate effectiveness of GI medications  - Encourage mobilization and activity  - Obtain nutritional consult as needed  - Establish a toileting routine/schedule  - Consider collaborating with pharmacy to review patient's medication profile  Outcome: Progressing     Problem: GASTROINTESTINAL - ADULT  Goal: Maintains adequate nutritional intake (undernourished)  Description: INTERVENTIONS:  - Monitor percentage of each meal consumed  - Identify factors contributing to decreased intake, treat as appropriate  - Assist with meals as needed  - Monitor I&O, WT and lab values  - Obtain nutritional consult as needed  - Optimize oral hygiene and moisture  - Encourage food from home; allow for food preferences  - Enhance eating environment  Outcome: Progressing     Problem: METABOLIC/FLUID AND ELECTROLYTES - ADULT  Goal: Electrolytes maintained within normal limits  Description: INTERVENTIONS:  - Monitor labs and rhythm and assess patient for signs and symptoms of electrolyte imbalances  - Administer electrolyte replacement as ordered  - Monitor response to electrolyte replacements, including rhythm and repeat lab results as appropriate  - Fluid restriction as ordered  - Instruct patient on fluid and nutrition restrictions as appropriate  Outcome: Progressing     Problem: Patient/Family Goals  Goal: Patient/Family Long Term Goal  Description: Description: Patient's Long Term Goal: \" I'll be able to go home\"    Interventions:  -compliance to ordered tests and meds to meet discharge criteria  - educate pt on ordered meds, tests ordered,MD follow up and importance of healthy lifestyle and exercise  - bowel rest,  pain meds, nausea control    Outcome: Progressing  Goal: Patient/Family Short Term Goal  Description: Patient's Short Term Goal: \" My pain will be under control\"    Interventions:   - monitor for complaints of pain  -offer PRN pain meds  Outcome: Progressing

## 2022-06-30 NOTE — BRIEF OP NOTE
Pre-Operative Diagnosis: abdominal pain     Post-Operative Diagnosis: normal post-operative changes      Procedure Performed:   ESOPHAGOGASTRODUODENOSCOPY (EGD)    Surgeon(s) and Role:     * Cara Martines MD - Primary    Assistant(s):        Surgical Findings: see above     Specimen: none     Estimated Blood Loss: No data recorded    Dictation Number:  none    Soo Durán MD  6/30/2022  2:50 PM

## 2022-06-30 NOTE — OPERATIVE REPORT
PATIENT NAME: Jasiel Schaefer  MRN: [id number]  DATE OF OPERATION:  6/30/22  REFERRING PHYSICIAN: Dr. Cesilia Adam  Medications:  MAC  PREOPERATIVE DIAGNOSIS    Abdominal pain    Hx of gastrojejunal ulcer  POSTOPERATIVE DIAGNOSIS:  1. Normal esophagus  2. Normal gastric remnant  3. Normal gastrojejunal anastomosis with no ulcers. 4. Normal efferent and afferent small bowel limbs. PROCEDURE PERFORMED:                Esophagogastroduodenoscopy   Endoscopist:  Cory Flynn MD     PROCEDURE AND FINDINGS: The patient was placed into the left lateral decubitus after informed consent was obtained. All questions were answered. An updated history and physical were performed and an ASA score of 3 was assigned. Informed consent was obtained prior to the procedure, with review of possible complications including bleeding, infection, and missed cancer. MAC sedation was administered. The Olympus video gastroscope was introduced into the mouth and passed into the esophogus after administration of MAC sedation. The entire examined esophagus was remarkable for normal mucosa. The entire examined stomach remnant,  including retroflexion view was remarkable for normal, with normal gastrojejunal anastomosis. Ulcers seen at the anastomosis on prior exams were resolved. The afferent and efferent limbs of the bypass small bowel segment were normal.   The gastroscope was removed from the patient. The procedure was completed. The patient tolerated the procedure well. RECOMMENDATIONS   1. Follow up with Dr. Rain Holm as outpt. 2. Advance diet as tolerated. 3. Continue pantoprazole 40 mg twice per day. 4. Resume anticoagulation today.   Cory Flynn MD, Gastroenterologist  Cc: Dr. Cesilia Adam

## 2022-06-30 NOTE — PLAN OF CARE
Pt is resting comfortably. Denies pain or nausea tonight. Passing flatus. Reports passing a smear of stool but no substantial BM. Plan for EGD tomorrow. Pt is NPO after midnight for procedure. Plan of care was reviewed. All questions answered.      Problem: PAIN - ADULT  Goal: Verbalizes/displays adequate comfort level or patient's stated pain goal  Description: INTERVENTIONS:  - Encourage pt to monitor pain and request assistance  - Assess pain using appropriate pain scale  - Administer analgesics based on type and severity of pain and evaluate response  - Implement non-pharmacological measures as appropriate and evaluate response  - Consider cultural and social influences on pain and pain management  - Manage/alleviate anxiety  - Utilize distraction and/or relaxation techniques  - Monitor for opioid side effects  - Notify MD/LIP if interventions unsuccessful or patient reports new pain  - Anticipate increased pain with activity and pre-medicate as appropriate  Outcome: Progressing     Problem: GASTROINTESTINAL - ADULT  Goal: Minimal or absence of nausea and vomiting  Description: INTERVENTIONS:  - Maintain adequate hydration with IV or PO as ordered and tolerated  - Nasogastric tube to low intermittent suction as ordered  - Evaluate effectiveness of ordered antiemetic medications  - Provide nonpharmacologic comfort measures as appropriate  - Advance diet as tolerated, if ordered  - Obtain nutritional consult as needed  - Evaluate fluid balance  Outcome: Progressing  Goal: Maintains or returns to baseline bowel function  Description: INTERVENTIONS:  - Assess bowel function  - Maintain adequate hydration with IV or PO as ordered and tolerated  - Evaluate effectiveness of GI medications  - Encourage mobilization and activity  - Obtain nutritional consult as needed  - Establish a toileting routine/schedule  - Consider collaborating with pharmacy to review patient's medication profile  Outcome: Progressing  Goal: Maintains adequate nutritional intake (undernourished)  Description: INTERVENTIONS:  - Monitor percentage of each meal consumed  - Identify factors contributing to decreased intake, treat as appropriate  - Assist with meals as needed  - Monitor I&O, WT and lab values  - Obtain nutritional consult as needed  - Optimize oral hygiene and moisture  - Encourage food from home; allow for food preferences  - Enhance eating environment  Outcome: Progressing     Problem: METABOLIC/FLUID AND ELECTROLYTES - ADULT  Goal: Electrolytes maintained within normal limits  Description: INTERVENTIONS:  - Monitor labs and rhythm and assess patient for signs and symptoms of electrolyte imbalances  - Administer electrolyte replacement as ordered  - Monitor response to electrolyte replacements, including rhythm and repeat lab results as appropriate  - Fluid restriction as ordered  - Instruct patient on fluid and nutrition restrictions as appropriate  Outcome: Progressing     Problem: Patient/Family Goals  Goal: Patient/Family Long Term Goal  Description: Description: Patient's Long Term Goal: \" I'll be able to go home\"    Interventions:  -compliance to ordered tests and meds to meet discharge criteria  - educate pt on ordered meds, tests ordered,MD follow up and importance of healthy lifestyle and exercise  - bowel rest,  pain meds, nausea control    Outcome: Progressing  Goal: Patient/Family Short Term Goal  Description: Patient's Short Term Goal: \" My pain will be under control\"    Interventions:   - monitor for complaints of pain  -offer PRN pain meds  Outcome: Progressing

## 2022-11-25 ENCOUNTER — HOSPITAL ENCOUNTER (INPATIENT)
Facility: HOSPITAL | Age: 62
LOS: 1 days | Discharge: HOME | End: 2022-11-28
Attending: EMERGENCY MEDICINE | Admitting: INTERNAL MEDICINE
Payer: MEDICARE

## 2022-11-25 ENCOUNTER — APPOINTMENT (OUTPATIENT)
Dept: CT IMAGING | Facility: HOSPITAL | Age: 62
End: 2022-11-25
Attending: EMERGENCY MEDICINE
Payer: MEDICARE

## 2022-11-25 DIAGNOSIS — R11.2 NAUSEA AND VOMITING, UNSPECIFIED VOMITING TYPE: Primary | ICD-10-CM

## 2022-11-25 DIAGNOSIS — R10.9 ABDOMINAL PAIN, ACUTE: ICD-10-CM

## 2022-11-25 LAB
ALBUMIN SERPL-MCNC: 3.5 G/DL (ref 3.4–5)
ALBUMIN/GLOB SERPL: 1 {RATIO} (ref 1–2)
ALP LIVER SERPL-CCNC: 123 U/L
ALT SERPL-CCNC: 19 U/L
ANION GAP SERPL CALC-SCNC: 3 MMOL/L (ref 0–18)
AST SERPL-CCNC: 21 U/L (ref 15–37)
BASOPHILS # BLD AUTO: 0.08 X10(3) UL (ref 0–0.2)
BASOPHILS NFR BLD AUTO: 0.8 %
BILIRUB SERPL-MCNC: 0.5 MG/DL (ref 0.1–2)
BILIRUB UR QL CFM: NEGATIVE
BUN BLD-MCNC: 14 MG/DL (ref 7–18)
CALCIUM BLD-MCNC: 9.2 MG/DL (ref 8.5–10.1)
CHLORIDE SERPL-SCNC: 112 MMOL/L (ref 98–112)
CLARITY UR REFRACT.AUTO: CLEAR
CO2 SERPL-SCNC: 27 MMOL/L (ref 21–32)
COLOR UR AUTO: YELLOW
CREAT BLD-MCNC: 0.88 MG/DL
EOSINOPHIL # BLD AUTO: 0.36 X10(3) UL (ref 0–0.7)
EOSINOPHIL NFR BLD AUTO: 3.6 %
ERYTHROCYTE [DISTWIDTH] IN BLOOD BY AUTOMATED COUNT: 15.5 %
GFR SERPLBLD BASED ON 1.73 SQ M-ARVRAT: 73 ML/MIN/1.73M2 (ref 60–?)
GLOBULIN PLAS-MCNC: 3.6 G/DL (ref 2.8–4.4)
GLUCOSE BLD-MCNC: 140 MG/DL (ref 70–99)
GLUCOSE UR STRIP.AUTO-MCNC: NEGATIVE MG/DL
HCT VFR BLD AUTO: 42.3 %
HGB BLD-MCNC: 13.7 G/DL
IMM GRANULOCYTES # BLD AUTO: 0.02 X10(3) UL (ref 0–1)
IMM GRANULOCYTES NFR BLD: 0.2 %
KETONES UR STRIP.AUTO-MCNC: 15 MG/DL
LEUKOCYTE ESTERASE UR QL STRIP.AUTO: NEGATIVE
LIPASE SERPL-CCNC: 72 U/L (ref 73–393)
LYMPHOCYTES # BLD AUTO: 1.54 X10(3) UL (ref 1–4)
LYMPHOCYTES NFR BLD AUTO: 15.3 %
MCH RBC QN AUTO: 30.5 PG (ref 26–34)
MCHC RBC AUTO-ENTMCNC: 32.4 G/DL (ref 31–37)
MCV RBC AUTO: 94.2 FL
MONOCYTES # BLD AUTO: 0.44 X10(3) UL (ref 0.1–1)
MONOCYTES NFR BLD AUTO: 4.4 %
NEUTROPHILS # BLD AUTO: 7.62 X10 (3) UL (ref 1.5–7.7)
NEUTROPHILS # BLD AUTO: 7.62 X10(3) UL (ref 1.5–7.7)
NEUTROPHILS NFR BLD AUTO: 75.7 %
NITRITE UR QL STRIP.AUTO: NEGATIVE
OSMOLALITY SERPL CALC.SUM OF ELEC: 297 MOSM/KG (ref 275–295)
PH UR STRIP.AUTO: 6 [PH] (ref 5–8)
PLATELET # BLD AUTO: 191 10(3)UL (ref 150–450)
POTASSIUM SERPL-SCNC: 3.9 MMOL/L (ref 3.5–5.1)
PROT SERPL-MCNC: 7.1 G/DL (ref 6.4–8.2)
RBC # BLD AUTO: 4.49 X10(6)UL
RBC UR QL AUTO: NEGATIVE
SARS-COV-2 RNA RESP QL NAA+PROBE: NOT DETECTED
SODIUM SERPL-SCNC: 142 MMOL/L (ref 136–145)
SP GR UR STRIP.AUTO: >=1.03 (ref 1–1.03)
UROBILINOGEN UR STRIP.AUTO-MCNC: 0.2 MG/DL
WBC # BLD AUTO: 10.1 X10(3) UL (ref 4–11)

## 2022-11-25 PROCEDURE — 96376 TX/PRO/DX INJ SAME DRUG ADON: CPT

## 2022-11-25 PROCEDURE — 83036 HEMOGLOBIN GLYCOSYLATED A1C: CPT | Performed by: HOSPITALIST

## 2022-11-25 PROCEDURE — 85025 COMPLETE CBC W/AUTO DIFF WBC: CPT | Performed by: EMERGENCY MEDICINE

## 2022-11-25 PROCEDURE — 96361 HYDRATE IV INFUSION ADD-ON: CPT

## 2022-11-25 PROCEDURE — 80053 COMPREHEN METABOLIC PANEL: CPT | Performed by: EMERGENCY MEDICINE

## 2022-11-25 PROCEDURE — 83690 ASSAY OF LIPASE: CPT | Performed by: EMERGENCY MEDICINE

## 2022-11-25 PROCEDURE — 81003 URINALYSIS AUTO W/O SCOPE: CPT | Performed by: EMERGENCY MEDICINE

## 2022-11-25 PROCEDURE — 99285 EMERGENCY DEPT VISIT HI MDM: CPT

## 2022-11-25 PROCEDURE — 74177 CT ABD & PELVIS W/CONTRAST: CPT | Performed by: EMERGENCY MEDICINE

## 2022-11-25 PROCEDURE — 96374 THER/PROPH/DIAG INJ IV PUSH: CPT

## 2022-11-25 RX ORDER — HYDROMORPHONE HYDROCHLORIDE 1 MG/ML
0.5 INJECTION, SOLUTION INTRAMUSCULAR; INTRAVENOUS; SUBCUTANEOUS EVERY 30 MIN PRN
Status: DISCONTINUED | OUTPATIENT
Start: 2022-11-25 | End: 2022-11-26

## 2022-11-25 RX ORDER — IOHEXOL 350 MG/ML
100 INJECTION, SOLUTION INTRAVENOUS
Status: COMPLETED | OUTPATIENT
Start: 2022-11-25 | End: 2022-11-25

## 2022-11-25 RX ORDER — SODIUM CHLORIDE 9 MG/ML
125 INJECTION, SOLUTION INTRAVENOUS CONTINUOUS
Status: DISCONTINUED | OUTPATIENT
Start: 2022-11-25 | End: 2022-11-26

## 2022-11-25 RX ORDER — ONDANSETRON 2 MG/ML
4 INJECTION INTRAMUSCULAR; INTRAVENOUS ONCE
Status: COMPLETED | OUTPATIENT
Start: 2022-11-25 | End: 2022-11-25

## 2022-11-26 PROBLEM — R11.2 NAUSEA AND VOMITING: Status: ACTIVE | Noted: 2022-11-26

## 2022-11-26 PROBLEM — N17.9 ACUTE KIDNEY INJURY: Status: ACTIVE | Noted: 2022-11-26

## 2022-11-26 PROBLEM — R11.2 NAUSEA AND VOMITING, UNSPECIFIED VOMITING TYPE: Status: ACTIVE | Noted: 2022-11-26

## 2022-11-26 PROBLEM — N17.9 ACUTE KIDNEY INJURY (HCC): Status: ACTIVE | Noted: 2022-11-26

## 2022-11-26 PROBLEM — R10.9 ABDOMINAL PAIN, ACUTE: Status: ACTIVE | Noted: 2022-11-26

## 2022-11-26 LAB
EST. AVERAGE GLUCOSE BLD GHB EST-MCNC: 114 MG/DL (ref 68–126)
GLUCOSE BLD-MCNC: 101 MG/DL (ref 70–99)
GLUCOSE BLD-MCNC: 101 MG/DL (ref 70–99)
GLUCOSE BLD-MCNC: 77 MG/DL (ref 70–99)
GLUCOSE BLD-MCNC: 87 MG/DL (ref 70–99)
GLUCOSE BLD-MCNC: 96 MG/DL (ref 70–99)
HBA1C MFR BLD: 5.6 % (ref ?–5.7)

## 2022-11-26 PROCEDURE — 82962 GLUCOSE BLOOD TEST: CPT

## 2022-11-26 RX ORDER — DEXTROSE AND SODIUM CHLORIDE 5; .9 G/100ML; G/100ML
INJECTION, SOLUTION INTRAVENOUS CONTINUOUS
Status: DISCONTINUED | OUTPATIENT
Start: 2022-11-26 | End: 2022-11-28

## 2022-11-26 RX ORDER — ONDANSETRON 2 MG/ML
4 INJECTION INTRAMUSCULAR; INTRAVENOUS EVERY 4 HOURS PRN
Status: ACTIVE | OUTPATIENT
Start: 2022-11-26 | End: 2022-11-26

## 2022-11-26 RX ORDER — HYDROMORPHONE HYDROCHLORIDE 1 MG/ML
0.5 INJECTION, SOLUTION INTRAMUSCULAR; INTRAVENOUS; SUBCUTANEOUS EVERY 30 MIN PRN
Status: ACTIVE | OUTPATIENT
Start: 2022-11-26 | End: 2022-11-26

## 2022-11-26 RX ORDER — SODIUM CHLORIDE 9 MG/ML
INJECTION, SOLUTION INTRAVENOUS CONTINUOUS
Status: ACTIVE | OUTPATIENT
Start: 2022-11-26 | End: 2022-11-26

## 2022-11-26 RX ORDER — NICOTINE POLACRILEX 4 MG
30 LOZENGE BUCCAL
Status: DISCONTINUED | OUTPATIENT
Start: 2022-11-26 | End: 2022-11-28

## 2022-11-26 RX ORDER — PROCHLORPERAZINE EDISYLATE 5 MG/ML
5 INJECTION INTRAMUSCULAR; INTRAVENOUS EVERY 8 HOURS PRN
Status: DISCONTINUED | OUTPATIENT
Start: 2022-11-26 | End: 2022-11-28

## 2022-11-26 RX ORDER — HYDRALAZINE HYDROCHLORIDE 20 MG/ML
10 INJECTION INTRAMUSCULAR; INTRAVENOUS EVERY 6 HOURS PRN
Status: DISCONTINUED | OUTPATIENT
Start: 2022-11-26 | End: 2022-11-28

## 2022-11-26 RX ORDER — FLUTICASONE FUROATE AND VILANTEROL 200; 25 UG/1; UG/1
1 POWDER RESPIRATORY (INHALATION) DAILY
Status: DISCONTINUED | OUTPATIENT
Start: 2022-11-26 | End: 2022-11-28

## 2022-11-26 RX ORDER — SODIUM CHLORIDE 9 MG/ML
INJECTION, SOLUTION INTRAVENOUS CONTINUOUS
Status: DISCONTINUED | OUTPATIENT
Start: 2022-11-26 | End: 2022-11-26

## 2022-11-26 RX ORDER — MORPHINE SULFATE 2 MG/ML
2 INJECTION, SOLUTION INTRAMUSCULAR; INTRAVENOUS EVERY 2 HOUR PRN
Status: DISCONTINUED | OUTPATIENT
Start: 2022-11-26 | End: 2022-11-28

## 2022-11-26 RX ORDER — MORPHINE SULFATE 4 MG/ML
4 INJECTION, SOLUTION INTRAMUSCULAR; INTRAVENOUS EVERY 2 HOUR PRN
Status: DISCONTINUED | OUTPATIENT
Start: 2022-11-26 | End: 2022-11-28

## 2022-11-26 RX ORDER — ACETAMINOPHEN 325 MG/1
650 TABLET ORAL EVERY 6 HOURS PRN
Status: DISCONTINUED | OUTPATIENT
Start: 2022-11-26 | End: 2022-11-28

## 2022-11-26 RX ORDER — NICOTINE POLACRILEX 4 MG
15 LOZENGE BUCCAL
Status: DISCONTINUED | OUTPATIENT
Start: 2022-11-26 | End: 2022-11-28

## 2022-11-26 RX ORDER — DEXTROSE MONOHYDRATE 25 G/50ML
50 INJECTION, SOLUTION INTRAVENOUS
Status: DISCONTINUED | OUTPATIENT
Start: 2022-11-26 | End: 2022-11-28

## 2022-11-26 RX ORDER — ENOXAPARIN SODIUM 100 MG/ML
50 INJECTION SUBCUTANEOUS DAILY
Status: DISCONTINUED | OUTPATIENT
Start: 2022-11-26 | End: 2022-11-28

## 2022-11-26 RX ORDER — MORPHINE SULFATE 2 MG/ML
1 INJECTION, SOLUTION INTRAMUSCULAR; INTRAVENOUS EVERY 2 HOUR PRN
Status: DISCONTINUED | OUTPATIENT
Start: 2022-11-26 | End: 2022-11-28

## 2022-11-26 RX ORDER — ONDANSETRON 2 MG/ML
4 INJECTION INTRAMUSCULAR; INTRAVENOUS EVERY 6 HOURS PRN
Status: DISCONTINUED | OUTPATIENT
Start: 2022-11-26 | End: 2022-11-28

## 2022-11-26 NOTE — ED INITIAL ASSESSMENT (HPI)
PT PRESENTS TO ED WITH ABDOMINAL PAIN HX SMALL BOWEL OBSTRUCTION HAS HAD MULTIPLE AND FEELS THE SAME

## 2022-11-26 NOTE — PLAN OF CARE
Pt AOx4. R.A. C/o severe pain to abdomen, relieved by IV pain medication. VS remain stable. Voiding freely. Last BM 11/25. NPO, IVF infusing per order. BS 77 this afternoon. IM MD made aware, see MAR. Pt up standby assist w/walker. Encouraged pt to ambulate in hallway throughout shift. SCDs bilaterally, ankle pumps encouraged. IS encouraged. Plan to continue bowel rest, obstructive series tomorrow. Gen surgery following. Pt updated on plan of care, all questions answered. Belongings within reach, instructed to call for assistance.

## 2022-11-26 NOTE — PLAN OF CARE
Pt A&Ox4; follows commands able to move all extremities independently. Complains of LLQ abdominal pain; see MAR for medication management. Room air when awake requires oxygen 2L NC when sleeping. HR 70-90s -140s. VSS. Voiding in toilet.

## 2022-11-26 NOTE — ED QUICK NOTES
Orders for admission, patient is aware of plan and ready to go upstairs. Any questions, please call ED RN Emma at extension [phone].      Patient Covid vaccination status: Fully vaccinated     COVID Test Ordered in ED: Rapid SARS-CoV-2 by PCR    COVID Suspicion at Admission: N/A    Running Infusions:   0.9NS @ 125mL/hr     Mental Status/LOC at time of transport: A&Ox3    Other pertinent information:   CIWA score: N/A   NIH score:  N/A

## 2022-11-27 ENCOUNTER — APPOINTMENT (OUTPATIENT)
Dept: GENERAL RADIOLOGY | Facility: HOSPITAL | Age: 62
End: 2022-11-27
Attending: SURGERY
Payer: MEDICARE

## 2022-11-27 LAB
ANION GAP SERPL CALC-SCNC: 2 MMOL/L (ref 0–18)
BUN BLD-MCNC: 10 MG/DL (ref 7–18)
CALCIUM BLD-MCNC: 8.4 MG/DL (ref 8.5–10.1)
CHLORIDE SERPL-SCNC: 117 MMOL/L (ref 98–112)
CO2 SERPL-SCNC: 26 MMOL/L (ref 21–32)
CREAT BLD-MCNC: 0.52 MG/DL
ERYTHROCYTE [DISTWIDTH] IN BLOOD BY AUTOMATED COUNT: 15.7 %
GFR SERPLBLD BASED ON 1.73 SQ M-ARVRAT: 104 ML/MIN/1.73M2 (ref 60–?)
GLUCOSE BLD-MCNC: 101 MG/DL (ref 70–99)
GLUCOSE BLD-MCNC: 105 MG/DL (ref 70–99)
GLUCOSE BLD-MCNC: 110 MG/DL (ref 70–99)
GLUCOSE BLD-MCNC: 78 MG/DL (ref 70–99)
GLUCOSE BLD-MCNC: 79 MG/DL (ref 70–99)
HCT VFR BLD AUTO: 38.5 %
HGB BLD-MCNC: 11.7 G/DL
MCH RBC QN AUTO: 29.8 PG (ref 26–34)
MCHC RBC AUTO-ENTMCNC: 30.4 G/DL (ref 31–37)
MCV RBC AUTO: 98 FL
OSMOLALITY SERPL CALC.SUM OF ELEC: 300 MOSM/KG (ref 275–295)
PLATELET # BLD AUTO: 160 10(3)UL (ref 150–450)
POTASSIUM SERPL-SCNC: 3.6 MMOL/L (ref 3.5–5.1)
POTASSIUM SERPL-SCNC: 3.9 MMOL/L (ref 3.5–5.1)
RBC # BLD AUTO: 3.93 X10(6)UL
SODIUM SERPL-SCNC: 145 MMOL/L (ref 136–145)
WBC # BLD AUTO: 4.5 X10(3) UL (ref 4–11)

## 2022-11-27 PROCEDURE — 82962 GLUCOSE BLOOD TEST: CPT

## 2022-11-27 PROCEDURE — 85027 COMPLETE CBC AUTOMATED: CPT | Performed by: HOSPITALIST

## 2022-11-27 PROCEDURE — 84132 ASSAY OF SERUM POTASSIUM: CPT | Performed by: HOSPITALIST

## 2022-11-27 PROCEDURE — 80048 BASIC METABOLIC PNL TOTAL CA: CPT | Performed by: HOSPITALIST

## 2022-11-27 PROCEDURE — 74019 RADEX ABDOMEN 2 VIEWS: CPT | Performed by: SURGERY

## 2022-11-27 RX ORDER — AMLODIPINE BESYLATE 5 MG/1
5 TABLET ORAL DAILY
Status: DISCONTINUED | OUTPATIENT
Start: 2022-11-27 | End: 2022-11-28

## 2022-11-27 RX ORDER — POTASSIUM CHLORIDE 20 MEQ/1
40 TABLET, EXTENDED RELEASE ORAL EVERY 4 HOURS
Status: COMPLETED | OUTPATIENT
Start: 2022-11-27 | End: 2022-11-27

## 2022-11-27 RX ORDER — TOPIRAMATE 25 MG/1
25 TABLET ORAL 2 TIMES DAILY
Status: DISCONTINUED | OUTPATIENT
Start: 2022-11-27 | End: 2022-11-28

## 2022-11-27 RX ORDER — OXYBUTYNIN CHLORIDE 10 MG/1
10 TABLET, EXTENDED RELEASE ORAL DAILY
Status: DISCONTINUED | OUTPATIENT
Start: 2022-11-27 | End: 2022-11-28

## 2022-11-27 RX ORDER — MONTELUKAST SODIUM 10 MG/1
10 TABLET ORAL DAILY
Status: DISCONTINUED | OUTPATIENT
Start: 2022-11-27 | End: 2022-11-28

## 2022-11-27 RX ORDER — FLUOXETINE HYDROCHLORIDE 20 MG/1
20 CAPSULE ORAL DAILY
Status: DISCONTINUED | OUTPATIENT
Start: 2022-11-27 | End: 2022-11-28

## 2022-11-27 RX ORDER — PANTOPRAZOLE SODIUM 40 MG/1
40 TABLET, DELAYED RELEASE ORAL
Status: DISCONTINUED | OUTPATIENT
Start: 2022-11-27 | End: 2022-11-28

## 2022-11-27 RX ORDER — LOSARTAN POTASSIUM 100 MG/1
100 TABLET ORAL DAILY
Status: DISCONTINUED | OUTPATIENT
Start: 2022-11-27 | End: 2022-11-28

## 2022-11-27 RX ORDER — BUPROPION HYDROCHLORIDE 300 MG/1
300 TABLET ORAL DAILY
Status: DISCONTINUED | OUTPATIENT
Start: 2022-11-27 | End: 2022-11-28

## 2022-11-27 NOTE — PLAN OF CARE
Pt AOx4. R.A. Denies pain. VS remain stable. Voiding freely. BM x3 today. Obstructive series complete, gen surg made aware of results. Tolerating diet, denies nausea. Will advance diet as tolerated. Up ad elicia. Encouraged pt to ambulate in hallway throughout shift. SCDs bilaterally, ankle pumps encouraged. IS encouraged. Plan to discharge to home when medically cleared. Pt updated on plan of care, all questions answered. Belongings within reach, instructed to call for assistance.

## 2022-11-27 NOTE — PLAN OF CARE
Aox4, sleepy this evening, friend of patient at bedside, reporting intermittent numbness to BLE that resolves on own, reporting headache, no nausea or emesis, encouraging ambulation, IVF as ordered, NPO, on 3L O2, APRIL no CPAP in room, on tele nsr, scds on, on Lovenox, voiding, up standby with walker, no further needs at this time.

## 2022-11-28 VITALS
DIASTOLIC BLOOD PRESSURE: 110 MMHG | BODY MASS INDEX: 42.88 KG/M2 | WEIGHT: 242 LBS | HEART RATE: 63 BPM | OXYGEN SATURATION: 97 % | SYSTOLIC BLOOD PRESSURE: 159 MMHG | HEIGHT: 63 IN | TEMPERATURE: 98 F | RESPIRATION RATE: 18 BRPM

## 2022-11-28 LAB
ANION GAP SERPL CALC-SCNC: 2 MMOL/L (ref 0–18)
BASOPHILS # BLD AUTO: 0.05 X10(3) UL (ref 0–0.2)
BASOPHILS NFR BLD AUTO: 0.9 %
BUN BLD-MCNC: 9 MG/DL (ref 7–18)
CALCIUM BLD-MCNC: 8.5 MG/DL (ref 8.5–10.1)
CHLORIDE SERPL-SCNC: 114 MMOL/L (ref 98–112)
CO2 SERPL-SCNC: 27 MMOL/L (ref 21–32)
CREAT BLD-MCNC: 0.55 MG/DL
EOSINOPHIL # BLD AUTO: 0.36 X10(3) UL (ref 0–0.7)
EOSINOPHIL NFR BLD AUTO: 6.7 %
ERYTHROCYTE [DISTWIDTH] IN BLOOD BY AUTOMATED COUNT: 15.3 %
GFR SERPLBLD BASED ON 1.73 SQ M-ARVRAT: 102 ML/MIN/1.73M2 (ref 60–?)
GLUCOSE BLD-MCNC: 114 MG/DL (ref 70–99)
GLUCOSE BLD-MCNC: 78 MG/DL (ref 70–99)
GLUCOSE BLD-MCNC: 85 MG/DL (ref 70–99)
HCT VFR BLD AUTO: 38.2 %
HGB BLD-MCNC: 12 G/DL
IMM GRANULOCYTES # BLD AUTO: 0.01 X10(3) UL (ref 0–1)
IMM GRANULOCYTES NFR BLD: 0.2 %
LYMPHOCYTES # BLD AUTO: 2.03 X10(3) UL (ref 1–4)
LYMPHOCYTES NFR BLD AUTO: 38 %
MAGNESIUM SERPL-MCNC: 2 MG/DL (ref 1.6–2.6)
MCH RBC QN AUTO: 30 PG (ref 26–34)
MCHC RBC AUTO-ENTMCNC: 31.4 G/DL (ref 31–37)
MCV RBC AUTO: 95.5 FL
MONOCYTES # BLD AUTO: 0.4 X10(3) UL (ref 0.1–1)
MONOCYTES NFR BLD AUTO: 7.5 %
NEUTROPHILS # BLD AUTO: 2.49 X10 (3) UL (ref 1.5–7.7)
NEUTROPHILS # BLD AUTO: 2.49 X10(3) UL (ref 1.5–7.7)
NEUTROPHILS NFR BLD AUTO: 46.7 %
OSMOLALITY SERPL CALC.SUM OF ELEC: 296 MOSM/KG (ref 275–295)
PLATELET # BLD AUTO: 159 10(3)UL (ref 150–450)
POTASSIUM SERPL-SCNC: 3.8 MMOL/L (ref 3.5–5.1)
RBC # BLD AUTO: 4 X10(6)UL
SODIUM SERPL-SCNC: 143 MMOL/L (ref 136–145)
WBC # BLD AUTO: 5.3 X10(3) UL (ref 4–11)

## 2022-11-28 PROCEDURE — 83735 ASSAY OF MAGNESIUM: CPT | Performed by: HOSPITALIST

## 2022-11-28 PROCEDURE — 82962 GLUCOSE BLOOD TEST: CPT

## 2022-11-28 PROCEDURE — 0124A HC PFIZER COVID-19 VACCINE ADMIN 12 YRS AND OLDER BIVALENT BOOSTER: CPT | Performed by: HOSPITALIST

## 2022-11-28 PROCEDURE — 80048 BASIC METABOLIC PNL TOTAL CA: CPT | Performed by: HOSPITALIST

## 2022-11-28 PROCEDURE — 85025 COMPLETE CBC W/AUTO DIFF WBC: CPT | Performed by: HOSPITALIST

## 2022-11-28 PROCEDURE — XW023W7 INTRODUCTION OF COVID-19 VACCINE BOOSTER INTO MUSCLE, PERCUTANEOUS APPROACH, NEW TECHNOLOGY GROUP 7: ICD-10-PCS | Performed by: HOSPITALIST

## 2022-11-28 RX ORDER — POTASSIUM CHLORIDE 20 MEQ/1
40 TABLET, EXTENDED RELEASE ORAL DAILY
Status: COMPLETED | OUTPATIENT
Start: 2022-11-28 | End: 2022-11-28

## 2022-11-28 NOTE — PLAN OF CARE
Patient A/O x4, VSS on RA, denies pain at this time. , tele, APRIL no CPAP. Voiding freely, last BM 11/27. Tolerating soft diet, will advance to regular for breakfast. Plan for dc home when cleared. Safety measures in place.

## 2022-11-28 NOTE — PLAN OF CARE
NURSING DISCHARGE NOTE    Discharged  via wheel chair. .  Accompanied by  O/S staff member. Belongings taken with patient. Cecelia Martines

## 2022-11-28 NOTE — PLAN OF CARE
Abdomen soft, bowel sounds present x4 quadrants. Denies discomfort. States tolerating general diet. Discussed plan of care, call for all asst needed, discomfort felt. Verbalized understanding.

## 2022-11-28 NOTE — PLAN OF CARE
States tolerating diet well. Denies abdominal discomfort. States ready to go home. States family waiting downstairs for her. Blood pressure elevated, asymptomatic, denies discomfort. Medication given. Declines waiting, states will monitor bp at home and will follow up with her primary care physician in a day. Verbal and written discharge instructions given. Verbalized understanding.

## 2022-11-28 NOTE — DISCHARGE INSTRUCTIONS
Continue general diet. Notify your physician immediately if any of the following:    Abdominal discomfort, nausea, vomiting, constipation, diarrhea, temperature of 100.4 or above.

## 2023-01-01 NOTE — PLAN OF CARE
GASTROINTESTINAL - ADULT    • Maintains adequate nutritional intake (undernourished) Not Progressing          DISCHARGE PLANNING    • Discharge to home or other facility with appropriate resources Progressing        Diabetes/Glucose Control    • Glucose ma (4) no apparent problem

## 2023-02-04 ENCOUNTER — HOSPITAL ENCOUNTER (EMERGENCY)
Facility: HOSPITAL | Age: 63
Discharge: HOME | End: 2023-02-04
Attending: EMERGENCY MEDICINE
Payer: MEDICARE

## 2023-02-04 ENCOUNTER — APPOINTMENT (OUTPATIENT)
Dept: GENERAL RADIOLOGY | Facility: HOSPITAL | Age: 63
End: 2023-02-04
Attending: EMERGENCY MEDICINE
Payer: MEDICARE

## 2023-02-04 VITALS
HEIGHT: 63 IN | RESPIRATION RATE: 20 BRPM | SYSTOLIC BLOOD PRESSURE: 163 MMHG | DIASTOLIC BLOOD PRESSURE: 86 MMHG | HEART RATE: 57 BPM | BODY MASS INDEX: 43 KG/M2 | OXYGEN SATURATION: 97 % | TEMPERATURE: 97 F

## 2023-02-04 DIAGNOSIS — U07.1 COVID-19: Primary | ICD-10-CM

## 2023-02-04 LAB
ALBUMIN SERPL-MCNC: 3.6 G/DL (ref 3.4–5)
ALBUMIN/GLOB SERPL: 1.1 {RATIO} (ref 1–2)
ALP LIVER SERPL-CCNC: 131 U/L
ALT SERPL-CCNC: 17 U/L
ANION GAP SERPL CALC-SCNC: 5 MMOL/L (ref 0–18)
AST SERPL-CCNC: 13 U/L (ref 15–37)
BASOPHILS # BLD AUTO: 0.06 X10(3) UL (ref 0–0.2)
BASOPHILS NFR BLD AUTO: 1 %
BILIRUB SERPL-MCNC: 0.3 MG/DL (ref 0.1–2)
BUN BLD-MCNC: 14 MG/DL (ref 7–18)
CALCIUM BLD-MCNC: 9 MG/DL (ref 8.5–10.1)
CHLORIDE SERPL-SCNC: 112 MMOL/L (ref 98–112)
CO2 SERPL-SCNC: 24 MMOL/L (ref 21–32)
CREAT BLD-MCNC: 0.72 MG/DL
D DIMER PPP FEU-MCNC: <0.27 UG/ML FEU (ref ?–0.64)
EOSINOPHIL # BLD AUTO: 0.41 X10(3) UL (ref 0–0.7)
EOSINOPHIL NFR BLD AUTO: 6.7 %
ERYTHROCYTE [DISTWIDTH] IN BLOOD BY AUTOMATED COUNT: 13.9 %
GFR SERPLBLD BASED ON 1.73 SQ M-ARVRAT: 93 ML/MIN/1.73M2 (ref 60–?)
GLOBULIN PLAS-MCNC: 3.3 G/DL (ref 2.8–4.4)
GLUCOSE BLD-MCNC: 88 MG/DL (ref 70–99)
HCT VFR BLD AUTO: 42.6 %
HGB BLD-MCNC: 13.6 G/DL
IMM GRANULOCYTES # BLD AUTO: 0.02 X10(3) UL (ref 0–1)
IMM GRANULOCYTES NFR BLD: 0.3 %
LYMPHOCYTES # BLD AUTO: 2.38 X10(3) UL (ref 1–4)
LYMPHOCYTES NFR BLD AUTO: 38.9 %
MCH RBC QN AUTO: 29.9 PG (ref 26–34)
MCHC RBC AUTO-ENTMCNC: 31.9 G/DL (ref 31–37)
MCV RBC AUTO: 93.6 FL
MONOCYTES # BLD AUTO: 0.42 X10(3) UL (ref 0.1–1)
MONOCYTES NFR BLD AUTO: 6.9 %
NEUTROPHILS # BLD AUTO: 2.83 X10 (3) UL (ref 1.5–7.7)
NEUTROPHILS # BLD AUTO: 2.83 X10(3) UL (ref 1.5–7.7)
NEUTROPHILS NFR BLD AUTO: 46.2 %
OSMOLALITY SERPL CALC.SUM OF ELEC: 292 MOSM/KG (ref 275–295)
PLATELET # BLD AUTO: 194 10(3)UL (ref 150–450)
POTASSIUM SERPL-SCNC: 4 MMOL/L (ref 3.5–5.1)
PROT SERPL-MCNC: 6.9 G/DL (ref 6.4–8.2)
RBC # BLD AUTO: 4.55 X10(6)UL
SODIUM SERPL-SCNC: 141 MMOL/L (ref 136–145)
TROPONIN I HIGH SENSITIVITY: 5 NG/L
WBC # BLD AUTO: 6.1 X10(3) UL (ref 4–11)

## 2023-02-04 PROCEDURE — 99284 EMERGENCY DEPT VISIT MOD MDM: CPT

## 2023-02-04 PROCEDURE — 85025 COMPLETE CBC W/AUTO DIFF WBC: CPT | Performed by: EMERGENCY MEDICINE

## 2023-02-04 PROCEDURE — 84484 ASSAY OF TROPONIN QUANT: CPT | Performed by: EMERGENCY MEDICINE

## 2023-02-04 PROCEDURE — 99285 EMERGENCY DEPT VISIT HI MDM: CPT

## 2023-02-04 PROCEDURE — 36415 COLL VENOUS BLD VENIPUNCTURE: CPT

## 2023-02-04 PROCEDURE — 85379 FIBRIN DEGRADATION QUANT: CPT | Performed by: EMERGENCY MEDICINE

## 2023-02-04 PROCEDURE — 93005 ELECTROCARDIOGRAM TRACING: CPT

## 2023-02-04 PROCEDURE — 80053 COMPREHEN METABOLIC PANEL: CPT | Performed by: EMERGENCY MEDICINE

## 2023-02-04 PROCEDURE — 71045 X-RAY EXAM CHEST 1 VIEW: CPT | Performed by: EMERGENCY MEDICINE

## 2023-02-04 PROCEDURE — 93010 ELECTROCARDIOGRAM REPORT: CPT

## 2023-02-04 NOTE — ED INITIAL ASSESSMENT (HPI)
Pt to ER from [facility] w/ complaints of SOB. Tested positive for COVID on Tuesday. Sent in for further workup.

## 2023-02-06 LAB
ATRIAL RATE: 57 BPM
P AXIS: 28 DEGREES
P-R INTERVAL: 122 MS
Q-T INTERVAL: 460 MS
QRS DURATION: 106 MS
QTC CALCULATION (BEZET): 447 MS
R AXIS: -13 DEGREES
T AXIS: 36 DEGREES
VENTRICULAR RATE: 57 BPM

## 2023-03-16 ENCOUNTER — APPOINTMENT (OUTPATIENT)
Dept: GENERAL RADIOLOGY | Facility: HOSPITAL | Age: 63
End: 2023-03-16
Attending: HOSPITALIST
Payer: MEDICARE

## 2023-03-16 ENCOUNTER — HOSPITAL ENCOUNTER (INPATIENT)
Facility: HOSPITAL | Age: 63
LOS: 4 days | Discharge: HOME | End: 2023-03-20
Attending: STUDENT IN AN ORGANIZED HEALTH CARE EDUCATION/TRAINING PROGRAM | Admitting: HOSPITALIST
Payer: MEDICARE

## 2023-03-16 ENCOUNTER — HOSPITAL ENCOUNTER (EMERGENCY)
Facility: HOSPITAL | Age: 63
Discharge: LEFT BEFORE DISCHARGE | End: 2023-03-16
Payer: MEDICARE

## 2023-03-16 ENCOUNTER — APPOINTMENT (OUTPATIENT)
Dept: CT IMAGING | Age: 63
End: 2023-03-16
Attending: STUDENT IN AN ORGANIZED HEALTH CARE EDUCATION/TRAINING PROGRAM
Payer: MEDICARE

## 2023-03-16 VITALS
DIASTOLIC BLOOD PRESSURE: 115 MMHG | OXYGEN SATURATION: 96 % | WEIGHT: 245 LBS | HEART RATE: 79 BPM | SYSTOLIC BLOOD PRESSURE: 209 MMHG | BODY MASS INDEX: 46.26 KG/M2 | HEIGHT: 61 IN | RESPIRATION RATE: 16 BRPM | TEMPERATURE: 97 F

## 2023-03-16 DIAGNOSIS — K56.609 SBO (SMALL BOWEL OBSTRUCTION) (HCC): Primary | ICD-10-CM

## 2023-03-16 LAB
ALBUMIN SERPL-MCNC: 3.9 G/DL (ref 3.4–5)
ALBUMIN/GLOB SERPL: 1.1 {RATIO} (ref 1–2)
ALP LIVER SERPL-CCNC: 132 U/L
ALT SERPL-CCNC: 17 U/L
ANION GAP SERPL CALC-SCNC: 6 MMOL/L (ref 0–18)
AST SERPL-CCNC: 18 U/L (ref 15–37)
BASOPHILS # BLD AUTO: 0.08 X10(3) UL (ref 0–0.2)
BASOPHILS NFR BLD AUTO: 0.8 %
BILIRUB SERPL-MCNC: 0.6 MG/DL (ref 0.1–2)
BUN BLD-MCNC: 15 MG/DL (ref 7–18)
CALCIUM BLD-MCNC: 9.5 MG/DL (ref 8.5–10.1)
CHLORIDE SERPL-SCNC: 109 MMOL/L (ref 98–112)
CO2 SERPL-SCNC: 26 MMOL/L (ref 21–32)
CREAT BLD-MCNC: 0.88 MG/DL
EOSINOPHIL # BLD AUTO: 0.26 X10(3) UL (ref 0–0.7)
EOSINOPHIL NFR BLD AUTO: 2.6 %
ERYTHROCYTE [DISTWIDTH] IN BLOOD BY AUTOMATED COUNT: 15.2 %
GFR SERPLBLD BASED ON 1.73 SQ M-ARVRAT: 73 ML/MIN/1.73M2 (ref 60–?)
GLOBULIN PLAS-MCNC: 3.5 G/DL (ref 2.8–4.4)
GLUCOSE BLD-MCNC: 115 MG/DL (ref 70–99)
GLUCOSE BLD-MCNC: 121 MG/DL (ref 70–99)
GLUCOSE BLD-MCNC: 121 MG/DL (ref 70–99)
GLUCOSE BLD-MCNC: 171 MG/DL (ref 70–99)
HCT VFR BLD AUTO: 43.5 %
HGB BLD-MCNC: 13.8 G/DL
IMM GRANULOCYTES # BLD AUTO: 0.03 X10(3) UL (ref 0–1)
IMM GRANULOCYTES NFR BLD: 0.3 %
LIPASE SERPL-CCNC: 20 U/L (ref 13–75)
LYMPHOCYTES # BLD AUTO: 1.77 X10(3) UL (ref 1–4)
LYMPHOCYTES NFR BLD AUTO: 18 %
MCH RBC QN AUTO: 29.1 PG (ref 26–34)
MCHC RBC AUTO-ENTMCNC: 31.7 G/DL (ref 31–37)
MCV RBC AUTO: 91.6 FL
MONOCYTES # BLD AUTO: 0.37 X10(3) UL (ref 0.1–1)
MONOCYTES NFR BLD AUTO: 3.8 %
NEUTROPHILS # BLD AUTO: 7.35 X10 (3) UL (ref 1.5–7.7)
NEUTROPHILS # BLD AUTO: 7.35 X10(3) UL (ref 1.5–7.7)
NEUTROPHILS NFR BLD AUTO: 74.5 %
OSMOLALITY SERPL CALC.SUM OF ELEC: 297 MOSM/KG (ref 275–295)
PLATELET # BLD AUTO: 232 10(3)UL (ref 150–450)
POTASSIUM SERPL-SCNC: 3.7 MMOL/L (ref 3.5–5.1)
PROT SERPL-MCNC: 7.4 G/DL (ref 6.4–8.2)
RBC # BLD AUTO: 4.75 X10(6)UL
SARS-COV-2 RNA RESP QL NAA+PROBE: NOT DETECTED
SODIUM SERPL-SCNC: 141 MMOL/L (ref 136–145)
WBC # BLD AUTO: 9.9 X10(3) UL (ref 4–11)

## 2023-03-16 PROCEDURE — 96375 TX/PRO/DX INJ NEW DRUG ADDON: CPT

## 2023-03-16 PROCEDURE — 99285 EMERGENCY DEPT VISIT HI MDM: CPT

## 2023-03-16 PROCEDURE — 80053 COMPREHEN METABOLIC PANEL: CPT | Performed by: STUDENT IN AN ORGANIZED HEALTH CARE EDUCATION/TRAINING PROGRAM

## 2023-03-16 PROCEDURE — 82962 GLUCOSE BLOOD TEST: CPT

## 2023-03-16 PROCEDURE — 83690 ASSAY OF LIPASE: CPT | Performed by: STUDENT IN AN ORGANIZED HEALTH CARE EDUCATION/TRAINING PROGRAM

## 2023-03-16 PROCEDURE — 94660 CPAP INITIATION&MGMT: CPT

## 2023-03-16 PROCEDURE — 85025 COMPLETE CBC W/AUTO DIFF WBC: CPT | Performed by: STUDENT IN AN ORGANIZED HEALTH CARE EDUCATION/TRAINING PROGRAM

## 2023-03-16 PROCEDURE — C9113 INJ PANTOPRAZOLE SODIUM, VIA: HCPCS | Performed by: HOSPITALIST

## 2023-03-16 PROCEDURE — 74177 CT ABD & PELVIS W/CONTRAST: CPT | Performed by: STUDENT IN AN ORGANIZED HEALTH CARE EDUCATION/TRAINING PROGRAM

## 2023-03-16 PROCEDURE — 94799 UNLISTED PULMONARY SVC/PX: CPT

## 2023-03-16 PROCEDURE — 71045 X-RAY EXAM CHEST 1 VIEW: CPT | Performed by: HOSPITALIST

## 2023-03-16 PROCEDURE — 96374 THER/PROPH/DIAG INJ IV PUSH: CPT

## 2023-03-16 PROCEDURE — 96376 TX/PRO/DX INJ SAME DRUG ADON: CPT

## 2023-03-16 PROCEDURE — 96361 HYDRATE IV INFUSION ADD-ON: CPT

## 2023-03-16 RX ORDER — ACETAMINOPHEN 500 MG
500 TABLET ORAL EVERY 4 HOURS PRN
Status: DISCONTINUED | OUTPATIENT
Start: 2023-03-16 | End: 2023-03-20

## 2023-03-16 RX ORDER — MORPHINE SULFATE 2 MG/ML
2 INJECTION, SOLUTION INTRAMUSCULAR; INTRAVENOUS EVERY 2 HOUR PRN
Status: DISCONTINUED | OUTPATIENT
Start: 2023-03-16 | End: 2023-03-20

## 2023-03-16 RX ORDER — PROCHLORPERAZINE EDISYLATE 5 MG/ML
5 INJECTION INTRAMUSCULAR; INTRAVENOUS EVERY 8 HOURS PRN
Status: DISCONTINUED | OUTPATIENT
Start: 2023-03-16 | End: 2023-03-20

## 2023-03-16 RX ORDER — HEPARIN SODIUM 5000 [USP'U]/ML
5000 INJECTION, SOLUTION INTRAVENOUS; SUBCUTANEOUS EVERY 8 HOURS SCHEDULED
Status: DISCONTINUED | OUTPATIENT
Start: 2023-03-16 | End: 2023-03-20

## 2023-03-16 RX ORDER — ONDANSETRON 2 MG/ML
4 INJECTION INTRAMUSCULAR; INTRAVENOUS EVERY 6 HOURS PRN
Status: DISCONTINUED | OUTPATIENT
Start: 2023-03-16 | End: 2023-03-20

## 2023-03-16 RX ORDER — MORPHINE SULFATE 2 MG/ML
1 INJECTION, SOLUTION INTRAMUSCULAR; INTRAVENOUS EVERY 2 HOUR PRN
Status: DISCONTINUED | OUTPATIENT
Start: 2023-03-16 | End: 2023-03-20

## 2023-03-16 RX ORDER — MORPHINE SULFATE 4 MG/ML
4 INJECTION, SOLUTION INTRAMUSCULAR; INTRAVENOUS ONCE
Status: COMPLETED | OUTPATIENT
Start: 2023-03-16 | End: 2023-03-16

## 2023-03-16 RX ORDER — SODIUM CHLORIDE 9 MG/ML
125 INJECTION, SOLUTION INTRAVENOUS CONTINUOUS
Status: DISCONTINUED | OUTPATIENT
Start: 2023-03-16 | End: 2023-03-20

## 2023-03-16 RX ORDER — ONDANSETRON 2 MG/ML
4 INJECTION INTRAMUSCULAR; INTRAVENOUS ONCE
Status: COMPLETED | OUTPATIENT
Start: 2023-03-16 | End: 2023-03-16

## 2023-03-16 RX ORDER — SODIUM CHLORIDE 9 MG/ML
INJECTION, SOLUTION INTRAVENOUS CONTINUOUS
Status: DISCONTINUED | OUTPATIENT
Start: 2023-03-16 | End: 2023-03-20

## 2023-03-16 RX ORDER — LABETALOL HYDROCHLORIDE 5 MG/ML
10 INJECTION, SOLUTION INTRAVENOUS EVERY 6 HOURS PRN
Status: DISCONTINUED | OUTPATIENT
Start: 2023-03-16 | End: 2023-03-20

## 2023-03-16 RX ORDER — MORPHINE SULFATE 4 MG/ML
4 INJECTION, SOLUTION INTRAMUSCULAR; INTRAVENOUS EVERY 2 HOUR PRN
Status: DISCONTINUED | OUTPATIENT
Start: 2023-03-16 | End: 2023-03-20

## 2023-03-16 NOTE — RESPIRATORY THERAPY NOTE
Patient wears CPAP at night at home, although currently has NG in. Will place CPAP in room for when NG is removed. Patient would like to just wear O2 at night until able to wear CPAP mask without NG.      Discussed with RN

## 2023-03-16 NOTE — ED QUICK NOTES
Pt been here in [facility] x 45 min, states is hungry and thinks her sugar is low. Pt informed she cannot eat, accuck being obtained at this time.

## 2023-03-16 NOTE — ED INITIAL ASSESSMENT (HPI)
Pt in er for c/o 4 hours of abd pain nausea, and vomiting, pt reports history of bowel obstructions in the past

## 2023-03-16 NOTE — ED INITIAL ASSESSMENT (HPI)
Lower abdominal pain x4 hours. Endorses nausea and vomiting. Denies any dysuria, constipation, diarrhea, or fever.

## 2023-03-17 ENCOUNTER — APPOINTMENT (OUTPATIENT)
Dept: GENERAL RADIOLOGY | Facility: HOSPITAL | Age: 63
End: 2023-03-17
Attending: SURGERY
Payer: MEDICARE

## 2023-03-17 LAB
ANION GAP SERPL CALC-SCNC: 5 MMOL/L (ref 0–18)
BASOPHILS # BLD AUTO: 0.03 X10(3) UL (ref 0–0.2)
BASOPHILS NFR BLD AUTO: 0.5 %
BUN BLD-MCNC: 15 MG/DL (ref 7–18)
CALCIUM BLD-MCNC: 8.2 MG/DL (ref 8.5–10.1)
CHLORIDE SERPL-SCNC: 114 MMOL/L (ref 98–112)
CO2 SERPL-SCNC: 26 MMOL/L (ref 21–32)
CREAT BLD-MCNC: 0.51 MG/DL
EOSINOPHIL # BLD AUTO: 0.3 X10(3) UL (ref 0–0.7)
EOSINOPHIL NFR BLD AUTO: 4.6 %
ERYTHROCYTE [DISTWIDTH] IN BLOOD BY AUTOMATED COUNT: 15.4 %
GFR SERPLBLD BASED ON 1.73 SQ M-ARVRAT: 104 ML/MIN/1.73M2 (ref 60–?)
GLUCOSE BLD-MCNC: 104 MG/DL (ref 70–99)
GLUCOSE BLD-MCNC: 112 MG/DL (ref 70–99)
GLUCOSE BLD-MCNC: 119 MG/DL (ref 70–99)
GLUCOSE BLD-MCNC: 95 MG/DL (ref 70–99)
HCT VFR BLD AUTO: 40 %
HGB BLD-MCNC: 12.5 G/DL
IMM GRANULOCYTES # BLD AUTO: 0.02 X10(3) UL (ref 0–1)
IMM GRANULOCYTES NFR BLD: 0.3 %
LYMPHOCYTES # BLD AUTO: 1.41 X10(3) UL (ref 1–4)
LYMPHOCYTES NFR BLD AUTO: 21.6 %
MCH RBC QN AUTO: 29.2 PG (ref 26–34)
MCHC RBC AUTO-ENTMCNC: 31.3 G/DL (ref 31–37)
MCV RBC AUTO: 93.5 FL
MONOCYTES # BLD AUTO: 0.49 X10(3) UL (ref 0.1–1)
MONOCYTES NFR BLD AUTO: 7.5 %
NEUTROPHILS # BLD AUTO: 4.27 X10 (3) UL (ref 1.5–7.7)
NEUTROPHILS # BLD AUTO: 4.27 X10(3) UL (ref 1.5–7.7)
NEUTROPHILS NFR BLD AUTO: 65.5 %
OSMOLALITY SERPL CALC.SUM OF ELEC: 302 MOSM/KG (ref 275–295)
PLATELET # BLD AUTO: 182 10(3)UL (ref 150–450)
POTASSIUM SERPL-SCNC: 3.8 MMOL/L (ref 3.5–5.1)
RBC # BLD AUTO: 4.28 X10(6)UL
SODIUM SERPL-SCNC: 145 MMOL/L (ref 136–145)
WBC # BLD AUTO: 6.5 X10(3) UL (ref 4–11)

## 2023-03-17 PROCEDURE — 82962 GLUCOSE BLOOD TEST: CPT

## 2023-03-17 PROCEDURE — 85025 COMPLETE CBC W/AUTO DIFF WBC: CPT | Performed by: HOSPITALIST

## 2023-03-17 PROCEDURE — 80048 BASIC METABOLIC PNL TOTAL CA: CPT | Performed by: HOSPITALIST

## 2023-03-17 PROCEDURE — 74248 X-RAY SM INT F-THRU STD: CPT | Performed by: SURGERY

## 2023-03-17 PROCEDURE — 74240 X-RAY XM UPR GI TRC 1CNTRST: CPT | Performed by: SURGERY

## 2023-03-17 PROCEDURE — C9113 INJ PANTOPRAZOLE SODIUM, VIA: HCPCS | Performed by: HOSPITALIST

## 2023-03-17 RX ORDER — AMLODIPINE BESYLATE 5 MG/1
5 TABLET ORAL DAILY
Status: DISCONTINUED | OUTPATIENT
Start: 2023-03-17 | End: 2023-03-20

## 2023-03-17 NOTE — PLAN OF CARE
Assumed care at 1900. A & O x 4. Refusing CPAP for the night. 2 L nasal cannula overnight.  NPO. NG to LIS with brown output. IVFs infusing per order. Morphine given with relief overnight for abdominal pain. Call light within reach. Plan for SBO series xray in AM.   Patient updated on plan of care.

## 2023-03-17 NOTE — PLAN OF CARE
Assumed care at 0730  Orientated x4, lethargic, NSR, RA, 2L at night   Abdominal pain, tolerating morphine IV     NPO  NG to LIS; no output, small brown drainage   X2 emesis; clear output   IV fluids in place   Needs met, call light within reach     Plan for SBO series xray tomorrow morning

## 2023-03-17 NOTE — PLAN OF CARE
Assumed care at 0730  Orientated x4, NSR, RA   Complaints of mild abdominal pain     NG to LIS; minimal drainage   VSS; afebrile   Small bowel xray series completed   Removed NG tube by 1500 per order, tolerating clear diet; no nausea       Plan for advancement of diet as tolerated

## 2023-03-18 PROCEDURE — C9113 INJ PANTOPRAZOLE SODIUM, VIA: HCPCS | Performed by: HOSPITALIST

## 2023-03-18 RX ORDER — BUPROPION HYDROCHLORIDE 150 MG/1
300 TABLET ORAL DAILY
Status: DISCONTINUED | OUTPATIENT
Start: 2023-03-18 | End: 2023-03-20

## 2023-03-18 RX ORDER — MONTELUKAST SODIUM 10 MG/1
10 TABLET ORAL DAILY
Status: DISCONTINUED | OUTPATIENT
Start: 2023-03-18 | End: 2023-03-20

## 2023-03-18 RX ORDER — TOPIRAMATE 25 MG/1
25 TABLET ORAL 2 TIMES DAILY
Status: DISCONTINUED | OUTPATIENT
Start: 2023-03-18 | End: 2023-03-20

## 2023-03-18 RX ORDER — HYDROCODONE BITARTRATE AND ACETAMINOPHEN 5; 325 MG/1; MG/1
1 TABLET ORAL EVERY 4 HOURS PRN
Qty: 10 TABLET | Refills: 0 | Status: SHIPPED | OUTPATIENT
Start: 2023-03-18 | End: 2023-03-20

## 2023-03-18 RX ORDER — POLYETHYLENE GLYCOL 3350 17 G/17G
17 POWDER, FOR SOLUTION ORAL DAILY
Status: DISCONTINUED | OUTPATIENT
Start: 2023-03-18 | End: 2023-03-20

## 2023-03-18 RX ORDER — PANTOPRAZOLE SODIUM 40 MG/1
40 TABLET, DELAYED RELEASE ORAL
Status: DISCONTINUED | OUTPATIENT
Start: 2023-03-18 | End: 2023-03-20

## 2023-03-18 RX ORDER — LOSARTAN POTASSIUM 100 MG/1
100 TABLET ORAL DAILY
Status: DISCONTINUED | OUTPATIENT
Start: 2023-03-18 | End: 2023-03-20

## 2023-03-18 RX ORDER — FLUOXETINE HYDROCHLORIDE 20 MG/1
20 CAPSULE ORAL DAILY
Status: DISCONTINUED | OUTPATIENT
Start: 2023-03-18 | End: 2023-03-20

## 2023-03-18 RX ORDER — HYDROCODONE BITARTRATE AND ACETAMINOPHEN 5; 325 MG/1; MG/1
2 TABLET ORAL EVERY 4 HOURS PRN
Status: DISCONTINUED | OUTPATIENT
Start: 2023-03-18 | End: 2023-03-20

## 2023-03-18 RX ORDER — HYDROCODONE BITARTRATE AND ACETAMINOPHEN 5; 325 MG/1; MG/1
1 TABLET ORAL EVERY 4 HOURS PRN
Status: DISCONTINUED | OUTPATIENT
Start: 2023-03-18 | End: 2023-03-20

## 2023-03-18 RX ORDER — OXYBUTYNIN CHLORIDE 10 MG/1
10 TABLET, EXTENDED RELEASE ORAL DAILY
Status: DISCONTINUED | OUTPATIENT
Start: 2023-03-18 | End: 2023-03-20

## 2023-03-18 NOTE — PLAN OF CARE
Assumed care at 1900. A & O x 4. Refusing CPAP for the night. 2 L nasal cannula overnight. Tolerating clears. IVFs infusing per order. BM x 1. Morphine given with relief overnight for abdominal pain. Call light within reach. Patient updated on plan of care.

## 2023-03-18 NOTE — PLAN OF CARE
Assumed care at 0730. A&Ox4. Tele - SR. RA with good O2 saturations during the day. C/O mild waves of Nausea today. No Vomiting. Medicated X1 with Zofran. Diet upgraded to Soft this evening. Plan for DC tomorrow if tolerating diet. Family at bedside. Resting comfortably. Will continue to monitor.

## 2023-03-19 NOTE — PLAN OF CARE
Assumed care of patient at 0730  A&ox4, room air, NSR on tele, VSS. Voiding adequate amounts of clear/yellow urine. Passing gas, no BM this shift. Ambulating in room and loco with staff assist.   Patient still complains of bloating and minimal lower abdominal pain. Tolerating current diet, no nausea. Gen Surg MD updated on patient's bloating and lower abdominal pain; patient declines wanting to discharge today. Call light within reach, will continue to monitor. Problem: PAIN - ADULT  Goal: Verbalizes/displays adequate comfort level or patient's stated pain goal  Description: INTERVENTIONS:  - Encourage pt to monitor pain and request assistance  - Assess pain using appropriate pain scale  - Administer analgesics based on type and severity of pain and evaluate response  - Implement non-pharmacological measures as appropriate and evaluate response  - Consider cultural and social influences on pain and pain management  - Manage/alleviate anxiety  - Utilize distraction and/or relaxation techniques  - Monitor for opioid side effects  - Notify MD/LIP if interventions unsuccessful or patient reports new pain  - Anticipate increased pain with activity and pre-medicate as appropriate  3/19/2023 1517 by Judah aGston RN  Outcome: Progressing  3/19/2023 1517 by Judah Gaston RN  Outcome: Progressing     Problem: RISK FOR INFECTION - ADULT  Goal: Absence of fever/infection during anticipated neutropenic period  Description: INTERVENTIONS  - Monitor WBC  - Administer growth factors as ordered  - Implement neutropenic guidelines  3/19/2023 1517 by Judah Gaston RN  Outcome: Progressing  3/19/2023 1517 by Judah Gaston RN  Outcome: Progressing     Problem: SAFETY ADULT - FALL  Goal: Free from fall injury  Description: INTERVENTIONS:  - Assess pt frequently for physical needs  - Identify cognitive and physical deficits and behaviors that affect risk of falls.   - [place] fall precautions as indicated by assessment.  - Educate pt/family on patient safety including physical limitations  - Instruct pt to call for assistance with activity based on assessment  - Modify environment to reduce risk of injury  - Provide assistive devices as appropriate  - Consider OT/PT consult to assist with strengthening/mobility  - Encourage toileting schedule  3/19/2023 1517 by Yasmani Wells RN  Outcome: Progressing  3/19/2023 1517 by Yasmani Wells RN  Outcome: Progressing     Problem: GASTROINTESTINAL - ADULT  Goal: Minimal or absence of nausea and vomiting  Description: INTERVENTIONS:  - Maintain adequate hydration with IV or PO as ordered and tolerated  - Nasogastric tube to low intermittent suction as ordered  - Evaluate effectiveness of ordered antiemetic medications  - Provide nonpharmacologic comfort measures as appropriate  - Advance diet as tolerated, if ordered  - Obtain nutritional consult as needed  - Evaluate fluid balance  3/19/2023 1517 by Yasmani Wells RN  Outcome: Progressing  3/19/2023 1517 by Yasmani Wells RN  Outcome: Progressing  Goal: Maintains or returns to baseline bowel function  Description: INTERVENTIONS:  - Assess bowel function  - Maintain adequate hydration with IV or PO as ordered and tolerated  - Evaluate effectiveness of GI medications  - Encourage mobilization and activity  - Obtain nutritional consult as needed  - Establish a toileting routine/schedule  - Consider collaborating with pharmacy to review patient's medication profile  3/19/2023 1517 by Yasmani Wells RN  Outcome: Progressing  3/19/2023 1517 by Yasmani Wells RN  Outcome: Progressing

## 2023-03-19 NOTE — PLAN OF CARE
Assumed care @ 1900. On telemetry monitoring. Updated patient with plan of care  She stated had a BM today but feels like she still needs to go. Laxative given  Ensures patient safety. Maintained a calm and safe environment. Side rails up x2. Needs attended. Call light within reach, will continue to monitor. Instructed to call when assistance needed. Left in bed resting comfortably. Problem: PAIN - ADULT  Goal: Verbalizes/displays adequate comfort level or patient's stated pain goal  Description: INTERVENTIONS:  - Encourage pt to monitor pain and request assistance  - Assess pain using appropriate pain scale  - Administer analgesics based on type and severity of pain and evaluate response  - Implement non-pharmacological measures as appropriate and evaluate response  - Consider cultural and social influences on pain and pain management  - Manage/alleviate anxiety  - Utilize distraction and/or relaxation techniques  - Monitor for opioid side effects  - Notify MD/LIP if interventions unsuccessful or patient reports new pain  - Anticipate increased pain with activity and pre-medicate as appropriate  Outcome: Progressing     Problem: RISK FOR INFECTION - ADULT  Goal: Absence of fever/infection during anticipated neutropenic period  Description: INTERVENTIONS  - Monitor WBC  - Administer growth factors as ordered  - Implement neutropenic guidelines  Outcome: Progressing     Problem: SAFETY ADULT - FALL  Goal: Free from fall injury  Description: INTERVENTIONS:  - Assess pt frequently for physical needs  - Identify cognitive and physical deficits and behaviors that affect risk of falls.   - House fall precautions as indicated by assessment.  - Educate pt/family on patient safety including physical limitations  - Instruct pt to call for assistance with activity based on assessment  - Modify environment to reduce risk of injury  - Provide assistive devices as appropriate  - Consider OT/PT consult to assist with strengthening/mobility  - Encourage toileting schedule  Outcome: Progressing     Problem: GASTROINTESTINAL - ADULT  Goal: Minimal or absence of nausea and vomiting  Description: INTERVENTIONS:  - Maintain adequate hydration with IV or PO as ordered and tolerated  - Nasogastric tube to low intermittent suction as ordered  - Evaluate effectiveness of ordered antiemetic medications  - Provide nonpharmacologic comfort measures as appropriate  - Advance diet as tolerated, if ordered  - Obtain nutritional consult as needed  - Evaluate fluid balance  Outcome: Progressing  Goal: Maintains or returns to baseline bowel function  Description: INTERVENTIONS:  - Assess bowel function  - Maintain adequate hydration with IV or PO as ordered and tolerated  - Evaluate effectiveness of GI medications  - Encourage mobilization and activity  - Obtain nutritional consult as needed  - Establish a toileting routine/schedule  - Consider collaborating with pharmacy to review patient's medication profile  Outcome: Progressing

## 2023-03-20 VITALS
DIASTOLIC BLOOD PRESSURE: 87 MMHG | TEMPERATURE: 99 F | HEART RATE: 69 BPM | RESPIRATION RATE: 17 BRPM | SYSTOLIC BLOOD PRESSURE: 159 MMHG | BODY MASS INDEX: 47 KG/M2 | WEIGHT: 250.63 LBS | OXYGEN SATURATION: 97 %

## 2023-03-20 LAB — GLUCOSE BLD-MCNC: 86 MG/DL (ref 70–99)

## 2023-03-20 PROCEDURE — 82962 GLUCOSE BLOOD TEST: CPT

## 2023-03-20 RX ORDER — HYDROCODONE BITARTRATE AND ACETAMINOPHEN 5; 325 MG/1; MG/1
1 TABLET ORAL EVERY 4 HOURS PRN
Qty: 10 TABLET | Refills: 0 | Status: SHIPPED | OUTPATIENT
Start: 2023-03-20

## 2023-03-20 NOTE — PLAN OF CARE
Assumed care @ 1900. On telemetry monitoring. Updated patient with plan of care  Ensures patient safety. Maintained a calm and safe environment. Side rails up x2. Needs attended. Call light within reach, will continue to monitor. Instructed to call when assistance needed. Left in bed resting comfortably. Problem: PAIN - ADULT  Goal: Verbalizes/displays adequate comfort level or patient's stated pain goal  Description: INTERVENTIONS:  - Encourage pt to monitor pain and request assistance  - Assess pain using appropriate pain scale  - Administer analgesics based on type and severity of pain and evaluate response  - Implement non-pharmacological measures as appropriate and evaluate response  - Consider cultural and social influences on pain and pain management  - Manage/alleviate anxiety  - Utilize distraction and/or relaxation techniques  - Monitor for opioid side effects  - Notify MD/LIP if interventions unsuccessful or patient reports new pain  - Anticipate increased pain with activity and pre-medicate as appropriate  Outcome: Progressing     Problem: RISK FOR INFECTION - ADULT  Goal: Absence of fever/infection during anticipated neutropenic period  Description: INTERVENTIONS  - Monitor WBC  - Administer growth factors as ordered  - Implement neutropenic guidelines  Outcome: Progressing     Problem: SAFETY ADULT - FALL  Goal: Free from fall injury  Description: INTERVENTIONS:  - Assess pt frequently for physical needs  - Identify cognitive and physical deficits and behaviors that affect risk of falls.   - [place] fall precautions as indicated by assessment.  - Educate pt/family on patient safety including physical limitations  - Instruct pt to call for assistance with activity based on assessment  - Modify environment to reduce risk of injury  - Provide assistive devices as appropriate  - Consider OT/PT consult to assist with strengthening/mobility  - Encourage toileting schedule  Outcome: Progressing     Problem: GASTROINTESTINAL - ADULT  Goal: Minimal or absence of nausea and vomiting  Description: INTERVENTIONS:  - Maintain adequate hydration with IV or PO as ordered and tolerated  - Nasogastric tube to low intermittent suction as ordered  - Evaluate effectiveness of ordered antiemetic medications  - Provide nonpharmacologic comfort measures as appropriate  - Advance diet as tolerated, if ordered  - Obtain nutritional consult as needed  - Evaluate fluid balance  Outcome: Progressing  Goal: Maintains or returns to baseline bowel function  Description: INTERVENTIONS:  - Assess bowel function  - Maintain adequate hydration with IV or PO as ordered and tolerated  - Evaluate effectiveness of GI medications  - Encourage mobilization and activity  - Obtain nutritional consult as needed  - Establish a toileting routine/schedule  - Consider collaborating with pharmacy to review patient's medication profile  Outcome: Progressing

## 2023-03-20 NOTE — RESPIRATORY THERAPY NOTE
CPAP taken out of room per pt request. Pt states she has not used it since she was admitted and is ok wearing oxygen via nasal cannula at night.

## 2023-03-20 NOTE — DISCHARGE INSTRUCTIONS
Appointment provided for patient with   DR BISHOP MILES FINA [facility]  [address] [facility]  [address]

## 2023-03-20 NOTE — PLAN OF CARE
Assumed care @ 0730. Alert and orientated X4. NSR on tele, RA, VSS. BM prior to shift, some bloating, w/ minimal abd pain. Tolerating diet, no complaints of nausea. Ambulating through halls, tolerating well. Patient updated on plan of care. Call light within reach. Medically cleared for discharge. Went over discharge education with patient. Patient verbalizing understand of discharge education. IV removed. Tele removed. NURSING DISCHARGE NOTE    Discharged Home via Wheelchair. Accompanied by RN  Belongings Taken by patient/family.

## 2024-08-14 ENCOUNTER — APPOINTMENT (OUTPATIENT)
Dept: GENERAL RADIOLOGY | Age: 64
End: 2024-08-14
Attending: EMERGENCY MEDICINE
Payer: MEDICARE

## 2024-08-14 ENCOUNTER — APPOINTMENT (OUTPATIENT)
Dept: CT IMAGING | Age: 64
End: 2024-08-14
Attending: EMERGENCY MEDICINE
Payer: MEDICARE

## 2024-08-14 ENCOUNTER — HOSPITAL ENCOUNTER (EMERGENCY)
Age: 64
Discharge: HOME | End: 2024-08-14
Attending: EMERGENCY MEDICINE
Payer: MEDICARE

## 2024-08-14 VITALS
RESPIRATION RATE: 16 BRPM | TEMPERATURE: 98 F | OXYGEN SATURATION: 98 % | WEIGHT: 230 LBS | HEART RATE: 69 BPM | DIASTOLIC BLOOD PRESSURE: 79 MMHG | HEIGHT: 62 IN | SYSTOLIC BLOOD PRESSURE: 151 MMHG | BODY MASS INDEX: 42.33 KG/M2

## 2024-08-14 DIAGNOSIS — B34.9 VIRAL SYNDROME: ICD-10-CM

## 2024-08-14 DIAGNOSIS — R07.89 CHEST PAIN, ATYPICAL: ICD-10-CM

## 2024-08-14 DIAGNOSIS — J45.901 ASTHMA WITH ACUTE EXACERBATION, UNSPECIFIED ASTHMA SEVERITY, UNSPECIFIED WHETHER PERSISTENT (HCC): Primary | ICD-10-CM

## 2024-08-14 LAB
ALBUMIN SERPL-MCNC: 3.2 G/DL (ref 3.4–5)
ALBUMIN/GLOB SERPL: 1.1 {RATIO} (ref 1–2)
ALP LIVER SERPL-CCNC: 98 U/L
ALT SERPL-CCNC: 20 U/L
ANION GAP SERPL CALC-SCNC: 5 MMOL/L (ref 0–18)
APTT PPP: 26.2 SECONDS (ref 23–36)
AST SERPL-CCNC: 25 U/L (ref 15–37)
ATRIAL RATE: 76 BPM
BASOPHILS # BLD AUTO: 0.04 X10(3) UL (ref 0–0.2)
BASOPHILS NFR BLD AUTO: 0.5 %
BILIRUB SERPL-MCNC: 0.7 MG/DL (ref 0.1–2)
BUN BLD-MCNC: 8 MG/DL (ref 9–23)
CALCIUM BLD-MCNC: 9 MG/DL (ref 8.5–10.1)
CHLORIDE SERPL-SCNC: 109 MMOL/L (ref 98–112)
CO2 SERPL-SCNC: 26 MMOL/L (ref 21–32)
CREAT BLD-MCNC: 0.68 MG/DL
D DIMER PPP FEU-MCNC: 0.72 UG/ML FEU (ref ?–0.66)
EGFRCR SERPLBLD CKD-EPI 2021: 96 ML/MIN/1.73M2 (ref 60–?)
EOSINOPHIL # BLD AUTO: 0.21 X10(3) UL (ref 0–0.7)
EOSINOPHIL NFR BLD AUTO: 2.7 %
ERYTHROCYTE [DISTWIDTH] IN BLOOD BY AUTOMATED COUNT: 13.7 %
GLOBULIN PLAS-MCNC: 3 G/DL (ref 2.8–4.4)
GLUCOSE BLD-MCNC: 92 MG/DL (ref 70–99)
HCT VFR BLD AUTO: 42.9 %
HGB BLD-MCNC: 13.8 G/DL
IMM GRANULOCYTES # BLD AUTO: 0.02 X10(3) UL (ref 0–1)
IMM GRANULOCYTES NFR BLD: 0.3 %
INR BLD: 0.96 (ref 0.8–1.2)
LIPASE SERPL-CCNC: 22 U/L (ref 12–53)
LYMPHOCYTES # BLD AUTO: 1.5 X10(3) UL (ref 1–4)
LYMPHOCYTES NFR BLD AUTO: 19.5 %
MCH RBC QN AUTO: 32 PG (ref 26–34)
MCHC RBC AUTO-ENTMCNC: 32.2 G/DL (ref 31–37)
MCV RBC AUTO: 99.5 FL
MONOCYTES # BLD AUTO: 0.51 X10(3) UL (ref 0.1–1)
MONOCYTES NFR BLD AUTO: 6.6 %
NEUTROPHILS # BLD AUTO: 5.4 X10 (3) UL (ref 1.5–7.7)
NEUTROPHILS # BLD AUTO: 5.4 X10(3) UL (ref 1.5–7.7)
NEUTROPHILS NFR BLD AUTO: 70.4 %
NT-PROBNP SERPL-MCNC: 304 PG/ML (ref ?–125)
OSMOLALITY SERPL CALC.SUM OF ELEC: 288 MOSM/KG (ref 275–295)
P AXIS: 57 DEGREES
P-R INTERVAL: 132 MS
PLATELET # BLD AUTO: 194 10(3)UL (ref 150–450)
POCT INFLUENZA A: NEGATIVE
POCT INFLUENZA B: NEGATIVE
POTASSIUM SERPL-SCNC: 4.2 MMOL/L (ref 3.5–5.1)
PROT SERPL-MCNC: 6.2 G/DL (ref 6.4–8.2)
PROTHROMBIN TIME: 12.8 SECONDS (ref 11.6–14.8)
Q-T INTERVAL: 390 MS
QRS DURATION: 94 MS
QTC CALCULATION (BEZET): 438 MS
R AXIS: -12 DEGREES
RBC # BLD AUTO: 4.31 X10(6)UL
SARS-COV-2 RNA RESP QL NAA+PROBE: NOT DETECTED
SODIUM SERPL-SCNC: 140 MMOL/L (ref 136–145)
T AXIS: 45 DEGREES
TROPONIN I SERPL HS-MCNC: 6 NG/L
VENTRICULAR RATE: 76 BPM
WBC # BLD AUTO: 7.7 X10(3) UL (ref 4–11)

## 2024-08-14 PROCEDURE — 80053 COMPREHEN METABOLIC PANEL: CPT | Performed by: EMERGENCY MEDICINE

## 2024-08-14 PROCEDURE — 71045 X-RAY EXAM CHEST 1 VIEW: CPT | Performed by: EMERGENCY MEDICINE

## 2024-08-14 PROCEDURE — 85610 PROTHROMBIN TIME: CPT | Performed by: EMERGENCY MEDICINE

## 2024-08-14 PROCEDURE — 87502 INFLUENZA DNA AMP PROBE: CPT | Performed by: EMERGENCY MEDICINE

## 2024-08-14 PROCEDURE — 99285 EMERGENCY DEPT VISIT HI MDM: CPT

## 2024-08-14 PROCEDURE — 85379 FIBRIN DEGRADATION QUANT: CPT | Performed by: EMERGENCY MEDICINE

## 2024-08-14 PROCEDURE — 85730 THROMBOPLASTIN TIME PARTIAL: CPT | Performed by: EMERGENCY MEDICINE

## 2024-08-14 PROCEDURE — 83690 ASSAY OF LIPASE: CPT | Performed by: EMERGENCY MEDICINE

## 2024-08-14 PROCEDURE — 93005 ELECTROCARDIOGRAM TRACING: CPT

## 2024-08-14 PROCEDURE — 83880 ASSAY OF NATRIURETIC PEPTIDE: CPT | Performed by: EMERGENCY MEDICINE

## 2024-08-14 PROCEDURE — 84484 ASSAY OF TROPONIN QUANT: CPT | Performed by: EMERGENCY MEDICINE

## 2024-08-14 PROCEDURE — 93010 ELECTROCARDIOGRAM REPORT: CPT

## 2024-08-14 PROCEDURE — 71260 CT THORAX DX C+: CPT | Performed by: EMERGENCY MEDICINE

## 2024-08-14 PROCEDURE — 96374 THER/PROPH/DIAG INJ IV PUSH: CPT

## 2024-08-14 PROCEDURE — 85025 COMPLETE CBC W/AUTO DIFF WBC: CPT | Performed by: EMERGENCY MEDICINE

## 2024-08-14 RX ORDER — PREDNISONE 20 MG/1
40 TABLET ORAL DAILY
Qty: 10 TABLET | Refills: 0 | Status: SHIPPED | OUTPATIENT
Start: 2024-08-14 | End: 2024-08-19

## 2024-08-14 RX ORDER — IPRATROPIUM BROMIDE AND ALBUTEROL SULFATE 2.5; .5 MG/3ML; MG/3ML
3 SOLUTION RESPIRATORY (INHALATION) ONCE
Status: COMPLETED | OUTPATIENT
Start: 2024-08-14 | End: 2024-08-14

## 2024-08-14 RX ORDER — METHYLPREDNISOLONE SODIUM SUCCINATE 125 MG/2ML
125 INJECTION, POWDER, LYOPHILIZED, FOR SOLUTION INTRAMUSCULAR; INTRAVENOUS ONCE
Status: COMPLETED | OUTPATIENT
Start: 2024-08-14 | End: 2024-08-14

## 2024-08-14 RX ORDER — ALBUTEROL SULFATE 90 UG/1
2 AEROSOL, METERED RESPIRATORY (INHALATION) EVERY 4 HOURS PRN
Qty: 1 EACH | Refills: 0 | Status: SHIPPED | OUTPATIENT
Start: 2024-08-14 | End: 2024-09-13

## 2024-08-14 NOTE — ED INITIAL ASSESSMENT (HPI)
Pt to ER with c/o chest pain that started this morning - states cough fpr few days - recent trip to [facility] - c/o SOB

## 2024-08-14 NOTE — ED PROVIDER NOTES
Patient Seen in: [facility] [place]      History     Chief Complaint   Patient presents with    Chest Pain Angina    Cough/URI    Difficulty Breathing     Stated Complaint: chest pain and sob since am.  reports cough for \"a couple of days\"    Subjective:   HPI    64-year-old female presents emergency room for evaluation of cough and chest discomfort.  Patient states symptoms started 3 to 4 days ago, cough is productive of thick green sputum.  States she does feel some tightness in her chest when she tries to take a deep breath.  Patient reports she does have a history of asthma.  Has not been using inhaler as she is been prescribed in the past.  Patient was in [place] last week and returned a few days ago.  Denies lower extremity swelling or calf pain denies PND orthopnea.  Describes any tearing type chest or abdominal pain denies any pleuritic type chest pain.  Denies exertional chest discomfort or shortness of breath.  Patient reports she did feel feverish.  States she did feel slight sore throat but denies difficulty speaking or swallowing.  Objective:   Past Medical History:    Anemia    Anxiety    Asthma (HCC)    in good control    Back problem    lower back    COVID-19    COVID-19    mild symptoms, slight fever and sore throat    CVA (cerebral vascular accident) (HCC)    Deep vein thrombosis (HCC)    left arm    Depression    Diabetes (HCC)    resolved after gastric bypass sx//    no meds    Frozen shoulder    left    Ganglion cyst    right wrist    GERD (gastroesophageal reflux disease)    Hearing impairment    No HA's    High blood pressure    History of blood transfusion    HTN (hypertension)    Hx of motion sickness    Hyperlipidemia    Morbid obesity (HCC)    APRIL (obstructive sleep apnea)    AHI 21 RDI 21 REM AHI 48 SaO2 marisol 78% // uses CPAP    Osteoarthritis    Pneumonia due to organism    Was hospitalized with COVID September 2021  fever chiil cough    PONV (postoperative nausea  and vomiting)    only one time    Pseudoseizures    due to anxiety, s/p psych and neuro w/u    SBO (small bowel obstruction) (HCC)    Seizure disorder (HCC)    PSUEDO SEIZURES    Sleep apnea    C-PAP    Visual impairment    having trouble w/ left eye after cataract surgery- seeing blurring; glasses              Past Surgical History:   Procedure Laterality Date    Arthroscopy of joint unlisted      Left knee    Cataract      Cholecystectomy      Colonoscopy  2005    diverticulosis - result in scan - Edward Fesenmyer    Colonoscopy N/A 2016    adenoma/hp, divertic. repeat 5 yrs, mac/2 day prep    Colonoscopy      Colonoscopy N/A 2021    Procedure: COLONOSCOPY;  Surgeon: Umberto Menjivar MD;  Location:  ENDOSCOPY    Gastric bypass,obese<100cm lizbeth-en-y      Gastric bypass,obesity,sb reconstruc      Other surgical history      Lizbeth-en-y    Other surgical history      Lap laser removal of endometrisis    Other surgical history  2022    ex lap, lysis of adhesions, drainage on intra abdominal phlegmon & abscess    Sling oper stres incontinence      Tonsillectomy      Tubal ligation      Upper gi endoscopy,diagnosis  2005    hiatal hernia, mild gastritis (H pylori (--))    Upper gi endoscopy,diagnosis  3/24/2009    Upper gi endoscopy,exam                  Social History     Socioeconomic History    Marital status:    Occupational History    Occupation: childcare   Tobacco Use    Smoking status: Former     Current packs/day: 0.00     Average packs/day: 0.3 packs/day for 15.6 years (4.7 ttl pk-yrs)     Types: Cigarettes     Start date: 1991     Quit date: 3/4/2007     Years since quittin.4     Passive exposure: Past    Smokeless tobacco: Never    Tobacco comments:     1/2 ppd x 10 yrs    Vaping Use    Vaping status: Never Used   Substance and Sexual Activity    Alcohol use: Not Currently     Comment: occ    Drug use: Not Currently     Types: Cannabis    Sexual activity:  Yes     Partners: Male     Birth control/protection: Tubal Ligation   Other Topics Concern    Exercise Yes     Comment: take care of 2 grandchildren   Social History Narrative    Homemaker    Former smoker- quit 2003- 1/2ppdx 10 yrs.     Social Determinants of Health     Food Insecurity: Food Insecurity Present (12/7/2023)    Received from [facility]    Food Insecurity     Currently or in the past 3 months, have you worried your food would run out before you had money to buy more?: No     In the past 12 months, have you run out of food or been unable to get more?: Yes   Transportation Needs: Unmet Transportation Needs (12/7/2023)    Received from [facility]    Transportation Needs     Medical Transportation Needs?: Yes    Received from [facility]    Housing Stability              Review of Systems    Positive for stated Chief Complaint: Chest Pain Angina, Cough/URI, and Difficulty Breathing    Other systems are as noted in HPI.  Constitutional and vital signs reviewed.      All other systems reviewed and negative except as noted above.    Physical Exam     ED Triage Vitals [08/14/24 1123]   /71   Pulse 74   Resp 15   Temp 98 °F (36.7 °C)   Temp src Temporal   SpO2 96 %   O2 Device None (Room air)       Current Vitals:   Vital Signs  BP: 151/79  Pulse: 69  Resp: 16  Temp: 98 °F (36.7 °C)  Temp src: Temporal    Oxygen Therapy  SpO2: 98 %  O2 Device: None (Room air)            Physical Exam    GENERAL: Patient is awake, alert, in no acute distress.  HEENT:  no scleral icterus.  Mucous membranes are moist,  Scalp is atraumatic.  NECK: Neck is supple, there is no nuchal rigidity.  No carotid bruits.  No masses.  Trachea midline.  No cervical lymphadenopathy.  HEART: Regular rate and rhythm, no murmurs.  LUNGS: Coarse breath sounds bilaterally with mild expiratory wheeze..  Occasional rhonchi that clear with cough.  ABDOMEN: Soft, nondistended,non  tender  EXTREMITIES: No peripheral edema, no calf tenderness    ED Course     Labs Reviewed   COMP METABOLIC PANEL (14) - Abnormal; Notable for the following components:       Result Value    BUN 8 (*)     Total Protein 6.2 (*)     Albumin 3.2 (*)     All other components within normal limits   D-DIMER - Abnormal; Notable for the following components:    D-Dimer 0.72 (*)     All other components within normal limits   PRO BETA NATRIURETIC PEPTIDE - Abnormal; Notable for the following components:    Pro-Beta Natriuretic Peptide 304 (*)     All other components within normal limits   LIPASE - Normal   TROPONIN I HIGH SENSITIVITY - Normal   PROTHROMBIN TIME (PT) - Normal   PTT, ACTIVATED - Normal   RAPID SARS-COV-2 BY PCR - Normal   POCT FLU TEST - Normal    Narrative:     This assay is a rapid molecular in vitro test utilizing nucleic acid amplification of influenza A and B viral RNA.   CBC WITH DIFFERENTIAL WITH PLATELET   RAINBOW DRAW LAVENDER   RAINBOW DRAW LIGHT GREEN   RAINBOW DRAW BLUE     EKG    Rate, intervals and axes as noted on EKG Report.  Rate: 76  Rhythm: Sinus Rhythm  Reading: Normal sinus rhythm, no ST elevation                          MDM        Differential diagnosis before testing includes but not limited to pneumonia, bronchitis, pneumothorax, pulmonary embolism, which is a medical condition that poses a threat to life/function    Radiographic images  I personally reviewed the radiographs and my individual interpretation shows chest x-ray no pneumothorax  I also reviewed the official reports that showed chest x-ray no active cardiopulmonary disease, CT chest no pulmonary embolism or consolidation.      Medications Provided: DuoNeb, Solu-Medrol    Course of Events during Emergency Room Visit include established, patient placed on cardiac monitor and pulse ox.  CBC and chemistry are unremarkable.  Troponin was 6.  D-dimer elevated 0.72.  CT chest without acute findings.  Patient did receive IV  steroids and nebulizer treatment, on reevaluation states she is feel much better.  Repeat pulmonary exam, there is marked improvement air movement bilaterally, there is still mild expiratory wheeze.  Vital signs are stable, symptoms exam consistent with viral syndrome with acute asthma exacerbation, will prescribe prednisone and albuterol MDI, follow-up with primary care physician.  Return to ER for any change in symptoms.  Patient well-appearing agrees plan and was discharged in good condition with     Shared decision making was utilized           Disposition:        Discharge  I have discussed with the patient the results of test, differential diagnosis, treatment plan, warning signs and symptoms which should prompt immediate return.  They expressed understanding of these instructions and agrees to the following plan provided.  They were given written discharge instructions and agrees to return for any concerns and voiced understanding and all questions were answered.    Note to patient: The 21st Century Cures Act makes medical notes like these available to patients in the interest of transparency. However, this is a medical document intended as peer to peer communication. It is written in medical language and may contain abbreviations or verbiage that are unfamiliar. It may appear blunt or direct. Medical documents are intended to carry relevant information, facts as evident, and the clinical opinion of the practitioner.                                            Medical Decision Making      Disposition and Plan     Clinical Impression:  1. Asthma with acute exacerbation, unspecified asthma severity, unspecified whether persistent (HCC)    2. Chest pain, atypical    3. Viral syndrome         Disposition:  Discharge  8/14/2024  1:12 pm    Follow-up:  Quincy Fernandez MD  [address] [address]  [phone]    Follow up in 2 day(s)            Medications  Prescribed:  Current Discharge Medication List        START taking these medications    Details   !! albuterol 108 (90 Base) MCG/ACT Inhalation Aero Soln Inhale 2 puffs into the lungs every 4 (four) hours as needed for Wheezing.  Qty: 1 each, Refills: 0      predniSONE 20 MG Oral Tab Take 2 tablets (40 mg total) by mouth daily for 5 days.  Qty: 10 tablet, Refills: 0       !! - Potential duplicate medications found. Please discuss with provider.

## 2024-10-27 ENCOUNTER — APPOINTMENT (OUTPATIENT)
Dept: CT IMAGING | Age: 64
End: 2024-10-27
Attending: EMERGENCY MEDICINE
Payer: MEDICARE

## 2024-10-27 ENCOUNTER — HOSPITAL ENCOUNTER (INPATIENT)
Facility: HOSPITAL | Age: 64
LOS: 2 days | Discharge: HOME | End: 2024-10-30
Attending: EMERGENCY MEDICINE | Admitting: HOSPITALIST
Payer: MEDICARE

## 2024-10-27 DIAGNOSIS — K56.609 SBO (SMALL BOWEL OBSTRUCTION) (HCC): Primary | ICD-10-CM

## 2024-10-27 LAB
ALBUMIN SERPL-MCNC: 3.4 G/DL (ref 3.4–5)
ALBUMIN/GLOB SERPL: 1.1 {RATIO} (ref 1–2)
ALP LIVER SERPL-CCNC: 105 U/L
ALT SERPL-CCNC: 16 U/L
ANION GAP SERPL CALC-SCNC: 7 MMOL/L (ref 0–18)
AST SERPL-CCNC: 13 U/L (ref 15–37)
BASOPHILS # BLD AUTO: 0.06 X10(3) UL (ref 0–0.2)
BASOPHILS NFR BLD AUTO: 0.7 %
BILIRUB SERPL-MCNC: 0.6 MG/DL (ref 0.1–2)
BUN BLD-MCNC: 13 MG/DL (ref 9–23)
CALCIUM BLD-MCNC: 9.2 MG/DL (ref 8.5–10.1)
CHLORIDE SERPL-SCNC: 111 MMOL/L (ref 98–112)
CO2 SERPL-SCNC: 23 MMOL/L (ref 21–32)
CREAT BLD-MCNC: 0.91 MG/DL
EGFRCR SERPLBLD CKD-EPI 2021: 70 ML/MIN/1.73M2 (ref 60–?)
EOSINOPHIL # BLD AUTO: 0.27 X10(3) UL (ref 0–0.7)
EOSINOPHIL NFR BLD AUTO: 3.3 %
ERYTHROCYTE [DISTWIDTH] IN BLOOD BY AUTOMATED COUNT: 13.8 %
GLOBULIN PLAS-MCNC: 3.1 G/DL (ref 2.8–4.4)
GLUCOSE BLD-MCNC: 126 MG/DL (ref 70–99)
HCT VFR BLD AUTO: 43.7 %
HGB BLD-MCNC: 14.5 G/DL
IMM GRANULOCYTES # BLD AUTO: 0.03 X10(3) UL (ref 0–1)
IMM GRANULOCYTES NFR BLD: 0.4 %
LIPASE SERPL-CCNC: 17 U/L (ref 12–53)
LYMPHOCYTES # BLD AUTO: 1.55 X10(3) UL (ref 1–4)
LYMPHOCYTES NFR BLD AUTO: 18.8 %
MCH RBC QN AUTO: 32.3 PG (ref 26–34)
MCHC RBC AUTO-ENTMCNC: 33.2 G/DL (ref 31–37)
MCV RBC AUTO: 97.3 FL
MONOCYTES # BLD AUTO: 0.49 X10(3) UL (ref 0.1–1)
MONOCYTES NFR BLD AUTO: 5.9 %
NEUTROPHILS # BLD AUTO: 5.85 X10 (3) UL (ref 1.5–7.7)
NEUTROPHILS # BLD AUTO: 5.85 X10(3) UL (ref 1.5–7.7)
NEUTROPHILS NFR BLD AUTO: 70.9 %
OSMOLALITY SERPL CALC.SUM OF ELEC: 294 MOSM/KG (ref 275–295)
PLATELET # BLD AUTO: 181 10(3)UL (ref 150–450)
POTASSIUM SERPL-SCNC: 3.7 MMOL/L (ref 3.5–5.1)
PROT SERPL-MCNC: 6.5 G/DL (ref 6.4–8.2)
RBC # BLD AUTO: 4.49 X10(6)UL
SODIUM SERPL-SCNC: 141 MMOL/L (ref 136–145)
WBC # BLD AUTO: 8.3 X10(3) UL (ref 4–11)

## 2024-10-27 PROCEDURE — 96375 TX/PRO/DX INJ NEW DRUG ADDON: CPT

## 2024-10-27 PROCEDURE — 81001 URINALYSIS AUTO W/SCOPE: CPT | Performed by: EMERGENCY MEDICINE

## 2024-10-27 PROCEDURE — 87186 SC STD MICRODIL/AGAR DIL: CPT | Performed by: EMERGENCY MEDICINE

## 2024-10-27 PROCEDURE — 74177 CT ABD & PELVIS W/CONTRAST: CPT | Performed by: EMERGENCY MEDICINE

## 2024-10-27 PROCEDURE — 87086 URINE CULTURE/COLONY COUNT: CPT | Performed by: EMERGENCY MEDICINE

## 2024-10-27 PROCEDURE — 96374 THER/PROPH/DIAG INJ IV PUSH: CPT

## 2024-10-27 PROCEDURE — 99285 EMERGENCY DEPT VISIT HI MDM: CPT

## 2024-10-27 PROCEDURE — 87077 CULTURE AEROBIC IDENTIFY: CPT | Performed by: EMERGENCY MEDICINE

## 2024-10-27 PROCEDURE — 81015 MICROSCOPIC EXAM OF URINE: CPT | Performed by: EMERGENCY MEDICINE

## 2024-10-27 PROCEDURE — 80053 COMPREHEN METABOLIC PANEL: CPT | Performed by: EMERGENCY MEDICINE

## 2024-10-27 PROCEDURE — 83690 ASSAY OF LIPASE: CPT | Performed by: EMERGENCY MEDICINE

## 2024-10-27 PROCEDURE — 85025 COMPLETE CBC W/AUTO DIFF WBC: CPT | Performed by: EMERGENCY MEDICINE

## 2024-10-27 RX ORDER — ONDANSETRON 2 MG/ML
4 INJECTION INTRAMUSCULAR; INTRAVENOUS ONCE
Status: COMPLETED | OUTPATIENT
Start: 2024-10-27 | End: 2024-10-27

## 2024-10-27 RX ORDER — MORPHINE SULFATE 4 MG/ML
2 INJECTION, SOLUTION INTRAMUSCULAR; INTRAVENOUS ONCE
Status: COMPLETED | OUTPATIENT
Start: 2024-10-27 | End: 2024-10-27

## 2024-10-28 LAB
BILIRUB UR QL STRIP.AUTO: NEGATIVE
CLARITY UR REFRACT.AUTO: CLEAR
COLOR UR AUTO: YELLOW
GLUCOSE UR STRIP.AUTO-MCNC: NEGATIVE MG/DL
KETONES UR STRIP.AUTO-MCNC: NEGATIVE MG/DL
LEUKOCYTE ESTERASE UR QL STRIP.AUTO: NEGATIVE
NITRITE UR QL STRIP.AUTO: POSITIVE
PH UR STRIP.AUTO: 6.5 [PH] (ref 5–8)
PROT UR STRIP.AUTO-MCNC: NEGATIVE MG/DL
RBC UR QL AUTO: NEGATIVE
SP GR UR STRIP.AUTO: 1.01 (ref 1–1.03)
UROBILINOGEN UR STRIP.AUTO-MCNC: 1 MG/DL

## 2024-10-28 PROCEDURE — 96376 TX/PRO/DX INJ SAME DRUG ADON: CPT

## 2024-10-28 RX ORDER — MORPHINE SULFATE 2 MG/ML
1 INJECTION, SOLUTION INTRAMUSCULAR; INTRAVENOUS EVERY 2 HOUR PRN
Status: DISCONTINUED | OUTPATIENT
Start: 2024-10-28 | End: 2024-10-30

## 2024-10-28 RX ORDER — ONDANSETRON 2 MG/ML
4 INJECTION INTRAMUSCULAR; INTRAVENOUS EVERY 6 HOURS PRN
Status: DISCONTINUED | OUTPATIENT
Start: 2024-10-28 | End: 2024-10-30

## 2024-10-28 RX ORDER — SODIUM CHLORIDE 9 MG/ML
INJECTION, SOLUTION INTRAVENOUS CONTINUOUS
Status: DISCONTINUED | OUTPATIENT
Start: 2024-10-28 | End: 2024-10-30

## 2024-10-28 RX ORDER — ONDANSETRON 2 MG/ML
4 INJECTION INTRAMUSCULAR; INTRAVENOUS EVERY 4 HOURS PRN
Status: ACTIVE | OUTPATIENT
Start: 2024-10-28 | End: 2024-10-28

## 2024-10-28 RX ORDER — METOCLOPRAMIDE HYDROCHLORIDE 5 MG/ML
5 INJECTION INTRAMUSCULAR; INTRAVENOUS EVERY 8 HOURS PRN
Status: DISCONTINUED | OUTPATIENT
Start: 2024-10-28 | End: 2024-10-30

## 2024-10-28 RX ORDER — ACETAMINOPHEN 10 MG/ML
1000 INJECTION, SOLUTION INTRAVENOUS EVERY 6 HOURS PRN
Status: DISCONTINUED | OUTPATIENT
Start: 2024-10-28 | End: 2024-10-29

## 2024-10-28 RX ORDER — MORPHINE SULFATE 4 MG/ML
4 INJECTION, SOLUTION INTRAMUSCULAR; INTRAVENOUS EVERY 2 HOUR PRN
Status: DISCONTINUED | OUTPATIENT
Start: 2024-10-28 | End: 2024-10-30

## 2024-10-28 RX ORDER — ENOXAPARIN SODIUM 100 MG/ML
0.5 INJECTION SUBCUTANEOUS EVERY 12 HOURS
Status: DISCONTINUED | OUTPATIENT
Start: 2024-10-28 | End: 2024-10-28

## 2024-10-28 RX ORDER — HYDRALAZINE HYDROCHLORIDE 20 MG/ML
10 INJECTION INTRAMUSCULAR; INTRAVENOUS EVERY 4 HOURS PRN
Status: DISCONTINUED | OUTPATIENT
Start: 2024-10-28 | End: 2024-10-30

## 2024-10-28 RX ORDER — MORPHINE SULFATE 4 MG/ML
2 INJECTION, SOLUTION INTRAMUSCULAR; INTRAVENOUS ONCE
Status: COMPLETED | OUTPATIENT
Start: 2024-10-28 | End: 2024-10-28

## 2024-10-28 RX ORDER — MORPHINE SULFATE 2 MG/ML
2 INJECTION, SOLUTION INTRAMUSCULAR; INTRAVENOUS EVERY 2 HOUR PRN
Status: DISCONTINUED | OUTPATIENT
Start: 2024-10-28 | End: 2024-10-30

## 2024-10-28 RX ORDER — ENOXAPARIN SODIUM 100 MG/ML
0.5 INJECTION SUBCUTANEOUS EVERY 12 HOURS SCHEDULED
Status: DISCONTINUED | OUTPATIENT
Start: 2024-10-28 | End: 2024-10-29

## 2024-10-28 NOTE — CONSULTS
[facility]  Report of Consultation    Roshni Scott Patient Status:  Inpatient    1960 MRN [id number]   Location [facility] 3SW-A Attending Joe Paulino, DO   Hosp Day # 0 PCP Quincy Fernandez MD     10/28/24    Reason for Consultation:    Surgical Consultation     CC:   Chief Complaint   Patient presents with    Abdomen/Flank Pain        History of Present Illness:    Roshni Scott is a a(n) 64 year old female. Patient with previous gastric bypass, she presented with gastric distension and abdominal pain. Feels improved today.   CT scan obtained revealing SBO transition at anastomosis site.   Patient admitted, general surgery consulted  She has had previous admissions for SBO in the past.     Past Medical History:    Past Medical History:    Anemia    Anxiety    Asthma ([facility])    in good control    Back problem    lower back    COVID-19    COVID-19    mild symptoms, slight fever and sore throat    CVA (cerebral vascular accident) ([facility])    Deep vein thrombosis ([facility])    left arm    Depression    Diabetes ([facility])    resolved after gastric bypass sx//    no meds    Frozen shoulder    left    Ganglion cyst    right wrist    GERD (gastroesophageal reflux disease)    Hearing impairment    No HA's    High blood pressure    History of blood transfusion    HTN (hypertension)    Hx of motion sickness    Hyperlipidemia    Morbid obesity ([facility])    APRIL (obstructive sleep apnea)    AHI 21 RDI 21 REM AHI 48 SaO2 marisol 78% // uses CPAP    Osteoarthritis    Pneumonia due to organism    Was hospitalized with COVID 2021  fever chiil cough    PONV (postoperative nausea and vomiting)    only one time    Pseudoseizures    due to anxiety, s/p psych and neuro w/u    SBO (small bowel obstruction) ([facility])    Seizure disorder ([facility])    PSUEDO SEIZURES    Sleep apnea    C-PAP    Visual impairment    having trouble w/ left eye after cataract surgery- seeing blurring; glasses       Past Surgical  History:    Past Surgical History:   Procedure Laterality Date    Arthroscopy of joint unlisted      Left knee    Cataract      Cholecystectomy      Colonoscopy  5/18/2005    diverticulosis - result in scan - [facility]    Colonoscopy N/A 5/25/2016    adenoma/hp, divertic. repeat 5 yrs, mac/2 day prep    Colonoscopy      Colonoscopy N/A 11/23/2021    Procedure: COLONOSCOPY;  Surgeon: Umberto Menjivar MD;  Location:  ENDOSCOPY    Gastric bypass,obese<100cm grady-en-y      Gastric bypass,obesity,sb reconstruc      Other surgical history  2007    Grady-en-y    Other surgical history  1990    Lap laser removal of endometrisis    Other surgical history  02/22/2022    ex lap, lysis of adhesions, drainage on intra abdominal phlegmon & abscess    Sling oper stres incontinence      Tonsillectomy      Tubal ligation      Upper gi endoscopy,diagnosis  5/18/2005    hiatal hernia, mild gastritis (H pylori (--))    Upper gi endoscopy,diagnosis  3/24/2009    Upper gi endoscopy,exam         Family History:    Family History   Problem Relation Age of Onset    Hypertension Father     Heart Disease Father         pacer    Pulmonary Disease Mother         COPD    Psychiatric Mother     Other (Other) Mother         copd    Other (Other) Sister         copd    Cancer Other         ?ovarian    Psychiatric Other     Diabetes Sister     Diabetes Sister     Cancer Paternal Grandmother     Cancer Sister         endometrial    Hypertension Sister     Diabetes Other         MAUNT       Social History:     reports that she quit smoking about 17 years ago. Her smoking use included cigarettes. She started smoking about 33 years ago. She has a 4.7 pack-year smoking history. She has been exposed to tobacco smoke. She has never used smokeless tobacco. She reports that she does not currently use alcohol. She reports that she does not currently use drugs after having used the following drugs: Cannabis.    Allergies:    Allergies[1]    Current  Medications:      Current Facility-Administered Medications:     hydrALAzine (Apresoline) 20 mg/mL injection 10 mg, 10 mg, Intravenous, Q4H PRN    sodium chloride 0.9% infusion, , Intravenous, Continuous    morphINE PF 2 MG/ML injection 1 mg, 1 mg, Intravenous, Q2H PRN **OR** morphINE PF 2 MG/ML injection 2 mg, 2 mg, Intravenous, Q2H PRN **OR** morphINE PF 4 MG/ML injection 4 mg, 4 mg, Intravenous, Q2H PRN    ondansetron (Zofran) 4 MG/2ML injection 4 mg, 4 mg, Intravenous, Q6H PRN    metoclopramide (Reglan) 5 mg/mL injection 5 mg, 5 mg, Intravenous, Q8H PRN    enoxaparin (Lovenox) 60 MG/0.6ML SUBQ injection 50 mg, 0.5 mg/kg, Subcutaneous, 2 times per day    Home Medications:    Medications Ordered Prior to Encounter[2]    Review of Systems:    10 point review performed pertinent positives and negatives per history of present illness.    Physical Exam:    Blood pressure 102/61, pulse 71, temperature 98 °F (36.7 °C), temperature source Oral, resp. rate 16, height 5' 2\" (1.575 m), weight 229 lb 4.8 oz (104 kg), last menstrual period 12/10/2014, SpO2 95%, not currently breastfeeding.    GENERAL: Alert and oriented x 3, well developed, well nourished female, in no apparent distress    SKIN: anicteric    RESPIRATORY: non labored breathing    CARDIOVASCULAR: RRR    ABDOMEN: obese, soft, non distended, mild tenderness, no rebound    LYMPHATIC: no lymphadenopathy    EXTREMITIES: no cyanosis, clubbing or edema    .    Laboratory Data:  Recent Labs   Lab 10/27/24  2139   WBC 8.3   HGB 14.5   MCV 97.3   .0       Recent Labs   Lab 10/27/24  2140      K 3.7      CO2 23.0   BUN 13   CREATSERUM 0.91   *   CA 9.2       Recent Labs   Lab 10/27/24  2140   ALT 16   AST 13*   ALB 3.4       No results for input(s): \"TROP\" in the last 168 hours.          Radiology:    CT ABDOMEN+PELVIS(CONTRAST ONLY)(CPT=74177)    Result Date: 10/28/2024  PROCEDURE:  CT ABDOMEN+PELVIS (CONTRAST ONLY) (CPT=74177)  COMPARISON:   [place], CT, CT ABDOMEN+PELVIS(CONTRAST ONLY)(CPT=74177), 3/16/2023, 4:23 AM.  INDICATIONS:  abd pain. Nausea and vomiting.  TECHNIQUE:  CT scanning was performed from the dome of the diaphragm to the pubic symphysis with non-ionic intravenous contrast material. Post contrast coronal MPR imaging was performed.  Dose reduction techniques were used. Dose information is transmitted to the ACR (American College of Radiology) NRDR (National Radiology Data Registry) which includes the Dose Index Registry.  PATIENT STATED HISTORY:(As transcribed by Technologist)  Pt complains of left side abd pain with nausea and vomiting   A preliminary radiology report was created by the [employer] radiology service.  This report was sent to the emergency department.  The report was reviewed prior to this dictation. CONTRAST USED:  100cc of Isovue 370  FINDINGS:  LIVER:  No enlargement, atrophy, abnormal density, or significant focal lesion.  BILIARY:  Status post cholecystectomy.  Mild stable intrahepatic ductal dilatation and extrahepatic dilatation.  This may reflect post cholecystectomy change.  Correlate with bilirubin level. PANCREAS:  No lesion, fluid collection, ductal dilatation or enlargement.  There is mild atrophy. SPLEEN:  No enlargement or focal lesion.  KIDNEYS:  Bilateral parapelvic renal cysts are noted.  No hydronephrosis or urinary tract calculi. ADRENALS:  No mass or enlargement.  AORTA/VASCULAR:  Atheromatous plaque noted in the aorta.  No evidence for aneurysm.  The portal vein is patent. RETROPERITONEUM:  No mass or adenopathy.  BOWEL/MESENTERY:  There are postoperative changes of gastric bypass.  There is a similar appearing obstruction of the Lizbeth limb with moderate distention of the small bowel proximal to the entero-enteric anastomosis, measuring up to 4.6 cm in diameter.  The distended Lizbeth limb contains fecal like material and there is an abrupt transition point at the entero-enteric anastomosis.  Beyond the  anastomosis, the small bowel is decompressed.  There is mild mesenteric edema and a small volume of interloop fluid.  There is no free air.  Findings are similar to a previous CT from 3/16/2023. ABDOMINAL WALL:  Postoperative changes are seen.  Tiny paraumbilical fat containing hernias are noted.  No bowel herniation. URINARY BLADDER:  No visible focal wall thickening, lesion, or calculus.  PELVIC NODES:  No adenopathy.  PELVIC ORGANS:  No visible mass.  Pelvic organs appropriate for patient age.  BONES:  Degenerative changes are seen in the spine.  No acute osseous abnormality is appreciated. LUNG BASES:  No visible pulmonary or pleural disease.  OTHER:  Negative.             CONCLUSION:  1. Status post gastric bypass.  There is obstruction of the Lizbeth limb with moderate distention of the small bowel proximal to the entero-enteric anastomosis which contains fecal like material.  There is an abrupt transition point at the utero-enteric anastomosis, beyond which the distal small bowel is decompressed.  There is associated mesenteric edema and a small volume of interloop fluid.  There is no free air.  There is a similar appearance compared to a previous CT from 3/16/2023 suggesting either acute recurrent obstruction or chronic obstruction. 2. Status post cholecystectomy.  Stable mild prominence of the intra and extrahepatic bile ducts which could reflect post cholecystectomy change.  Correlate with bilirubin level. 3. Bilateral parapelvic renal cysts are again noted.    LOCATION:  [facility]   Dictated by (CST): Robson Kaur MD on 10/28/2024 at 3:52 AM     Finalized by (CST): Robson Kaur MD on 10/28/2024 at 4:04 AM          Problem List:    Patient Active Problem List   Diagnosis    Hyperlipidemia    Morbid obesity with BMI of 50.0-59.9, adult (HCC)    Essential hypertension    Non-seasonal allergic rhinitis due to pollen    ASTHMA    APRIL on CPAP    B12 deficiency    Complex tear of medial meniscus of right  knee as current injury, subsequent encounter    Chondromalacia of right knee    Internal derangement of right knee    Hypertonicity of bladder    Iron malabsorption (HCC)    Other iron deficiency anemias    Pulmonary hypertension (HCC)    Ectatic thoracic aorta (HCC)    Pseudo Seizure disorder (HCC)    Major depressive disorder, recurrent severe without psychotic features (HCC)    Carotid atherosclerosis    Epilepsy seizure, generalized, convulsive (HCC)    Type 2 diabetes mellitus without complication, without long-term current use of insulin (HCC)    Alcohol use    Right knee arthroscopy with partial medial meniscectomy  [facility]  03/02/2022    S/P arthroscopic surgery of right knee    Arthritis of both knees    Chronic pain of right knee    Abdominal pain, generalized    Intestinal obstruction, unspecified cause, unspecified whether partial or complete (HCC)    Nausea and vomiting in adult    Hyperglycemia    Weakness    Acute gastric ulcer with perforation and obstruction (HCC)    Anemia    Leukocytosis    Postoperative intra-abdominal abscess (HCC)    SBO (small bowel obstruction) (HCC)    Acute kidney injury (HCC)    Nausea and vomiting    Nausea and vomiting, unspecified vomiting type    Abdominal pain, acute       Impression:    64 y.o with pSBO  CT scan as noted above  Improved pain, no nausea    Plan:  Reviewed with patient recommendations for conservative management   -NPO  -serial abdominal exams  -IV fluids  -encourage ambulation  -obstructive series, or SBFT if no improvement tomorrow  Explained to patient that if their condition deteriorates, they may require surgical management.   TIAGO Collazo DR  [facility]  General Surgery  10/28/2024         [1]   Allergies  Allergen Reactions    Alteplase OTHER (SEE COMMENTS) and SWELLING     Mild lip swelling.   [2]   Current Facility-Administered Medications on File Prior to Encounter   Medication Dose Route Frequency Provider  Last Rate Last Admin    [COMPLETED] ipratropium-albuterol (Duoneb) 0.5-2.5 (3) MG/3ML inhalation solution 3 mL  3 mL Nebulization Once Chi Salvador, DO   3 mL at 08/14/24 1138    [COMPLETED] methylPREDNISolone sodium succinate (Solu-MEDROL) injection 125 mg  125 mg Intravenous Once Chi Salvador, DO   125 mg at 08/14/24 1154    [COMPLETED] iopamidol 76% (ISOVUE-370) injection for power injector  100 mL Intravenous ONCE PRN Chi Salvador, DO   100 mL at 08/14/24 1245     Current Outpatient Medications on File Prior to Encounter   Medication Sig Dispense Refill    pantoprazole 40 MG Oral Tab EC Take 1 tablet (40 mg total) by mouth 2 (two) times daily before meals. 180 tablet 2    FLUoxetine 20 MG Oral Cap Take 1 capsule (20 mg total) by mouth daily. 90 capsule 0    losartan 100 MG Oral Tab Take 1 tablet (100 mg total) by mouth daily. 90 tablet 2    buPROPion 300 MG Oral Tablet 24 Hr Take 1 tablet (300 mg total) by mouth daily. 90 tablet 2    montelukast 10 MG Oral Tab Take 1 tablet (10 mg total) by mouth daily. 90 tablet 0    topiramate 25 MG Oral Tab Take 1 tablet (25 mg total) by mouth 2 (two) times daily. 180 tablet 2    amLODIPine 5 MG Oral Tab Take 1 tablet (5 mg total) by mouth daily. 90 tablet 0    Cholecalciferol (VITAMIN D) 50 MCG (2000 UT) Oral Cap Take 1 capsule (2,000 Units total) by mouth.      Albuterol Sulfate  (90 Base) MCG/ACT Inhalation Aero Soln Inhale 2 puffs into the lungs every 6 (six) hours as needed for Wheezing or Shortness of Breath. 1 each 0    Cyanocobalamin (VITAMIN B-12) 1000 MCG Sublingual SL Tab Place 2,000 mcg under the tongue daily. 60 tablet 11

## 2024-10-28 NOTE — SPIRITUAL CARE NOTE
Spiritual Care Visit Note    Patient Name: Roshni Scott Date of Spiritual Care Visit: 10/28/24   : 1960 Primary Dx: SBO (small bowel obstruction) (HCC)       Referred By: Referral From: Nurse    Spiritual Care Taxonomy:    Intended Effects: Aligning care plan with patient's values    Methods: Encourage self care    Interventions: Acknowledge current situation;Active listening;Ask questions to bring forth feelings    Visit Type/Summary:     - PoA: New PoAH Created: Visited patient in response to PoA for Healthcare consult/request. Created a new PoAH for Healthcare document that, per the patient, accurately reflects their wishes. Gave the patient the original PoAH document along with copies. Confirmed that the PoAH primary agent name and contact information have been entered into the Epic, Advance Directives section. A copy of the new PoAH document has been placed on the patient's paper chart.    Spiritual Care support can be requested via an Epic consult. For urgent/immediate needs, please contact the On Call  at: [facility]: ext [phone]    Emma Higgins   Spiritual [facility]

## 2024-10-28 NOTE — ED QUICK NOTES
Orders for admission, patient is aware of plan and ready to go upstairs. Any questions, please call ED RN Jose Alfredo  at extension [phone].     Patient Covid vaccination status: Fully vaccinated     COVID Test Ordered in ED: None    COVID Suspicion at Admission: N/A    Running Infusions:  None    Mental Status/LOC at time of transport: A x O 4    Other pertinent information:   CIWA score: N/A   NIH score:  N/A

## 2024-10-28 NOTE — PLAN OF CARE
NURSING ADMISSION NOTE      Patient admitted via Cart  Oriented to room.  Safety precautions initiated.  Bed in low position.  Call light in reach.    Skin check completed with LAURA Lyn.

## 2024-10-28 NOTE — ED QUICK NOTES
Called dispatch for updated ETA as ambulance has still not arrived. Was told they are on [place] and should be pulling in soon.

## 2024-10-28 NOTE — ED INITIAL ASSESSMENT (HPI)
Lower abd pain/vomiting x4 hours. Hx of bowel obstructions. No diarrhea/blood in stool. Pt states this feels like a bowel obstruction. Pt is feeling lightheaded/dizzy.

## 2024-10-28 NOTE — ED PROVIDER NOTES
Patient Seen in: [facility] In [place]      History     Chief Complaint   Patient presents with    Abdomen/Flank Pain     Stated Complaint: abd pain. Nausea and vomiting.    Subjective:   HPI      64-year-old female with past medical history of asthma, hypertension, previous SBO presents today for evaluation of abdominal pain.  Patient felt well and had a normal bowel movement throughout the day today.  Around dinnertime, she began having a generalized abdominal pain that is consistent with previous bowel obstructions.  She reported 2 episodes of nonbilious, nonbloody emesis.  She denies any fevers, dysuria or hematuria.    Objective:     Past Medical History:    Anemia    Anxiety    Asthma ([facility])    in good control    Back problem    lower back    COVID-19    COVID-19    mild symptoms, slight fever and sore throat    CVA (cerebral vascular accident) ([facility])    Deep vein thrombosis ([facility])    left arm    Depression    Diabetes ([facility])    resolved after gastric bypass sx//    no meds    Frozen shoulder    left    Ganglion cyst    right wrist    GERD (gastroesophageal reflux disease)    Hearing impairment    No HA's    High blood pressure    History of blood transfusion    HTN (hypertension)    Hx of motion sickness    Hyperlipidemia    Morbid obesity ([facility])    APRIL (obstructive sleep apnea)    AHI 21 RDI 21 REM AHI 48 SaO2 marisol 78% // uses CPAP    Osteoarthritis    Pneumonia due to organism    Was hospitalized with COVID September 2021  fever chiil cough    PONV (postoperative nausea and vomiting)    only one time    Pseudoseizures    due to anxiety, s/p psych and neuro w/u    SBO (small bowel obstruction) ([facility])    Seizure disorder ([facility])    PSUEDO SEIZURES    Sleep apnea    C-PAP    Visual impairment    having trouble w/ left eye after cataract surgery- seeing blurring; glasses              Past Surgical History:   Procedure Laterality Date    Arthroscopy of joint unlisted      Left knee    Cataract       Cholecystectomy      Colonoscopy  2005    diverticulosis - result in scan - [facility]    Colonoscopy N/A 2016    adenoma/hp, divertic. repeat 5 yrs, mac/2 day prep    Colonoscopy      Colonoscopy N/A 2021    Procedure: COLONOSCOPY;  Surgeon: Umberto Menjivar MD;  Location:  ENDOSCOPY    Gastric bypass,obese<100cm lizbeth-en-y      Gastric bypass,obesity,sb reconstruc      Other surgical history      Lizbeth-en-y    Other surgical history      Lap laser removal of endometrisis    Other surgical history  2022    ex lap, lysis of adhesions, drainage on intra abdominal phlegmon & abscess    Sling oper stres incontinence      Tonsillectomy      Tubal ligation      Upper gi endoscopy,diagnosis  2005    hiatal hernia, mild gastritis (H pylori (--))    Upper gi endoscopy,diagnosis  3/24/2009    Upper gi endoscopy,exam                  Social History     Socioeconomic History    Marital status:    Occupational History    Occupation: childcare   Tobacco Use    Smoking status: Former     Current packs/day: 0.00     Average packs/day: 0.3 packs/day for 15.6 years (4.7 ttl pk-yrs)     Types: Cigarettes     Start date: 1991     Quit date: 3/4/2007     Years since quittin.6     Passive exposure: Past    Smokeless tobacco: Never    Tobacco comments:     1/2 ppd x 10 yrs    Vaping Use    Vaping status: Never Used   Substance and Sexual Activity    Alcohol use: Not Currently     Comment: occ    Drug use: Not Currently     Types: Cannabis    Sexual activity: Yes     Partners: Male     Birth control/protection: Tubal Ligation   Other Topics Concern    Exercise Yes     Comment: take care of 2 grandchildren   Social History Narrative    Homemaker    Former smoker- quit - pdx 10 yrs.     Social Drivers of Health     Food Insecurity: Food Insecurity Present (2023)    Received from [facility]    Food Insecurity     Currently or in the past 3 months,  have you worried your food would run out before you had money to buy more?: No     In the past 12 months, have you run out of food or been unable to get more?: Yes   Transportation Needs: Unmet Transportation Needs (12/7/2023)    Received from [facility]    Transportation Needs     Medical Transportation Needs?: Yes    Received from [facility]    Housing Stability                  Physical Exam     ED Triage Vitals   BP 10/27/24 2111 (!) 186/100   Pulse 10/27/24 2111 76   Resp 10/27/24 2111 20   Temp 10/27/24 2111 97 °F (36.1 °C)   Temp src 10/27/24 2304 Oral   SpO2 10/27/24 2111 96 %   O2 Device 10/27/24 2111 None (Room air)       Current Vitals:   Vital Signs  BP: 154/90  Pulse: 76  Resp: 16  Temp: 98 °F (36.7 °C)  Temp src: Oral    Oxygen Therapy  SpO2: 100 %  O2 Device: Nasal cannula  O2 Flow Rate (L/min): 2 L/min        Physical Exam  Vitals and nursing note reviewed.   Constitutional:       Appearance: Normal appearance.   HENT:      Head: Normocephalic.      Nose: Nose normal.      Mouth/Throat:      Mouth: Mucous membranes are moist.   Eyes:      Extraocular Movements: Extraocular movements intact.   Cardiovascular:      Rate and Rhythm: Normal rate.   Pulmonary:      Effort: Pulmonary effort is normal.   Abdominal:      General: Abdomen is flat.      Tenderness: There is generalized abdominal tenderness. There is no guarding or rebound.   Musculoskeletal:         General: Normal range of motion.   Skin:     General: Skin is warm.   Neurological:      General: No focal deficit present.      Mental Status: She is alert.   Psychiatric:         Mood and Affect: Mood normal.         ED Course     Labs Reviewed   COMP METABOLIC PANEL (14) - Abnormal; Notable for the following components:       Result Value    Glucose 126 (*)     AST 13 (*)     All other components within normal limits   LIPASE - Normal   CBC WITH DIFFERENTIAL WITH PLATELET   URINALYSIS WITH CULTURE REFLEX             Comparison March 16, 2023 and November 24, 2023    CT abdomen and pelvis with IV contrast    IMPRESSION:    Status post gastric bypass.  Similar-appearing obstruction of the Lizbeth limb with moderate distention of the small bowel proximal to the enteroenteric anastomosis, measuring up to 4.6 cm and containing fecal-like material.  Abrupt transition point at the enteroenteric anastomosis, beyond which the distal small bowel is decompressed.  Associated prominent mesenteric edema and small volume ascites.  No free air.  This may be an acute recurrent obstruction or chronic obstruction.    No significant distention of the pancreaticobiliary limb.    Bilateral parapelvic renal cysts.  Prior cholecystectomy.       MDM      Differential Diagnosis  64-year-old female presents today for evaluation of generalized abdominal pain along with vomiting.  She is generalized tenderness without guarding or rigidity.  Differential would include bowel obstruction, diverticulitis, gastritis.  Plan for CT along with labs.  Will reassess.    12:30 am  CT concerning for small bowel obstruction.  I reviewed case with Dr. Gonzalez general surgery, he recommends holding off on NG tube right now until surgery evaluates patient in the morning.  I notified the hospitalist of need for admission.  Patient updated on plan and agreeable to admission.    External Chart Reviewed  I reviewed the discharge summary from hospitalization in November 2023.  Patient has a history of gastric bypass is an was admitted for high-grade SBO at that time.    Discussions of Management  Case reviewed with general surgery along with the hospitalist    Admission disposition: 10/28/2024 12:31 AM           Medical Decision Making      Disposition and Plan     Clinical Impression:  1. SBO (small bowel obstruction) (HCC)         Disposition:  Admit  10/28/2024 12:31 am    Follow-up:  No follow-up provider specified.        Medications Prescribed:  Current  Discharge Medication List              Supplementary Documentation:         Hospital Problems       Present on Admission  Date Reviewed: 3/24/2022            ICD-10-CM Noted POA    SBO (small bowel obstruction) (formerly [facility]) K56.609 6/25/2022 Unknown

## 2024-10-28 NOTE — PLAN OF CARE
A&oriented x4 . VSS. . Telemetry monitoring. SCDs off. Ankle pumps encouraged. NPO with ice chips. Last BM 10/27 . Voiding. Pain managed with IV Morphine- slightly drowsy after pain med administration, 2L O2 placed on pt. Ambulating with SB assist. Plan is IVF, NPO Bowel Rest . Patient updated and in agreement with plan of care. Safety precautions in place. Instructed patient to call for assistance, call light within reach.

## 2024-10-28 NOTE — PLAN OF CARE
A&Ox4.  VSS on RA.  Telemetry monitoring.  Pain managed with PRN medication.  Pt states she has hx of narrow esophagus and eats softer foods at home.  NPO at this time.  LBM before this admission.  LLQ & LUQ bowels hypoactive.  Experiencing some abdominal tenderness, denies diarrhea or blood in stool.  Gen surg to evaluate patient in am.    Safety precautions in place.  Instructed patient to call for assistance.  Call light within reach.

## 2024-10-28 NOTE — H&P
[facility] Hospitalist History and Physical      Chief Complaint   Patient presents with    Abdomen/Flank Pain        PCP: Quincy Fernandez MD      History of Present Illness: Patient is a 64 year old female with PMH sig for HTN, asthma, depression, anxiety, and morbid obesity s/p gastric bypass who presented to the ED for evaluation of lower abd pain and vomiting.  She states the symptoms started about 4 hours PTA.  She has a hx of bowel obstructions.  No diarrhea or bloody stools.  No F/C or sick contacts.  States it feels like prior bowel obstructions.     In the ED, CT a/p showed changes c/w recurrent bowel obstruction.  Gen surg consulted.      On my evaluation, pt states her abd pain is about 5-6/10.     Past Medical History:    Anemia    Anxiety    Asthma ([facility])    in good control    Back problem    lower back    COVID-19    COVID-19    mild symptoms, slight fever and sore throat    CVA (cerebral vascular accident) ([facility])    Deep vein thrombosis ([facility])    left arm    Depression    Diabetes ([facility])    resolved after gastric bypass sx//    no meds    Frozen shoulder    left    Ganglion cyst    right wrist    GERD (gastroesophageal reflux disease)    Hearing impairment    No HA's    High blood pressure    History of blood transfusion    HTN (hypertension)    Hx of motion sickness    Hyperlipidemia    Morbid obesity ([facility])    APRIL (obstructive sleep apnea)    AHI 21 RDI 21 REM AHI 48 SaO2 marisol 78% // uses CPAP    Osteoarthritis    Pneumonia due to organism    Was hospitalized with COVID September 2021  fever chiil cough    PONV (postoperative nausea and vomiting)    only one time    Pseudoseizures    due to anxiety, s/p psych and neuro w/u    SBO (small bowel obstruction) ([facility])    Seizure disorder ([facility])    PSUEDO SEIZURES    Sleep apnea    C-PAP    Visual impairment    having trouble w/ left eye after cataract surgery- seeing blurring; glasses      Past Surgical History:   Procedure Laterality Date     Arthroscopy of joint unlisted      Left knee    Cataract      Cholecystectomy      Colonoscopy  2005    diverticulosis - result in scan - [facility]    Colonoscopy N/A 2016    adenoma/hp, divertic. repeat 5 yrs, mac/2 day prep    Colonoscopy      Colonoscopy N/A 2021    Procedure: COLONOSCOPY;  Surgeon: Umberto Menjivar MD;  Location:  ENDOSCOPY    Gastric bypass,obese<100cm grady-en-y      Gastric bypass,obesity,sb reconstruc      Other surgical history      Grady-en-y    Other surgical history      Lap laser removal of endometrisis    Other surgical history  2022    ex lap, lysis of adhesions, drainage on intra abdominal phlegmon & abscess    Sling oper stres incontinence      Tonsillectomy      Tubal ligation      Upper gi endoscopy,diagnosis  2005    hiatal hernia, mild gastritis (H pylori (--))    Upper gi endoscopy,diagnosis  3/24/2009    Upper gi endoscopy,exam          ALL:  Allergies[1]     Medications Ordered Prior to Encounter[2]      Social History     Tobacco Use    Smoking status: Former     Current packs/day: 0.00     Average packs/day: 0.3 packs/day for 15.6 years (4.7 ttl pk-yrs)     Types: Cigarettes     Start date: 1991     Quit date: 3/4/2007     Years since quittin.6     Passive exposure: Past    Smokeless tobacco: Never    Tobacco comments:     1/2 ppd x 10 yrs    Substance Use Topics    Alcohol use: Not Currently     Comment: occ        Fam Hx  Family History   Problem Relation Age of Onset    Hypertension Father     Heart Disease Father         pacer    Pulmonary Disease Mother         COPD    Psychiatric Mother     Other (Other) Mother         copd    Other (Other) Sister         copd    Cancer Other         ?ovarian    Psychiatric Other     Diabetes Sister     Diabetes Sister     Cancer Paternal Grandmother     Cancer Sister         endometrial    Hypertension Sister     Diabetes Other         MAUNT       Review of Systems  Comprehensive  ROS reviewed and negative except for what is stated in HPI.      OBJECTIVE:  /71 (BP Location: Left arm)   Pulse 73   Temp 97.5 °F (36.4 °C) (Oral)   Resp 18   Ht 5' 2\" (1.575 m)   Wt 229 lb 4.8 oz (104 kg)   LMP 12/10/2014 (Approximate)   SpO2 95%   BMI 41.94 kg/m²   Gen: No acute distress, alert and oriented x3, no focal neurologic deficits  HEENT:  EOMI, PERRLA, OP clear, MMM  Pulm: Lungs clear bilaterally, normal respiratory effort  CV: Heart with regular rate and rhythm, no murmur.  Normal PMI.    Abd: Abdomen soft, +TTP, morbidly obese. nondistended, no organomegaly, bowel sounds present  MSK: Full range of motion in extremities, no clubbing, no cyanosis  Skin: no rashes or lesions  Neuro:  Grossly intact, no focal deficits      Data Review:    LABS:   Lab Results   Component Value Date    WBC 8.3 10/27/2024    HGB 14.5 10/27/2024    HCT 43.7 10/27/2024    .0 10/27/2024    CREATSERUM 0.91 10/27/2024    BUN 13 10/27/2024     10/27/2024    K 3.7 10/27/2024     10/27/2024    CO2 23.0 10/27/2024     10/27/2024    CA 9.2 10/27/2024    ALB 3.4 10/27/2024    ALKPHO 105 10/27/2024    BILT 0.6 10/27/2024    TP 6.5 10/27/2024    AST 13 10/27/2024    ALT 16 10/27/2024    LIP 17 10/27/2024       CXR: image personally reviewed.      Radiology: CT ABDOMEN+PELVIS(CONTRAST ONLY)(CPT=74177)    Result Date: 10/28/2024  PROCEDURE:  CT ABDOMEN+PELVIS (CONTRAST ONLY) (CPT=74177)  COMPARISON:  [facility], CT, CT ABDOMEN+PELVIS(CONTRAST ONLY)(CPT=74177), 3/16/2023, 4:23 AM.  INDICATIONS:  abd pain. Nausea and vomiting.  TECHNIQUE:  CT scanning was performed from the dome of the diaphragm to the pubic symphysis with non-ionic intravenous contrast material. Post contrast coronal MPR imaging was performed.  Dose reduction techniques were used. Dose information is transmitted to the ACR (American College of Radiology) NRDR (National Radiology Data Registry) which includes the Dose Index Registry.   PATIENT STATED HISTORY:(As transcribed by Technologist)  Pt complains of left side abd pain with nausea and vomiting   A preliminary radiology report was created by the [facility] radiology service.  This report was sent to the emergency department.  The report was reviewed prior to this dictation. CONTRAST USED:  100cc of Isovue 370  FINDINGS:  LIVER:  No enlargement, atrophy, abnormal density, or significant focal lesion.  BILIARY:  Status post cholecystectomy.  Mild stable intrahepatic ductal dilatation and extrahepatic dilatation.  This may reflect post cholecystectomy change.  Correlate with bilirubin level. PANCREAS:  No lesion, fluid collection, ductal dilatation or enlargement.  There is mild atrophy. SPLEEN:  No enlargement or focal lesion.  KIDNEYS:  Bilateral parapelvic renal cysts are noted.  No hydronephrosis or urinary tract calculi. ADRENALS:  No mass or enlargement.  AORTA/VASCULAR:  Atheromatous plaque noted in the aorta.  No evidence for aneurysm.  The portal vein is patent. RETROPERITONEUM:  No mass or adenopathy.  BOWEL/MESENTERY:  There are postoperative changes of gastric bypass.  There is a similar appearing obstruction of the Lizbeth limb with moderate distention of the small bowel proximal to the entero-enteric anastomosis, measuring up to 4.6 cm in diameter.  The distended Lizbeth limb contains fecal like material and there is an abrupt transition point at the entero-enteric anastomosis.  Beyond the anastomosis, the small bowel is decompressed.  There is mild mesenteric edema and a small volume of interloop fluid.  There is no free air.  Findings are similar to a previous CT from 3/16/2023. ABDOMINAL WALL:  Postoperative changes are seen.  Tiny paraumbilical fat containing hernias are noted.  No bowel herniation. URINARY BLADDER:  No visible focal wall thickening, lesion, or calculus.  PELVIC NODES:  No adenopathy.  PELVIC ORGANS:  No visible mass.  Pelvic organs appropriate for patient age.  BONES:   Degenerative changes are seen in the spine.  No acute osseous abnormality is appreciated. LUNG BASES:  No visible pulmonary or pleural disease.  OTHER:  Negative.             CONCLUSION:  1. Status post gastric bypass.  There is obstruction of the Lizbeth limb with moderate distention of the small bowel proximal to the entero-enteric anastomosis which contains fecal like material.  There is an abrupt transition point at the utero-enteric anastomosis, beyond which the distal small bowel is decompressed.  There is associated mesenteric edema and a small volume of interloop fluid.  There is no free air.  There is a similar appearance compared to a previous CT from 3/16/2023 suggesting either acute recurrent obstruction or chronic obstruction. 2. Status post cholecystectomy.  Stable mild prominence of the intra and extrahepatic bile ducts which could reflect post cholecystectomy change.  Correlate with bilirubin level. 3. Bilateral parapelvic renal cysts are again noted.    LOCATION:  [facility]   Dictated by (CST): Robson Kaur MD on 10/28/2024 at 3:52 AM     Finalized by (CST): Robson Kaur MD on 10/28/2024 at 4:04 AM          Assessment/Plan:     64 yr old female with PMH sig for HTN, asthma, depression, anxiety, and morbid obesity s/p gastric bypass who presented to the ED for evaluation of lower abd pain and vomiting.    # Small bowel obstruction   - suspect due to adhesions relate to prior surgeries  - keep NPO for now  - IVFs, pain control, anti-emetics   - gen surg c/s     # Essential HTN  - monitor  - hold po med while NPO    # Asthma  - controlled  - cont home inhalers  - cont singulair   - encourage IS use    # Major depression, recurrent  # Generalized anxiety d/o  - controlled  - resume home meds when taking po    # Morbid obesity   - Recommend follow up with PCP to discuss diet and lifestyle modifications to encourage weight loss.     DVT prophy - lov subcutaneous   Dispo: inpt care.   POC d/w pt who  agrees.     Outpatient records or previous hospital records reviewed.   [facility] hospitalist to continue to follow patient while in house  A total of 76 minutes taken with patient and coordinating care.  Greater than 50% face to face encounter.      Joe Paulino DO  [facility] Hospital[facility]         [1]   Allergies  Allergen Reactions    Alteplase OTHER (SEE COMMENTS) and SWELLING     Mild lip swelling.   [2]   Current Facility-Administered Medications on File Prior to Encounter   Medication Dose Route Frequency Provider Last Rate Last Admin    [COMPLETED] ipratropium-albuterol (Duoneb) 0.5-2.5 (3) MG/3ML inhalation solution 3 mL  3 mL Nebulization Once Chi Salvador, DO   3 mL at 08/14/24 1138    [COMPLETED] methylPREDNISolone sodium succinate (Solu-MEDROL) injection 125 mg  125 mg Intravenous Once Chi Salvador,    125 mg at 08/14/24 1154    [COMPLETED] iopamidol 76% (ISOVUE-370) injection for power injector  100 mL Intravenous ONCE PRN Chi Salvador DO   100 mL at 08/14/24 1245     Current Outpatient Medications on File Prior to Encounter   Medication Sig Dispense Refill    pantoprazole 40 MG Oral Tab EC Take 1 tablet (40 mg total) by mouth 2 (two) times daily before meals. 180 tablet 2    FLUoxetine 20 MG Oral Cap Take 1 capsule (20 mg total) by mouth daily. 90 capsule 0    losartan 100 MG Oral Tab Take 1 tablet (100 mg total) by mouth daily. 90 tablet 2    buPROPion 300 MG Oral Tablet 24 Hr Take 1 tablet (300 mg total) by mouth daily. 90 tablet 2    montelukast 10 MG Oral Tab Take 1 tablet (10 mg total) by mouth daily. 90 tablet 0    topiramate 25 MG Oral Tab Take 1 tablet (25 mg total) by mouth 2 (two) times daily. 180 tablet 2    amLODIPine 5 MG Oral Tab Take 1 tablet (5 mg total) by mouth daily. 90 tablet 0    Cholecalciferol (VITAMIN D) 50 MCG (2000 UT) Oral Cap Take 1 capsule (2,000 Units total) by mouth.      Albuterol Sulfate  (90 Base) MCG/ACT Inhalation Aero Soln Inhale 2 puffs into the lungs  every 6 (six) hours as needed for Wheezing or Shortness of Breath. 1 each 0    Cyanocobalamin (VITAMIN B-12) 1000 MCG Sublingual SL Tab Place 2,000 mcg under the tongue daily. 60 tablet 11

## 2024-10-29 LAB
ALBUMIN SERPL-MCNC: 3.4 G/DL (ref 3.2–4.8)
ALBUMIN/GLOB SERPL: 1.7 {RATIO} (ref 1–2)
ALP LIVER SERPL-CCNC: 148 U/L
ALT SERPL-CCNC: 94 U/L
ANION GAP SERPL CALC-SCNC: 6 MMOL/L (ref 0–18)
AST SERPL-CCNC: 156 U/L (ref ?–34)
BASOPHILS # BLD AUTO: 0.04 X10(3) UL (ref 0–0.2)
BASOPHILS NFR BLD AUTO: 1 %
BILIRUB SERPL-MCNC: 0.6 MG/DL (ref 0.2–1.1)
BUN BLD-MCNC: 14 MG/DL (ref 9–23)
CALCIUM BLD-MCNC: 8.8 MG/DL (ref 8.7–10.4)
CHLORIDE SERPL-SCNC: 113 MMOL/L (ref 98–112)
CO2 SERPL-SCNC: 24 MMOL/L (ref 21–32)
CREAT BLD-MCNC: 0.59 MG/DL
EGFRCR SERPLBLD CKD-EPI 2021: 99 ML/MIN/1.73M2 (ref 60–?)
EOSINOPHIL # BLD AUTO: 0.26 X10(3) UL (ref 0–0.7)
EOSINOPHIL NFR BLD AUTO: 6.3 %
ERYTHROCYTE [DISTWIDTH] IN BLOOD BY AUTOMATED COUNT: 13.8 %
GLOBULIN PLAS-MCNC: 2 G/DL (ref 2–3.5)
GLUCOSE BLD-MCNC: 83 MG/DL (ref 70–99)
HCT VFR BLD AUTO: 39.7 %
HGB BLD-MCNC: 12.4 G/DL
IMM GRANULOCYTES # BLD AUTO: 0.01 X10(3) UL (ref 0–1)
IMM GRANULOCYTES NFR BLD: 0.2 %
LYMPHOCYTES # BLD AUTO: 1.41 X10(3) UL (ref 1–4)
LYMPHOCYTES NFR BLD AUTO: 33.9 %
MAGNESIUM SERPL-MCNC: 1.7 MG/DL (ref 1.6–2.6)
MCH RBC QN AUTO: 31.2 PG (ref 26–34)
MCHC RBC AUTO-ENTMCNC: 31.2 G/DL (ref 31–37)
MCV RBC AUTO: 99.7 FL
MONOCYTES # BLD AUTO: 0.37 X10(3) UL (ref 0.1–1)
MONOCYTES NFR BLD AUTO: 8.9 %
NEUTROPHILS # BLD AUTO: 2.07 X10 (3) UL (ref 1.5–7.7)
NEUTROPHILS # BLD AUTO: 2.07 X10(3) UL (ref 1.5–7.7)
NEUTROPHILS NFR BLD AUTO: 49.7 %
OSMOLALITY SERPL CALC.SUM OF ELEC: 296 MOSM/KG (ref 275–295)
PHOSPHATE SERPL-MCNC: 3.3 MG/DL (ref 2.4–5.1)
PLATELET # BLD AUTO: 133 10(3)UL (ref 150–450)
POTASSIUM SERPL-SCNC: 3.9 MMOL/L (ref 3.5–5.1)
PROT SERPL-MCNC: 5.4 G/DL (ref 5.7–8.2)
RBC # BLD AUTO: 3.98 X10(6)UL
SODIUM SERPL-SCNC: 143 MMOL/L (ref 136–145)
UFH PPP CHRO-ACNC: 0.44 IU/ML
WBC # BLD AUTO: 4.2 X10(3) UL (ref 4–11)

## 2024-10-29 PROCEDURE — 84100 ASSAY OF PHOSPHORUS: CPT | Performed by: HOSPITALIST

## 2024-10-29 PROCEDURE — 85025 COMPLETE CBC W/AUTO DIFF WBC: CPT | Performed by: HOSPITALIST

## 2024-10-29 PROCEDURE — 80053 COMPREHEN METABOLIC PANEL: CPT | Performed by: HOSPITALIST

## 2024-10-29 PROCEDURE — 85520 HEPARIN ASSAY: CPT

## 2024-10-29 PROCEDURE — 83735 ASSAY OF MAGNESIUM: CPT | Performed by: HOSPITALIST

## 2024-10-29 RX ORDER — BUPROPION HYDROCHLORIDE 300 MG/1
300 TABLET ORAL DAILY
Status: DISCONTINUED | OUTPATIENT
Start: 2024-10-29 | End: 2024-10-30

## 2024-10-29 RX ORDER — AMLODIPINE BESYLATE 5 MG/1
5 TABLET ORAL DAILY
Status: DISCONTINUED | OUTPATIENT
Start: 2024-10-29 | End: 2024-10-30

## 2024-10-29 RX ORDER — ACETAMINOPHEN 500 MG
1000 TABLET ORAL EVERY 6 HOURS PRN
Status: DISCONTINUED | OUTPATIENT
Start: 2024-10-29 | End: 2024-10-30

## 2024-10-29 RX ORDER — MAGNESIUM SULFATE HEPTAHYDRATE 40 MG/ML
2 INJECTION, SOLUTION INTRAVENOUS ONCE
Status: COMPLETED | OUTPATIENT
Start: 2024-10-29 | End: 2024-10-29

## 2024-10-29 RX ORDER — ENOXAPARIN SODIUM 100 MG/ML
40 INJECTION SUBCUTANEOUS EVERY 12 HOURS SCHEDULED
Status: DISCONTINUED | OUTPATIENT
Start: 2024-10-29 | End: 2024-10-30

## 2024-10-29 RX ORDER — LOSARTAN POTASSIUM 100 MG/1
100 TABLET ORAL DAILY
Status: DISCONTINUED | OUTPATIENT
Start: 2024-10-29 | End: 2024-10-30

## 2024-10-29 RX ORDER — MAGNESIUM 200 MG
2000 TABLET ORAL DAILY
Status: DISCONTINUED | OUTPATIENT
Start: 2024-10-29 | End: 2024-10-29

## 2024-10-29 RX ORDER — TOPIRAMATE 25 MG/1
25 TABLET, FILM COATED ORAL 2 TIMES DAILY
Status: DISCONTINUED | OUTPATIENT
Start: 2024-10-29 | End: 2024-10-30

## 2024-10-29 RX ORDER — ALBUTEROL SULFATE 90 UG/1
2 INHALANT RESPIRATORY (INHALATION) EVERY 6 HOURS PRN
Status: DISCONTINUED | OUTPATIENT
Start: 2024-10-29 | End: 2024-10-29

## 2024-10-29 RX ORDER — MONTELUKAST SODIUM 10 MG/1
10 TABLET ORAL DAILY
Status: DISCONTINUED | OUTPATIENT
Start: 2024-10-29 | End: 2024-10-30

## 2024-10-29 RX ORDER — PANTOPRAZOLE SODIUM 40 MG/1
40 TABLET, DELAYED RELEASE ORAL
Status: DISCONTINUED | OUTPATIENT
Start: 2024-10-29 | End: 2024-10-30

## 2024-10-29 RX ORDER — CHOLECALCIFEROL (VITAMIN D3) 50 MCG
2000 TABLET ORAL DAILY
Status: DISCONTINUED | OUTPATIENT
Start: 2024-10-29 | End: 2024-10-30

## 2024-10-29 RX ORDER — FLUOXETINE 10 MG/1
30 TABLET, FILM COATED ORAL DAILY
Status: DISCONTINUED | OUTPATIENT
Start: 2024-10-29 | End: 2024-10-30

## 2024-10-29 NOTE — PROGRESS NOTES
[facility]   part of [facility] Hospitalist Progress Note     Roshni Scott Patient Status:  Inpatient    1960 MRN [id number]   Location [facility] 3SW-A Attending Joe Paulino, DO   Hosp Day # 1 PCP Quincy Fernandez MD     Follow Up:  The encounter diagnosis was SBO (small bowel obstruction) (HCC).    Subjective:     Patient seen and examined. Abd pain improving, passing flatus.  No F/C, N/V.  She does c/o coughing up from whit phlegm but states she has had this for a while.  Denies SOB.     Objective:    Review of Systems:   10 point ROS completed and was negative, except for pertinent positive and negatives stated in subjective.    Vital signs:  Temp:  [98 °F (36.7 °C)-98.7 °F (37.1 °C)] 98.3 °F (36.8 °C)  Pulse:  [53-71] 57  Resp:  [16-18] 16  BP: ()/(58-80) 150/69  SpO2:  [92 %-99 %] 92 %    Physical Exam:    Gen: No acute distress, alert and oriented x3, no focal neurologic deficits  HEENT:  EOMI, PERRLA, OP clear, MMM  Pulm: Lungs clear bilaterally, normal respiratory effort  CV: Heart with regular rate and rhythm, no murmur.  Normal PMI.    Abd: Abdomen soft, +TTP, morbidly obese. nondistended, no organomegaly, bowel sounds present  MSK: Full range of motion in extremities, no clubbing, no cyanosis  Skin: no rashes or lesions  Neuro:  Grossly intact, no focal deficits      Diagnostic Data:    Labs:  Recent Labs   Lab 10/27/24  2139 10/29/24  0538   WBC 8.3 4.2   HGB 14.5 12.4   MCV 97.3 99.7   .0 133.0*       Recent Labs   Lab 10/27/24  2140 10/29/24  0538   * 83   BUN 13 14   CREATSERUM 0.91 0.59   CA 9.2 8.8   ALB 3.4 3.4    143   K 3.7 3.9    113*   CO2 23.0 24.0   ALKPHO 105 148*   AST 13* 156*   ALT 16 94*   BILT 0.6 0.6   TP 6.5 5.4*       Estimated Creatinine Clearance: 74.2 mL/min (based on SCr of 0.59 mg/dL).    No results for input(s): \"PTP\", \"INR\" in the last 168 hours.         COVID-19 Lab  Results    COVID-19  Lab Results   Component Value Date    COVID19 Not Detected 08/14/2024    COVID19 Not Detected 03/16/2023    COVID19 Not Detected 11/25/2022       Pro-Calcitonin  No results for input(s): \"PCT\" in the last 168 hours.    Cardiac  No results for input(s): \"TROP\", \"PBNP\" in the last 168 hours.    Creatinine Kinase  No results for input(s): \"CK\" in the last 168 hours.    Inflammatory Markers  No results for input(s): \"CRP\", \"BREEZY\", \"LDH\", \"DDIMER\" in the last 168 hours.    Imaging: Imaging data reviewed in Epic.    Medications:    ceFAZolin  2 g Intravenous Q8H    enoxaparin  0.5 mg/kg Subcutaneous 2 times per day       Assessment & Plan:      64 yr old female with PMH sig for HTN, asthma, depression, anxiety, and morbid obesity s/p gastric bypass who presented to the ED for evaluation of lower abd pain and vomiting.     # Small bowel obstruction   - suspect due to adhesions relate to prior surgeries  - start CLD   - gen surg c/s appreciated      # Essential HTN  - monitor  - resume home meds     # Asthma  - controlled  - cont home inhalers  - cont singulair   - encourage IS use     # Major depression, recurrent  # Generalized anxiety d/o  - controlled  - resume home meds      # Morbid obesity   - Recommend follow up with PCP to discuss diet and lifestyle modifications to encourage weight loss.     # E coli UTI  - start empiric ancef pending UCx    Plan of care: inpt care.     Plan of care discussed with patient or family at bedside.    Joe Paulino DO    Supplementary Documentation:     Quality:  DVT Prophylaxis: lov subcutaneous   CODE status: FULL  Gonsalves: no  Central line: no  If COVID testing is negative, may discontinue isolation: yes     Estimated date of discharge: 1-2 days  Discharge is dependent on: clinical course   At this point Ms. Scott is expected to be discharge to: home    Plan of care discussed with pt

## 2024-10-29 NOTE — PROGRESS NOTES
Pharmacy Progress Note:  Anticoagulation Dose Adjustment     Roshni Scott is a 64 year old female on enoxaparin for VTE prophylaxis.  Anti-factor Xa levels are being monitored due to the patient's high risk status of obesity.    Relevant labs:    Body mass index is 44.17 kg/m².    Wt Readings from Last 1 Encounters:   10/29/24 109.5 kg (241 lb 8 oz)       Lab Results   Component Value Date    CREATSERUM 0.59 10/29/2024       Heparin Anti Xa Assay   Date Value Ref Range Status   10/29/2024 0.44 IU/mL Final       Anti Xa level being assessed:  0.44 units/mL (Peak drawn 4 hours after dose).  Goal Peak Anti-Xa level:  (Enoxaparin prophylaxis: 0.2-0.4 unit/mL).  Goal Trough Anti-Xa level: </=0.5 unit/mL  (when applicable)    Based on the above, enoxaparin dose will be adjusted to 40 mg, to begin 2300 tonight . Next Anti-factor Xa Peak drawn 4 hours after dose will be ordered on 10/31.    Thank you,  Jennifer Coleman, PharmD  10/29/2024 4:45 PM

## 2024-10-29 NOTE — PROGRESS NOTES
[facility]  Progress Note    Roshni Scott Patient Status:  Inpatient    1960 MRN [id number]   Location [facility] 3SW-A Attending Joe Paulino, DO   Hosp Day # 1 PCP Quincy Fernandez MD     Subjective:    Patient feels improvement in pain  She is passing flatus     Objective/Physical Exam:    Vital Signs:  Blood pressure 150/69, pulse 57, temperature 98.3 °F (36.8 °C), temperature source Oral, resp. rate 16, height 5' 2\" (1.575 m), weight 241 lb 8 oz (109.5 kg), last menstrual period 12/10/2014, SpO2 92%, not currently breastfeeding.    General:  Alert, orientated x3.  Cooperative.  No apparent distress.    Lungs:  Non labored breathing    Cardiac:  Regular rate and rhythm.     Abdomen:  obese, soft, mild epigastric pain, non distended    Extremities:  No lower extremity edema noted.  Without clubbing or cyanosis.        Labs:  Reviewed    Lab Results   Component Value Date    WBC 4.2 10/29/2024    HGB 12.4 10/29/2024    HCT 39.7 10/29/2024    .0 10/29/2024    CREATSERUM 0.59 10/29/2024    BUN 14 10/29/2024     10/29/2024    K 3.9 10/29/2024     10/29/2024    CO2 24.0 10/29/2024    GLU 83 10/29/2024    CA 8.8 10/29/2024    ALB 3.4 10/29/2024    ALKPHO 148 10/29/2024    BILT 0.6 10/29/2024    TP 5.4 10/29/2024     10/29/2024    ALT 94 10/29/2024    MG 1.7 10/29/2024    PHOS 3.3 10/29/2024       Xray:  Reviewed    Problem List:  Patient Active Problem List   Diagnosis    Hyperlipidemia    Morbid obesity with BMI of 50.0-59.9, adult (HCC)    Essential hypertension    Non-seasonal allergic rhinitis due to pollen    ASTHMA    APRIL on CPAP    B12 deficiency    Complex tear of medial meniscus of right knee as current injury, subsequent encounter    Chondromalacia of right knee    Internal derangement of right knee    Hypertonicity of bladder    Iron malabsorption (HCC)    Other iron deficiency anemias    Pulmonary hypertension (HCC)    Ectatic thoracic aorta  (HCC)    Pseudo Seizure disorder (HCC)    Major depressive disorder, recurrent severe without psychotic features (HCC)    Carotid atherosclerosis    Epilepsy seizure, generalized, convulsive (HCC)    Type 2 diabetes mellitus without complication, without long-term current use of insulin (HCC)    Alcohol use    Right knee arthroscopy with partial medial meniscectomy  [facility]  03/02/2022    S/P arthroscopic surgery of right knee    Arthritis of both knees    Chronic pain of right knee    Abdominal pain, generalized    Intestinal obstruction, unspecified cause, unspecified whether partial or complete (HCC)    Nausea and vomiting in adult    Hyperglycemia    Weakness    Acute gastric ulcer with perforation and obstruction (HCC)    Anemia    Leukocytosis    Postoperative intra-abdominal abscess (HCC)    SBO (small bowel obstruction) (HCC)    Acute kidney injury (HCC)    Nausea and vomiting    Nausea and vomiting, unspecified vomiting type    Abdominal pain, acute           Impression:     63 y/o with pSBO  -pain improved, passing flatus   -UTI e coli     Plan:    Will start clear liquids today  Encourage ambulation/IS  IV antibiotics per primary for UTI  All questions answered    TIAGO Hector  [facility]  General Surgery  10/29/2024

## 2024-10-29 NOTE — PLAN OF CARE
Pt noted drowsy but easily awaken. BP stable, spo2 wnl on 2l/NC. Maintained on NPO, IVF infusing. States abdominal discomfort is tolerable at present. Abdomen soft, bowel sound hypoactive, generalized abdominal tenderness on palpation. Pt updated on plan of care, verbalized understanding.

## 2024-10-29 NOTE — PLAN OF CARE
A&oriented x4, drowsy easily arousable . VSS. . IS encouraged. Telemetry monitoring. SCDs declined, Lovenox. Ankle pumps encouraged. CLD.Last BM 10/27. Voiding. Pain managed with IV Acetaminophen. Encouraged ambulation. Plan is advance diet, pain management. Patient updated and in agreement with plan of care. Safety precautions in place. Instructed patient to call for assistance, call light within reach.

## 2024-10-30 VITALS
WEIGHT: 241.5 LBS | RESPIRATION RATE: 20 BRPM | DIASTOLIC BLOOD PRESSURE: 70 MMHG | TEMPERATURE: 99 F | SYSTOLIC BLOOD PRESSURE: 134 MMHG | OXYGEN SATURATION: 96 % | HEART RATE: 86 BPM | BODY MASS INDEX: 44.44 KG/M2 | HEIGHT: 62 IN

## 2024-10-30 LAB
ANION GAP SERPL CALC-SCNC: 7 MMOL/L (ref 0–18)
BASOPHILS # BLD AUTO: 0.03 X10(3) UL (ref 0–0.2)
BASOPHILS NFR BLD AUTO: 0.7 %
BUN BLD-MCNC: 8 MG/DL (ref 9–23)
CALCIUM BLD-MCNC: 9 MG/DL (ref 8.7–10.4)
CHLORIDE SERPL-SCNC: 112 MMOL/L (ref 98–112)
CO2 SERPL-SCNC: 25 MMOL/L (ref 21–32)
CREAT BLD-MCNC: 0.55 MG/DL
EGFRCR SERPLBLD CKD-EPI 2021: 101 ML/MIN/1.73M2 (ref 60–?)
EOSINOPHIL # BLD AUTO: 0.26 X10(3) UL (ref 0–0.7)
EOSINOPHIL NFR BLD AUTO: 6.3 %
ERYTHROCYTE [DISTWIDTH] IN BLOOD BY AUTOMATED COUNT: 13.1 %
GLUCOSE BLD-MCNC: 86 MG/DL (ref 70–99)
HCT VFR BLD AUTO: 38.9 %
HGB BLD-MCNC: 12.7 G/DL
IMM GRANULOCYTES # BLD AUTO: 0.01 X10(3) UL (ref 0–1)
IMM GRANULOCYTES NFR BLD: 0.2 %
LYMPHOCYTES # BLD AUTO: 1.5 X10(3) UL (ref 1–4)
LYMPHOCYTES NFR BLD AUTO: 36.6 %
MAGNESIUM SERPL-MCNC: 1.8 MG/DL (ref 1.6–2.6)
MCH RBC QN AUTO: 31.1 PG (ref 26–34)
MCHC RBC AUTO-ENTMCNC: 32.6 G/DL (ref 31–37)
MCV RBC AUTO: 95.1 FL
MONOCYTES # BLD AUTO: 0.36 X10(3) UL (ref 0.1–1)
MONOCYTES NFR BLD AUTO: 8.8 %
NEUTROPHILS # BLD AUTO: 1.94 X10 (3) UL (ref 1.5–7.7)
NEUTROPHILS # BLD AUTO: 1.94 X10(3) UL (ref 1.5–7.7)
NEUTROPHILS NFR BLD AUTO: 47.4 %
OSMOLALITY SERPL CALC.SUM OF ELEC: 296 MOSM/KG (ref 275–295)
PLATELET # BLD AUTO: 153 10(3)UL (ref 150–450)
POTASSIUM SERPL-SCNC: 3.5 MMOL/L (ref 3.5–5.1)
RBC # BLD AUTO: 4.09 X10(6)UL
SODIUM SERPL-SCNC: 144 MMOL/L (ref 136–145)
WBC # BLD AUTO: 4.1 X10(3) UL (ref 4–11)

## 2024-10-30 PROCEDURE — 80048 BASIC METABOLIC PNL TOTAL CA: CPT | Performed by: INTERNAL MEDICINE

## 2024-10-30 PROCEDURE — 85025 COMPLETE CBC W/AUTO DIFF WBC: CPT | Performed by: INTERNAL MEDICINE

## 2024-10-30 PROCEDURE — 83735 ASSAY OF MAGNESIUM: CPT | Performed by: INTERNAL MEDICINE

## 2024-10-30 RX ORDER — ACETAMINOPHEN 500 MG
500 TABLET ORAL EVERY 6 HOURS PRN
Status: SHIPPED | COMMUNITY
Start: 2024-10-30

## 2024-10-30 RX ORDER — CEPHALEXIN 500 MG/1
500 CAPSULE ORAL 2 TIMES DAILY
Qty: 6 CAPSULE | Refills: 0 | Status: SHIPPED | OUTPATIENT
Start: 2024-10-30 | End: 2024-11-02

## 2024-10-30 RX ORDER — MAGNESIUM OXIDE 400 MG/1
400 TABLET ORAL ONCE
Status: COMPLETED | OUTPATIENT
Start: 2024-10-30 | End: 2024-10-30

## 2024-10-30 NOTE — PROGRESS NOTES
Roshni Scott is a 64 year old female  Chief Complaint   Patient presents with    Abdomen/Flank Pain       HPI: 65 y/o female admitted for psbo    Improved  tolerating diet          Past Medical History:    Anemia    Anxiety    Asthma (HCC)    in good control    Back problem    lower back    COVID-19    COVID-19    mild symptoms, slight fever and sore throat    CVA (cerebral vascular accident) ([facility])    Deep vein thrombosis (HCC)    left arm    Depression    Diabetes (HCC)    resolved after gastric bypass sx//    no meds    Frozen shoulder    left    Ganglion cyst    right wrist    GERD (gastroesophageal reflux disease)    Hearing impairment    No HA's    High blood pressure    History of blood transfusion    HTN (hypertension)    Hx of motion sickness    Hyperlipidemia    Morbid obesity ([facility])    APRIL (obstructive sleep apnea)    AHI 21 RDI 21 REM AHI 48 SaO2 marisol 78% // uses CPAP    Osteoarthritis    Pneumonia due to organism    Was hospitalized with COVID September 2021  fever chiil cough    PONV (postoperative nausea and vomiting)    only one time    Pseudoseizures    due to anxiety, s/p psych and neuro w/u    SBO (small bowel obstruction) ([facility])    Seizure disorder ([facility])    PSUEDO SEIZURES    Sleep apnea    C-PAP    Visual impairment    having trouble w/ left eye after cataract surgery- seeing blurring; glasses     Past Surgical History:   Procedure Laterality Date    Arthroscopy of joint unlisted      Left knee    Cataract      Cholecystectomy      Colonoscopy  5/18/2005    diverticulosis - result in scan - [facility]    Colonoscopy N/A 5/25/2016    adenoma/hp, divertic. repeat 5 yrs, mac/2 day prep    Colonoscopy      Colonoscopy N/A 11/23/2021    Procedure: COLONOSCOPY;  Surgeon: Umberto Menjivar MD;  Location:  ENDOSCOPY    Gastric bypass,obese<100cm lizbeth-en-y      Gastric bypass,obesity,sb reconstruc      Other surgical history  2007    Lizbeth-en-y    Other surgical history  1990    Lap laser  removal of endometrisis    Other surgical history  2022    ex lap, lysis of adhesions, drainage on intra abdominal phlegmon & abscess    Sling oper stres incontinence      Tonsillectomy      Tubal ligation      Upper gi endoscopy,diagnosis  2005    hiatal hernia, mild gastritis (H pylori (--))    Upper gi endoscopy,diagnosis  3/24/2009    Upper gi endoscopy,exam       Family History   Problem Relation Age of Onset    Hypertension Father     Heart Disease Father         pacer    Pulmonary Disease Mother         COPD    Psychiatric Mother     Other (Other) Mother         copd    Other (Other) Sister         copd    Cancer Other         ?ovarian    Psychiatric Other     Diabetes Sister     Diabetes Sister     Cancer Paternal Grandmother     Cancer Sister         endometrial    Hypertension Sister     Diabetes Other         MAUNT     Social History     Socioeconomic History    Marital status:    Occupational History    Occupation: childcare   Tobacco Use    Smoking status: Former     Current packs/day: 0.00     Average packs/day: 0.3 packs/day for 15.6 years (4.7 ttl pk-yrs)     Types: Cigarettes     Start date: 1991     Quit date: 3/4/2007     Years since quittin.6     Passive exposure: Past    Smokeless tobacco: Never    Tobacco comments:     1/2 ppd x 10 yrs    Vaping Use    Vaping status: Never Used   Substance and Sexual Activity    Alcohol use: Not Currently     Comment: occ    Drug use: Not Currently     Types: Cannabis    Sexual activity: Yes     Partners: Male     Birth control/protection: Tubal Ligation   Other Topics Concern    Exercise Yes     Comment: take care of 2 grandchildren   Social History Narrative    Homemaker    Former smoker- quit - pdx 10 yrs.     Social Drivers of Health     Food Insecurity: No Food Insecurity (10/28/2024)    Food Insecurity     Food Insecurity: Never true   Transportation Needs: No Transportation Needs (10/28/2024)    Transportation Needs      Lack of Transportation: No   Housing Stability: Low Risk  (10/28/2024)    Housing Stability     Housing Instability: No           EXAM: abd obese  soft  non tender          IMPRESSION: psbo  resolved       PLAN: ok for home today  F/u with bariatric surgeon if needed

## 2024-10-30 NOTE — PROGRESS NOTES
[facility]   part of [facility] Hospitalist Progress Note     Roshnirehan Scott Patient Status:  Inpatient    1960 MRN [id number]   Location [facility] 3SW-A Attending Joe Paulino, DO   Hosp Day # 2 PCP Quincy Fernandez MD     Follow Up:  The encounter diagnosis was SBO (small bowel obstruction) (HCC).    Subjective:     Patient seen and examined.  Abdominal pain is in the lower part of abdomen.  Has been having bowel movements.  Tolerating clears.    Objective:    Review of Systems:   10 point ROS completed and was negative, except for pertinent positive and negatives stated in subjective.    Vital signs:  Temp:  [98.1 °F (36.7 °C)-98.9 °F (37.2 °C)] 98.9 °F (37.2 °C)  Pulse:  [55-69] 69  Resp:  [16-22] 20  BP: (127-184)/(72-97) 157/84  SpO2:  [91 %-98 %] 96 %    Physical Exam:    Gen: No acute distress, alert and oriented x3, no focal neurologic deficits  HEENT:  EOMI, PERRLA, OP clear, MMM  Pulm: Lungs clear bilaterally, normal respiratory effort  CV: Heart with regular rate and rhythm, no murmur.  Normal PMI.    Abd: Abdomen soft, +TTP lower abdomen bilaterally, morbidly obese. nondistended,  bowel sounds present  MSK: Full range of motion in extremities, no clubbing, no cyanosis  Skin: no rashes or lesions  Neuro:  Grossly intact, no focal deficits      Diagnostic Data:    Labs:  Recent Labs   Lab 10/27/24  2139 10/29/24  0538 10/30/24  0452   WBC 8.3 4.2 4.1   HGB 14.5 12.4 12.7   MCV 97.3 99.7 95.1   .0 133.0* 153.0       Recent Labs   Lab 10/27/24  2140 10/29/24  0538 10/30/24  0452   * 83 86   BUN 13 14 8*   CREATSERUM 0.91 0.59 0.55   CA 9.2 8.8 9.0   ALB 3.4 3.4  --     143 144   K 3.7 3.9 3.5    113* 112   CO2 23.0 24.0 25.0   ALKPHO 105 148*  --    AST 13* 156*  --    ALT 16 94*  --    BILT 0.6 0.6  --    TP 6.5 5.4*  --        Estimated Creatinine Clearance: 79.6 mL/min (based on SCr of 0.55 mg/dL).    No results  for input(s): \"PTP\", \"INR\" in the last 168 hours.         COVID-19 Lab Results    COVID-19  Lab Results   Component Value Date    COVID19 Not Detected 08/14/2024    COVID19 Not Detected 03/16/2023    COVID19 Not Detected 11/25/2022       Pro-Calcitonin  No results for input(s): \"PCT\" in the last 168 hours.    Cardiac  No results for input(s): \"TROP\", \"PBNP\" in the last 168 hours.    Creatinine Kinase  No results for input(s): \"CK\" in the last 168 hours.    Inflammatory Markers  No results for input(s): \"CRP\", \"BREEZY\", \"LDH\", \"DDIMER\" in the last 168 hours.    Imaging: Imaging data reviewed in Epic.    Medications:    ceFAZolin  2 g Intravenous Q8H    amLODIPine  5 mg Oral Daily    buPROPion ER  300 mg Oral Daily    cholecalciferol  2,000 Units Oral Daily    losartan  100 mg Oral Daily    montelukast  10 mg Oral Daily    pantoprazole  40 mg Oral BID AC    topiramate  25 mg Oral BID    FLUoxetine  30 mg Oral Daily    enoxaparin  40 mg Subcutaneous 2 times per day       Assessment & Plan:      64 yr old female with PMH sig for HTN, asthma, depression, anxiety, and morbid obesity s/p gastric bypass who presented to the ED for evaluation of lower abd pain and vomiting.     # Small bowel obstruction-improving  - suspect due to adhesions relate to prior surgeries  - diet per surgery  - gen surg c/s appreciated      #UTI  - Cultures growing E. coli, susceptible to Ancef  - Continue Ancef    # Essential HTN  - monitor  - resume home meds     # Asthma  - controlled  - cont home inhalers  - cont singulair   - encourage IS use     # Major depression, recurrent  # Generalized anxiety d/o  - controlled  - resume home meds      # Morbid obesity   - Recommend follow up with PCP to discuss diet and lifestyle modifications to encourage weight loss.     Plan of care: inpt care.     Plan of care discussed with patient or family at bedside.    Anna Reddy DO  Hospitalist  [facility]    Supplementary Documentation:      Quality:  DVT Prophylaxis: lov subcutaneous   CODE status: FULL  Gonsalves: no  Central line: no  If COVID testing is negative, may discontinue isolation: yes     Estimated date of discharge: 1-2 days  Discharge is dependent on: clinical course   At this point Ms. Scott is expected to be discharge to: home    Plan of care discussed with pt

## 2024-10-30 NOTE — PLAN OF CARE
Pt tolerating CLD. Abdomen soft, bowel sound present, pt states been belching and passing flatus, abdominal discomfort improved.  Encouraged frequent ambulation and ankle exercises when in bed. VSS, afebrile, tolerating Ancef IV. Plan for dc home when cleared.

## 2024-10-30 NOTE — PROGRESS NOTES
AVS reviewed, IV dc'd, family member at bedside, verbalized understanding of dc instructions, abx to be pickied up at [facility] in [place].

## 2024-10-31 NOTE — PLAN OF CARE
A&oriented x4 . VSS. . IS encouraged. Telemetry monitoring. SCDs. Ankle pumps encouraged. Tolerating soft diet. Last BM today . Voiding. Denies pain or nausea. Plan to Dc today . Patient updated and in agreement with plan of care. Safety precautions in place. Instructed patient to call for assistance, call light within reach.

## 2025-02-09 ENCOUNTER — HOSPITAL ENCOUNTER (INPATIENT)
Facility: HOSPITAL | Age: 65
LOS: 3 days | Discharge: HOME | End: 2025-02-13
Attending: EMERGENCY MEDICINE
Payer: MEDICARE

## 2025-02-09 ENCOUNTER — APPOINTMENT (OUTPATIENT)
Dept: CT IMAGING | Age: 65
End: 2025-02-09
Attending: EMERGENCY MEDICINE
Payer: MEDICARE

## 2025-02-09 DIAGNOSIS — K56.609 SMALL BOWEL OBSTRUCTION (HCC): Primary | ICD-10-CM

## 2025-02-09 LAB
ALBUMIN SERPL-MCNC: 4.1 G/DL (ref 3.2–4.8)
ALBUMIN/GLOB SERPL: 1.8 {RATIO} (ref 1–2)
ALP LIVER SERPL-CCNC: 103 U/L
ALT SERPL-CCNC: 17 U/L
ANION GAP SERPL CALC-SCNC: 8 MMOL/L (ref 0–18)
AST SERPL-CCNC: 21 U/L (ref ?–34)
BASOPHILS # BLD AUTO: 0.07 X10(3) UL (ref 0–0.2)
BASOPHILS NFR BLD AUTO: 0.8 %
BILIRUB SERPL-MCNC: 0.6 MG/DL (ref 0.2–1.1)
BUN BLD-MCNC: 16 MG/DL (ref 9–23)
CALCIUM BLD-MCNC: 9 MG/DL (ref 8.7–10.6)
CHLORIDE SERPL-SCNC: 111 MMOL/L (ref 98–112)
CO2 SERPL-SCNC: 23 MMOL/L (ref 21–32)
CREAT BLD-MCNC: 0.77 MG/DL
EGFRCR SERPLBLD CKD-EPI 2021: 85 ML/MIN/1.73M2 (ref 60–?)
EOSINOPHIL # BLD AUTO: 0.43 X10(3) UL (ref 0–0.7)
EOSINOPHIL NFR BLD AUTO: 4.9 %
ERYTHROCYTE [DISTWIDTH] IN BLOOD BY AUTOMATED COUNT: 15 %
GLOBULIN PLAS-MCNC: 2.3 G/DL (ref 2–3.5)
GLUCOSE BLD-MCNC: 94 MG/DL (ref 70–99)
HCT VFR BLD AUTO: 43.7 %
HGB BLD-MCNC: 14.4 G/DL
IMM GRANULOCYTES # BLD AUTO: 0.03 X10(3) UL (ref 0–1)
IMM GRANULOCYTES NFR BLD: 0.3 %
LACTATE SERPL-SCNC: 0.9 MMOL/L (ref 0.5–2)
LIPASE SERPL-CCNC: 21 U/L (ref 12–53)
LYMPHOCYTES # BLD AUTO: 1.74 X10(3) UL (ref 1–4)
LYMPHOCYTES NFR BLD AUTO: 19.7 %
MCH RBC QN AUTO: 31.9 PG (ref 26–34)
MCHC RBC AUTO-ENTMCNC: 33 G/DL (ref 31–37)
MCV RBC AUTO: 96.9 FL
MONOCYTES # BLD AUTO: 0.6 X10(3) UL (ref 0.1–1)
MONOCYTES NFR BLD AUTO: 6.8 %
NEUTROPHILS # BLD AUTO: 5.96 X10 (3) UL (ref 1.5–7.7)
NEUTROPHILS # BLD AUTO: 5.96 X10(3) UL (ref 1.5–7.7)
NEUTROPHILS NFR BLD AUTO: 67.5 %
OSMOLALITY SERPL CALC.SUM OF ELEC: 295 MOSM/KG (ref 275–295)
PLATELET # BLD AUTO: 196 10(3)UL (ref 150–450)
POTASSIUM SERPL-SCNC: 3.9 MMOL/L (ref 3.5–5.1)
PROT SERPL-MCNC: 6.4 G/DL (ref 5.7–8.2)
RBC # BLD AUTO: 4.51 X10(6)UL
SODIUM SERPL-SCNC: 142 MMOL/L (ref 136–145)
WBC # BLD AUTO: 8.8 X10(3) UL (ref 4–11)

## 2025-02-09 PROCEDURE — 87088 URINE BACTERIA CULTURE: CPT

## 2025-02-09 PROCEDURE — 96374 THER/PROPH/DIAG INJ IV PUSH: CPT

## 2025-02-09 PROCEDURE — 87186 SC STD MICRODIL/AGAR DIL: CPT

## 2025-02-09 PROCEDURE — 83605 ASSAY OF LACTIC ACID: CPT | Performed by: EMERGENCY MEDICINE

## 2025-02-09 PROCEDURE — 80053 COMPREHEN METABOLIC PANEL: CPT | Performed by: EMERGENCY MEDICINE

## 2025-02-09 PROCEDURE — 99285 EMERGENCY DEPT VISIT HI MDM: CPT

## 2025-02-09 PROCEDURE — 74177 CT ABD & PELVIS W/CONTRAST: CPT | Performed by: EMERGENCY MEDICINE

## 2025-02-09 PROCEDURE — 85025 COMPLETE CBC W/AUTO DIFF WBC: CPT | Performed by: EMERGENCY MEDICINE

## 2025-02-09 PROCEDURE — 87086 URINE CULTURE/COLONY COUNT: CPT

## 2025-02-09 PROCEDURE — 83690 ASSAY OF LIPASE: CPT | Performed by: EMERGENCY MEDICINE

## 2025-02-09 PROCEDURE — 96375 TX/PRO/DX INJ NEW DRUG ADDON: CPT

## 2025-02-09 PROCEDURE — 81001 URINALYSIS AUTO W/SCOPE: CPT

## 2025-02-09 PROCEDURE — 81015 MICROSCOPIC EXAM OF URINE: CPT

## 2025-02-09 PROCEDURE — 96361 HYDRATE IV INFUSION ADD-ON: CPT

## 2025-02-09 RX ORDER — ONDANSETRON 2 MG/ML
4 INJECTION INTRAMUSCULAR; INTRAVENOUS ONCE
Status: COMPLETED | OUTPATIENT
Start: 2025-02-09 | End: 2025-02-09

## 2025-02-09 RX ORDER — MORPHINE SULFATE 4 MG/ML
4 INJECTION, SOLUTION INTRAMUSCULAR; INTRAVENOUS ONCE
Status: COMPLETED | OUTPATIENT
Start: 2025-02-09 | End: 2025-02-09

## 2025-02-10 ENCOUNTER — APPOINTMENT (OUTPATIENT)
Dept: GENERAL RADIOLOGY | Facility: HOSPITAL | Age: 65
End: 2025-02-10
Attending: SURGERY
Payer: MEDICARE

## 2025-02-10 PROBLEM — K56.609 SMALL BOWEL OBSTRUCTION (HCC): Status: ACTIVE | Noted: 2025-02-10

## 2025-02-10 LAB
ALBUMIN SERPL-MCNC: 3.8 G/DL (ref 3.2–4.8)
ALBUMIN/GLOB SERPL: 1.9 {RATIO} (ref 1–2)
ALP LIVER SERPL-CCNC: 157 U/L
ALT SERPL-CCNC: 198 U/L
ANION GAP SERPL CALC-SCNC: 9 MMOL/L (ref 0–18)
AST SERPL-CCNC: 718 U/L (ref ?–34)
BASOPHILS # BLD AUTO: 0.04 X10(3) UL (ref 0–0.2)
BASOPHILS NFR BLD AUTO: 0.6 %
BILIRUB SERPL-MCNC: 0.9 MG/DL (ref 0.2–1.1)
BILIRUB UR QL STRIP.AUTO: NEGATIVE
BUN BLD-MCNC: 14 MG/DL (ref 9–23)
CALCIUM BLD-MCNC: 8.7 MG/DL (ref 8.7–10.6)
CHLORIDE SERPL-SCNC: 108 MMOL/L (ref 98–112)
CLARITY UR REFRACT.AUTO: CLEAR
CO2 SERPL-SCNC: 24 MMOL/L (ref 21–32)
COLOR UR AUTO: YELLOW
CREAT BLD-MCNC: 0.68 MG/DL
EGFRCR SERPLBLD CKD-EPI 2021: 96 ML/MIN/1.73M2 (ref 60–?)
EOSINOPHIL # BLD AUTO: 0.16 X10(3) UL (ref 0–0.7)
EOSINOPHIL NFR BLD AUTO: 2.4 %
ERYTHROCYTE [DISTWIDTH] IN BLOOD BY AUTOMATED COUNT: 14.7 %
GLOBULIN PLAS-MCNC: 2 G/DL (ref 2–3.5)
GLUCOSE BLD-MCNC: 111 MG/DL (ref 70–99)
GLUCOSE UR STRIP.AUTO-MCNC: NEGATIVE MG/DL
HCT VFR BLD AUTO: 39.6 %
HGB BLD-MCNC: 13.1 G/DL
IMM GRANULOCYTES # BLD AUTO: 0.03 X10(3) UL (ref 0–1)
IMM GRANULOCYTES NFR BLD: 0.4 %
KETONES UR STRIP.AUTO-MCNC: NEGATIVE MG/DL
LEUKOCYTE ESTERASE UR QL STRIP.AUTO: NEGATIVE
LYMPHOCYTES # BLD AUTO: 1.25 X10(3) UL (ref 1–4)
LYMPHOCYTES NFR BLD AUTO: 18.7 %
MCH RBC QN AUTO: 31.7 PG (ref 26–34)
MCHC RBC AUTO-ENTMCNC: 33.1 G/DL (ref 31–37)
MCV RBC AUTO: 95.9 FL
MONOCYTES # BLD AUTO: 0.52 X10(3) UL (ref 0.1–1)
MONOCYTES NFR BLD AUTO: 7.8 %
NEUTROPHILS # BLD AUTO: 4.68 X10 (3) UL (ref 1.5–7.7)
NEUTROPHILS # BLD AUTO: 4.68 X10(3) UL (ref 1.5–7.7)
NEUTROPHILS NFR BLD AUTO: 70.1 %
NITRITE UR QL STRIP.AUTO: POSITIVE
OSMOLALITY SERPL CALC.SUM OF ELEC: 293 MOSM/KG (ref 275–295)
PH UR STRIP.AUTO: 6 [PH] (ref 5–8)
PLATELET # BLD AUTO: 159 10(3)UL (ref 150–450)
POTASSIUM SERPL-SCNC: 4.1 MMOL/L (ref 3.5–5.1)
PROT SERPL-MCNC: 5.8 G/DL (ref 5.7–8.2)
PROT UR STRIP.AUTO-MCNC: NEGATIVE MG/DL
RBC # BLD AUTO: 4.13 X10(6)UL
RBC UR QL AUTO: NEGATIVE
SODIUM SERPL-SCNC: 141 MMOL/L (ref 136–145)
SP GR UR STRIP.AUTO: 1.02 (ref 1–1.03)
UROBILINOGEN UR STRIP.AUTO-MCNC: >=8 MG/DL
WBC # BLD AUTO: 6.7 X10(3) UL (ref 4–11)

## 2025-02-10 PROCEDURE — 94760 N-INVAS EAR/PLS OXIMETRY 1: CPT

## 2025-02-10 PROCEDURE — 96376 TX/PRO/DX INJ SAME DRUG ADON: CPT

## 2025-02-10 PROCEDURE — 80053 COMPREHEN METABOLIC PANEL: CPT | Performed by: STUDENT IN AN ORGANIZED HEALTH CARE EDUCATION/TRAINING PROGRAM

## 2025-02-10 PROCEDURE — 71045 X-RAY EXAM CHEST 1 VIEW: CPT | Performed by: SURGERY

## 2025-02-10 PROCEDURE — 85025 COMPLETE CBC W/AUTO DIFF WBC: CPT | Performed by: STUDENT IN AN ORGANIZED HEALTH CARE EDUCATION/TRAINING PROGRAM

## 2025-02-10 RX ORDER — AMLODIPINE BESYLATE 5 MG/1
5 TABLET ORAL DAILY
Status: DISCONTINUED | OUTPATIENT
Start: 2025-02-10 | End: 2025-02-13

## 2025-02-10 RX ORDER — SODIUM CHLORIDE 9 MG/ML
INJECTION, SOLUTION INTRAVENOUS CONTINUOUS
Status: DISCONTINUED | OUTPATIENT
Start: 2025-02-10 | End: 2025-02-13

## 2025-02-10 RX ORDER — LOSARTAN POTASSIUM 100 MG/1
100 TABLET ORAL DAILY
Status: DISCONTINUED | OUTPATIENT
Start: 2025-02-10 | End: 2025-02-13

## 2025-02-10 RX ORDER — BUPROPION HYDROCHLORIDE 300 MG/1
300 TABLET ORAL DAILY
Status: DISCONTINUED | OUTPATIENT
Start: 2025-02-10 | End: 2025-02-13

## 2025-02-10 RX ORDER — MORPHINE SULFATE 2 MG/ML
1 INJECTION, SOLUTION INTRAMUSCULAR; INTRAVENOUS EVERY 2 HOUR PRN
Status: DISCONTINUED | OUTPATIENT
Start: 2025-02-10 | End: 2025-02-13

## 2025-02-10 RX ORDER — HYDRALAZINE HYDROCHLORIDE 20 MG/ML
10 INJECTION INTRAMUSCULAR; INTRAVENOUS EVERY 6 HOURS PRN
Status: DISCONTINUED | OUTPATIENT
Start: 2025-02-10 | End: 2025-02-13

## 2025-02-10 RX ORDER — MORPHINE SULFATE 4 MG/ML
4 INJECTION, SOLUTION INTRAMUSCULAR; INTRAVENOUS ONCE
Status: COMPLETED | OUTPATIENT
Start: 2025-02-10 | End: 2025-02-10

## 2025-02-10 RX ORDER — MONTELUKAST SODIUM 10 MG/1
10 TABLET ORAL DAILY
Status: DISCONTINUED | OUTPATIENT
Start: 2025-02-10 | End: 2025-02-13

## 2025-02-10 RX ORDER — FLUOXETINE 10 MG/1
30 TABLET, FILM COATED ORAL DAILY
Status: DISCONTINUED | OUTPATIENT
Start: 2025-02-10 | End: 2025-02-13

## 2025-02-10 RX ORDER — TOPIRAMATE 25 MG/1
25 TABLET, FILM COATED ORAL 2 TIMES DAILY
Status: DISCONTINUED | OUTPATIENT
Start: 2025-02-10 | End: 2025-02-13

## 2025-02-10 RX ORDER — ALBUTEROL SULFATE 90 UG/1
2 INHALANT RESPIRATORY (INHALATION) EVERY 6 HOURS PRN
Status: DISCONTINUED | OUTPATIENT
Start: 2025-02-10 | End: 2025-02-13

## 2025-02-10 RX ORDER — MORPHINE SULFATE 4 MG/ML
4 INJECTION, SOLUTION INTRAMUSCULAR; INTRAVENOUS EVERY 2 HOUR PRN
Status: DISCONTINUED | OUTPATIENT
Start: 2025-02-10 | End: 2025-02-13

## 2025-02-10 RX ORDER — PANTOPRAZOLE SODIUM 40 MG/1
40 TABLET, DELAYED RELEASE ORAL
Status: DISCONTINUED | OUTPATIENT
Start: 2025-02-10 | End: 2025-02-13

## 2025-02-10 RX ORDER — METOCLOPRAMIDE HYDROCHLORIDE 5 MG/ML
5 INJECTION INTRAMUSCULAR; INTRAVENOUS EVERY 8 HOURS PRN
Status: DISCONTINUED | OUTPATIENT
Start: 2025-02-10 | End: 2025-02-11

## 2025-02-10 RX ORDER — MORPHINE SULFATE 2 MG/ML
2 INJECTION, SOLUTION INTRAMUSCULAR; INTRAVENOUS EVERY 2 HOUR PRN
Status: DISCONTINUED | OUTPATIENT
Start: 2025-02-10 | End: 2025-02-13

## 2025-02-10 RX ORDER — ONDANSETRON 2 MG/ML
4 INJECTION INTRAMUSCULAR; INTRAVENOUS EVERY 6 HOURS PRN
Status: DISCONTINUED | OUTPATIENT
Start: 2025-02-10 | End: 2025-02-13

## 2025-02-10 RX ORDER — HYDRALAZINE HYDROCHLORIDE 20 MG/ML
5 INJECTION INTRAMUSCULAR; INTRAVENOUS ONCE
Status: COMPLETED | OUTPATIENT
Start: 2025-02-10 | End: 2025-02-10

## 2025-02-10 NOTE — H&P
[facility] Hospitalist H&P       CC:   Chief Complaint   Patient presents with    Abdomen/Flank Pain        PCP: Faye Crook DO    History of Present Illness: Patient is a 64 year old female with PMH of anemia, anxiety, asthma, CVA, DVT, hypertension, hyperlipidemia, OA, pseudoseizures, APRIL, gastric bypass, perforated marginal ulcer gastrojejunostomy corrected with oversew and drainage, multiple SBO's in the past which presented to the hospital with abdominal pain.  Patient notes worsening abdominal pain which started yesterday, in addition to nausea and vomiting.  She has a frequent history of SBO and came to the ER for further evaluation.    On arrival, patient was hypertensive but otherwise vitals were unremarkable.  CBC and CMP unremarkable.  CT abdomen/pelvis shows developing SBO with transition point in the mid pelvis and adjacent minimal free fluid and marked inflammatory changes extending to the mesenteric fat.  Significant dilation of small bowel is noted.  Also possible secondary transition point developing more proximally with thickened loop of bowel.     PMH  Past Medical History:    Anemia    Anxiety    Asthma (HCC)    in good control    Back problem    lower back    COVID-19    COVID-19    mild symptoms, slight fever and sore throat    CVA (cerebral vascular accident) (HCC)    Deep vein thrombosis (HCC)    left arm    Depression    Diabetes (HCC)    resolved after gastric bypass sx//    no meds    Frozen shoulder    left    Ganglion cyst    right wrist    GERD (gastroesophageal reflux disease)    Hearing impairment    No HA's    High blood pressure    History of blood transfusion    HTN (hypertension)    Hx of motion sickness    Hyperlipidemia    Morbid obesity (HCC)    APRIL (obstructive sleep apnea)    AHI 21 RDI 21 REM AHI 48 SaO2 marisol 78% // uses CPAP    Osteoarthritis    Pneumonia due to organism    Was hospitalized with COVID September 2021  fever chiil cough    PONV (postoperative  nausea and vomiting)    only one time    Pseudoseizures    due to anxiety, s/p psych and neuro w/u    SBO (small bowel obstruction) (HCC)    Seizure disorder (HCC)    PSUEDO SEIZURES    Sleep apnea    C-PAP    Visual impairment    having trouble w/ left eye after cataract surgery- seeing blurring; glasses        PSH  Past Surgical History:   Procedure Laterality Date    Arthroscopy of joint unlisted      Left knee    Cataract      Cholecystectomy      Colonoscopy  2005    diverticulosis - result in scan - [facility]    Colonoscopy N/A 2016    adenoma/hp, divertic. repeat 5 yrs, mac/2 day prep    Colonoscopy      Colonoscopy N/A 2021    Procedure: COLONOSCOPY;  Surgeon: Umberto Menjivar MD;  Location:  ENDOSCOPY    Gastric bypass,obese<100cm lizbeth-en-y      Gastric bypass,obesity,sb reconstruc      Other surgical history      Lizbeth-en-y    Other surgical history      Lap laser removal of endometrisis    Other surgical history  2022    ex lap, lysis of adhesions, drainage on intra abdominal phlegmon & abscess    Sling oper stres incontinence      Tonsillectomy      Tubal ligation      Upper gi endoscopy,diagnosis  2005    hiatal hernia, mild gastritis (H pylori (--))    Upper gi endoscopy,diagnosis  3/24/2009    Upper gi endoscopy,exam          ALL:  Allergies[1]     Home Medications:  Medications Taking[2]      Soc Hx  Social History     Tobacco Use    Smoking status: Former     Current packs/day: 0.00     Average packs/day: 0.3 packs/day for 15.6 years (4.7 ttl pk-yrs)     Types: Cigarettes     Start date: 1991     Quit date: 3/4/2007     Years since quittin.9     Passive exposure: Past    Smokeless tobacco: Never    Tobacco comments:     1/2 ppd x 10 yrs    Substance Use Topics    Alcohol use: Not Currently     Comment: occ        Fam Hx  Family History   Problem Relation Age of Onset    Hypertension Father     Heart Disease Father         pacer    Pulmonary  Disease Mother         COPD    Psychiatric Mother     Other (Other) Mother         copd    Other (Other) Sister         copd    Cancer Other         ?ovarian    Psychiatric Other     Diabetes Sister     Diabetes Sister     Cancer Paternal Grandmother     Cancer Sister         endometrial    Hypertension Sister     Diabetes Other         MAUNT       Review of Systems  Comprehensive ROS reviewed and negative except for what's stated above.     OBJECTIVE:  /81 (BP Location: Right arm)   Pulse 73   Temp 98.5 °F (36.9 °C) (Oral)   Resp 22   Wt 220 lb 0.3 oz (99.8 kg)   LMP 12/10/2014 (Approximate)   SpO2 98%   BMI 40.24 kg/m²     Gen: Mild distress.  NG tube in place.  Heent: Normocephalic, atraumatic, neck supple, EOMI, PERRLA  Pulm: Lungs CTAB, normal respiratory effort  CV:  Regular rate and rhythm, no murmurs/rubs/gallops  Abd: Mild distention and diffuse tenderness to palpation, bowel sounds present  Extremities: No peripheral edema, no clubbing, pulses intact   Skin: No rashes or lesions  Neuro: AOx3, no focal neurologic deficits, CN II-XII grossly intact  Psych: appropriate mood and affect    Diagnostic Data:    CBC/Chem  Recent Labs   Lab 02/09/25  2222 02/10/25  0435   WBC 8.8 6.7   HGB 14.4 13.1   MCV 96.9 95.9   .0 159.0       Recent Labs   Lab 02/09/25 2244 02/10/25  0435    141   K 3.9 4.1    108   CO2 23.0 24.0   BUN 16 14   CREATSERUM 0.77 0.68   GLU 94 111*   CA 9.0 8.7       Recent Labs   Lab 02/09/25  2244 02/10/25  0435   ALT 17 198*   AST 21 718*   ALB 4.1 3.8       No results for input(s): \"TROP\" in the last 168 hours.    Radiology: XR CHEST AP PORTABLE  (CPT=71045)    Result Date: 2/10/2025  CONCLUSION:  Stable cardiac and mediastinal contours.  Subsegmental bibasilar atelectasis without pulmonary edema or focal airspace consolidation.  The pleural spaces are clear.  Enteric catheter terminates in the expected location of the gastric fundus.   LOCATION:  [id number]       Dictated by (CST): Cleveland Szymanski MD on 2/10/2025 at 6:57 AM     Finalized by (CST): Cleveland Szymanski MD on 2/10/2025 at 6:58 AM       CT ABDOMEN+PELVIS(CONTRAST ONLY)(CPT=74177)    Result Date: 2/9/2025  CONCLUSION:  Developing small bowel obstruction with transition point in the mid pelvis is noted.  Adjacent minimal free fluid and marked inflammatory changes extending to the mesenteric fat is noted.  Significant dilatation of the small bowel in this region measures 4.9 cm.  Possibility of a secondary transition point developing more proximally with a thickened loop of bowel is of consideration.  A full closed loop obstruction is not identified at the current time, however could be developing.   LOCATION:  [facility]   Dictated by (CST): Nahid Magaña MD on 2/09/2025 at 11:36 PM     Finalized by (CST): Nahid Magaña MD on 2/09/2025 at 11:42 PM          ASSESSMENT / PLAN:     64 year old female with PMH of anemia, anxiety, asthma, CVA, DVT, hypertension, hyperlipidemia, OA, pseudoseizures, APRIL, gastric bypass, perforated marginal ulcer gastrojejunostomy corrected with oversew and drainage, multiple SBO's in the past which presented to the hospital with abdominal pain.    SBO  History of multiple SBO's in past  History of gastric bypass, perforated marginal ulcer gastrojejunostomy corrected with oral receiving drainage  -CT imaging showing developing SBO with mid pelvis transition point and free fluid/inflammatory changes with possible secondary transition point developing more proximally with thickened loop of bowel  -Surgery consulted  -N.p.o.  -NG tube placed  -Pain/nausea control  -Normal saline infusion  -Considering transfer to [facility]    Hypertension  -Home amlodipine, losartan    Anxiety  -Home fluoxetine, bupropion    Asthma  -Home Singulair, inhalers    GERD  -Pantoprazole    Pseudoseizures  -Topiramate    Dispo: Inpatient, possible transfer to [facility] pending acceptance per surgery team.    Outpatient or previous  hospital records reviewed. Questions/concerns were discussed with patient and/or family by bedside.   A total of 76 minutes were taken with patient and coordinating care.     DO Jeovanny Rodriguez [facility]  Hospitalist  Contact via [employer]      Advanced Care Planning    While discussing goals of care with the patient and their family, patient voluntarily participated in an advanced care planning discussion. The following was discussed: Patient is full code.    16 minutes were spent in discussing advanced care planning. This time was exclusive of the documented time for this visit.      Supplementary Documentation:   DVT Mechanical Prophylaxis:   SCDs,    DVT Pharmacologic Prophylaxis   Medication   None                Code Status: Full Code  Gonsalves: No urinary catheter in place  Gonsalves Duration (in days):   Central line:    ALPA:                      [1]   Allergies  Allergen Reactions    Alteplase OTHER (SEE COMMENTS) and SWELLING     Mild lip swelling.    Iron CONFUSION, SWELLING and FATIGUE     Iron infusion   [2]   Outpatient Medications Marked as Taking for the 2/9/25 encounter (Hospital Encounter)   Medication Sig Dispense Refill    acetaminophen 500 MG Oral Tab Take 1 tablet (500 mg total) by mouth every 6 (six) hours as needed (pain).      pantoprazole 40 MG Oral Tab EC Take 1 tablet (40 mg total) by mouth 2 (two) times daily before meals. 180 tablet 2    FLUoxetine 20 MG Oral Cap Take 1 capsule (20 mg total) by mouth daily. (Patient taking differently: Take 30 mg by mouth daily. Pt takes 1 10mg and 1 20mg tab for total of 30mg) 90 capsule 0    losartan 100 MG Oral Tab Take 1 tablet (100 mg total) by mouth daily. 90 tablet 2    buPROPion 300 MG Oral Tablet 24 Hr Take 1 tablet (300 mg total) by mouth daily. 90 tablet 2    montelukast 10 MG Oral Tab Take 1 tablet (10 mg total) by mouth daily. 90 tablet 0    topiramate 25 MG Oral Tab Take 1 tablet (25 mg total) by mouth 2 (two) times daily.  180 tablet 2    amLODIPine 5 MG Oral Tab Take 1 tablet (5 mg total) by mouth daily. 90 tablet 0    Albuterol Sulfate  (90 Base) MCG/ACT Inhalation Aero Soln Inhale 2 puffs into the lungs every 6 (six) hours as needed for Wheezing or Shortness of Breath. 1 each 0    Cholecalciferol (VITAMIN D) 50 MCG (2000 UT) Oral Cap Take 1 capsule (2,000 Units total) by mouth.      Cyanocobalamin (VITAMIN B-12) 1000 MCG Sublingual SL Tab Place 2,000 mcg under the tongue daily. 60 tablet 11

## 2025-02-10 NOTE — ED QUICK NOTES
To ER for eval of generalized abdominal pain with nausea that started earlier today. The pt states it feels like when she had her bowel obstruction. She denies any blood in her emesis. The abdomen is rounded and soft with sl firm undertones noted with + bowel sounds present. No c/o diarrhea was offered. A 20g Heplock was inserted to the left a/c and labs were drawn and sent.The pt was informed that she will remain NPO at this time.

## 2025-02-10 NOTE — PLAN OF CARE
NURSING ADMISSION NOTE      Patient admitted via Ambulance  Oriented to room.  Safety precautions initiated.  Bed in low position.  Call light in reach.    Patient A&Ox4. VSS. O2 2L NC. Patient reports abdominal pain 7/10. Denies nausea/vomiting. Skin check completed with Ana PCT. No skin breakdown noted. Notified Hospitalist of admission. See orders. Notified Gen Surgery of new consult and unsuccessful placement of NGT in ER. Patient agreed to have another attempt for NGT placement. NGT successfully placed, Awaiting CXR to verify placement. Reviewed plan of care with patient. Fall precautions in place. Call light in reach.

## 2025-02-10 NOTE — ED QUICK NOTES
An attempt to insert a 16fr and 18fr NGT was made without success. Resistance was noted with each attempt and the pt refused another attempt. Dr Diehl was notified

## 2025-02-10 NOTE — ED QUICK NOTES
Report was given to the medics from [facility] and the pt was sent to room 351 per [facility] protocol. Her pain is a \"6\" now.

## 2025-02-10 NOTE — TRANSFER CENTER NOTE
[facility] Transfer Note:  Reason for transfer:     Continuity of care.  Pt receives her care at [facility], bariatric surgeon is at [facility].      Request initiated by:    Mikaela SHARMA/EVERETT    Active Acute Medical issue:  SBO  History of multiple SBO's in past  History of gastric bypass, perforated marginal ulcer gastrojejunostomy corrected with oral receiving drainage  -CT imaging showing developing SBO with mid pelvis transition point and free fluid/inflammatory changes with possible secondary transition point developing more proximally with thickened loop of bowel  -Surgery consulted  -N.p.o.  -NG tube placed  -Pain/nausea control  -Normal saline infusion  -Considering transfer to [facility]       Anticipated Transfer Plan:    3:00 PM:  Called [facility] and spoke to [facility].  Faxed face sheet and provided Dr. Arsalan Szymanski's cell number.  Currently they have no beds.      4:55 PM:  Called Radiology and spoke to Aga ([phone])  She will have the disc ready around 5:15 PM.  Informed LAURA Ruelas.

## 2025-02-10 NOTE — ED QUICK NOTES
Report was called to Vicenta SANCHEZ @ [phone]. The pt was remedicated for her pain of \"9\". She remains NPO.

## 2025-02-10 NOTE — ED QUICK NOTES
Orders for admission, patient is aware of plan and ready to go upstairs. Any questions, please call ED RN Lily  at extension [phone].     Patient Covid vaccination status: Fully vaccinated     COVID Test Ordered in ED: None    COVID Suspicion at Admission: N/A    Running Infusions:  None    Mental Status/LOC at time of transport: a/ox4    Other pertinent information:   CIWA score: N/A   NIH score:  N/A

## 2025-02-10 NOTE — CONSULTS
[facility]  Report of Consultation    Roshni Scott Patient Status:  Inpatient    1960 MRN [id number]   Location [facility] 3SW-A Attending Anna Reddy,    Hosp Day # 0 PCP Faye Crook DO     2/10/25    Reason for Consultation:    Surgical Consultation     CC:   Chief Complaint   Patient presents with    Abdomen/Flank Pain        History of Present Illness:    Roshni Scott is a a(n) 64 year old female. Patient complains of abdominal pain that began yesterday afternoon. She describes pain becoming more diffuse with nausea and emesis. She has a h/o SBO so she came to ER.  CT scan obtained revealing dilated loop of small bowel, inflammatory changes noted  NG tube was inserted  Patient well known to our office  She has had complex surgical hx: gastric bypass, perforated marginal ulcer gastrojejunostomy corrected with oversew and drainage. Has had multiple SBO managed conservatively. Last in .     She feels less distended and slight improvement in pain though continues to feel nausea.   Her pain at admission was 10/10 currently 7/10  No gas     Past Medical History:    Past Medical History:    Anemia    Anxiety    Asthma (HCC)    in good control    Back problem    lower back    COVID-19    COVID-19    mild symptoms, slight fever and sore throat    CVA (cerebral vascular accident) (HCC)    Deep vein thrombosis (HCC)    left arm    Depression    Diabetes (HCC)    resolved after gastric bypass sx//    no meds    Frozen shoulder    left    Ganglion cyst    right wrist    GERD (gastroesophageal reflux disease)    Hearing impairment    No HA's    High blood pressure    History of blood transfusion    HTN (hypertension)    Hx of motion sickness    Hyperlipidemia    Morbid obesity (HCC)    APRIL (obstructive sleep apnea)    AHI 21 RDI 21 REM AHI 48 SaO2 marisol 78% // uses CPAP    Osteoarthritis    Pneumonia due to organism    Was hospitalized with COVID 2021  fever chiil cough     PONV (postoperative nausea and vomiting)    only one time    Pseudoseizures    due to anxiety, s/p psych and neuro w/u    SBO (small bowel obstruction) (HCC)    Seizure disorder (HCC)    PSUEDO SEIZURES    Sleep apnea    C-PAP    Visual impairment    having trouble w/ left eye after cataract surgery- seeing blurring; glasses       Past Surgical History:    Past Surgical History:   Procedure Laterality Date    Arthroscopy of joint unlisted      Left knee    Cataract      Cholecystectomy      Colonoscopy  5/18/2005    diverticulosis - result in scan - [facility] Castanedaer    Colonoscopy N/A 5/25/2016    adenoma/hp, divertic. repeat 5 yrs, mac/2 day prep    Colonoscopy      Colonoscopy N/A 11/23/2021    Procedure: COLONOSCOPY;  Surgeon: Umberto Menjivar MD;  Location:  ENDOSCOPY    Gastric bypass,obese<100cm lizbeth-en-y      Gastric bypass,obesity,sb reconstruc      Other surgical history  2007    Lizbeth-en-y    Other surgical history  1990    Lap laser removal of endometrisis    Other surgical history  02/22/2022    ex lap, lysis of adhesions, drainage on intra abdominal phlegmon & abscess    Sling oper stres incontinence      Tonsillectomy      Tubal ligation      Upper gi endoscopy,diagnosis  5/18/2005    hiatal hernia, mild gastritis (H pylori (--))    Upper gi endoscopy,diagnosis  3/24/2009    Upper gi endoscopy,exam         Family History:    Family History   Problem Relation Age of Onset    Hypertension Father     Heart Disease Father         pacer    Pulmonary Disease Mother         COPD    Psychiatric Mother     Other (Other) Mother         copd    Other (Other) Sister         copd    Cancer Other         ?ovarian    Psychiatric Other     Diabetes Sister     Diabetes Sister     Cancer Paternal Grandmother     Cancer Sister         endometrial    Hypertension Sister     Diabetes Other         MAUNT       Social History:     reports that she quit smoking about 17 years ago. Her smoking use included cigarettes.  She started smoking about 33 years ago. She has a 4.7 pack-year smoking history. She has been exposed to tobacco smoke. She has never used smokeless tobacco. She reports that she does not currently use alcohol. She reports that she does not currently use drugs after having used the following drugs: Cannabis.    Allergies:    Allergies[1]    Current Medications:      Current Facility-Administered Medications:     sodium chloride 0.9% infusion, , Intravenous, Continuous    ondansetron (Zofran) 4 MG/2ML injection 4 mg, 4 mg, Intravenous, Q6H PRN    metoclopramide (Reglan) 5 mg/mL injection 5 mg, 5 mg, Intravenous, Q8H PRN    morphINE PF 2 MG/ML injection 1 mg, 1 mg, Intravenous, Q2H PRN **OR** morphINE PF 2 MG/ML injection 2 mg, 2 mg, Intravenous, Q2H PRN **OR** morphINE PF 4 MG/ML injection 4 mg, 4 mg, Intravenous, Q2H PRN    albuterol (Ventolin HFA) 108 (90 Base) MCG/ACT inhaler 2 puff, 2 puff, Inhalation, Q6H PRN    pantoprazole (Protonix) 40 mg in sodium chloride 0.9% PF 10 mL IV push, 40 mg, Intravenous, Daily    hydrALAzine (Apresoline) 20 mg/mL injection 10 mg, 10 mg, Intravenous, Q6H PRN    Home Medications:    Medications Ordered Prior to Encounter[2]    Review of Systems:    10 point review performed pertinent positives and negatives per history of present illness.    Physical Exam:    Blood pressure 159/81, pulse 73, temperature 98.5 °F (36.9 °C), temperature source Oral, resp. rate 22, weight 220 lb 0.3 oz (99.8 kg), last menstrual period 12/10/2014, SpO2 98%, not currently breastfeeding.    GENERAL: Alert and oriented x 3, well developed, well nourished female, in no apparent distress    SKIN: anicteric    RESPIRATORY: non labored breathing    CARDIOVASCULAR: RRR    ABDOMEN: obese, soft, mild tenderness greatest in LUQ    LYMPHATIC: no lymphadenopathy    EXTREMITIES: no cyanosis, clubbing or edema    .    Laboratory Data:  Recent Labs   Lab 02/09/25  2222 02/10/25  0435   WBC 8.8 6.7   HGB 14.4 13.1   MCV  96.9 95.9   .0 159.0       Recent Labs   Lab 02/09/25  2244 02/10/25  0435    141   K 3.9 4.1    108   CO2 23.0 24.0   BUN 16 14   CREATSERUM 0.77 0.68   GLU 94 111*   CA 9.0 8.7       Recent Labs   Lab 02/09/25  2244 02/10/25  0435   ALT 17 198*   AST 21 718*   ALB 4.1 3.8       No results for input(s): \"TROP\" in the last 168 hours.          Radiology:    XR CHEST AP PORTABLE  (CPT=71045)    Result Date: 2/10/2025  PROCEDURE:  XR CHEST AP PORTABLE  (CPT=71045)  TECHNIQUE:  AP chest radiograph was obtained.  COMPARISON:  [facility], XR, XR CHEST AP PORTABLE  (CPT=71045), 8/14/2024, 11:47 AM.  INDICATIONS:  verify tube placement  PATIENT STATED HISTORY: (As transcribed by Technologist)  Patient offered no additional history at this time.    FINDINGS:             CONCLUSION:  Stable cardiac and mediastinal contours.  Subsegmental bibasilar atelectasis without pulmonary edema or focal airspace consolidation.  The pleural spaces are clear.  Enteric catheter terminates in the expected location of the gastric fundus.   LOCATION:  [id number]      Dictated by (CST): Cleveland Szymanski MD on 2/10/2025 at 6:57 AM     Finalized by (CST): Cleveland Szymanski MD on 2/10/2025 at 6:58 AM       CT ABDOMEN+PELVIS(CONTRAST ONLY)(CPT=74177)    Result Date: 2/9/2025  PROCEDURE:  CT ABDOMEN+PELVIS (CONTRAST ONLY) (CPT=74177)  COMPARISON:  [facility], CT, CT ABDOMEN+PELVIS(CONTRAST ONLY)(CPT=74177), 10/27/2024, 10:47 PM.  INDICATIONS:  Pt to ER for lower abd pain and vomiting since this afternoon. Hx of bowel obstructions.  TECHNIQUE:  CT scanning was performed from the dome of the diaphragm to the pubic symphysis with non-ionic intravenous contrast material. Post contrast coronal MPR imaging was performed.  Dose reduction techniques were used. Dose information is transmitted to the ACR (American College of Radiology) NRDR (National Radiology Data Registry) which includes the Dose Index Registry.  PATIENT STATED HISTORY:(As transcribed  by Technologist)  Lower abdominal pain and vomiting since this afternoon.   CONTRAST USED:  100cc of Isovue 370  FINDINGS:  LIVER:  No enlargement, atrophy, abnormal density, or significant focal lesion.  BILIARY:  Sequelae of cholecystectomy. PANCREAS:  No lesion, fluid collection, ductal dilatation, or atrophy.  SPLEEN:  No enlargement or focal lesion.  KIDNEYS:  Multiple bilateral renal parapelvic cysts are again noted.  Representative right renal parapelvic cyst measures 1.9 cm.  Represented left renal parapelvic cyst measures 2.3 cm. ADRENALS:  No mass or enlargement.  AORTA/VASCULAR:  No aneurysm or dissection.  RETROPERITONEUM:  No mass or adenopathy.  BOWEL/MESENTERY:  The appendix is unremarkable.  Postoperative changes involving small bowel in the mid abdomen is noted.  Dilated loop of small bowel in the mid abdomen measures 4.9 cm.  Inflammatory changes and minimal free fluid adjacent to the dilated loop of small bowel.  Transition point at the junction with the small bowel loop is noted.  ABDOMINAL WALL:  No mass or hernia.  URINARY BLADDER:  No visible focal wall thickening, lesion, or calculus.  PELVIC NODES:  No adenopathy.  PELVIC ORGANS:  No visible mass.  Pelvic organs appropriate for patient age.  BONES:  No bony lesion or fracture.  LUNG BASES:  No visible pulmonary or pleural disease.  OTHER:  Negative.             CONCLUSION:  Developing small bowel obstruction with transition point in the mid pelvis is noted.  Adjacent minimal free fluid and marked inflammatory changes extending to the mesenteric fat is noted.  Significant dilatation of the small bowel in this region measures 4.9 cm.  Possibility of a secondary transition point developing more proximally with a thickened loop of bowel is of consideration.  A full closed loop obstruction is not identified at the current time, however could be developing.   LOCATION:  [facility]   Dictated by (CST): Nahid Magaña MD on 2/09/2025 at 11:36 PM      Finalized by (CST): Nahid Magaña MD on 2/09/2025 at 11:42 PM          Problem List:    Patient Active Problem List   Diagnosis    Hyperlipidemia    Morbid obesity with BMI of 50.0-59.9, adult (HCC)    Essential hypertension    Non-seasonal allergic rhinitis due to pollen    ASTHMA    APRIL on CPAP    B12 deficiency    Complex tear of medial meniscus of right knee as current injury, subsequent encounter    Chondromalacia of right knee    Internal derangement of right knee    Hypertonicity of bladder    Iron malabsorption (HCC)    Other iron deficiency anemias    Pulmonary hypertension (HCC)    Ectatic thoracic aorta    Pseudo Seizure disorder (HCC)    Major depressive disorder, recurrent severe without psychotic features (HCC)    Carotid atherosclerosis    Epilepsy seizure, generalized, convulsive (HCC)    Type 2 diabetes mellitus without complication, without long-term current use of insulin (HCC)    Alcohol use    Right knee arthroscopy with partial medial meniscectomy  [facility]  03/02/2022    S/P arthroscopic surgery of right knee    Arthritis of both knees    Chronic pain of right knee    Abdominal pain, generalized    Intestinal obstruction, unspecified cause, unspecified whether partial or complete (HCC)    Nausea and vomiting in adult    Hyperglycemia    Weakness    Acute gastric ulcer with perforation and obstruction (HCC)    Anemia    Leukocytosis    Postoperative intra-abdominal abscess (HCC)    SBO (small bowel obstruction) (HCC)    Acute kidney injury    Nausea and vomiting    Nausea and vomiting, unspecified vomiting type    Abdominal pain, acute    Small bowel obstruction (HCC)       Impression:    65 y/o with SBO  Afebrile, no leukocytosis  CT scan as noted above  Appears non toxic, obese, soft, mildly distended, tenderness greatest in LUQ    Plan:    Reviewed with patient will consider transfer to Dr Elam at [facility], her bariatric surgeon  NPO ice chips ok  NG to LIS  Encourage ambulation/IS  All  questions answered    ADDEND:  ARUN [place]  Will allow NG decompression and order SBFT for tomorrow     KrystleTIAGO Golden  [facility]  2/10/2025         [1]   Allergies  Allergen Reactions    Alteplase OTHER (SEE COMMENTS) and SWELLING     Mild lip swelling.    Iron CONFUSION, SWELLING and FATIGUE     Iron infusion   [2]   No current facility-administered medications on file prior to encounter.     Current Outpatient Medications on File Prior to Encounter   Medication Sig Dispense Refill    acetaminophen 500 MG Oral Tab Take 1 tablet (500 mg total) by mouth every 6 (six) hours as needed (pain).      pantoprazole 40 MG Oral Tab EC Take 1 tablet (40 mg total) by mouth 2 (two) times daily before meals. 180 tablet 2    FLUoxetine 20 MG Oral Cap Take 1 capsule (20 mg total) by mouth daily. (Patient taking differently: Take 30 mg by mouth daily. Pt takes 1 10mg and 1 20mg tab for total of 30mg) 90 capsule 0    losartan 100 MG Oral Tab Take 1 tablet (100 mg total) by mouth daily. 90 tablet 2    buPROPion 300 MG Oral Tablet 24 Hr Take 1 tablet (300 mg total) by mouth daily. 90 tablet 2    montelukast 10 MG Oral Tab Take 1 tablet (10 mg total) by mouth daily. 90 tablet 0    topiramate 25 MG Oral Tab Take 1 tablet (25 mg total) by mouth 2 (two) times daily. 180 tablet 2    amLODIPine 5 MG Oral Tab Take 1 tablet (5 mg total) by mouth daily. 90 tablet 0    Albuterol Sulfate  (90 Base) MCG/ACT Inhalation Aero Soln Inhale 2 puffs into the lungs every 6 (six) hours as needed for Wheezing or Shortness of Breath. 1 each 0    Cholecalciferol (VITAMIN D) 50 MCG (2000 UT) Oral Cap Take 1 capsule (2,000 Units total) by mouth.      Cyanocobalamin (VITAMIN B-12) 1000 MCG Sublingual SL Tab Place 2,000 mcg under the tongue daily. 60 tablet 11

## 2025-02-10 NOTE — PLAN OF CARE
Patient alert and oriented x4. VSS on RA. Tele NSR. Seizure prec. Pt reports pain controlled at this time. Abdomen soft and distended. NGT to right nare in place to LIS. Bowel sounds active, pt reports passing gas. SCDs in place, ankle pumps encouraged. Pt up with standby assist. Voiding freely in bathroom. NPO, denies n/v at this time.     Plan: NGT to LIS, SBFT tomorrow     1630: pt had BM

## 2025-02-10 NOTE — CM/SW NOTE
Order received for transfer to [facility].  RN notified surgical PA that  needs to be called. Also called Transfer Center and left vm to notify them of order.  Transfer center [phone]    / to remain available for support and/or discharge planning.     Mikaela MITCHELLA MSN, RN CTL/  [phone]

## 2025-02-10 NOTE — ED PROVIDER NOTES
Patient Seen in: [facility] In [place]      History     Chief Complaint   Patient presents with    Abdomen/Flank Pain     Stated Complaint: Pt to ER for lower abd pain and vomiting since this afternoon. Hx of bowel obst*    Subjective:   HPI      Patient is a 64-year-old female presenting to the ED with abdominal pain that started after dinner this evening.  The patient states that the pain is severe, located mostly in the lower abdomen, constant but waxing and waning in severity without identified exacerbating or alleviating factors described as a cramping sensation associate with nausea as well as a single episode of vomiting prior to arrival.  Her last bowel movement was this morning which she states was loose.  She does have a history of previous abdominal surgeries including cholecystectomy as well as an exploratory laparotomy for a possible \"intestinal tear\" that the patient states was not present.  She did not require any resection at that time.  This was approximately 3 years ago.  She does have a history of bowel obstructions and pain feels somewhat similar.  No fevers or chills.  No known sick contacts.  No known exposure to foodborne illness.    Patient was noted to be hypertensive upon arrival.  She does have a known history of hypertension.  She states that she forgot to take her BP meds today.  She is also experiencing significant pain at this time.  No chest pain or shortness of breath.  No headache.  No dizziness.  No visual disturbance.  No other neurodeficits.    Objective:     Past Medical History:    Anemia    Anxiety    Asthma (HCC)    in good control    Back problem    lower back    COVID-19    COVID-19    mild symptoms, slight fever and sore throat    CVA (cerebral vascular accident) (HCC)    Deep vein thrombosis (HCC)    left arm    Depression    Diabetes (HCC)    resolved after gastric bypass sx//    no meds    Frozen shoulder    left    Ganglion cyst    right wrist     GERD (gastroesophageal reflux disease)    Hearing impairment    No HA's    High blood pressure    History of blood transfusion    HTN (hypertension)    Hx of motion sickness    Hyperlipidemia    Morbid obesity (HCC)    APRIL (obstructive sleep apnea)    AHI 21 RDI 21 REM AHI 48 SaO2 marisol 78% // uses CPAP    Osteoarthritis    Pneumonia due to organism    Was hospitalized with COVID September 2021  fever chiil cough    PONV (postoperative nausea and vomiting)    only one time    Pseudoseizures    due to anxiety, s/p psych and neuro w/u    SBO (small bowel obstruction) ([facility])    Seizure disorder ([facility])    PSUEDO SEIZURES    Sleep apnea    C-PAP    Visual impairment    having trouble w/ left eye after cataract surgery- seeing blurring; glasses              Past Surgical History:   Procedure Laterality Date    Arthroscopy of joint unlisted      Left knee    Cataract      Cholecystectomy      Colonoscopy  5/18/2005    diverticulosis - result in scan - [facility]    Colonoscopy N/A 5/25/2016    adenoma/hp, divertic. repeat 5 yrs, mac/2 day prep    Colonoscopy      Colonoscopy N/A 11/23/2021    Procedure: COLONOSCOPY;  Surgeon: Umberto Menjivar MD;  Location:  ENDOSCOPY    Gastric bypass,obese<100cm lizbeth-en-y      Gastric bypass,obesity,sb reconstruc      Other surgical history  2007    Lizbeth-en-y    Other surgical history  1990    Lap laser removal of endometrisis    Other surgical history  02/22/2022    ex lap, lysis of adhesions, drainage on intra abdominal phlegmon & abscess    Sling oper stres incontinence      Tonsillectomy      Tubal ligation      Upper gi endoscopy,diagnosis  5/18/2005    hiatal hernia, mild gastritis (H pylori (--))    Upper gi endoscopy,diagnosis  3/24/2009    Upper gi endoscopy,exam                  Social History     Socioeconomic History    Marital status:    Occupational History    Occupation: childcare   Tobacco Use    Smoking status: Former     Current packs/day: 0.00      Average packs/day: 0.3 packs/day for 15.6 years (4.7 ttl pk-yrs)     Types: Cigarettes     Start date: 1991     Quit date: 3/4/2007     Years since quittin.9     Passive exposure: Past    Smokeless tobacco: Never    Tobacco comments:      ppd x 10 yrs    Vaping Use    Vaping status: Never Used   Substance and Sexual Activity    Alcohol use: Not Currently     Comment: occ    Drug use: Not Currently     Types: Cannabis    Sexual activity: Yes     Partners: Male     Birth control/protection: Tubal Ligation   Other Topics Concern    Exercise Yes     Comment: take care of 2 grandchildren   Social History Narrative    Homemaker    Former smoker- quit - 2ppdx 10 yrs.     Social Drivers of Health     Food Insecurity: No Food Insecurity (2/10/2025)    NCSS - Food Insecurity     Worried About Running Out of Food in the Last Year: No     Ran Out of Food in the Last Year: No   Transportation Needs: Unmet Transportation Needs (2/10/2025)    NCSS - Transportation     Lack of Transportation: Yes   Housing Stability: Not At Risk (2/10/2025)    NCSS - Housing/Utilities     Has Housing: Yes     Worried About Losing Housing: No     Unable to Get Utilities: No                  Physical Exam     ED Triage Vitals [25]   BP (!) 195/105   Pulse 76   Resp 16   Temp 97.6 °F (36.4 °C)   Temp src Oral   SpO2 98 %   O2 Device None (Room air)       Current Vitals:   Vital Signs  BP: 153/82  Pulse: 76  Resp: 14  Temp: 98.5 °F (36.9 °C)  Temp src: Oral    Oxygen Therapy  SpO2: 100 %  O2 Device: Nasal cannula  O2 Flow Rate (L/min): 2 L/min  Pulse Oximetry Type: Continuous        Physical Exam  Vitals and nursing note reviewed.   Constitutional:       General: She is not in acute distress.     Appearance: Normal appearance. She is well-developed. She is not ill-appearing or toxic-appearing.   HENT:      Head: Normocephalic and atraumatic.      Right Ear: External ear normal.      Left Ear: External ear normal.       Mouth/Throat:      Mouth: Mucous membranes are moist.      Pharynx: Oropharynx is clear.   Eyes:      Conjunctiva/sclera: Conjunctivae normal.   Cardiovascular:      Rate and Rhythm: Normal rate and regular rhythm.      Heart sounds: Normal heart sounds.   Pulmonary:      Effort: Pulmonary effort is normal.      Breath sounds: Normal breath sounds.   Abdominal:      General: Abdomen is flat. Bowel sounds are normal. There is no distension.      Palpations: Abdomen is soft.      Tenderness: There is abdominal tenderness. There is no guarding or rebound.      Comments: Tenderness greatest in the right upper and lower quadrants   Musculoskeletal:      Right lower leg: No edema.      Left lower leg: No edema.   Skin:     General: Skin is warm.      Capillary Refill: Capillary refill takes less than 2 seconds.      Findings: No rash.   Neurological:      General: No focal deficit present.      Mental Status: She is alert and oriented to person, place, and time.   Psychiatric:         Mood and Affect: Mood normal.         Behavior: Behavior normal.             ED Course     Labs Reviewed   COMP METABOLIC PANEL (14) - Abnormal; Notable for the following components:       Result Value    Glucose 111 (*)      (*)      (*)     Alkaline Phosphatase 157 (*)     All other components within normal limits   COMP METABOLIC PANEL (14) - Normal   LIPASE - Normal   LACTIC ACID, PLASMA - Normal   CBC WITH DIFFERENTIAL WITH PLATELET   CBC WITH DIFFERENTIAL WITH PLATELET   URINALYSIS WITH CULTURE REFLEX       ED Course as of 02/10/25 0700  ------------------------------------------------------------  Time: 02/09 2257  Comment: BP improved              MDM      History obtained from patient.     Differential diagnosis includes small bowel obstruction, gastroenteritis, ischemic colitis, appendicitis, viscus perforation.    Previous records reviewed.  Known history of a hypertension last in the office October 31, 2024.   Patient was admitted October 27, 2024 for SBO and discharged on October 30, 2024.  No surgery was needed at that time.  At that time she was having persistent left lower quadrant abdominal discomfort that she has had for a couple of years but the acute pain had resolved.  Her blood pressure was noted to be elevated during the hospital stay but she was not taking her home medications.  She had resumed her home medications but her blood pressure was still elevated and she had started phentermine.  Patient was advised to have a 2-week BP check.  At that time her blood pressure was 158/90.    Testing considered and ordered includes CBC, CMP, lipase, UA, CT scan of the abdomen and pelvis    I reviewed all results.  CBC and CMP unremarkable.  Lipase normal.  Lactic acid is normal.  UA is pending at time of disposition.  Patient has not provided specimen.      I also reviewed the official report which shows   CT ABDOMEN+PELVIS(CONTRAST ONLY)(CPT=74177)    Result Date: 2/9/2025  PROCEDURE:  CT ABDOMEN+PELVIS (CONTRAST ONLY) (CPT=74177)  COMPARISON:  [facility], CT, CT ABDOMEN+PELVIS(CONTRAST ONLY)(CPT=74177), 10/27/2024, 10:47 PM.  INDICATIONS:  Pt to ER for lower abd pain and vomiting since this afternoon. Hx of bowel obstructions.  TECHNIQUE:  CT scanning was performed from the dome of the diaphragm to the pubic symphysis with non-ionic intravenous contrast material. Post contrast coronal MPR imaging was performed.  Dose reduction techniques were used. Dose information is transmitted to the ACR (American College of Radiology) NRDR (National Radiology Data Registry) which includes the Dose Index Registry.  PATIENT STATED HISTORY:(As transcribed by Technologist)  Lower abdominal pain and vomiting since this afternoon.   CONTRAST USED:  100cc of Isovue 370  FINDINGS:  LIVER:  No enlargement, atrophy, abnormal density, or significant focal lesion.  BILIARY:  Sequelae of cholecystectomy. PANCREAS:  No lesion, fluid collection,  ductal dilatation, or atrophy.  SPLEEN:  No enlargement or focal lesion.  KIDNEYS:  Multiple bilateral renal parapelvic cysts are again noted.  Representative right renal parapelvic cyst measures 1.9 cm.  Represented left renal parapelvic cyst measures 2.3 cm. ADRENALS:  No mass or enlargement.  AORTA/VASCULAR:  No aneurysm or dissection.  RETROPERITONEUM:  No mass or adenopathy.  BOWEL/MESENTERY:  The appendix is unremarkable.  Postoperative changes involving small bowel in the mid abdomen is noted.  Dilated loop of small bowel in the mid abdomen measures 4.9 cm.  Inflammatory changes and minimal free fluid adjacent to the dilated loop of small bowel.  Transition point at the junction with the small bowel loop is noted.  ABDOMINAL WALL:  No mass or hernia.  URINARY BLADDER:  No visible focal wall thickening, lesion, or calculus.  PELVIC NODES:  No adenopathy.  PELVIC ORGANS:  No visible mass.  Pelvic organs appropriate for patient age.  BONES:  No bony lesion or fracture.  LUNG BASES:  No visible pulmonary or pleural disease.  OTHER:  Negative.             CONCLUSION:  Developing small bowel obstruction with transition point in the mid pelvis is noted.  Adjacent minimal free fluid and marked inflammatory changes extending to the mesenteric fat is noted.  Significant dilatation of the small bowel in this region measures 4.9 cm.  Possibility of a secondary transition point developing more proximally with a thickened loop of bowel is of consideration.  A full closed loop obstruction is not identified at the current time, however could be developing.   LOCATION:  [facility]   Dictated by (CST): Nahid Magaña MD on 2/09/2025 at 11:36 PM     Finalized by (CST): Nahid Magaña MD on 2/09/2025 at 11:42 PM          Others who assisted in patient's care included laura hospitalist.  Case was discussed with plan for admission.  General surgery, Dr. Whitfield, was also notified from the ED    Interventions in care included IV  fluids, Zofran and morphine, as well as NG tube placement with subsequent chest x-ray ordered.    NG tube was ordered.  I did discuss the importance of placement with patient.  Patient was very reluctant to placement but ultimately agreed.  States that they made her \"bleed\" in the past and this was very painful and they were unsuccessful with placement.  The nurse did attempt to times to place NG tube and she was unsuccessful.  The patient ultimately adamantly refused any additional attempts to place NG tube.          Admission disposition: 2/10/2025 12:26 AM           Medical Decision Making      Disposition and Plan     Clinical Impression:  1. Small bowel obstruction (HCC)         Disposition:  Admit  2/10/2025 12:26 am    Follow-up:  No follow-up provider specified.        Medications Prescribed:  Current Discharge Medication List              Supplementary Documentation:         Hospital Problems       Present on Admission  Date Reviewed: 3/24/2022            ICD-10-CM Noted POA    * (Principal) Small bowel obstruction (HCC) K56.609 2/10/2025 Unknown

## 2025-02-11 ENCOUNTER — APPOINTMENT (OUTPATIENT)
Dept: GENERAL RADIOLOGY | Facility: HOSPITAL | Age: 65
End: 2025-02-11
Attending: PHYSICIAN ASSISTANT
Payer: MEDICARE

## 2025-02-11 LAB
ADENOVIRUS PCR:: NOT DETECTED
ALBUMIN SERPL-MCNC: 3.8 G/DL (ref 3.2–4.8)
ALBUMIN/GLOB SERPL: 1.9 {RATIO} (ref 1–2)
ALP LIVER SERPL-CCNC: 168 U/L
ALT SERPL-CCNC: 168 U/L
ANION GAP SERPL CALC-SCNC: 11 MMOL/L (ref 0–18)
AST SERPL-CCNC: 185 U/L (ref ?–34)
B PARAPERT DNA SPEC QL NAA+PROBE: NOT DETECTED
B PERT DNA SPEC QL NAA+PROBE: NOT DETECTED
BILIRUB SERPL-MCNC: 0.7 MG/DL (ref 0.2–1.1)
BUN BLD-MCNC: 10 MG/DL (ref 9–23)
C PNEUM DNA SPEC QL NAA+PROBE: NOT DETECTED
CALCIUM BLD-MCNC: 9.1 MG/DL (ref 8.7–10.6)
CHLORIDE SERPL-SCNC: 107 MMOL/L (ref 98–112)
CO2 SERPL-SCNC: 23 MMOL/L (ref 21–32)
CORONAVIRUS 229E PCR:: NOT DETECTED
CORONAVIRUS HKU1 PCR:: NOT DETECTED
CORONAVIRUS NL63 PCR:: NOT DETECTED
CORONAVIRUS OC43 PCR:: NOT DETECTED
CREAT BLD-MCNC: 0.56 MG/DL
EGFRCR SERPLBLD CKD-EPI 2021: 101 ML/MIN/1.73M2 (ref 60–?)
ERYTHROCYTE [DISTWIDTH] IN BLOOD BY AUTOMATED COUNT: 14.9 %
FLUAV H1 2009 PAND RNA SPEC QL NAA+PROBE: DETECTED
FLUBV RNA SPEC QL NAA+PROBE: NOT DETECTED
GLOBULIN PLAS-MCNC: 2 G/DL (ref 2–3.5)
GLUCOSE BLD-MCNC: 101 MG/DL (ref 70–99)
HCT VFR BLD AUTO: 39.3 %
HGB BLD-MCNC: 13.2 G/DL
MAGNESIUM SERPL-MCNC: 1.8 MG/DL (ref 1.6–2.6)
MCH RBC QN AUTO: 32 PG (ref 26–34)
MCHC RBC AUTO-ENTMCNC: 33.6 G/DL (ref 31–37)
MCV RBC AUTO: 95.4 FL
METAPNEUMOVIRUS PCR:: NOT DETECTED
MYCOPLASMA PNEUMONIA PCR:: NOT DETECTED
OSMOLALITY SERPL CALC.SUM OF ELEC: 291 MOSM/KG (ref 275–295)
PARAINFLUENZA 1 PCR:: NOT DETECTED
PARAINFLUENZA 2 PCR:: NOT DETECTED
PARAINFLUENZA 3 PCR:: NOT DETECTED
PARAINFLUENZA 4 PCR:: NOT DETECTED
PLATELET # BLD AUTO: 158 10(3)UL (ref 150–450)
POTASSIUM SERPL-SCNC: 3.7 MMOL/L (ref 3.5–5.1)
PROT SERPL-MCNC: 5.8 G/DL (ref 5.7–8.2)
RBC # BLD AUTO: 4.12 X10(6)UL
RHINOVIRUS/ENTERO PCR:: NOT DETECTED
RSV RNA SPEC QL NAA+PROBE: NOT DETECTED
SARS-COV-2 RNA NPH QL NAA+NON-PROBE: NOT DETECTED
SODIUM SERPL-SCNC: 141 MMOL/L (ref 136–145)
WBC # BLD AUTO: 8.3 X10(3) UL (ref 4–11)

## 2025-02-11 PROCEDURE — 74250 X-RAY XM SM INT 1CNTRST STD: CPT | Performed by: PHYSICIAN ASSISTANT

## 2025-02-11 PROCEDURE — 85027 COMPLETE CBC AUTOMATED: CPT

## 2025-02-11 PROCEDURE — 83735 ASSAY OF MAGNESIUM: CPT

## 2025-02-11 PROCEDURE — 0202U NFCT DS 22 TRGT SARS-COV-2: CPT | Performed by: HOSPITALIST

## 2025-02-11 PROCEDURE — 80053 COMPREHEN METABOLIC PANEL: CPT

## 2025-02-11 RX ORDER — ACETAMINOPHEN 10 MG/ML
1000 INJECTION, SOLUTION INTRAVENOUS EVERY 6 HOURS PRN
Status: DISCONTINUED | OUTPATIENT
Start: 2025-02-11 | End: 2025-02-12

## 2025-02-11 RX ORDER — POTASSIUM CHLORIDE 1.5 G/1.58G
20 POWDER, FOR SOLUTION ORAL ONCE
Status: DISCONTINUED | OUTPATIENT
Start: 2025-02-11 | End: 2025-02-11

## 2025-02-11 RX ORDER — OSELTAMIVIR PHOSPHATE 75 MG/1
75 CAPSULE ORAL 2 TIMES DAILY
Status: DISCONTINUED | OUTPATIENT
Start: 2025-02-11 | End: 2025-02-13

## 2025-02-11 RX ORDER — MAGNESIUM SULFATE HEPTAHYDRATE 40 MG/ML
2 INJECTION, SOLUTION INTRAVENOUS ONCE
Status: COMPLETED | OUTPATIENT
Start: 2025-02-11 | End: 2025-02-11

## 2025-02-11 RX ORDER — POTASSIUM CHLORIDE 14.9 MG/ML
20 INJECTION INTRAVENOUS ONCE
Status: COMPLETED | OUTPATIENT
Start: 2025-02-11 | End: 2025-02-11

## 2025-02-11 RX ORDER — POTASSIUM CHLORIDE 1500 MG/1
20 TABLET, EXTENDED RELEASE ORAL ONCE
Status: COMPLETED | OUTPATIENT
Start: 2025-02-11 | End: 2025-02-11

## 2025-02-11 RX ORDER — METOCLOPRAMIDE HYDROCHLORIDE 5 MG/ML
10 INJECTION INTRAMUSCULAR; INTRAVENOUS EVERY 8 HOURS PRN
Status: DISCONTINUED | OUTPATIENT
Start: 2025-02-11 | End: 2025-02-13

## 2025-02-11 NOTE — PLAN OF CARE
Problem: GASTROINTESTINAL - ADULT  Goal: Minimal or absence of nausea and vomiting  Description: INTERVENTIONS:  - Maintain adequate hydration with IV or PO as ordered and tolerated  - Nasogastric tube to low intermittent suction as ordered  - Evaluate effectiveness of ordered antiemetic medications  - Provide nonpharmacologic comfort measures as appropriate  - Advance diet as tolerated, if ordered  - Obtain nutritional consult as needed  - Evaluate fluid balance  Outcome: Progressing  Goal: Maintains or returns to baseline bowel function  Description: INTERVENTIONS:  - Assess bowel function  - Maintain adequate hydration with IV or PO as ordered and tolerated  - Evaluate effectiveness of GI medications  - Encourage mobilization and activity  - Obtain nutritional consult as needed  - Establish a toileting routine/schedule  - Consider collaborating with pharmacy to review patient's medication profile  Outcome: Progressing, Tolerating clear liquid diet.  Denies nausea with diet.

## 2025-02-11 NOTE — PLAN OF CARE
A/o x4. Ra/. NGT to LIS. LBM 2/10. Plan to dc to [facility] when bed becomes available. POC updated with pt. All safety measures in place. Call light within reach .

## 2025-02-11 NOTE — PROGRESS NOTES
Received message from Dr. Whitfield.  Per Dr. Whitfield, can cancel transfer. SBO seems to be resolving., will continue to manage here.

## 2025-02-11 NOTE — TRANSFER CENTER NOTE
0800: Per Mikaela at [facility] no beds available  1145: per Dr. Whitfield surg. SBO seems to be resolving. Transfer request cancelled. Mikaela from [facility] notified

## 2025-02-11 NOTE — PROGRESS NOTES
[facility] Hospitalist Progress Note     CC: Hospital Follow up    PCP: Faye Crook DO       Subjective:     Seen and examined.  Patient feels better, NG tube removed and tolerating clear liquid diet.    OBJECTIVE:    Blood pressure 157/84, pulse 69, temperature 98.4 °F (36.9 °C), temperature source Oral, resp. rate 20, weight 220 lb 0.3 oz (99.8 kg), last menstrual period 12/10/2014, SpO2 96%, not currently breastfeeding.    Temp:  [98.1 °F (36.7 °C)-98.4 °F (36.9 °C)] 98.4 °F (36.9 °C)  Pulse:  [69-92] 69  Resp:  [16-22] 20  BP: (141-183)/(70-92) 157/84  SpO2:  [90 %-96 %] 96 %      Intake/Output:    Intake/Output Summary (Last 24 hours) at 2/11/2025 1449  Last data filed at 2/11/2025 1417  Gross per 24 hour   Intake 360 ml   Output 1200 ml   Net -840 ml       Last 3 Weights   02/10/25 0320 220 lb 0.3 oz (99.8 kg)   02/09/25 2159 220 lb (99.8 kg)   10/29/24 0650 241 lb 8 oz (109.5 kg)   10/28/24 0626 229 lb 4.8 oz (104 kg)   10/28/24 0553 230 lb (104.3 kg)   10/27/24 2111 230 lb (104.3 kg)   08/14/24 1123 230 lb (104.3 kg)       Exam   Gen: Alert, no acute distress  Heent: Normocephalic, atraumatic, neck supple, EOMI, PERRLA  Pulm: Lungs CTAB, normal respiratory effort  CV:  Regular rate and rhythm, no murmurs/rubs/gallops  Abd: Soft, nontender, nondistended, bowel sounds present  Extremities: No peripheral edema, no clubbing, pulses intact   Skin: No rashes or lesions  Neuro: AOx3, no focal neurologic deficits, CN II-XII grossly intact  Psych: appropriate mood and affect      Data Review:       Labs:     Recent Labs   Lab 02/09/25  2222 02/10/25  0435 02/11/25  0519   RBC 4.51 4.13 4.12   HGB 14.4 13.1 13.2   HCT 43.7 39.6 39.3   MCV 96.9 95.9 95.4   MCH 31.9 31.7 32.0   MCHC 33.0 33.1 33.6   RDW 15.0 14.7 14.9   NEPRELIM 5.96 4.68  --    WBC 8.8 6.7 8.3   .0 159.0 158.0         Recent Labs   Lab 02/09/25  2244 02/10/25  0435 02/11/25  0519   GLU 94 111* 101*   BUN 16 14 10   CREATSERUM 0.77  0.68 0.56   EGFRCR 85 96 101   CA 9.0 8.7 9.1    141 141   K 3.9 4.1 3.7    108 107   CO2 23.0 24.0 23.0       Recent Labs   Lab 02/09/25  2244 02/10/25  0435 02/11/25  0519   ALT 17 198* 168*   AST 21 718* 185*   ALB 4.1 3.8 3.8         Imaging:  XR SMALL BOWEL SINGLE CONTRAST (CPT=74250)    Result Date: 2/11/2025  CONCLUSION:  Negative exam.  No evidence to suggest bowel obstruction.   LOCATION:  [facility]   Dictated by (CST): Kathleen Bliss DO on 2/11/2025 at 12:21 PM     Finalized by (CST): Kathleen Bliss DO on 2/11/2025 at 12:21 PM       XR CHEST AP PORTABLE  (CPT=71045)    Result Date: 2/10/2025  CONCLUSION:  Stable cardiac and mediastinal contours.  Subsegmental bibasilar atelectasis without pulmonary edema or focal airspace consolidation.  The pleural spaces are clear.  Enteric catheter terminates in the expected location of the gastric fundus.   LOCATION:  [id number]      Dictated by (CST): Cleveland Szymanski MD on 2/10/2025 at 6:57 AM     Finalized by (CST): Cleveland Szymanski MD on 2/10/2025 at 6:58 AM       CT ABDOMEN+PELVIS(CONTRAST ONLY)(CPT=74177)    Result Date: 2/9/2025  CONCLUSION:  Developing small bowel obstruction with transition point in the mid pelvis is noted.  Adjacent minimal free fluid and marked inflammatory changes extending to the mesenteric fat is noted.  Significant dilatation of the small bowel in this region measures 4.9 cm.  Possibility of a secondary transition point developing more proximally with a thickened loop of bowel is of consideration.  A full closed loop obstruction is not identified at the current time, however could be developing.   LOCATION:  [facility]   Dictated by (CST): Nahid Magaña MD on 2/09/2025 at 11:36 PM     Finalized by (CST): Nahid Magaña MD on 2/09/2025 at 11:42 PM          Meds:      potassium chloride  20 mEq Intravenous Once    potassium chloride  20 mEq Oral Once    amLODIPine  5 mg Oral Daily    buPROPion ER  300 mg Oral Daily    losartan  100 mg Oral  Daily    pantoprazole  40 mg Oral BID AC    montelukast  10 mg Oral Daily    topiramate  25 mg Oral BID    FLUoxetine  30 mg Oral Daily      sodium chloride 100 mL/hr at 02/10/25 1610       acetaminophen    benzocaine-menthol    phenol    metoclopramide    ondansetron    morphINE **OR** morphINE **OR** morphINE    albuterol    hydrALAzine       Assessment/Plan:     64 year old female with PMH of anemia, anxiety, asthma, CVA, DVT, hypertension, hyperlipidemia, OA, pseudoseizures, APRIL, gastric bypass, perforated marginal ulcer gastrojejunostomy corrected with oversew and drainage, multiple SBO's in the past which presented to the hospital with abdominal pain.     SBO  History of multiple SBO's in past  History of gastric bypass, perforated marginal ulcer gastrojejunostomy corrected with oral receiving drainage  -Pain/nausea improved, NG tube removed.  Advance to clear liquid diet per surgery.  -Continue to monitor/advance diet as tolerated per surgery  -Pain/nausea control  -IV fluids  -No longer needing to be transferred to [facility] given improvement     Hypertension  -Home amlodipine, losartan     Anxiety  -Home fluoxetine, bupropion     Asthma  -Home Singulair, inhalers     GERD  -Pantoprazole     Pseudoseizures  -Topiramate      Dispo: Inpatient    Outpatient records reviewed. Questions/concerns were discussed with patient and/or family by bedside.   A total of 51 minutes were taken with patient and coordinating care.     DO Jeovanny Rodriguez [facility]  Hospitalist  Contact via [employer]    Supplementary Documentation:   DVT Mechanical Prophylaxis:   SCDs,    DVT Pharmacologic Prophylaxis   Medication   None                Code Status: Full Code  Gonsalves: No urinary catheter in place  Gonsalves Duration (in days):   Central line:    ALPA: 2/12/2025        **Certification      PHYSICIAN Certification of Need for Inpatient Hospitalization - Initial Certification    Patient will require inpatient services  that will reasonably be expected to span two midnight's based on the clinical documentation in H+P.   Based on patients current state of illness, I anticipate that, after discharge, patient will require TBD.

## 2025-02-11 NOTE — DISCHARGE INSTRUCTIONS
-Ride [employer]ist [place] - [phone] [email or web address]  -Ride [place] [phone] or [phone] [email or web address]  -First Transit [phone] [email or web address]    -[place] kemal Rios Ride Around [facility] [phone]  -[place] [facility] [phone]  -[facility] [phone] Ext. [phone]   -[facility] Dial-a-Ride Service  Reservations: [phone]  -[facility] [email or web address] Shuttle Bus Line at [phone]  -[facility] Senior Bus Service [phone]  -[facility] [phone].  -[facility] Transit [phone]-[phone]  -[facility] [phone]   -[employer] Transportation [phone]  -Go [employer] Grandparent Transportation [phone] [email or web address]  -[employer] [phone]  -Disabled on the Go [phone]  -[place] [employer] [facility] [phone]  -[facility] Wheelchair Transport [phone]   -[employer]: [phone]  -Connections thro mobile Rebecca [phone]  -[employer] [phone]  -[facility] [place], [facility], and [facility]. [phone] [email or web address]  -[employer] Transportation [facility] and surrounding communities [phone] [email or web address]    Influenza (Adult)    Updated for the 7182-7207 flu season   Influenza is also called the flu. It's a viral illness that affects the air passages of your nose, sinuses, throat, and lungs. It's different from the common cold. The flu can easily be passed from one to person to another. It may be spread through the air by coughing and sneezing. It can also be spread by touching the sick person and then touching your own eyes, nose, or mouth.   The flu starts 1 to 3 days after you are exposed to the flu virus. It may last for 1 to 2 weeks but sometimes people feel tired or fatigued  for many weeks afterward. You usually don’t need to take antibiotics unless you are at high risk for or have a complication from a bacterial infection. This might be an ear or sinus infection or pneumonia.   Flu symptoms may be mild or severe. They can include extreme tiredness (wanting to stay in bed all day), chills, fevers, muscle aches, soreness with eye movement, headache, and a dry, hacking cough.   Antiviral medicine for the flu is available by prescription. If you start taking it within 48 hours, it may help reduce how long your symptoms last and how severe they are. Your provider may do a test to find out if you have influenza and which strain you have.   Home care  Follow these guidelines when caring for yourself at home:  Stay away from cigarette smoke, whether it's yours or other people’s.  Acetaminophen or ibuprofen will help ease your fever, muscle aches, and headache. Don’t give aspirin to anyone younger than 18 who has the flu. This can cause a serious condition called Reye syndrome.  Nausea, loose stools, and loss of appetite are common with the flu. Eat light meals. Drink 6 to 8 glasses of liquids every day. Good choices are water, sport drinks, soft drinks without caffeine, juices, tea, and soup. Extra fluids will also help loosen secretions in your nose and lungs.  Over-the-counter cold medicines will not make the flu go away faster. But the medicines may help with coughing, sore throat, and congestion in your nose and sinuses. Don’t use a decongestant if you have high blood pressure.  Stay home until your fever has been gone for at least 24 hours without using medicine to reduce fever.  Follow-up care  Follow up with your healthcare provider, or as advised, if you're not getting better over the next week.   If you're age 65 or older, talk with your provider about getting a pneumococcal vaccine. You should also get vaccinated against pneumococcal pneumoniae at other ages if you have a weak immune  system, chronic asthma, COPD (chronic obstructive pulmonary disorder), or certain other conditions. With very few exceptions, all adults should get a flu vaccine every fall. September and October are generally good times to get vaccinated. Ask your provider about this.   When to get medical advice  Call your healthcare provider right away if you have the flu and any of these occur:   Cough with lots of colored mucus (sputum) or blood in your mucus  Chest pain, shortness of breath, wheezing, or trouble breathing  Severe headache, or face, neck, or ear pain  New rash with fever  Fever of 100.4°F (38°C) or higher, or as advised by your provider  Confusion, behavior change, or seizure  Severe weakness or dizziness  You get a new fever or cough after getting better for a few days  Also call your provider if you have flu symptoms and have a weakened immune system or are taking medicines that can weaken your immune system. These include steroids and certain anti-inflammatory medicines.   Diann last reviewed this educational content on 6/1/2022 © 2000-2023 [employer]. All rights reserved. This information is not intended as a substitute for professional medical care. Always follow your healthcare professional's instructions.

## 2025-02-11 NOTE — PROGRESS NOTES
[facility]  Progress Note    Roshni Scott Patient Status:  Inpatient    1960 MRN [id number]   Location [facility] 3SW-A Attending Anna Reddy DO   Hosp Day # 1 PCP Faye Crook DO     Subjective:    Patient reports she has had BM  Feels her abdominal pain is improving   C/o throat pain and headache    Objective/Physical Exam:    Vital Signs:  Blood pressure (!) 162/87, pulse 85, temperature 98.3 °F (36.8 °C), temperature source Oral, resp. rate 22, weight 220 lb 0.3 oz (99.8 kg), last menstrual period 12/10/2014, SpO2 91%, not currently breastfeeding.    General:  Alert, orientated x3.  Cooperative.  No apparent distress.    Lungs:  Non labored breathing    Cardiac:  Regular rate and rhythm.     Abdomen:  softer, non distended, improved abdominal pain     Extremities:  No lower extremity edema noted.  Without clubbing or cyanosis.        Labs:  Reviewed    Lab Results   Component Value Date    WBC 8.3 2025    HGB 13.2 2025    HCT 39.3 2025    .0 2025    CREATSERUM 0.56 2025    BUN 10 2025     2025    K 3.7 2025     2025    CO2 23.0 2025     2025    CA 9.1 2025    ALB 3.8 2025    ALKPHO 168 2025    BILT 0.7 2025    TP 5.8 2025     2025     2025    MG 1.8 2025       Problem List:  Patient Active Problem List   Diagnosis    Hyperlipidemia    Morbid obesity with BMI of 50.0-59.9, adult (HCC)    Essential hypertension    Non-seasonal allergic rhinitis due to pollen    ASTHMA    APRIL on CPAP    B12 deficiency    Complex tear of medial meniscus of right knee as current injury, subsequent encounter    Chondromalacia of right knee    Internal derangement of right knee    Hypertonicity of bladder    Iron malabsorption (HCC)    Other iron deficiency anemias    Pulmonary hypertension (HCC)    Ectatic thoracic aorta    Pseudo Seizure disorder (HCC)     Major depressive disorder, recurrent severe without psychotic features (HCC)    Carotid atherosclerosis    Epilepsy seizure, generalized, convulsive (HCC)    Type 2 diabetes mellitus without complication, without long-term current use of insulin (HCC)    Alcohol use    Right knee arthroscopy with partial medial meniscectomy  [facility]  03/02/2022    S/P arthroscopic surgery of right knee    Arthritis of both knees    Chronic pain of right knee    Abdominal pain, generalized    Intestinal obstruction, unspecified cause, unspecified whether partial or complete (HCC)    Nausea and vomiting in adult    Hyperglycemia    Weakness    Acute gastric ulcer with perforation and obstruction (HCC)    Anemia    Leukocytosis    Postoperative intra-abdominal abscess (HCC)    SBO (small bowel obstruction) (HCC)    Acute kidney injury    Nausea and vomiting    Nausea and vomiting, unspecified vomiting type    Abdominal pain, acute    Small bowel obstruction (HCC)           Impression:     63 y/o with SBO  H/o gastric bypass  -afebrile, no leukocytosis  -abdominal pain has improved    Plan:    Cepacol/chloraseptic   NPO ice chips  IV tylenol for headache  SBFT as ordered  Continue with transfer to [facility] once bed avail, transfer to bariatric surgeon.   All questions anwered    TIAGO Hector  [facility]  General Surgery  2/11/2025

## 2025-02-12 LAB
ALBUMIN SERPL-MCNC: 3.6 G/DL (ref 3.2–4.8)
ANION GAP SERPL CALC-SCNC: 8 MMOL/L (ref 0–18)
BUN BLD-MCNC: 7 MG/DL (ref 9–23)
CALCIUM BLD-MCNC: 8.7 MG/DL (ref 8.7–10.6)
CHLORIDE SERPL-SCNC: 108 MMOL/L (ref 98–112)
CO2 SERPL-SCNC: 26 MMOL/L (ref 21–32)
CREAT BLD-MCNC: 0.6 MG/DL
EGFRCR SERPLBLD CKD-EPI 2021: 99 ML/MIN/1.73M2 (ref 60–?)
ERYTHROCYTE [DISTWIDTH] IN BLOOD BY AUTOMATED COUNT: 14.8 %
GLUCOSE BLD-MCNC: 105 MG/DL (ref 70–99)
HCT VFR BLD AUTO: 37.7 %
HGB BLD-MCNC: 12.2 G/DL
MAGNESIUM SERPL-MCNC: 1.9 MG/DL (ref 1.6–2.6)
MCH RBC QN AUTO: 31.2 PG (ref 26–34)
MCHC RBC AUTO-ENTMCNC: 32.4 G/DL (ref 31–37)
MCV RBC AUTO: 96.4 FL
OSMOLALITY SERPL CALC.SUM OF ELEC: 292 MOSM/KG (ref 275–295)
PHOSPHATE SERPL-MCNC: 2.3 MG/DL (ref 2.4–5.1)
PLATELET # BLD AUTO: 143 10(3)UL (ref 150–450)
POTASSIUM SERPL-SCNC: 4 MMOL/L (ref 3.5–5.1)
POTASSIUM SERPL-SCNC: 4 MMOL/L (ref 3.5–5.1)
RBC # BLD AUTO: 3.91 X10(6)UL
SODIUM SERPL-SCNC: 142 MMOL/L (ref 136–145)
WBC # BLD AUTO: 6.9 X10(3) UL (ref 4–11)

## 2025-02-12 PROCEDURE — 80069 RENAL FUNCTION PANEL: CPT

## 2025-02-12 PROCEDURE — 83735 ASSAY OF MAGNESIUM: CPT

## 2025-02-12 PROCEDURE — 85027 COMPLETE CBC AUTOMATED: CPT

## 2025-02-12 PROCEDURE — 84132 ASSAY OF SERUM POTASSIUM: CPT

## 2025-02-12 RX ORDER — ACETAMINOPHEN 500 MG
1000 TABLET ORAL EVERY 6 HOURS PRN
Status: DISCONTINUED | OUTPATIENT
Start: 2025-02-12 | End: 2025-02-13

## 2025-02-12 NOTE — PROGRESS NOTES
[facility]  Progress Note    Roshni Scott Patient Status:  Inpatient    1960 MRN [id number]   Location [facility] 3SW-A Attending Arsalan Szymanski DO   Hosp Day # 2 PCP Faye Crook DO     Subjective:    Patient with flu A  Patient tolerating clears, notes some mild abdominal pain though improved  She is passing flatus and BM     Objective/Physical Exam:    Vital Signs:  Blood pressure 156/78, pulse 70, temperature 99.2 °F (37.3 °C), temperature source Oral, resp. rate 16, weight 220 lb 0.3 oz (99.8 kg), last menstrual period 12/10/2014, SpO2 94%, not currently breastfeeding.    General:  Alert, orientated x3.  Cooperative.  No apparent distress.    Lungs:  Non labored breathing    Cardiac:  Regular rate and rhythm.     Abdomen:  soft, non distended, non tender    Extremities:  No lower extremity edema noted.  Without clubbing or cyanosis.        Labs:  Reviewed    Lab Results   Component Value Date    WBC 6.9 2025    HGB 12.2 2025    HCT 37.7 2025    .0 2025    CREATSERUM 0.60 2025    BUN 7 2025     2025    K 4.0 2025    K 4.0 2025     2025    CO2 26.0 2025     2025    CA 8.7 2025    ALB 3.6 2025    MG 1.9 2025    PHOS 2.3 2025       Xray:  Reviewed    Problem List:  Patient Active Problem List   Diagnosis    Hyperlipidemia    Morbid obesity with BMI of 50.0-59.9, adult (HCC)    Essential hypertension    Non-seasonal allergic rhinitis due to pollen    ASTHMA    APRIL on CPAP    B12 deficiency    Complex tear of medial meniscus of right knee as current injury, subsequent encounter    Chondromalacia of right knee    Internal derangement of right knee    Hypertonicity of bladder    Iron malabsorption (HCC)    Other iron deficiency anemias    Pulmonary hypertension (HCC)    Ectatic thoracic aorta    Pseudo Seizure disorder (HCC)    Major depressive disorder, recurrent severe  without psychotic features (HCC)    Carotid atherosclerosis    Epilepsy seizure, generalized, convulsive (HCC)    Type 2 diabetes mellitus without complication, without long-term current use of insulin (HCC)    Alcohol use    Right knee arthroscopy with partial medial meniscectomy  [facility]  03/02/2022    S/P arthroscopic surgery of right knee    Arthritis of both knees    Chronic pain of right knee    Abdominal pain, generalized    Intestinal obstruction, unspecified cause, unspecified whether partial or complete (HCC)    Nausea and vomiting in adult    Hyperglycemia    Weakness    Acute gastric ulcer with perforation and obstruction (HCC)    Anemia    Leukocytosis    Postoperative intra-abdominal abscess (HCC)    SBO (small bowel obstruction) (HCC)    Acute kidney injury    Nausea and vomiting    Nausea and vomiting, unspecified vomiting type    Abdominal pain, acute    Small bowel obstruction (HCC)           Impression:     63 y/o with SBO  H/o gastric bypass  SBFT without obstruction, NG out tolerating liquids    Plan:    Advance diet as tolerated  No plans for surgical management  Med management for flu  Will sign off  F/u with Dr Elam as outpatient  All questions answered      TIAGO Hector  [facility]  General Surgery  2/12/2025

## 2025-02-12 NOTE — PROGRESS NOTES
Abdomen soft and mildly tender. Bowel sound present to all abdominal quadrants.  Verbalized not feeling well due to Flu.  She's been coughing and with low grade temperature this morning.  Full liquid diet for breakfast and will advance to Soft/Low fiber diet for lunch.

## 2025-02-12 NOTE — PROGRESS NOTES
[facility] Hospitalist Progress Note     CC: Hospital Follow up    PCP: Faye Crook DO       Subjective:     Seen and examined.  Patient endorsing mild milligrams daily headaches.  Tested positive for flu last night.  Doing better in terms of abdominal pain and diet advancement.    OBJECTIVE:    Blood pressure 156/78, pulse 70, temperature 99.2 °F (37.3 °C), temperature source Oral, resp. rate 16, weight 220 lb 0.3 oz (99.8 kg), last menstrual period 12/10/2014, SpO2 94%, not currently breastfeeding.    Temp:  [98.2 °F (36.8 °C)-99.3 °F (37.4 °C)] 99.2 °F (37.3 °C)  Pulse:  [70-77] 70  Resp:  [16-18] 16  BP: (116-156)/(50-79) 156/78  SpO2:  [92 %-99 %] 94 %      Intake/Output:    Intake/Output Summary (Last 24 hours) at 2/12/2025 1231  Last data filed at 2/12/2025 1038  Gross per 24 hour   Intake 1580 ml   Output 1350 ml   Net 230 ml       Last 3 Weights   02/10/25 0320 220 lb 0.3 oz (99.8 kg)   02/09/25 2159 220 lb (99.8 kg)   10/29/24 0650 241 lb 8 oz (109.5 kg)   10/28/24 0626 229 lb 4.8 oz (104 kg)   10/28/24 0553 230 lb (104.3 kg)   10/27/24 2111 230 lb (104.3 kg)   08/14/24 1123 230 lb (104.3 kg)       Exam   Gen: Fatigued  Heent: Normocephalic, atraumatic, neck supple, EOMI, PERRLA  Pulm: Lungs CTAB, normal respiratory effort  CV:  Regular rate and rhythm, no murmurs/rubs/gallops  Abd: Soft, nontender, nondistended, bowel sounds present  Extremities: No peripheral edema, no clubbing, pulses intact   Skin: No rashes or lesions  Neuro: AOx3, no focal neurologic deficits, CN II-XII grossly intact      Data Review:       Labs:     Recent Labs   Lab 02/09/25  2222 02/10/25  0435 02/11/25  0519 02/12/25  0413   RBC 4.51 4.13 4.12 3.91   HGB 14.4 13.1 13.2 12.2   HCT 43.7 39.6 39.3 37.7   MCV 96.9 95.9 95.4 96.4   MCH 31.9 31.7 32.0 31.2   MCHC 33.0 33.1 33.6 32.4   RDW 15.0 14.7 14.9 14.8   NEPRELIM 5.96 4.68  --   --    WBC 8.8 6.7 8.3 6.9   .0 159.0 158.0 143.0*         Recent Labs   Lab  02/10/25  0435 02/11/25  0519 02/12/25  0413   * 101* 105*   BUN 14 10 7*   CREATSERUM 0.68 0.56 0.60   EGFRCR 96 101 99   CA 8.7 9.1 8.7    141 142   K 4.1 3.7 4.0  4.0    107 108   CO2 24.0 23.0 26.0       Recent Labs   Lab 02/09/25  2244 02/10/25  0435 02/11/25 0519 02/12/25 0413   ALT 17 198* 168*  --    AST 21 718* 185*  --    ALB 4.1 3.8 3.8 3.6         Imaging:  XR SMALL BOWEL SINGLE CONTRAST (CPT=74250)    Result Date: 2/11/2025  CONCLUSION:  Negative exam.  No evidence to suggest bowel obstruction.   LOCATION:  [facility]   Dictated by (CST): Kathleen Bliss DO on 2/11/2025 at 12:21 PM     Finalized by (CST): Kathleen Bliss DO on 2/11/2025 at 12:21 PM       XR CHEST AP PORTABLE  (CPT=71045)    Result Date: 2/10/2025  CONCLUSION:  Stable cardiac and mediastinal contours.  Subsegmental bibasilar atelectasis without pulmonary edema or focal airspace consolidation.  The pleural spaces are clear.  Enteric catheter terminates in the expected location of the gastric fundus.   LOCATION:  [id number]      Dictated by (CST): Cleveland Szymanski MD on 2/10/2025 at 6:57 AM     Finalized by (CST): Cleveland Szymanski MD on 2/10/2025 at 6:58 AM       CT ABDOMEN+PELVIS(CONTRAST ONLY)(CPT=74177)    Result Date: 2/9/2025  CONCLUSION:  Developing small bowel obstruction with transition point in the mid pelvis is noted.  Adjacent minimal free fluid and marked inflammatory changes extending to the mesenteric fat is noted.  Significant dilatation of the small bowel in this region measures 4.9 cm.  Possibility of a secondary transition point developing more proximally with a thickened loop of bowel is of consideration.  A full closed loop obstruction is not identified at the current time, however could be developing.   LOCATION:  [facility]   Dictated by (CST): Nahid Magaña MD on 2/09/2025 at 11:36 PM     Finalized by (CST): Nahid Magaña MD on 2/09/2025 at 11:42 PM          Meds:      sodium phosphate  15 mmol Intravenous  Once    oseltamivir  75 mg Oral BID    amLODIPine  5 mg Oral Daily    buPROPion ER  300 mg Oral Daily    losartan  100 mg Oral Daily    pantoprazole  40 mg Oral BID AC    montelukast  10 mg Oral Daily    topiramate  25 mg Oral BID    FLUoxetine  30 mg Oral Daily      sodium chloride 100 mL/hr at 02/10/25 1610       acetaminophen    benzocaine-menthol    phenol    metoclopramide    dextromethorphan-guaiFENesin    ondansetron    morphINE **OR** morphINE **OR** morphINE    albuterol    hydrALAzine       Assessment/Plan:     64 year old female with PMH of anemia, anxiety, asthma, CVA, DVT, hypertension, hyperlipidemia, OA, pseudoseizures, APRIL, gastric bypass, perforated marginal ulcer gastrojejunostomy corrected with oversew and drainage, multiple SBO's in the past which presented to the hospital with abdominal pain.     SBO  History of multiple SBO's in past  History of gastric bypass, perforated marginal ulcer gastrojejunostomy corrected with oral receiving drainage  -Improved, patient tolerating liquid diet.  Can further advance his tolerated.  -Pain/nausea control  -Further recommendations per general surgery    Influenza A infection  -Patient endorsing myalgias, headache, coughing since last night.  Tested positive for influenza A infection.  -Tamiflu started  -Supportive care     Hypertension  -Home amlodipine, losartan     Anxiety  -Home fluoxetine, bupropion     Asthma  -Home Singulair, inhalers     GERD  -Pantoprazole     Pseudoseizures  -Topiramate      Dispo: Discharge in the next 24 hours.    Outpatient records reviewed. Questions/concerns were discussed with patient and/or family by bedside.   A total of 52 minutes were taken with patient and coordinating care.     DO Jeovanny Rodriguez [facility]  Hospitalist  Contact via [employer]anks[employer]    Supplementary Documentation:   DVT Mechanical Prophylaxis:   SCDs,    DVT Pharmacologic Prophylaxis   Medication   None                Code Status: Full  Code  Gonsalves: No urinary catheter in place  Gonsalves Duration (in days):   Central line:    ALPA: 2/12/2025        **Certification      PHYSICIAN Certification of Need for Inpatient Hospitalization - Initial Certification    Patient will require inpatient services that will reasonably be expected to span two midnight's based on the clinical documentation in H+P.   Based on patients current state of illness, I anticipate that, after discharge, patient will require TBD.

## 2025-02-12 NOTE — PLAN OF CARE
AxO4. VSS on RA - APRIL no CPAP. Observed w/ congested cough, feeling weak - resp panel ordered, tested positive for influenza A, tamiflu started. Denies nausea during shift, tolerating clears. LBM 2/11. Voids w/o issue. PRN tylenol given for headache, PRN robitussin given for cough. Up ad elicia. [place] to f/u in AM. Updated on POC. Safety measures placed. Care ongoing.

## 2025-02-13 VITALS
BODY MASS INDEX: 40 KG/M2 | SYSTOLIC BLOOD PRESSURE: 125 MMHG | DIASTOLIC BLOOD PRESSURE: 71 MMHG | OXYGEN SATURATION: 100 % | HEART RATE: 77 BPM | RESPIRATION RATE: 18 BRPM | TEMPERATURE: 98 F | WEIGHT: 220 LBS

## 2025-02-13 LAB
ALBUMIN SERPL-MCNC: 3.7 G/DL (ref 3.2–4.8)
ANION GAP SERPL CALC-SCNC: 3 MMOL/L (ref 0–18)
BUN BLD-MCNC: 7 MG/DL (ref 9–23)
CALCIUM BLD-MCNC: 8.8 MG/DL (ref 8.7–10.6)
CHLORIDE SERPL-SCNC: 111 MMOL/L (ref 98–112)
CO2 SERPL-SCNC: 25 MMOL/L (ref 21–32)
CREAT BLD-MCNC: 0.6 MG/DL
EGFRCR SERPLBLD CKD-EPI 2021: 99 ML/MIN/1.73M2 (ref 60–?)
ERYTHROCYTE [DISTWIDTH] IN BLOOD BY AUTOMATED COUNT: 14.6 %
GLUCOSE BLD-MCNC: 99 MG/DL (ref 70–99)
HCT VFR BLD AUTO: 38.3 %
HGB BLD-MCNC: 12.3 G/DL
MAGNESIUM SERPL-MCNC: 1.8 MG/DL (ref 1.6–2.6)
MCH RBC QN AUTO: 31 PG (ref 26–34)
MCHC RBC AUTO-ENTMCNC: 32.1 G/DL (ref 31–37)
MCV RBC AUTO: 96.5 FL
OSMOLALITY SERPL CALC.SUM OF ELEC: 286 MOSM/KG (ref 275–295)
PHOSPHATE SERPL-MCNC: 2.8 MG/DL (ref 2.4–5.1)
PHOSPHATE SERPL-MCNC: 2.8 MG/DL (ref 2.4–5.1)
PLATELET # BLD AUTO: 143 10(3)UL (ref 150–450)
POTASSIUM SERPL-SCNC: 3.8 MMOL/L (ref 3.5–5.1)
RBC # BLD AUTO: 3.97 X10(6)UL
SODIUM SERPL-SCNC: 139 MMOL/L (ref 136–145)
WBC # BLD AUTO: 4.6 X10(3) UL (ref 4–11)

## 2025-02-13 PROCEDURE — 80069 RENAL FUNCTION PANEL: CPT

## 2025-02-13 PROCEDURE — 83735 ASSAY OF MAGNESIUM: CPT

## 2025-02-13 PROCEDURE — 85027 COMPLETE CBC AUTOMATED: CPT

## 2025-02-13 PROCEDURE — 84100 ASSAY OF PHOSPHORUS: CPT

## 2025-02-13 RX ORDER — MAGNESIUM OXIDE 400 MG/1
400 TABLET ORAL ONCE
Status: COMPLETED | OUTPATIENT
Start: 2025-02-13 | End: 2025-02-13

## 2025-02-13 RX ORDER — OSELTAMIVIR PHOSPHATE 75 MG/1
75 CAPSULE ORAL 2 TIMES DAILY
Qty: 8 CAPSULE | Refills: 0 | Status: SHIPPED | OUTPATIENT
Start: 2025-02-13 | End: 2025-02-17

## 2025-02-13 RX ORDER — POTASSIUM CHLORIDE 1500 MG/1
40 TABLET, EXTENDED RELEASE ORAL ONCE
Status: COMPLETED | OUTPATIENT
Start: 2025-02-13 | End: 2025-02-13

## 2025-02-13 RX ORDER — SEMAGLUTIDE 0.68 MG/ML
0.25 INJECTION, SOLUTION SUBCUTANEOUS WEEKLY
COMMUNITY
Start: 2025-02-03 | End: 2025-05-04

## 2025-02-13 RX ORDER — OXYBUTYNIN CHLORIDE 15 MG/1
15 TABLET, EXTENDED RELEASE ORAL DAILY
COMMUNITY
Start: 2024-12-21

## 2025-02-13 NOTE — PLAN OF CARE
Problem: GASTROINTESTINAL - ADULT  Goal: Minimal or absence of nausea and vomiting  Description: INTERVENTIONS:  - Maintain adequate hydration with IV or PO as ordered and tolerated  - Nasogastric tube to low intermittent suction as ordered  - Evaluate effectiveness of ordered antiemetic medications  - Provide nonpharmacologic comfort measures as appropriate  - Advance diet as tolerated, if ordered  - Obtain nutritional consult as needed  - Evaluate fluid balance  Outcome: Progressing  Goal: Maintains or returns to baseline bowel function  Description: INTERVENTIONS:  - Assess bowel function  - Maintain adequate hydration with IV or PO as ordered and tolerated  - Evaluate effectiveness of GI medications  - Encourage mobilization and activity  - Obtain nutritional consult as needed  - Establish a toileting routine/schedule  - Consider collaborating with pharmacy to review patient's medication profile  Outcome: Progressing, Tolerating soft/Low fiber diet

## 2025-02-13 NOTE — DISCHARGE SUMMARY
General Medicine Discharge Summary     Patient ID:  Roshni Scott  64 year old  8/7/1960    Admit date: 2/9/2025    Discharge date and time: 2/13/2025    Attending Physician: Arsalan Szymanski DO     Consults: IP CONSULT TO HOSPITALIST  IP CONSULT TO GENERAL SURGERY  IP CONSULT TO SOCIAL WORK  IP CONSULT TO SOCIAL WORK    Primary Care Physician: Faye Crook DO     Reason for admission: SBO    Risk For Readmission: Low    Discharge Diagnoses: Small bowel obstruction (HCC) [K56.609]  See Additional Discharge Diagnoses in Hospital Course    Discharged Condition: good    Follow-up with labs/images appointments: PCP, surgery     Exam   Gen: Alert, no acute distress  Heent: Normocephalic, atraumatic, neck supple, EOMI, PERRLA  Pulm: Lungs CTAB, normal respiratory effort  CV:  Regular rate and rhythm, no murmurs/rubs/gallops  Abd: Soft, nontender, nondistended, bowel sounds present  Extremities: No peripheral edema, no clubbing, pulses intact   Skin: No rashes or lesions  Neuro: AOx3, no focal neurologic deficits, CN II-XII grossly intact  Psych: appropriate mood and affect    Hospital Course: 64 year old female with PMH of anemia, anxiety, asthma, CVA, DVT, hypertension, hyperlipidemia, OA, pseudoseizures, APRIL, gastric bypass, perforated marginal ulcer gastrojejunostomy corrected with oversew and drainage, multiple SBO's in the past which presented to the hospital with abdominal pain.  Admitted for SBO, NG tube placed and patient progressed well.  Tolerated soft diet, stable for discharge per surgery.  Patient also sent home with Tamiflu for influenza A infection.     SBO  History of multiple SBO's in past  History of gastric bypass, perforated marginal ulcer gastrojejunostomy corrected with oral receiving drainage  -Improved, patient tolerating liquid diet.   -Pain/nausea control  -Further recommendations per general surgery     Influenza A infection  -Tamiflu started  -Supportive care     Hypertension  -Home  amlodipine, losartan     Anxiety  -Home fluoxetine, bupropion     Asthma  -Home Singulair, inhalers     GERD  -Pantoprazole     Pseudoseizures  -Topiramate    Operative Procedures:      Imaging: XR SMALL BOWEL SINGLE CONTRAST (CPT=74250)    Result Date: 2/11/2025  CONCLUSION:  Negative exam.  No evidence to suggest bowel obstruction.   LOCATION:  [facility]   Dictated by (CST): Kathleen Bliss DO on 2/11/2025 at 12:21 PM     Finalized by (CST): Kathleen Bliss DO on 2/11/2025 at 12:21 PM          Disposition: home      Home Medication Changes:     Med list     Medication List        START taking these medications      oseltamivir 75 MG Caps  Commonly known as: Tamiflu  Take 1 capsule (75 mg total) by mouth 2 (two) times daily for 4 days.            CONTINUE taking these medications      acetaminophen 500 MG Tabs  Commonly known as: Tylenol Extra Strength     albuterol 108 (90 Base) MCG/ACT Aers  Commonly known as: Ventolin HFA  Inhale 2 puffs into the lungs every 6 (six) hours as needed for Wheezing or Shortness of Breath.     amLODIPine 5 MG Tabs  Commonly known as: Norvasc  Take 1 tablet (5 mg total) by mouth daily.     buPROPion  MG Tb24  Commonly known as: Wellbutrin XL  Take 1 tablet (300 mg total) by mouth daily.     FLUoxetine 20 MG Caps  Commonly known as: PROzac  Take 1 capsule (20 mg total) by mouth daily.     losartan 100 MG Tabs  Commonly known as: Cozaar  Take 1 tablet (100 mg total) by mouth daily.     montelukast 10 MG Tabs  Commonly known as: Singulair  Take 1 tablet (10 mg total) by mouth daily.     pantoprazole 40 MG Tbec  Commonly known as: Protonix  Take 1 tablet (40 mg total) by mouth 2 (two) times daily before meals.     topiramate 25 MG Tabs  Commonly known as: TopaMAX  Take 1 tablet (25 mg total) by mouth 2 (two) times daily.     Vitamin B-12 1000 MCG Subl  Place 2,000 mcg under the tongue daily.     Vitamin D 50 MCG (2000 UT) Caps               Where to Get Your Medications        These  medications were sent to [id number] - [place], [place] - [address] [phone], [phone]  [address] [facility] [address]      Hours: 24-hours Phone: [phone]   oseltamivir 75 MG Caps         FU   Follow-up Information       Faye Crook,  Follow up in 1 week(s).    Specialty: Family Medicine  Contact information:  [address]  [address]  [facility] [address] [phone]               Juan Manuel Lal MD Follow up.    Specialties: SURGERY, GENERAL, BARIATRICS  Why: As needed  Contact information:  [address]  [facility] [address] [phone]                             DC instructions:      Other Discharge Instructions:         -[employer] [place] - [phone] [email or web address]  -R[employer] [place] [phone] or [phone] [email or web address]  -First Transit [phone] [email or web address]    -[place] [facility] [facility] [phone]  -[facility] [phone]  -[facility] Transportation [phone] Ext. [phone]   -[facility] Dial-[employer]-Ride Service  Reservations: [phone]  -[facility] Shuttle Bus Line at [phone]  -[facility] Senior Bus Service [phone]  -[facility] [phone].  -[facility] Transit [phone]  -[facility] [phone]   -[employer] Transportation [phone]  -[employer] Transportation [phone] [email or web address]  -[employer] [phone]  -Disabled on the Go [phone]  -[place] [employer] Transfer [phone]  -[facility] Wheelchair Transport [phone]   -[employer]: [phone]  -[facility] [phone]  -[facility] [employer] [phone]  -[facility] [place], [place], [place], [place], and [facility]. [phone]  [email or web address]  -[employer] [facility] and surrounding communities [phone] [email or web address]    Influenza (Adult)    Updated for the 8675-1385 flu season   Influenza is also called the flu. It's a viral illness that affects the air passages of your nose, sinuses, throat, and lungs. It's different from the common cold. The flu can easily be passed from one to person to another. It may be spread through the air by coughing and sneezing. It can also be spread by touching the sick person and then touching your own eyes, nose, or mouth.   The flu starts 1 to 3 days after you are exposed to the flu virus. It may last for 1 to 2 weeks but sometimes people feel tired or fatigued for many weeks afterward. You usually don’t need to take antibiotics unless you are at high risk for or have a complication from a bacterial infection. This might be an ear or sinus infection or pneumonia.   Flu symptoms may be mild or severe. They can include extreme tiredness (wanting to stay in bed all day), chills, fevers, muscle aches, soreness with eye movement, headache, and a dry, hacking cough.   Antiviral medicine for the flu is available by prescription. If you start taking it within 48 hours, it may help reduce how long your symptoms last and how severe they are. Your provider may do a test to find out if you have influenza and which strain you have.   Home care  Follow these guidelines when caring for yourself at home:  Stay away from cigarette smoke, whether it's yours or other people’s.  Acetaminophen or ibuprofen will help ease your fever, muscle aches, and headache. Don’t give aspirin to anyone younger than 18 who has the flu. This can cause a serious condition called Reye syndrome.  Nausea, loose stools, and loss of appetite are common with the flu. Eat light meals. Drink 6 to 8 glasses of liquids every day. Good choices are water, sport drinks, soft drinks without caffeine, juices, tea, and  soup. Extra fluids will also help loosen secretions in your nose and lungs.  Over-the-counter cold medicines will not make the flu go away faster. But the medicines may help with coughing, sore throat, and congestion in your nose and sinuses. Don’t use a decongestant if you have high blood pressure.  Stay home until your fever has been gone for at least 24 hours without using medicine to reduce fever.  Follow-up care  Follow up with your healthcare provider, or as advised, if you're not getting better over the next week.   If you're age 65 or older, talk with your provider about getting a pneumococcal vaccine. You should also get vaccinated against pneumococcal pneumoniae at other ages if you have a weak immune system, chronic asthma, COPD (chronic obstructive pulmonary disorder), or certain other conditions. With very few exceptions, all adults should get a flu vaccine every fall. September and October are generally good times to get vaccinated. Ask your provider about this.   When to get medical advice  Call your healthcare provider right away if you have the flu and any of these occur:   Cough with lots of colored mucus (sputum) or blood in your mucus  Chest pain, shortness of breath, wheezing, or trouble breathing  Severe headache, or face, neck, or ear pain  New rash with fever  Fever of 100.4°F (38°C) or higher, or as advised by your provider  Confusion, behavior change, or seizure  Severe weakness or dizziness  You get a new fever or cough after getting better for a few days  Also call your provider if you have flu symptoms and have a weakened immune system or are taking medicines that can weaken your immune system. These include steroids and certain anti-inflammatory medicines.   [employer] last reviewed this educational content on 6/1/2022 © 2000-2023 [employer]. All rights reserved. This information is not intended as a substitute for professional medical care. Always follow your healthcare  professional's instructions.              I reconciled current and discharge medications on day of discharge, discussed changes with patient and noted changes above.       Total Time Coordinating Care: 36 minutes.     Patient had opportunity to ask questions and state understanding and agreement with therapeutic plan as outlined.    DO Jeovanny Rodriguez [facility]  Hospitalist  Contact via [employer]

## 2025-02-13 NOTE — PLAN OF CARE
Patient alert and oriented x4. Room air. APRIL with no CPAP. Telemetry monitoring. Cardiac electrolyte protocol with magnesium and potassium replaced. Glasses at upper dentures at bedside. Tolerating soft/low fiber diet. Up ad elicia. Deemed appropriate for discharge home with family.

## 2025-02-13 NOTE — PLAN OF CARE
AxO4. VSS on RA - APRIL no CPAP. On tamiflu, PRN robitussin given for cough. Denies nausea during shift. LBM 2/12. Voids w/o issue. PRN tylenol given for headache. Up ad elicia - encouraged to ambulate halls w/ mask. Poss dc in AM. Updated on POC. Safety measures placed. Care ongoing.

## 2025-02-13 NOTE — PROGRESS NOTES
AVS reviewed, IV dc'd, will dc home shortly , verbalized understanding of dc instructions. Tamiflu to be picked up at own pharmacy

## 2025-02-28 NOTE — PROGRESS NOTES
Physician Clarification    Additional information related to the patient's BMI of 40.24 kg/m²    40.24 kg/m²with Morbid Obesity     This note is part of the patient's medical record.

## 2025-03-31 ENCOUNTER — HOSPITAL ENCOUNTER (INPATIENT)
Facility: HOSPITAL | Age: 65
LOS: 1 days | Discharge: ANOTHER HOSPITAL | End: 2025-03-31
Attending: EMERGENCY MEDICINE | Admitting: HOSPITALIST
Payer: MEDICARE

## 2025-03-31 ENCOUNTER — APPOINTMENT (OUTPATIENT)
Dept: CT IMAGING | Age: 65
End: 2025-03-31
Attending: EMERGENCY MEDICINE
Payer: MEDICARE

## 2025-03-31 VITALS
HEIGHT: 63 IN | BODY MASS INDEX: 38.07 KG/M2 | RESPIRATION RATE: 16 BRPM | DIASTOLIC BLOOD PRESSURE: 61 MMHG | WEIGHT: 214.88 LBS | OXYGEN SATURATION: 98 % | SYSTOLIC BLOOD PRESSURE: 118 MMHG | TEMPERATURE: 99 F | HEART RATE: 70 BPM

## 2025-03-31 DIAGNOSIS — K56.609 SBO (SMALL BOWEL OBSTRUCTION) (HCC): Primary | ICD-10-CM

## 2025-03-31 LAB
ALBUMIN SERPL-MCNC: 4.5 G/DL (ref 3.2–4.8)
ALBUMIN/GLOB SERPL: 1.7 {RATIO} (ref 1–2)
ALP LIVER SERPL-CCNC: 117 U/L
ALT SERPL-CCNC: 147 U/L
ANION GAP SERPL CALC-SCNC: 7 MMOL/L (ref 0–18)
AST SERPL-CCNC: 109 U/L (ref ?–34)
BASOPHILS # BLD AUTO: 0.07 X10(3) UL (ref 0–0.2)
BASOPHILS NFR BLD AUTO: 0.9 %
BILIRUB SERPL-MCNC: 0.7 MG/DL (ref 0.2–1.1)
BUN BLD-MCNC: 12 MG/DL (ref 9–23)
CALCIUM BLD-MCNC: 10 MG/DL (ref 8.7–10.6)
CHLORIDE SERPL-SCNC: 110 MMOL/L (ref 98–112)
CO2 SERPL-SCNC: 25 MMOL/L (ref 21–32)
CREAT BLD-MCNC: 0.91 MG/DL
EGFRCR SERPLBLD CKD-EPI 2021: 70 ML/MIN/1.73M2 (ref 60–?)
EOSINOPHIL # BLD AUTO: 0.39 X10(3) UL (ref 0–0.7)
EOSINOPHIL NFR BLD AUTO: 5 %
ERYTHROCYTE [DISTWIDTH] IN BLOOD BY AUTOMATED COUNT: 14.6 %
GLOBULIN PLAS-MCNC: 2.6 G/DL (ref 2–3.5)
GLUCOSE BLD-MCNC: 110 MG/DL (ref 70–99)
GLUCOSE BLD-MCNC: 97 MG/DL (ref 70–99)
HCT VFR BLD AUTO: 43.8 %
HGB BLD-MCNC: 14.8 G/DL
IMM GRANULOCYTES # BLD AUTO: 0.02 X10(3) UL (ref 0–1)
IMM GRANULOCYTES NFR BLD: 0.3 %
LIPASE SERPL-CCNC: 27 U/L (ref 12–53)
LYMPHOCYTES # BLD AUTO: 1.67 X10(3) UL (ref 1–4)
LYMPHOCYTES NFR BLD AUTO: 21.3 %
MCH RBC QN AUTO: 33.5 PG (ref 26–34)
MCHC RBC AUTO-ENTMCNC: 33.8 G/DL (ref 31–37)
MCV RBC AUTO: 99.1 FL
MONOCYTES # BLD AUTO: 0.55 X10(3) UL (ref 0.1–1)
MONOCYTES NFR BLD AUTO: 7 %
NEUTROPHILS # BLD AUTO: 5.15 X10 (3) UL (ref 1.5–7.7)
NEUTROPHILS # BLD AUTO: 5.15 X10(3) UL (ref 1.5–7.7)
NEUTROPHILS NFR BLD AUTO: 65.5 %
OSMOLALITY SERPL CALC.SUM OF ELEC: 294 MOSM/KG (ref 275–295)
PLATELET # BLD AUTO: 187 10(3)UL (ref 150–450)
POCT INFLUENZA A: NEGATIVE
POCT INFLUENZA B: NEGATIVE
POTASSIUM SERPL-SCNC: 5.1 MMOL/L (ref 3.5–5.1)
PROT SERPL-MCNC: 7.1 G/DL (ref 5.7–8.2)
RBC # BLD AUTO: 4.42 X10(6)UL
SODIUM SERPL-SCNC: 142 MMOL/L (ref 136–145)
TROPONIN I SERPL HS-MCNC: 6 NG/L
WBC # BLD AUTO: 7.9 X10(3) UL (ref 4–11)

## 2025-03-31 PROCEDURE — 96374 THER/PROPH/DIAG INJ IV PUSH: CPT

## 2025-03-31 PROCEDURE — 96361 HYDRATE IV INFUSION ADD-ON: CPT

## 2025-03-31 PROCEDURE — 85025 COMPLETE CBC W/AUTO DIFF WBC: CPT | Performed by: EMERGENCY MEDICINE

## 2025-03-31 PROCEDURE — 87502 INFLUENZA DNA AMP PROBE: CPT | Performed by: EMERGENCY MEDICINE

## 2025-03-31 PROCEDURE — 93010 ELECTROCARDIOGRAM REPORT: CPT | Performed by: INTERNAL MEDICINE

## 2025-03-31 PROCEDURE — 84484 ASSAY OF TROPONIN QUANT: CPT | Performed by: STUDENT IN AN ORGANIZED HEALTH CARE EDUCATION/TRAINING PROGRAM

## 2025-03-31 PROCEDURE — 99285 EMERGENCY DEPT VISIT HI MDM: CPT

## 2025-03-31 PROCEDURE — 96375 TX/PRO/DX INJ NEW DRUG ADDON: CPT

## 2025-03-31 PROCEDURE — 93005 ELECTROCARDIOGRAM TRACING: CPT

## 2025-03-31 PROCEDURE — 74177 CT ABD & PELVIS W/CONTRAST: CPT | Performed by: EMERGENCY MEDICINE

## 2025-03-31 PROCEDURE — 80053 COMPREHEN METABOLIC PANEL: CPT | Performed by: EMERGENCY MEDICINE

## 2025-03-31 PROCEDURE — 82962 GLUCOSE BLOOD TEST: CPT

## 2025-03-31 PROCEDURE — 83690 ASSAY OF LIPASE: CPT | Performed by: EMERGENCY MEDICINE

## 2025-03-31 RX ORDER — METOCLOPRAMIDE HYDROCHLORIDE 5 MG/ML
5 INJECTION INTRAMUSCULAR; INTRAVENOUS EVERY 8 HOURS PRN
Status: DISCONTINUED | OUTPATIENT
Start: 2025-03-31 | End: 2025-03-31

## 2025-03-31 RX ORDER — SODIUM CHLORIDE 9 MG/ML
INJECTION, SOLUTION INTRAVENOUS CONTINUOUS
Status: DISCONTINUED | OUTPATIENT
Start: 2025-03-31 | End: 2025-03-31

## 2025-03-31 RX ORDER — POLYETHYLENE GLYCOL 3350 17 G/17G
17 POWDER, FOR SOLUTION ORAL DAILY PRN
Status: DISCONTINUED | OUTPATIENT
Start: 2025-03-31 | End: 2025-03-31

## 2025-03-31 RX ORDER — ONDANSETRON 2 MG/ML
4 INJECTION INTRAMUSCULAR; INTRAVENOUS EVERY 6 HOURS PRN
Status: DISCONTINUED | OUTPATIENT
Start: 2025-03-31 | End: 2025-03-31

## 2025-03-31 RX ORDER — BISACODYL 10 MG
10 SUPPOSITORY, RECTAL RECTAL
Status: DISCONTINUED | OUTPATIENT
Start: 2025-03-31 | End: 2025-03-31

## 2025-03-31 RX ORDER — FLUOXETINE 10 MG/1
10 CAPSULE ORAL DAILY
COMMUNITY
Start: 2025-02-26

## 2025-03-31 RX ORDER — MORPHINE SULFATE 4 MG/ML
4 INJECTION, SOLUTION INTRAMUSCULAR; INTRAVENOUS EVERY 30 MIN PRN
Status: DISCONTINUED | OUTPATIENT
Start: 2025-03-31 | End: 2025-03-31

## 2025-03-31 RX ORDER — MORPHINE SULFATE 4 MG/ML
4 INJECTION, SOLUTION INTRAMUSCULAR; INTRAVENOUS EVERY 2 HOUR PRN
Status: DISCONTINUED | OUTPATIENT
Start: 2025-03-31 | End: 2025-03-31

## 2025-03-31 RX ORDER — ONDANSETRON 2 MG/ML
4 INJECTION INTRAMUSCULAR; INTRAVENOUS ONCE
Status: COMPLETED | OUTPATIENT
Start: 2025-03-31 | End: 2025-03-31

## 2025-03-31 RX ORDER — SENNOSIDES 8.6 MG
17.2 TABLET ORAL NIGHTLY PRN
Status: DISCONTINUED | OUTPATIENT
Start: 2025-03-31 | End: 2025-03-31

## 2025-03-31 RX ORDER — IOPAMIDOL 755 MG/ML
80 INJECTION, SOLUTION INTRAVASCULAR
Status: COMPLETED | OUTPATIENT
Start: 2025-03-31 | End: 2025-03-31

## 2025-03-31 RX ORDER — ALBUTEROL SULFATE 90 UG/1
2 INHALANT RESPIRATORY (INHALATION) EVERY 6 HOURS PRN
Status: DISCONTINUED | OUTPATIENT
Start: 2025-03-31 | End: 2025-03-31

## 2025-03-31 RX ORDER — ACETAMINOPHEN 500 MG
1000 TABLET ORAL EVERY 6 HOURS PRN
Status: DISCONTINUED | OUTPATIENT
Start: 2025-03-31 | End: 2025-03-31

## 2025-03-31 RX ORDER — HEPARIN SODIUM 5000 [USP'U]/ML
5000 INJECTION, SOLUTION INTRAVENOUS; SUBCUTANEOUS EVERY 8 HOURS SCHEDULED
Status: DISCONTINUED | OUTPATIENT
Start: 2025-03-31 | End: 2025-03-31

## 2025-03-31 RX ORDER — SODIUM PHOSPHATE, DIBASIC AND SODIUM PHOSPHATE, MONOBASIC 7; 19 G/230ML; G/230ML
1 ENEMA RECTAL ONCE AS NEEDED
Status: DISCONTINUED | OUTPATIENT
Start: 2025-03-31 | End: 2025-03-31

## 2025-03-31 RX ORDER — MORPHINE SULFATE 2 MG/ML
1 INJECTION, SOLUTION INTRAMUSCULAR; INTRAVENOUS EVERY 2 HOUR PRN
Status: DISCONTINUED | OUTPATIENT
Start: 2025-03-31 | End: 2025-03-31

## 2025-03-31 RX ORDER — MORPHINE SULFATE 2 MG/ML
2 INJECTION, SOLUTION INTRAMUSCULAR; INTRAVENOUS EVERY 2 HOUR PRN
Status: DISCONTINUED | OUTPATIENT
Start: 2025-03-31 | End: 2025-03-31

## 2025-03-31 NOTE — PLAN OF CARE
Patient alert and oriented x4. VSS, on RA when awake, 1-2 L via NC when asleep, hx APRIL. Tele in place; NSR. Abdominal bloating/tightness reported, IV PRN pain medication given. NPO, IVF infusing.       Plan: NPO, IVF. Await transfer to [facility].         1136: Pt alerted staff to report of chest pain. Hospitalist notified, EKG and lab ordered.

## 2025-03-31 NOTE — ED INITIAL ASSESSMENT (HPI)
Lower abd pain and vomiting since 7pm. Pt reports this is similar pain to bowel obstructions she's had in the past.

## 2025-03-31 NOTE — ED PROVIDER NOTES
Patient Seen in: [facility] [place]      History     Chief Complaint   Patient presents with    Abdomen/Flank Pain    Nausea/Vomiting/Diarrhea     Stated Complaint: Lower abdominal pain with N/V, pain started at 1900    Subjective:   HPI      This is a 64-year-old female presenting with abdominal pain and vomiting.  Patient has a history of obstructions and was looking after the patient.  Patient's past medical record mid February just 1 month ago the patient was admitted and cared for small bowel obstruction at that time.  Patient states that her symptoms today seem to come on time around 7:00 this evening.  Patient states she was feeling fine.  She states she started feeling lower abdominal discomfort distention nausea and vomiting.  No fever noted no recent illness no recent fevers cough cold runny nose no history of recent trauma no urinary complaints no diarrhea    Objective:     Past Medical History:    Anemia    Anxiety    Asthma ([facility])    in good control    Back problem    lower back    COVID-19    COVID-19    mild symptoms, slight fever and sore throat    CVA (cerebral vascular accident) ([facility])    Deep vein thrombosis ([facility])    left arm    Depression    Diabetes ([facility])    resolved after gastric bypass sx//    no meds    Frozen shoulder    left    Ganglion cyst    right wrist    GERD (gastroesophageal reflux disease)    Hearing impairment    No HA's    High blood pressure    History of blood transfusion    HTN (hypertension)    Hx of motion sickness    Hyperlipidemia    Morbid obesity ([facility])    APRIL (obstructive sleep apnea)    AHI 21 RDI 21 REM AHI 48 SaO2 marisol 78% // uses CPAP    Osteoarthritis    Pneumonia due to organism    Was hospitalized with COVID September 2021  fever chiil cough    PONV (postoperative nausea and vomiting)    only one time    Pseudoseizures    due to anxiety, s/p psych and neuro w/u    SBO (small bowel obstruction) ([facility])    Seizure disorder ([facility])    PSUEDO  SEIZURES    Sleep apnea    C-PAP    Visual impairment    having trouble w/ left eye after cataract surgery- seeing blurring; glasses              Past Surgical History:   Procedure Laterality Date    Arthroscopy of joint unlisted      Left knee    Cataract      Cholecystectomy      Colonoscopy  2005    diverticulosis - result in scan - [facility]    Colonoscopy N/A 2016    adenoma/hp, divertic. repeat 5 yrs, mac/2 day prep    Colonoscopy      Colonoscopy N/A 2021    Procedure: COLONOSCOPY;  Surgeon: Umberto Menjivar MD;  Location:  ENDOSCOPY    Gastric bypass,obese<100cm lizbeth-en-y      Gastric bypass,obesity,sb reconstruc      Other surgical history      Lizbeth-en-y    Other surgical history      Lap laser removal of endometrisis    Other surgical history  2022    ex lap, lysis of adhesions, drainage on intra abdominal phlegmon & abscess    Sling oper stres incontinence      Tonsillectomy      Tubal ligation      Upper gi endoscopy,diagnosis  2005    hiatal hernia, mild gastritis (H pylori (--))    Upper gi endoscopy,diagnosis  3/24/2009    Upper gi endoscopy,exam                  Social History     Socioeconomic History    Marital status:    Occupational History    Occupation: childcare   Tobacco Use    Smoking status: Former     Current packs/day: 0.00     Average packs/day: 0.3 packs/day for 15.6 years (4.7 ttl pk-yrs)     Types: Cigarettes     Start date: 1991     Quit date: 3/4/2007     Years since quittin.0     Passive exposure: Past    Smokeless tobacco: Never    Tobacco comments:     1/2 ppd x 10 yrs    Vaping Use    Vaping status: Never Used   Substance and Sexual Activity    Alcohol use: Yes     Comment: occ    Drug use: Not Currently     Types: Cannabis    Sexual activity: Yes     Partners: Male     Birth control/protection: Tubal Ligation   Other Topics Concern    Exercise Yes     Comment: take care of 2 grandchildren   Social History Narrative     Homemaker    Former smoker- quit 2003- 1/2ppdx 10 yrs.     Social Drivers of Health     Food Insecurity: No Food Insecurity (2/10/2025)    NCSS - Food Insecurity     Worried About Running Out of Food in the Last Year: No     Ran Out of Food in the Last Year: No   Transportation Needs: Unmet Transportation Needs (2/10/2025)    NCSS - Transportation     Lack of Transportation: Yes   Housing Stability: Not At Risk (2/10/2025)    NCSS - Housing/Utilities     Has Housing: Yes     Worried About Losing Housing: No     Unable to Get Utilities: No                  Physical Exam     ED Triage Vitals [03/31/25 0133]   BP (!) 179/92   Pulse 78   Resp 20   Temp 97.7 °F (36.5 °C)   Temp src Oral   SpO2 100 %   O2 Device None (Room air)       Current Vitals:   Vital Signs  BP: (!) 179/92  Pulse: 78  Resp: 20  Temp: 97.7 °F (36.5 °C)  Temp src: Oral    Oxygen Therapy  SpO2: 100 %  O2 Device: None (Room air)        Physical Exam  Awake alert patient appears uncomfortable HEENT exam shows some dry mucous membranes possible signs of dehydration.  Lungs are clear to auscultation.  Cardiovascular exam shows a regular rhythm abdomen diffusely tender no rebound or guarding past medical megaly no signs of peritonitis extremities no clubbing cyanosis or edema no signs of mottling of her cyanosis no signs of neurologic complaints or deficits on neurologic exam skin is nondiaphoretic    ED Course     Labs Reviewed   COMP METABOLIC PANEL (14) - Abnormal; Notable for the following components:       Result Value    Glucose 110 (*)      (*)      (*)     All other components within normal limits   LIPASE - Normal   POCT FLU TEST - Normal    Narrative:     This assay is a rapid molecular in vitro test utilizing nucleic acid amplification of influenza A and B viral RNA.   CBC WITH DIFFERENTIAL WITH PLATELET   URINALYSIS WITH CULTURE REFLEX            Differential diagnosis includes ischemic bowel, small bowel obstruction, perforated  viscus       MDM      A total of 35 minutes of critical care time (exclusive of billable procedures) was administered to manage the patient's critical imaging findings due to her high grade small bowel obstruction.  This involved direct patient intervention, complex decision making, and/or extensive discussions with the patient, family, and clinical staff.      Admission disposition: 3/31/2025  3:00 AM           Medical Decision Making  64-year-old female presenting to the emergency department.  IV established cardiac monitor shows a sinus rhythm pulse ox shows no signs of hypoxia.  Patient has a fluid bolus initiated here in the emergency department due to nuclear signs of clinical signs of dehydration.  We have reviewed the patient's past medical history of recent hospitalization at this facility for small bowel obstruction.  CBC shows normal white cell without sepsis metabolic panel shows a chronically elevated liver function enzymes which we have reviewed on previous ER visits but renal function is within acceptable limits.  Lipase level shows no elevation to give pancreatitis.  Independent interpretation by ED physician of CT scan shows recurrent high-grade small bowel obstruction for involving the alimentary limb of the gastric bypass with a transition point at the JJ anastomosis.  No signs of perforated viscus at this time.  Patient will be admitted with general surgical consultation at this time.  Serial abdominal exam showed no peritoneal signs on repeat exams.    Amount and/or Complexity of Data Reviewed  External Data Reviewed: labs, radiology and notes.  Labs: ordered. Decision-making details documented in ED Course.  Radiology: ordered and independent interpretation performed. Decision-making details documented in ED Course.  ECG/medicine tests: ordered and independent interpretation performed. Decision-making details documented in ED Course.        Disposition and Plan     Clinical Impression:  1. SBO  (small bowel obstruction) ([facility])         Disposition:  Admit  3/31/2025  3:00 am    Follow-up:  No follow-up provider specified.        Medications Prescribed:  Current Discharge Medication List              Supplementary Documentation:         Hospital Problems       Present on Admission  Date Reviewed: 3/31/2025            ICD-10-CM Noted POA    * (Principal) SBO (small bowel obstruction) ([facility]) K56.609 6/25/2022 Unknown

## 2025-03-31 NOTE — PROGRESS NOTES
Report called to Joanna SANCHEZ at [facility].   Imaging disc given to [facility] transporters.   Spouse at bedside and aware of plan.

## 2025-03-31 NOTE — PROGRESS NOTES
03/31/25 1215   BiPAP/CPAP Monitored Parameters   Toleration Refused     PATIENT DOES NOT USE CPAP. HISTORY OF APRIL.    APRIL PROTOCOL IN PLACE.     Will continue to follow RT protocol.    Basil Estrada, RRT

## 2025-03-31 NOTE — H&P
[facility] Hospitalist History and Physical      Chief Complaint   Patient presents with    Abdomen/Flank Pain    Nausea/Vomiting/Diarrhea        PCP: Faye Crook DO      History of Present Illness: Patient is a 64 year old female with PMH sig for gastric bypass, recurrent SBO, asthma, hypertension, anxiety who presents for abdominal pain.  Patient states he developed abdominal pain and nausea/vomiting yesterday.  States she has a history of multiple small bowel obstructions.  She does have history of gastric bypass surgery.      CT imaging significant for small bowel obstruction with transition point in the mid abdomen associated anastomotic sutures.  Surgery consulted.          Past Medical History:    Anemia    Anxiety    Asthma ([facility])    in good control    Back problem    lower back    COVID-19    COVID-19    mild symptoms, slight fever and sore throat    CVA (cerebral vascular accident) ([facility])    Deep vein thrombosis ([facility])    left arm    Depression    Diabetes ([facility])    resolved after gastric bypass sx//    no meds    Frozen shoulder    left    Ganglion cyst    right wrist    GERD (gastroesophageal reflux disease)    Hearing impairment    No HA's    High blood pressure    History of blood transfusion    HTN (hypertension)    Hx of motion sickness    Hyperlipidemia    Morbid obesity ([facility])    APRIL (obstructive sleep apnea)    AHI 21 RDI 21 REM AHI 48 SaO2 marisol 78% // uses CPAP    Osteoarthritis    Pneumonia due to organism    Was hospitalized with COVID September 2021  fever chiil cough    PONV (postoperative nausea and vomiting)    only one time    Pseudoseizures    due to anxiety, s/p psych and neuro w/u    SBO (small bowel obstruction) ([facility])    Seizure disorder ([facility])    PSUEDO SEIZURES    Sleep apnea    C-PAP    Visual impairment    having trouble w/ left eye after cataract surgery- seeing blurring; glasses      Past Surgical History:   Procedure Laterality Date    Arthroscopy of joint unlisted       Left knee    Cataract      Cholecystectomy      Colonoscopy  2005    diverticulosis - result in scan - [facility]    Colonoscopy N/A 2016    adenoma/hp, divertic. repeat 5 yrs, mac/2 day prep    Colonoscopy      Colonoscopy N/A 2021    Procedure: COLONOSCOPY;  Surgeon: Umberto eMnjivar MD;  Location:  ENDOSCOPY    Gastric bypass,obese<100cm grady-en-y      Gastric bypass,obesity,sb reconstruc      Other surgical history      Grady-en-y    Other surgical history      Lap laser removal of endometrisis    Other surgical history  2022    ex lap, lysis of adhesions, drainage on intra abdominal phlegmon & abscess    Sling oper stres incontinence      Tonsillectomy      Tubal ligation      Upper gi endoscopy,diagnosis  2005    hiatal hernia, mild gastritis (H pylori (--))    Upper gi endoscopy,diagnosis  3/24/2009    Upper gi endoscopy,exam          ALL:  Allergies[1]     No current outpatient medications on file.       Social History     Tobacco Use    Smoking status: Former     Current packs/day: 0.00     Average packs/day: 0.3 packs/day for 15.6 years (4.7 ttl pk-yrs)     Types: Cigarettes     Start date: 1991     Quit date: 3/4/2007     Years since quittin.0     Passive exposure: Past    Smokeless tobacco: Never    Tobacco comments:     1/2 ppd x 10 yrs    Substance Use Topics    Alcohol use: Yes     Comment: occ        Fam Hx  Family History   Problem Relation Age of Onset    Hypertension Father     Heart Disease Father         pacer    Pulmonary Disease Mother         COPD    Psychiatric Mother     Other (Other) Mother         copd    Other (Other) Sister         copd    Cancer Other         ?ovarian    Psychiatric Other     Diabetes Sister     Diabetes Sister     Cancer Paternal Grandmother     Cancer Sister         endometrial    Hypertension Sister     Diabetes Other         MAUNT       Review of Systems  Comprehensive ROS reviewed and negative except for what is  stated in HPI.      OBJECTIVE:  /69 (BP Location: Right arm)   Pulse 74   Temp 98.2 °F (36.8 °C) (Oral)   Resp 16   Ht 5' 3\" (1.6 m)   Wt 214 lb 14.4 oz (97.5 kg)   LMP 12/10/2014 (Approximate)   SpO2 90%   BMI 38.07 kg/m²   Physical Exam:  General: Alert, awake, cooperative.  HEENT:  Normocephalic, atraumatic.  Neck:  Trachea midline.  Neck supple.  Chest:  Clear to auscultation bilaterally. No wheezes, rales, or rhonchi.  CV:  Regular rate and rhythm.  Positive S1/S2. No murmur, no gallops, no rubs  GI: Bowel sounds present in all four quadrants, abdomen is distended, mildly tender to palpation.  Extremities:  No lower extremity edema or cyanosis.  Neurological:  AAOx3.  Moving all extremities.  Skin:  Warm and dry.        Data Review:    LABS:   Lab Results   Component Value Date    WBC 7.9 03/31/2025    HGB 14.8 03/31/2025    HCT 43.8 03/31/2025    .0 03/31/2025    CREATSERUM 0.91 03/31/2025    BUN 12 03/31/2025     03/31/2025    K 5.1 03/31/2025     03/31/2025    CO2 25.0 03/31/2025     03/31/2025    CA 10.0 03/31/2025    ALB 4.5 03/31/2025    ALKPHO 117 03/31/2025    BILT 0.7 03/31/2025    TP 7.1 03/31/2025     03/31/2025     03/31/2025    LIP 27 03/31/2025    PGLU 97 03/31/2025       CT : image personally reviewed.  Small obstruction with transition point in the mid abdomen associated anastomotic sutures.    Radiology: CT ABDOMEN+PELVIS(CONTRAST ONLY)(CPT=74177)    Result Date: 3/31/2025  PROCEDURE:  CT ABDOMEN+PELVIS (CONTRAST ONLY) (CPT=74177)  COMPARISON:  [facility], CT, CT ABDOMEN+PELVIS(CONTRAST ONLY)(CPT=74177), 2/09/2025, 11:23 PM.  INDICATIONS:  Lower abdominal pain with N/V, pain started at 1900  TECHNIQUE:  CT scanning was performed from the dome of the diaphragm to the pubic symphysis with non-ionic intravenous contrast material. Post contrast coronal MPR imaging was performed.  Dose reduction techniques were used. Dose information is  transmitted to the ACR (American College of Radiology) NRDR (National Radiology Data Registry) which includes the Dose Index Registry.  PATIENT STATED HISTORY:(As transcribed by Technologist)   Lower abdominal pain with N/V, pain started at 1900   CONTRAST USED:  80cc of Isovue 370  FINDINGS:  LIVER:  No enlargement, atrophy, abnormal density, or significant focal lesion.  BILIARY:  Surgically absent gallbladder with stable bile duct prominence. PANCREAS:  No lesion, fluid collection, ductal dilatation, or atrophy.  SPLEEN:  No enlargement or focal lesion.  KIDNEYS:  No mass, obstruction, or calcification.  Parapelvic renal cysts again demonstrated which require no further workup or follow-up. ADRENALS:  No mass or enlargement.  AORTA/VASCULAR:  Trace atherosclerosis.  No aneurysm. RETROPERITONEUM:  No mass or adenopathy.  BOWEL/MESENTERY:  Gastric bypass changes again demonstrated.  Distention of mid abdominal small bowel loops is similar appearance to previous study with maximal diameter of 4.9 cm, previously 4.9 cm.  Transition to normal caliber at level of suture line.   Normal appendix. ABDOMINAL WALL:  No mass or hernia.  URINARY BLADDER:  No visible focal wall thickening, lesion, or calculus.  PELVIC NODES:  No adenopathy.  PELVIC ORGANS:  No visible mass.  Pelvic organs appropriate for patient age.  BONES:  Degenerative changes of spine. LUNG BASES:  No visible pulmonary or pleural disease.              CONCLUSION:  Small-bowel obstruction with transition point in the mid abdomen associated with anastomotic sutures.  Findings are not significantly changed from 2/9/2025.   LOCATION:  [facility]   Dictated by (CST): Alvin Gan MD on 3/31/2025 at 7:32 AM     Finalized by (CST): Alvin Gan MD on 3/31/2025 at 7:39 AM          Assessment/Plan:   64 year old female with PMH sig for gastric bypass, recurrent SBO, asthma, hypertension, anxiety who presents for abdominal pain.    #Recurrent SBO  #History of gastric  bypass, perforated marginal ulcer gastrojejunostomy  -Pain/nausea control  -IV fluids  -NG tube deferred  -Further recommendations per general surgery    #Transaminitis  -Appears chronic.  Will trend LFTs.    #Asthma  -Home inhaler    #GERD  -IV Protonix while n.p.o.    #Hypertension  Anxiety  -Resume home meds when able    Discussed with RN    DVT prophylaxis: SCDs  CODE STATUS: Full code       Outpatient records or previous hospital records reviewed.   [facility] hospitalist to continue to follow patient while in house  A total of 77 minutes taken with patient and coordinating care.  Greater than 50% face to face encounter.        Addendum: Dr. Whitfield from general surgery recommending transfer to [facility] as patient underwent gastric bypass there.  She will be better suited there in case she needs surgical intervention.  There is a bed available for transfer.    Shirley Cevallos DO  [facility] Hospitalist      **Certification      PHYSICIAN Certification of Need for Inpatient Hospitalization - Initial Certification    Patient will require inpatient services that will reasonably be expected to span two midnight's based on the clinical documentation in H+P.   Based on patients current state of illness, I anticipate that, after discharge, patient will require TBD.         [1]   Allergies  Allergen Reactions    Alteplase OTHER (SEE COMMENTS) and SWELLING     Mild lip swelling.    Iron CONFUSION, SWELLING and FATIGUE     Iron infusion

## 2025-03-31 NOTE — TRANSFER CENTER NOTE
[facility] Note:  Reason for transfer:     Higher level of care/surgeon at [facility]    Request initiated by:    Dr Whitfield surgery    Active Acute Medical issue:    Pt has recurrent SBO and her gastric surgery was at [facility]        Anticipated Transfer Plan:     Spoke to [facility] transfer center. A Dr Richard accepted the pt. Provided them with Dr Cevallos direct number.

## 2025-03-31 NOTE — PLAN OF CARE
Pt arrived on unit just before 0500 from [facility] accompanied by EMT. Pt is A&O x 4, admitted for SBO, drowsy upon arrival and requesting pain medication. Gen sx consulted d/t hx of gastric bypass and NG tube placement.  Pt's lungs are clear, breathing unlabored. Tele &  in place with NC at bedside d/t report from ED RN that pt desaturated after being given morphine. Vital signs at this time are stable. Pt's diet is NPO- swabs and mouth moisturizer provided. IVF infusing at 75 ml/hr. Pt is continent of bladder and bowel. Pt ambulates with standby assist and able to use standing scale and bathroom upon arrival to unit. Skin admission assessment revealed healed abdominal midline incision and IV in . Pt is good historian. Admission navigator completed, med reconciliation reviewed. POC discussed with pt who verbalized understanding. NOC safety plan in place, bed in low position, bed alarm on, call light and personal items within reach, pt encouraged to call for assistance.

## 2025-03-31 NOTE — ED QUICK NOTES
Sats 87 % room air with good waveform . Patient asleep on bed with non labored breathing , arousable by verbal stimuli - 88 % sats upon arousal  but goes back to sleep . Placed on 2 lpm O2 via nasal cannula - sats improving 98 % . No distress.

## 2025-03-31 NOTE — TRANSFER CENTER NOTE
The pt will go to ROOM: 279 at [facility]    Nurse to nurse report: [phone] Joanna    PCS completed

## 2025-03-31 NOTE — CONSULTS
[facility]  Report of Consultation    Roshni Scott Patient Status:  Inpatient    1960 MRN [id number]   Location [facility] 3SW-A Attending Shirley Cevallos DO   Hosp Day # 0 PCP Faye Crook DO     Reason for Consultation:    Abdominal pain    History of Present Illness:    Roshni Scott is a a(n) 64 year old female.  Patient has a history of partial small bowel obstructions.  Patient has a history of a Lizbeth-en-Y gastrojejunostomy bypass as well as a oversew of a marginal ulcer.  Patient was recently admitted to the hospital in February with a bowel obstruction.  This resolved with conservative measures.  Patient developed the onset of abdominal pain with associated nausea and vomiting yesterday prompting a visit to the emergency room.  Her vomiting has subsided however she continues to have abdominal pain.  CT scan confirms a partial small bowel obstruction with a transition point near her previous anastomosis.  Patient's bariatric surgeon is at [facility].    Past Medical History:    Past Medical History:    Anemia    Anxiety    Asthma (HCC)    in good control    Back problem    lower back    COVID-19    COVID-19    mild symptoms, slight fever and sore throat    CVA (cerebral vascular accident) (HCC)    Deep vein thrombosis (HCC)    left arm    Depression    Diabetes (HCC)    resolved after gastric bypass sx//    no meds    Frozen shoulder    left    Ganglion cyst    right wrist    GERD (gastroesophageal reflux disease)    Hearing impairment    No HA's    High blood pressure    History of blood transfusion    HTN (hypertension)    Hx of motion sickness    Hyperlipidemia    Morbid obesity (HCC)    APRIL (obstructive sleep apnea)    AHI 21 RDI 21 REM AHI 48 SaO2 marisol 78% // uses CPAP    Osteoarthritis    Pneumonia due to organism    Was hospitalized with COVID 2021  fever chiil cough    PONV (postoperative nausea and vomiting)    only one time    Pseudoseizures     due to anxiety, s/p psych and neuro w/u    SBO (small bowel obstruction) (HCC)    Seizure disorder (HCC)    PSUEDO SEIZURES    Sleep apnea    C-PAP    Visual impairment    having trouble w/ left eye after cataract surgery- seeing blurring; glasses       Past Surgical History:    Past Surgical History:   Procedure Laterality Date    Arthroscopy of joint unlisted      Left knee    Cataract      Cholecystectomy      Colonoscopy  5/18/2005    diverticulosis - result in scan - [facility]    Colonoscopy N/A 5/25/2016    adenoma/hp, divertic. repeat 5 yrs, mac/2 day prep    Colonoscopy      Colonoscopy N/A 11/23/2021    Procedure: COLONOSCOPY;  Surgeon: Umberto Menjivar MD;  Location:  ENDOSCOPY    Gastric bypass,obese<100cm lizbeth-en-y      Gastric bypass,obesity,sb reconstruc      Other surgical history  2007    Lizbeth-en-y    Other surgical history  1990    Lap laser removal of endometrisis    Other surgical history  02/22/2022    ex lap, lysis of adhesions, drainage on intra abdominal phlegmon & abscess    Sling oper stres incontinence      Tonsillectomy      Tubal ligation      Upper gi endoscopy,diagnosis  5/18/2005    hiatal hernia, mild gastritis (H pylori (--))    Upper gi endoscopy,diagnosis  3/24/2009    Upper gi endoscopy,exam         Family History:    Family History   Problem Relation Age of Onset    Hypertension Father     Heart Disease Father         pacer    Pulmonary Disease Mother         COPD    Psychiatric Mother     Other (Other) Mother         copd    Other (Other) Sister         copd    Cancer Other         ?ovarian    Psychiatric Other     Diabetes Sister     Diabetes Sister     Cancer Paternal Grandmother     Cancer Sister         endometrial    Hypertension Sister     Diabetes Other         MAUNT       Social History:     reports that she quit smoking about 18 years ago. Her smoking use included cigarettes. She started smoking about 33 years ago. She has a 4.7 pack-year smoking history.  She has been exposed to tobacco smoke. She has never used smokeless tobacco. She reports current alcohol use. She reports that she does not currently use drugs after having used the following drugs: Cannabis.    Allergies:    Allergies[1]    Current Medications:      Current Facility-Administered Medications:     sodium chloride 0.9% infusion, , Intravenous, Continuous    morphINE PF 2 MG/ML injection 1 mg, 1 mg, Intravenous, Q2H PRN **OR** morphINE PF 2 MG/ML injection 2 mg, 2 mg, Intravenous, Q2H PRN **OR** morphINE PF 4 MG/ML injection 4 mg, 4 mg, Intravenous, Q2H PRN    melatonin tab 3 mg, 3 mg, Oral, Nightly PRN    metoclopramide (Reglan) 5 mg/mL injection 5 mg, 5 mg, Intravenous, Q8H PRN    sodium chloride 0.9% infusion, , Intravenous, Continuous    heparin (Porcine) 5000 UNIT/ML injection 5,000 Units, 5,000 Units, Subcutaneous, Q8H VERÓNICA    acetaminophen (Tylenol Extra Strength) tab 1,000 mg, 1,000 mg, Oral, Q6H PRN    polyethylene glycol (PEG 3350) (Miralax) 17 g oral packet 17 g, 17 g, Oral, Daily PRN    sennosides (Senokot) tab 17.2 mg, 17.2 mg, Oral, Nightly PRN    bisacodyl (Dulcolax) 10 MG rectal suppository 10 mg, 10 mg, Rectal, Daily PRN    fleet enema (Fleet) rectal enema 133 mL, 1 enema, Rectal, Once PRN    ondansetron (Zofran) 4 MG/2ML injection 4 mg, 4 mg, Intravenous, Q6H PRN    albuterol (Ventolin HFA) 108 (90 Base) MCG/ACT inhaler 2 puff, 2 puff, Inhalation, Q6H PRN    pantoprazole (Protonix) 40 mg in sodium chloride 0.9% PF 10 mL IV push, 40 mg, Intravenous, Daily    Home Medications:    Medications Ordered Prior to Encounter[2]    Review of Systems:    10 point review performed pertinent positives and negatives per history of present illness.    Physical Exam:    Blood pressure 118/61, pulse 70, temperature 98.5 °F (36.9 °C), temperature source Oral, resp. rate 16, height 5' 3\" (1.6 m), weight 214 lb 14.4 oz (97.5 kg), last menstrual period 12/10/2014, SpO2 98%, not currently  breastfeeding.    GENERAL: well developed, well nourished female, in no apparent distress    SKIN: anicteric    HEENT:   normocephalic    NECK: supple, no JVD    RESPIRATORY: clear to auscultation    CARDIOVASCULAR: RRR    ABDOMEN: Diffusely tender with mild guarding.  No peritoneal findings.    LYMPHATIC: no lymphadenopathy    EXTREMITIES: no cyanosis, clubbing or edema    .    Laboratory Data:  Recent Labs   Lab 03/31/25  0201   WBC 7.9   HGB 14.8   MCV 99.1   .0       Recent Labs   Lab 03/31/25  0158      K 5.1      CO2 25.0   BUN 12   CREATSERUM 0.91   *   CA 10.0       Recent Labs   Lab 03/31/25  0158   *   *   ALB 4.5       No results for input(s): \"TROP\" in the last 168 hours.          Radiology:    CT ABDOMEN+PELVIS(CONTRAST ONLY)(CPT=74177)    Result Date: 3/31/2025  PROCEDURE:  CT ABDOMEN+PELVIS (CONTRAST ONLY) (CPT=74177)  COMPARISON:  [place], CT, CT ABDOMEN+PELVIS(CONTRAST ONLY)(CPT=74177), 2/09/2025, 11:23 PM.  INDICATIONS:  Lower abdominal pain with N/V, pain started at 1900  TECHNIQUE:  CT scanning was performed from the dome of the diaphragm to the pubic symphysis with non-ionic intravenous contrast material. Post contrast coronal MPR imaging was performed.  Dose reduction techniques were used. Dose information is transmitted to the ACR (American College of Radiology) NRDR (National Radiology Data Registry) which includes the Dose Index Registry.  PATIENT STATED HISTORY:(As transcribed by Technologist)   Lower abdominal pain with N/V, pain started at 1900   CONTRAST USED:  80cc of Isovue 370  FINDINGS:  LIVER:  No enlargement, atrophy, abnormal density, or significant focal lesion.  BILIARY:  Surgically absent gallbladder with stable bile duct prominence. PANCREAS:  No lesion, fluid collection, ductal dilatation, or atrophy.  SPLEEN:  No enlargement or focal lesion.  KIDNEYS:  No mass, obstruction, or calcification.  Parapelvic renal cysts again  demonstrated which require no further workup or follow-up. ADRENALS:  No mass or enlargement.  AORTA/VASCULAR:  Trace atherosclerosis.  No aneurysm. RETROPERITONEUM:  No mass or adenopathy.  BOWEL/MESENTERY:  Gastric bypass changes again demonstrated.  Distention of mid abdominal small bowel loops is similar appearance to previous study with maximal diameter of 4.9 cm, previously 4.9 cm.  Transition to normal caliber at level of suture line.   Normal appendix. ABDOMINAL WALL:  No mass or hernia.  URINARY BLADDER:  No visible focal wall thickening, lesion, or calculus.  PELVIC NODES:  No adenopathy.  PELVIC ORGANS:  No visible mass.  Pelvic organs appropriate for patient age.  BONES:  Degenerative changes of spine. LUNG BASES:  No visible pulmonary or pleural disease.              CONCLUSION:  Small-bowel obstruction with transition point in the mid abdomen associated with anastomotic sutures.  Findings are not significantly changed from 2/9/2025.   LOCATION:  [facility]   Dictated by (CST): Alvin Gan MD on 3/31/2025 at 7:32 AM     Finalized by (CST): Alvin Gan MD on 3/31/2025 at 7:39 AM          Problem List:    Patient Active Problem List   Diagnosis    Hyperlipidemia    Morbid obesity with BMI of 50.0-59.9, adult (HCC)    Essential hypertension    Non-seasonal allergic rhinitis due to pollen    ASTHMA    APRIL on CPAP    B12 deficiency    Complex tear of medial meniscus of right knee as current injury, subsequent encounter    Chondromalacia of right knee    Internal derangement of right knee    Hypertonicity of bladder    Iron malabsorption (HCC)    Other iron deficiency anemias    Pulmonary hypertension (HCC)    Ectatic thoracic aorta    Pseudo Seizure disorder (HCC)    Major depressive disorder, recurrent severe without psychotic features (HCC)    Carotid atherosclerosis    Epilepsy seizure, generalized, convulsive (HCC)    Type 2 diabetes mellitus without complication, without long-term current use of insulin  (HCC)    Alcohol use    Right knee arthroscopy with partial medial meniscectomy  Global  03/02/2022    S/P arthroscopic surgery of right knee    Arthritis of both knees    Chronic pain of right knee    Abdominal pain, generalized    Intestinal obstruction, unspecified cause, unspecified whether partial or complete (HCC)    Nausea and vomiting in adult    Hyperglycemia    Weakness    Acute gastric ulcer with perforation and obstruction (HCC)    Anemia    Leukocytosis    Postoperative intra-abdominal abscess (HCC)    SBO (small bowel obstruction) (HCC)    Acute kidney injury    Nausea and vomiting    Nausea and vomiting, unspecified vomiting type    Abdominal pain, acute    Small bowel obstruction (HCC)       Impression:    Partial small bowel obstruction in the setting of previous gastric bypass and gastrojejunostomy revision    Plan:    Will transfer to [facility] where her bariatric surgeons are on staff in case she would need surgical intervention.  This is her second admission within a month.  A bed is available this transfer will occur immediately she is stable for transport.        Deniz Whitfield MD  3/31/2025        [1]   Allergies  Allergen Reactions    Alteplase OTHER (SEE COMMENTS) and SWELLING     Mild lip swelling.    Iron CONFUSION, SWELLING and FATIGUE     Iron infusion   [2]   Current Facility-Administered Medications on File Prior to Encounter   Medication Dose Route Frequency Provider Last Rate Last Admin    [COMPLETED] magnesium oxide (Mag-Ox) tab 400 mg  400 mg Oral Once Ali, Irfan, DO   400 mg at 02/13/25 0854    [COMPLETED] potassium chloride (Klor-Con M20) tab 40 mEq  40 mEq Oral Once Ali, Irfan, DO   40 mEq at 02/13/25 0854    [COMPLETED] sodium phosphate 15 mmol in 0.9% NaCl 100mL IVPB premix  15 mmol Intravenous Once Ali, Irfan, DO   15 mmol at 02/12/25 1143    [COMPLETED] potassium chloride 20 mEq/100mL IVPB premix 20 mEq  20 mEq Intravenous Once Ali, Irfan, DO 50 mL/hr at 02/11/25  1424 20 mEq at 02/11/25 1424    [COMPLETED] magnesium sulfate in sterile water for injection 2 g/50mL IVPB premix 2 g  2 g Intravenous Once Ali, Irfan, DO 50 mL/hr at 02/11/25 1130 2 g at 02/11/25 1130    [COMPLETED] potassium chloride (Klor-Con M20) tab 20 mEq  20 mEq Oral Once Ali, Irfan, DO   20 mEq at 02/11/25 1456    [COMPLETED] morphINE PF 4 MG/ML injection 4 mg  4 mg Intravenous Once Peksa-Sink, Naya L, DO   4 mg at 02/10/25 0142    [COMPLETED] hydrALAzine (Apresoline) 20 mg/mL injection 5 mg  5 mg Intravenous Once Ali, Irfan, DO   5 mg at 02/10/25 1321    [COMPLETED] morphINE PF 4 MG/ML injection 4 mg  4 mg Intravenous Once Peksa-Sink, Naya L, DO   4 mg at 02/09/25 2233    [COMPLETED] ondansetron (Zofran) 4 MG/2ML injection 4 mg  4 mg Intravenous Once Peksa-Sink, Naya L, DO   4 mg at 02/09/25 2228    [COMPLETED] sodium chloride 0.9 % IV bolus 500 mL  500 mL Intravenous Once Peksa-Sink, Naya L, DO   Stopped at 02/09/25 2303    [COMPLETED] iopamidol 76% (ISOVUE-370) injection for power injector  100 mL Intravenous ONCE PRN Peksa-Sink, Naya L, DO   100 mL at 02/09/25 2330     Current Outpatient Medications on File Prior to Encounter   Medication Sig Dispense Refill    oxyBUTYnin Chloride ER 15 MG Oral Tablet 24 Hr Take 1 tablet (15 mg total) by mouth daily.      acetaminophen 500 MG Oral Tab Take 1 tablet (500 mg total) by mouth every 6 (six) hours as needed (pain).      pantoprazole 40 MG Oral Tab EC Take 1 tablet (40 mg total) by mouth 2 (two) times daily before meals. 180 tablet 2    FLUoxetine 20 MG Oral Cap Take 1 capsule (20 mg total) by mouth daily. (Patient taking differently: Take 30 mg by mouth daily. Pt takes 1 10mg and 1 20mg tab for total of 30mg) 90 capsule 0    losartan 100 MG Oral Tab Take 1 tablet (100 mg total) by mouth daily. 90 tablet 2    montelukast 10 MG Oral Tab Take 1 tablet (10 mg total) by mouth daily. 90 tablet 0    topiramate 25 MG Oral Tab Take 1 tablet (25 mg total) by mouth  2 (two) times daily. 180 tablet 2    Albuterol Sulfate  (90 Base) MCG/ACT Inhalation Aero Soln Inhale 2 puffs into the lungs every 6 (six) hours as needed for Wheezing or Shortness of Breath. 1 each 0    Cholecalciferol (VITAMIN D) 50 MCG (2000 UT) Oral Cap Take 1 capsule (2,000 Units total) by mouth.      Cyanocobalamin (VITAMIN B-12) 1000 MCG Sublingual SL Tab Place 2,000 mcg under the tongue daily. 60 tablet 11    [] oseltamivir (TAMIFLU) 75 MG Oral Cap Take 1 capsule (75 mg total) by mouth 2 (two) times daily for 4 days. 8 capsule 0    OZEMPIC, 0.25 OR 0.5 MG/DOSE, 2 MG/3ML Subcutaneous Solution Pen-injector Inject 0.25 mg into the skin once a week.      buPROPion 300 MG Oral Tablet 24 Hr Take 1 tablet (300 mg total) by mouth daily. 90 tablet 2    amLODIPine 5 MG Oral Tab Take 1 tablet (5 mg total) by mouth daily. (Patient taking differently: Take 1 tablet (5 mg total) by mouth nightly. Pt taking differently. Takes nightly) 90 tablet 0

## 2025-04-01 LAB
ATRIAL RATE: 66 BPM
P AXIS: 29 DEGREES
P-R INTERVAL: 150 MS
Q-T INTERVAL: 422 MS
QRS DURATION: 90 MS
QTC CALCULATION (BEZET): 442 MS
R AXIS: 39 DEGREES
T AXIS: 20 DEGREES
VENTRICULAR RATE: 66 BPM

## (undated) DIAGNOSIS — K66.8 FREE INTRAPERITONEAL AIR: Primary | ICD-10-CM

## (undated) DEVICE — NON-ADHERENT STRIPS,OIL EMULSION: Brand: CURITY

## (undated) DEVICE — STANDARD HYPODERMIC NEEDLE,POLYPROPYLENE HUB: Brand: MONOJECT

## (undated) DEVICE — SINGLE USE MEDICAL DEVICE FOR OPHTHALMIC SURGERY: Brand: SIL. COATED I/A 45 MIL 12/B

## (undated) DEVICE — SUTURE SILK 2-0

## (undated) DEVICE — PADDING CAST COTTON  4

## (undated) DEVICE — LIGHT HANDLE

## (undated) DEVICE — BSS BAG CENTURION

## (undated) DEVICE — LAPAROTOMY SPONGE - RF AND X-RAY DETECTABLE PRE-WASHED: Brand: SITUATE

## (undated) DEVICE — 10K/24K ARTHROSCOPY INFLOW TUBE SET: Brand: 10K/24K

## (undated) DEVICE — REM POLYHESIVE ADULT PATIENT RETURN ELECTRODE: Brand: VALLEYLAB

## (undated) DEVICE — SOL H2O 1000ML BTL

## (undated) DEVICE — UNFOLDER PLATINUM 1 SERIES CRTRDG 30/BOX: Brand: UNFOLDER PLATINUM 1 SERIES

## (undated) DEVICE — ENDOSCOPY PACK - LOWER: Brand: MEDLINE INDUSTRIES, INC.

## (undated) DEVICE — VIOLET BRAIDED (POLYGLACTIN 910), SYNTHETIC ABSORBABLE SUTURE: Brand: COATED VICRYL

## (undated) DEVICE — ACTIVE FMS W/ INTREPID* ULTRA SLEEVES, 0.9MM 30° ABS* INTREPID* BALANCED TIP: Brand: ALCON

## (undated) DEVICE — BANDAGE ROLL,100% COTTON, 6 PLY, LARGE: Brand: KERLIX

## (undated) DEVICE — CLOSING BUNDLE: Brand: MEDLINE INDUSTRIES, INC.

## (undated) DEVICE — 3M™ RED DOT™ MONITORING ELECTRODE WITH FOAM TAPE AND STICKY GEL, 50/BAG, 20/CASE, 72/PLT 2570: Brand: RED DOT™

## (undated) DEVICE — POOLE SUCTION HANDLE: Brand: CARDINAL HEALTH

## (undated) DEVICE — SUTURE MONOCRYL 4-0 PS-2

## (undated) DEVICE — SUTURE PDS II 1 ST-21

## (undated) DEVICE — KNEE ARTHROSCOPY CDS: Brand: MEDLINE INDUSTRIES, INC.

## (undated) DEVICE — EYE PACK: Brand: MEDLINE INDUSTRIES, INC.

## (undated) DEVICE — SOL LACT RINGERS 3000ML

## (undated) DEVICE — PREP BETADINE SOL 5% EYE

## (undated) DEVICE — CLEARCUT® SIDEPORT KNIFE DUAL BEVEL 1.0MM ANGLED: Brand: CLEARCUT®

## (undated) DEVICE — SUTURE SILK 3-0 SH

## (undated) DEVICE — 1200CC GUARDIAN II: Brand: GUARDIAN

## (undated) DEVICE — GOWN,SIRUS,FABRIC-REINFORCED,X-LARGE: Brand: MEDLINE

## (undated) DEVICE — OCCUCOAT SYRINGE 1ML SINGLE: Brand: OCCUCOAT

## (undated) DEVICE — BLADE ADVANCUT CORNEAL 2.4MM

## (undated) DEVICE — ENDOSCOPY PACK UPPER: Brand: MEDLINE INDUSTRIES, INC.

## (undated) DEVICE — 450 ML BOTTLE OF 0.05% CHLORHEXIDINE GLUCONATE IN 99.95% STERILE WATER FOR IRRIGATION, USP AND APPLICATOR.: Brand: IRRISEPT ANTIMICROBIAL WOUND LAVAGE

## (undated) DEVICE — DRAIN RELIAVAC 100CC

## (undated) DEVICE — Device: Brand: DEFENDO AIR/WATER/SUCTION AND BIOPSY VALVE

## (undated) DEVICE — CLIP LGT 11MM OPEN 2.8MM 235CM

## (undated) DEVICE — SYRINGE 20CC LL TIP

## (undated) DEVICE — STERILE POLYISOPRENE POWDER-FREE SURGICAL GLOVES: Brand: PROTEXIS

## (undated) DEVICE — SUTURE SILK 0 FSL

## (undated) DEVICE — CATARACT PATIENT CARE KIT

## (undated) DEVICE — FILTERLINE NASAL ADULT O2/CO2

## (undated) DEVICE — DRESS WOUND AQUACEL 3.5INX12IN

## (undated) DEVICE — MEGADYNE E-Z CLEAN BLADE 4IN

## (undated) DEVICE — CAP ENDO DISTAL ATTCHMNT 11.35

## (undated) DEVICE — NEEDLE CONTRAST INTERJECT 25G

## (undated) DEVICE — SYRINGE 10ML LL TIP

## (undated) DEVICE — STERILE SYNTHETIC POLYISOPRENE POWDER-FREE SURGICAL GLOVES WITH HYDROGEL COATING, SMOOTH FINISH, STRAIGHT FINGER: Brand: PROTEXIS

## (undated) DEVICE — [AGGRESSIVE PLUS CUTTER, ARTHROSCOPIC SHAVER BLADE,  DO NOT RESTERILIZE,  DO NOT USE IF PACKAGE IS DAMAGED,  KEEP DRY,  KEEP AWAY FROM SUNLIGHT]: Brand: FORMULA

## (undated) DEVICE — PROXIMATE SKIN STAPLERS (35 WIDE) CONTAINS 35 STAINLESS STEEL STAPLES (FIXED HEAD): Brand: PROXIMATE

## (undated) DEVICE — TOWEL SURG OR 17X30IN BLUE

## (undated) DEVICE — LAPAROTOMY CDS: Brand: MEDLINE INDUSTRIES, INC.

## (undated) DEVICE — SUTURE SILK 0

## (undated) DEVICE — DRAIN CHANNEL 19FR BLAKE

## (undated) DEVICE — SCD SLEEVE KNEE HI BLEND

## (undated) DEVICE — DISPOSABLE TOURNIQUET CUFF SINGLE BLADDER, DUAL PORT AND QUICK CONNECT CONNECTOR: Brand: COLOR CUFF

## (undated) DEVICE — SOL  .9 1000ML BTL

## (undated) NOTE — LETTER
Patient Name: Eloise Oropeza  YOB: 1960          MRN number:  [id number]  Date:  11/8/2017  Referring Physician: Dr. Darylene Chalk  Discharge Summary  Initial Functional Outcome Score 28/100  Final Functional Outcome Score 53/100  Number of V and stepping activities (12 visits) Partially met  · Pt will report no complaints of instability or giving out sensation with ambulation in level ground for 10 minutes ( 12 visits) Met  · Pt will improve tandem stance to > 20s to improve safety and indepen

## (undated) NOTE — ED AVS SNAPSHOT
Monico Newell   MRN: [id number]    Department:  [facility] Department in [place]   Date of Visit:  7/16/2019           Disclosure     Insurance plans vary and the physician(s) referred by the ER may not be covered by your plan.  Please cont tell this physician (or your personal doctor if your instructions are to return to your personal doctor) about any new or lasting problems. The primary care or specialist physician will see patients referred from the [facility] Emergency Department.  [place]

## (undated) NOTE — LETTER
OUTSIDE TESTING RESULT REQUEST     IMPORTANT: FOR YOUR IMMEDIATE ATTENTION  Please FAX all test results listed below to: [phone]     Testing already done on or about: .      * * * * If testing is NOT complete, arrange with patient A.S.A.P. * * * *

## (undated) NOTE — LETTER
[address] [facility] FOR BLOOD OR BLOOD COMPONENTS      In the course of your treatment, it may become necessary to administer a transfusion of blood or blood components. This form provides basic information concerning this procedure and, if signed by you, authorizes its performance by qualified medical personnel. DESCRIPTION OF PROCEDURE:  Blood is introduced into one of your veins, commonly in the arm, using a sterilized disposable needle. The amount of blood transfused, and whether the transfusion will be of blood or blood components is a judgment the physician will make based on your particular needs. RISKS:  The transfusion is a common procedure of low risk. MINOR AND TEMPORARY REACTIONS ARE NOT UNCOMMON, including a slight bruise, swelling or local reaction in the area where the needle pierces your skin, or a non-serious reaction to the transfused material itself, including headache, fever or a mild skin reaction, such as rash. Serious reactions are possible, though very unlikely and include severe allergic reaction (shock)  and destruction (hemolysis) of transfused blood cells. Infectious diseases which are known to be transmitted by blood transfusion include CERTAIN TYPES OF VIRAL HEPATITIS, a viral infection of the liver, HUMAN IMMUNODEFICIENCY VIRUS (HIV-1,2) infection, a viral infection known to cause ACQUIRED IMMUNODEFICIENCY SYNDROME (AIDS) AS WELL AS CERTAIN OTHER BACTERIAL, VIRAL AND PARASITIC DISEASES. While a minimal risk of acquiring an infectious disease from transfused blood exists, in accordance with Federal and State law all due care has been taken in donor selection and testing to avoid transmission of disease. ALTERNATIVES:  If loss of blood poses serious threats in the course of your treatment, THERE IS NO EFFECTIVE ALTERNATIVE TO BLOOD TRANSFUSION.  However, if you have any further questions on this matter, your physician will fully explain the alternatives to you if it has not already been done. I,Roshni Scott, have read/had read to me the above. I understand the matters bearing on the decision whether or not to authorize a transfusion of blood or blood components. I have no questions which have not been answered to my full satisfaction.  I hereby consent to such transfusion as  my physician may deem necessary or advisable in the course of my treatment.        _______________   __________________________________________________  Date     Signature of Patient, Parent or Legal Guardian      ([place])      __________________________________________  Witness to Signature (title or relationship to patient)    Patient Name: Lakeshia Nevarez     : 1960                 Printed: 2022     Medical Record #: [id number]                    Page 1 of 1

## (undated) NOTE — LETTER
OUTSIDE TESTING RESULT REQUEST     IMPORTANT: FOR YOUR IMMEDIATE ATTENTION  Please FAX all test results listed below to: [phone]     Testing already done on or about: .      * * * * If testing is NOT complete, arrange with patient A.S.A.P. * * * *

## (undated) NOTE — LETTER
February 12, 2025  To Whom It May Concern,  Please let this letter certify that Chi Scott is to be excused from work from 2/11/2025-2/12/2025 as he was with his mother Roshni who is currently hospitalized at [facility].   Please feel free to contact me at [phone] with any questions.   Sincerely,  Arsalan Szymanski, DO  [facility]  Hospitalist  Contact via [employer]

## (undated) NOTE — LETTER
Patient Name: Damaris Arias  YOB: 1960          MRN number:  [id number]  Date:  3/24/2017  Referring Physician:  Bill Monroe    Physical Therapy Discharge Summary  Pt has attended 8, cancelled 0, and no shown 0 visits in [address] [place] [address] improved posture in addition to improved spinal mobility might decrease the \"stress\" she experiences in her back to decrease overall onset of pseudoseizures.  Unfortunately, she reports to me that she feels good for a few days following PT but then the ne daily activities suggesting improved tissue balances. NOT MET - 4/10 at rest, but can increase to 8/10 still on R     Plan: Discharge from physical therapy today. Continue with proper posture and HEP independently.  Return to MD for further follow-up on c

## (undated) NOTE — IP AVS SNAPSHOT
[facility] [place] [address] [place], [address] ~ [phone]                Discharge Summary   1/13/2017    Rona Richmond           Admission Information        Provider Department    1/13/2017 Laurie De La Garza MD  3nw Budesonide-Formoterol Fumarate 160-4.5 MCG/ACT Aero   Commonly known as:  SYMBICORT        Inhale 2 puffs into the lungs 2 (two) times daily.     Marizol Aguilar                              BuPROPion HCl 100 MG Tabs   Last time this was given:  100 mg on [address] [phone], [address]  [address], [facility]     Phone:  [phone]    - acetaminophen 325 MG Tabs              Patient Instructions       Any increase in significant pain; nausea/ vomiting, fever >101.1, chills; notify you 4.28 (01/14/17)  13.2 (01/14/17)  40.0 (01/14/17)  93.5 (01/14/17)  30.8 (01/14/17)  33.0  (01/14/17)  178.0     (01/13/17)  9.4 (01/13/17)  4.89 (01/13/17)  14.8 (01/13/17)  45.5 (01/13/17)  93.0 (01/13/17)  30.3 (01/13/17)  32.5  (01/13/17)  212.0 - If you have concerns related to behavioral health issues or thoughts of harming yourself, contact [address] [place] [address] at [phone].     - If you don’t have insurance, Regina Acevedo has partnered with Patient [employer] Deyvi Losartan Potassium (COZAAR) 100 MG Oral Tab         Use: Treat abnormal blood pressure (high or low), cardiac conditions; and/or abnormal heart rates/rhythms   Most common side effects: Dizziness or feeling lightheaded (especially with standing), heart ra What to report to your healthcare team: Changes in thinking, talking or movement, drowsiness, shaking           All Other Medications     Neomycin-Polymyxin-HC 3.5-68798-2 Otic Suspension    Neomycin-Polymyxin-HC 3.5-24920-6 Otic Solution    Tolterodine Ta

## (undated) NOTE — LETTER
OUTSIDE TESTING RESULT REQUEST     IMPORTANT: FOR YOUR IMMEDIATE ATTENTION  Please FAX all test results listed below to: [phone]     Testing already done on or about: ***     * * * * If testing is NOT complete, arrange with patient A.S.A.P. * * * *

## (undated) NOTE — IP AVS SNAPSHOT
[address]            (For Outpatient Use Only) Initial Admit Date: 2022   Inpt/Obs Admit Date: Inpt: 22 / Obs: N/A   Discharge Date:    Alan Ward:  [de-identified]   MRN: [de-identified]   CSN: [id number]   CEID: [id number]        ENCOUNTER  Patient Class: Inpatient Admitting Provider: Shruthi Fermin MD Unit: [place] [address] Service: Surgical Attending Provider: Dennis Hameed MD   Bed: 310-A   Visit Type:   Referring Physician: No ref. provider found Billing Flag:    Admit Diagnosis: Abdominal pain, generalized [R10.84]      PATIENT  Legal Name:   Brain Pacer   Legal Sex: Female  Gender ID: Female             Pref Name:    PCP:  Jean-Claude Briggs, * Home: [phone]   Address:  [address] : 1960 (61 yrs) Mobile: [phone]; [phone]         City/[facility]: [place] [id number] Marital:  Language: [place]: Will SSN4: [id number] Rastafarian: Gnosticist - Jessica*     Race: Other Ethnicity:  Or  [facility] ([facility])*   EMERGENCY CONTACT   Name Relationship Legal Guardian? Home Phone Work Phone Mobile Phone   1. Chi Scott  2.  Malcolm Prado  Daughter          [phone]     GUARANTOR  Guarantor: Enrico Locke : 1960 Home Phone: [phone]   Address: [address]  Sex: Female Work Phone:    City/State/Zip: [address] [place] [address]   Rel. to Patient: Self Guarantor ID: [id number]   [address]   Employer:  Status: RETIRED     COVERAGE  PRIMARY INSURANCE   Payor: [employer] [facility] Plan: Imer [facility] IPA   Group Number: [id number] Insurance Type: [address] Name: Bello Duncan : 1960   Subscriber ID: [id number] Pt Rel to Subscriber: Self   SECONDARY INSURANCE   Payor:  Plan:    Group Number:  Insurance Type:    Subscriber Name:  Subscriber :    Subscriber ID:  Pt Rel to Subscriber:    TERTIARY INSURANCE   Payor:  Plan:    Group Number:  Insurance Type:    Subscriber Name:  Subscriber :    Subscriber ID:  Pt Rel to Subscriber:    Hospital Account Financial Class: Medicare Advantage    2022

## (undated) NOTE — ED AVS SNAPSHOT
Juliet Norton   MRN: [id number]    Department:  [facility] Emergency Department in [place]   Date of Visit:  7/5/2019           Disclosure     Insurance plans vary and the physician(s) referred by the ER may not be covered by your plan.  Please conta tell this physician (or your personal doctor if your instructions are to return to your personal doctor) about any new or lasting problems. The primary care or specialist physician will see patients referred from the [facility] Emergency Department.  Mo Chao

## (undated) NOTE — IP AVS SNAPSHOT
[facility] [place] [address] [place], [address] ~ [phone]                Discharge Summary   1/29/2017    Jaz Presley           Admission Information        Provider Department    1/29/2017 Gregg Arellano MD  7 Last time this was given:  2 puffs on 1/30/2017 12:15 AM   Commonly known as:  PROAIR HFA        Inhale 2 puffs into the lungs every 4 (four) hours as needed for Wheezing or Shortness of Breath.     Monique Becker                           [address] [phone], [address] [address]  [address] [facility]     Phone:  [phone]    - aspirin EC 81 MG Tbec  - Cyclobenzaprine HCl 5 MG Tabs            Discharge References/Attachments     CYCLOBENZAPRINE TABLETS (ENGLISH)      Follow-u **THIS IS NOT A REFERRAL**     Your physician has referred you for Physical Therapy and/or Occupational Therapy affiliated with [address] [place] [address]. The locations are listed below.  To schedule an appointment at any one of our locations, please call: [phone] listed above. It is important that you contact your health insurance directly to determine your therapy benefit coverage including co-payment, deductible, patient payment responsibility, or limit in number of visits.  Co-payment is due at the time Basophil Absolu    (01/30/17)  51.0 (01/30/17)  33.9 (01/30/17)  6.9 (01/30/17)  6.9 (01/30/17)  1.0 -- (01/30/17)  3.75 (01/30/17)  2.49 (01/30/17)  0.51 (01/30/17)  0.51 (H) (01/30/17)  0.07    (01/29/17)  51.8 (01/29/17)  36.4 (01/29/17)  5.8 (01/29/17) Medicaid plans. To get signed up and covered, please call [phone] and ask to get set up for an insurance coverage that is in-network with [facility].         MyChart     Visit [employer]  You can access your MyChart to more actively manage Use:  “Thinning” of blood to prevent clotting within blood vessels, Pain relief   Most common side effects:  Bleeding, stomach upset   What to report to your healthcare team: Unusual bleeding, abdominal pain, dark, loose bowel movements           Blood Pro Use: Treat conditions such as depression and thought disorders   Most common side effects: Dizziness, drowsiness, problems with movement   What to report to your healthcare team: Changes in thinking, talking or movement, drowsiness, shaking           A